# Patient Record
Sex: MALE | Race: WHITE | NOT HISPANIC OR LATINO | Employment: OTHER | URBAN - METROPOLITAN AREA
[De-identification: names, ages, dates, MRNs, and addresses within clinical notes are randomized per-mention and may not be internally consistent; named-entity substitution may affect disease eponyms.]

---

## 2018-05-13 ENCOUNTER — APPOINTMENT (OUTPATIENT)
Dept: GENERAL RADIOLOGY | Facility: CLINIC | Age: 58
End: 2018-05-13
Attending: EMERGENCY MEDICINE

## 2018-05-13 ENCOUNTER — HOSPITAL ENCOUNTER (EMERGENCY)
Facility: CLINIC | Age: 58
Discharge: HOME | End: 2018-05-13
Attending: EMERGENCY MEDICINE | Admitting: EMERGENCY MEDICINE

## 2018-05-13 VITALS
TEMPERATURE: 98.7 F | HEART RATE: 88 BPM | RESPIRATION RATE: 18 BRPM | OXYGEN SATURATION: 94 % | BODY MASS INDEX: 24.34 KG/M2 | SYSTOLIC BLOOD PRESSURE: 135 MMHG | DIASTOLIC BLOOD PRESSURE: 85 MMHG | WEIGHT: 205.25 LBS

## 2018-05-13 DIAGNOSIS — J20.9 ACUTE BRONCHITIS, UNSPECIFIED ORGANISM: ICD-10-CM

## 2018-05-13 LAB
ANION GAP SERPL CALCULATED.3IONS-SCNC: 14 MMOL/L (ref 3–14)
BASOPHILS # BLD AUTO: 0.1 10E9/L (ref 0–0.2)
BASOPHILS NFR BLD AUTO: 1.5 %
BUN SERPL-MCNC: 9 MG/DL (ref 7–30)
CALCIUM SERPL-MCNC: 9 MG/DL (ref 8.5–10.1)
CHLORIDE SERPL-SCNC: 105 MMOL/L (ref 94–109)
CO2 SERPL-SCNC: 25 MMOL/L (ref 20–32)
CREAT SERPL-MCNC: 0.7 MG/DL (ref 0.66–1.25)
DIFFERENTIAL METHOD BLD: ABNORMAL
EOSINOPHIL # BLD AUTO: 0.3 10E9/L (ref 0–0.7)
EOSINOPHIL NFR BLD AUTO: 5.6 %
ERYTHROCYTE [DISTWIDTH] IN BLOOD BY AUTOMATED COUNT: 12.5 % (ref 10–15)
GFR SERPL CREATININE-BSD FRML MDRD: >90 ML/MIN/1.7M2
GLUCOSE SERPL-MCNC: 114 MG/DL (ref 70–99)
HCT VFR BLD AUTO: 41.1 % (ref 40–53)
HGB BLD-MCNC: 14.2 G/DL (ref 13.3–17.7)
IMM GRANULOCYTES # BLD: 0 10E9/L (ref 0–0.4)
IMM GRANULOCYTES NFR BLD: 0.2 %
LYMPHOCYTES # BLD AUTO: 2 10E9/L (ref 0.8–5.3)
LYMPHOCYTES NFR BLD AUTO: 36.5 %
MCH RBC QN AUTO: 36 PG (ref 26.5–33)
MCHC RBC AUTO-ENTMCNC: 34.5 G/DL (ref 31.5–36.5)
MCV RBC AUTO: 104 FL (ref 78–100)
MONOCYTES # BLD AUTO: 0.4 10E9/L (ref 0–1.3)
MONOCYTES NFR BLD AUTO: 7.8 %
NEUTROPHILS # BLD AUTO: 2.6 10E9/L (ref 1.6–8.3)
NEUTROPHILS NFR BLD AUTO: 48.4 %
NRBC # BLD AUTO: 0 10*3/UL
NRBC BLD AUTO-RTO: 0 /100
PLATELET # BLD AUTO: 157 10E9/L (ref 150–450)
POTASSIUM SERPL-SCNC: 3.5 MMOL/L (ref 3.4–5.3)
RBC # BLD AUTO: 3.94 10E12/L (ref 4.4–5.9)
SODIUM SERPL-SCNC: 144 MMOL/L (ref 133–144)
TROPONIN I SERPL-MCNC: <0.015 UG/L (ref 0–0.04)
WBC # BLD AUTO: 5.4 10E9/L (ref 4–11)

## 2018-05-13 PROCEDURE — 93005 ELECTROCARDIOGRAM TRACING: CPT | Performed by: EMERGENCY MEDICINE

## 2018-05-13 PROCEDURE — 85025 COMPLETE CBC W/AUTO DIFF WBC: CPT | Performed by: EMERGENCY MEDICINE

## 2018-05-13 PROCEDURE — 99284 EMERGENCY DEPT VISIT MOD MDM: CPT | Mod: 25 | Performed by: EMERGENCY MEDICINE

## 2018-05-13 PROCEDURE — 84484 ASSAY OF TROPONIN QUANT: CPT | Performed by: EMERGENCY MEDICINE

## 2018-05-13 PROCEDURE — 25000125 ZZHC RX 250: Performed by: EMERGENCY MEDICINE

## 2018-05-13 PROCEDURE — 93010 ELECTROCARDIOGRAM REPORT: CPT | Mod: Z6 | Performed by: EMERGENCY MEDICINE

## 2018-05-13 PROCEDURE — 99285 EMERGENCY DEPT VISIT HI MDM: CPT | Mod: 25 | Performed by: EMERGENCY MEDICINE

## 2018-05-13 PROCEDURE — 71046 X-RAY EXAM CHEST 2 VIEWS: CPT

## 2018-05-13 PROCEDURE — 80048 BASIC METABOLIC PNL TOTAL CA: CPT | Performed by: EMERGENCY MEDICINE

## 2018-05-13 PROCEDURE — 94640 AIRWAY INHALATION TREATMENT: CPT | Performed by: EMERGENCY MEDICINE

## 2018-05-13 RX ORDER — AZITHROMYCIN 250 MG/1
TABLET, FILM COATED ORAL
Qty: 6 TABLET | Refills: 0 | Status: SHIPPED | OUTPATIENT
Start: 2018-05-13 | End: 2018-05-18

## 2018-05-13 RX ORDER — ALBUTEROL SULFATE 90 UG/1
2 AEROSOL, METERED RESPIRATORY (INHALATION) EVERY 6 HOURS PRN
Qty: 1 INHALER | Refills: 0 | Status: ON HOLD | OUTPATIENT
Start: 2018-05-13 | End: 2019-07-08

## 2018-05-13 RX ORDER — IPRATROPIUM BROMIDE AND ALBUTEROL SULFATE 2.5; .5 MG/3ML; MG/3ML
3 SOLUTION RESPIRATORY (INHALATION) ONCE
Status: COMPLETED | OUTPATIENT
Start: 2018-05-13 | End: 2018-05-13

## 2018-05-13 RX ADMIN — IPRATROPIUM BROMIDE AND ALBUTEROL SULFATE 3 ML: .5; 3 SOLUTION RESPIRATORY (INHALATION) at 12:05

## 2018-05-13 ASSESSMENT — ENCOUNTER SYMPTOMS
EYE REDNESS: 0
HEADACHES: 0
COUGH: 1
SINUS PAIN: 0
FEVER: 0
MYALGIAS: 1
SHORTNESS OF BREATH: 0
ABDOMINAL PAIN: 0
RHINORRHEA: 0
CONFUSION: 0
COLOR CHANGE: 0
NECK STIFFNESS: 0
ARTHRALGIAS: 0
SORE THROAT: 0
DIFFICULTY URINATING: 0

## 2018-05-13 NOTE — ED AVS SNAPSHOT
[facility], [place], Emergency Department    [address]    [place] [address]    Phone:  [phone]    Fax:  [phone]                                       Sohan Marcano   MRN: [id number]    Department:  [facility], Emergency Department   Date of Visit:  5/13/2018           After Visit Summary Signature Page     I have received my discharge instructions, and my questions have been answered. I have discussed any challenges I see with this plan with the nurse or doctor.    ..........................................................................................................................................  Patient/Patient Representative Signature      ..........................................................................................................................................  Patient Representative Print Name and Relationship to Patient    ..................................................               ................................................  Date                                            Time    ..........................................................................................................................................  Reviewed by Signature/Title    ...................................................              ..............................................  Date                                                            Time

## 2018-05-13 NOTE — ED AVS SNAPSHOT
[facility], Emergency Department    [address]    [place] [address]    Phone:  [phone]    Fax:  [phone]                                       Sohan Marcano   MRN: [id number]    Department:  [facility], Emergency Department   Date of Visit:  5/13/2018           Patient Information     Date Of Birth          1960        Your diagnoses for this visit were:     Acute bronchitis, unspecified organism        You were seen by Dung Delaney MD.        Discharge Instructions         Acute Bronchitis  Your healthcare provider has told you that you have acute bronchitis. Bronchitis is infection or inflammation of the bronchial tubes (airways in the lungs). Normally, air moves easily in and out of the airways. Bronchitis narrows the airways, making it harder for air to flow in and out of the lungs. This causes symptoms such as shortness of breath, coughing up yellow or green mucus, and wheezing. Bronchitis can be acute or chronic. Acute means the condition comes on quickly and goes away in a short time, usually within 3 to 10 days. Chronic means a condition lasts a long time and often comes back.    What causes acute bronchitis?  Acute bronchitis almost always starts as a viral respiratory infection, such as a cold or the flu. Certain factors make it more likely for a cold or flu to turn into bronchitis. These include being very young, being elderly, having a heart or lung problem, or having a weak immune system. Cigarette smoking also makes bronchitis more likely.  When bronchitis develops, the airways become swollen. The airways may also become infected with bacteria. This is known as a secondary infection.  Diagnosing acute bronchitis  Your healthcare provider will examine you and ask about your symptoms and health history. You may also have a sputum culture to test the fluid in your lungs. Chest X-rays may be done to look for infection in the lungs.  Treating acute bronchitis  Bronchitis  usually clears up as the cold or flu goes away. You can help feel better faster by doing the following:    Take medicine as directed. You may be told to take ibuprofen or other over-the-counter medicines. These help relieve inflammation in your bronchial tubes. Your healthcare provider may prescribe an inhaler to help open up the bronchial tubes. Most of the time, acute bronchitis is caused by a viral infection. Antibiotics are usually not prescribed for viral infections.    Drink plenty of fluids, such as water, juice, or warm soup. Fluids loosen mucus so that you can cough it up. This helps you breathe more easily. Fluids also prevent dehydration.    Make sure you get plenty of rest.    Do not smoke. Do not allow anyone else to smoke in your home.  Recovery and follow-up  Follow up with your doctor as you are told. You will likely feel better in a week or two. But a dry cough can linger beyond that time. Let your doctor know if you still have symptoms (other than a dry cough) after 2 weeks, or if you re prone to getting bronchial infections. Take steps to protect yourself from future infections. These steps include stopping smoking and avoiding tobacco smoke, washing your hands often, and getting a yearly flu shot.  When to call your healthcare provider  Call the healthcare provider if you have any of the following:    Fever of 100.4 F (38.0 C) or higher, or as advised    Symptoms that get worse, or new symptoms    Trouble breathing    Symptoms that don t start to improve within a week, or within 3 days of taking antibiotics   Date Last Reviewed: 12/1/2016 2000-2017 [employer]. [address] [place] [address], [place] [address]. All rights reserved. This information is not intended as a substitute for professional medical care. Always follow your healthcare professional's instructions.      Use albuterol inhaler as directed.  Take complete course of azithromycin.    Please make an appointment to follow up  with Your Primary Care Provider in 1 week.     24 Hour Appointment Hotline       To make an appointment at any [facility], call [phone] ([phone]). If you don't have a family doctor or clinic, we will help you find one. [facility] are conveniently located to serve the needs of you and your family.             Review of your medicines      START taking        Dose / Directions Last dose taken    albuterol 108 (90 Base) MCG/ACT Inhaler   Commonly known as:  PROAIR HFA/PROVENTIL HFA/VENTOLIN HFA   Dose:  2 puff   Quantity:  1 Inhaler        Inhale 2 puffs into the lungs every 6 hours as needed for shortness of breath / dyspnea or wheezing   Refills:  0        azithromycin 250 MG tablet   Commonly known as:  ZITHROMAX Z-SEN   Quantity:  6 tablet        Two tablets on the first day, then one tablet daily for the next 4 days   Refills:  0          Our records show that you are taking the medicines listed below. If these are incorrect, please call your family doctor or clinic.        Dose / Directions Last dose taken    NEW MED        VITAMIN B COMPLEX + VITAMIN C - Take 1 tablet by mouth once daily.   Refills:  0                Prescriptions were sent or printed at these locations (2 Prescriptions)                   Other Prescriptions                Printed at Department/Unit printer (2 of 2)         albuterol (PROAIR HFA/PROVENTIL HFA/VENTOLIN HFA) 108 (90 Base) MCG/ACT Inhaler               azithromycin (ZITHROMAX Z-SEN) 250 MG tablet                Procedures and tests performed during your visit     Basic metabolic panel    CBC with platelets differential    Chest XR,  PA & LAT    EKG 12 lead    Peripheral IV catheter    Troponin I      Orders Needing Specimen Collection     None      Pending Results     Date and Time Order Name Status Description    5/13/2018 1147 Chest XR,  PA & LAT Preliminary             Pending Culture Results     No orders found from 5/11/2018 to 5/14/2018.           "  Pending Results Instructions     If you had any lab results that were not finalized at the time of your Discharge, you can call the ED Lab Result RN at [phone]. You will be contacted by this team for any positive Lab results or changes in treatment. The nurses are available 7 days a week from 10A to 6:30P.  You can leave a message 24 hours per day and they will return your call.        Thank you for choosing [facility]       Thank you for choosing [facility] for your care. Our goal is always to provide you with excellent care. Hearing back from our patients is one way we can continue to improve our services. Please take a few minutes to complete the written survey that you may receive in the mail after you visit with us. Thank you!        [facility] Information     [facility] lets you send messages to your doctor, view your test results, renew your prescriptions, schedule appointments and more. To sign up, go to [email or web address] . Click on \"Log in\" on the left side of the screen, which will take you to the Welcome page. Then click on \"Sign up Now\" on the right side of the page.     You will be asked to enter the access code listed below, as well as some personal information. Please follow the directions to create your username and password.     Your access code is: [email or web address]  Expires: 2018  1:13 PM     Your access code will  in 90 days. If you need help or a new code, please call your [facility] clinic or [phone].        Care EveryWhere ID     This is your Care EveryWhere ID. This could be used by other organizations to access your [facility] medical records  [id number]        Equal Access to Services     [facility]: Hadii prachi Frank, wacarenda luqadaha, qamariano dinhalefren lawler. So [facility] [phone].    ATENCIÓN: Si habla español, tiene a archuleta disposición servicios gratuitos de asistencia lingüística. Llame al [phone].    We comply " with applicable federal civil rights laws and [place] laws. We do not discriminate on the basis of race, color, national origin, age, disability, sex, sexual orientation, or gender identity.            After Visit Summary       This is your record. Keep this with you and show to your community pharmacist(s) and doctor(s) at your next visit.

## 2018-05-13 NOTE — DISCHARGE INSTRUCTIONS

## 2018-05-13 NOTE — ED PROVIDER NOTES
History     Chief Complaint   Patient presents with     Cough     fough for the past 1 week, feels congested in chest,      HPI  Sohan Marcano is a 57 year old male who resents the emergency department for evaluation of cough, chest congestion, and chest pain.  Patient states that he has been ill for the past 3 weeks.  Patient states that his symptoms began with nasal congestion and rhinorrhea.  He subsequent developed a GI illness which included nausea and vomiting.  Those symptoms have subsequently resolved but for the past week, the patient has had a cough is productive of sputum that is mostly clear but occasionally green.  He denies any hemoptysis.  Patient also complains of his chest feeling congested and tight.  Though symptoms are worse with coughing.  Patient denies any fever although states he has had some sweats.  He denies any nausea or vomiting.  No abdominal pain.  No back pain.  Patient denies any leg pain or swelling.  Patient does smoke cigarettes.    I have reviewed the Medications, Allergies, Past Medical and Surgical History, and Social History in the Epic system.    Review of Systems   Constitutional: Negative for fever.   HENT: Negative for congestion, rhinorrhea, sinus pain and sore throat.    Eyes: Negative for redness.   Respiratory: Positive for cough. Negative for shortness of breath.    Cardiovascular: Positive for chest pain.   Gastrointestinal: Negative for abdominal pain.   Genitourinary: Negative for difficulty urinating.   Musculoskeletal: Positive for myalgias. Negative for arthralgias and neck stiffness.   Skin: Negative for color change.   Neurological: Negative for headaches.   Psychiatric/Behavioral: Negative for confusion.   All other systems reviewed and are negative.      Physical Exam   BP: 140/84  Pulse: 105  Temp: 98.7  F (37.1  C)  Resp: 18  Weight: 93.1 kg (205 lb 4 oz)  SpO2: 97 %      Physical Exam   Constitutional: He appears well-developed and well-nourished.  No distress.   HENT:   Head: Normocephalic and atraumatic.   Mouth/Throat: Oropharynx is clear and moist. No oropharyngeal exudate.   Eyes: Pupils are equal, round, and reactive to light. No scleral icterus.   Neck: Normal range of motion. Neck supple.   Cardiovascular: Normal rate, regular rhythm, normal heart sounds and intact distal pulses.    Pulmonary/Chest: Effort normal and breath sounds normal. No respiratory distress.   Abdominal: Soft. Bowel sounds are normal. There is no tenderness.   Musculoskeletal: Normal range of motion. He exhibits no edema or tenderness.   Neurological: He is alert. He has normal strength. Coordination normal.   Skin: Skin is warm and dry. No rash noted. He is not diaphoretic.   Psychiatric: He has a normal mood and affect. His behavior is normal.   Nursing note and vitals reviewed.      ED Course     ED Course     Procedures             EKG Interpretation:      Interpreted by BARNEY SHI MD  Time reviewed: 1205  Symptoms at time of EKG: chest pain   Rhythm: normal sinus   Rate: 84  Axis: Left Axis Deviation  Ectopy: none  Conduction: normal  ST Segments/ T Waves: No ST-T wave changes and No acute ischemic changes  Q Waves: none  Comparison to prior: Resolved atrial fibrillation    Clinical Impression: no acute changes    Results for orders placed or performed during the hospital encounter of 05/13/18   Chest XR,  PA & LAT    Narrative    XR CHEST 2 VW   5/13/2018 12:47 PM     HISTORY: cough;     COMPARISON: None.    FINDINGS: The heart is negative.  Lungs appear to be hyperinflated.  This pattern raises the possibility of emphysematous change. The  pulmonary vasculature is normal.  The bones and soft tissues are  unremarkable.      Impression    IMPRESSION:   1. No active infiltrates are identified.  2. Lungs appear hyperinflated. Question emphysematous change.       CBC with platelets differential   Result Value Ref Range    WBC 5.4 4.0 - 11.0 10e9/L    RBC Count 3.94 (L) 4.4 -  5.9 10e12/L    Hemoglobin 14.2 13.3 - 17.7 g/dL    Hematocrit 41.1 40.0 - 53.0 %     (H) 78 - 100 fl    MCH 36.0 (H) 26.5 - 33.0 pg    MCHC 34.5 31.5 - 36.5 g/dL    RDW 12.5 10.0 - 15.0 %    Platelet Count 157 150 - 450 10e9/L    Diff Method Automated Method     % Neutrophils 48.4 %    % Lymphocytes 36.5 %    % Monocytes 7.8 %    % Eosinophils 5.6 %    % Basophils 1.5 %    % Immature Granulocytes 0.2 %    Nucleated RBCs 0 0 /100    Absolute Neutrophil 2.6 1.6 - 8.3 10e9/L    Absolute Lymphocytes 2.0 0.8 - 5.3 10e9/L    Absolute Monocytes 0.4 0.0 - 1.3 10e9/L    Absolute Eosinophils 0.3 0.0 - 0.7 10e9/L    Absolute Basophils 0.1 0.0 - 0.2 10e9/L    Abs Immature Granulocytes 0.0 0 - 0.4 10e9/L    Absolute Nucleated RBC 0.0    Basic metabolic panel   Result Value Ref Range    Sodium 144 133 - 144 mmol/L    Potassium 3.5 3.4 - 5.3 mmol/L    Chloride 105 94 - 109 mmol/L    Carbon Dioxide 25 20 - 32 mmol/L    Anion Gap 14 3 - 14 mmol/L    Glucose 114 (H) 70 - 99 mg/dL    Urea Nitrogen 9 7 - 30 mg/dL    Creatinine 0.70 0.66 - 1.25 mg/dL    GFR Estimate >90 >60 mL/min/1.7m2    GFR Estimate If Black >90 >60 mL/min/1.7m2    Calcium 9.0 8.5 - 10.1 mg/dL   Troponin I   Result Value Ref Range    Troponin I ES <0.015 0.000 - 0.045 ug/L     Critical Care time:    Medications   ipratropium - albuterol 0.5 mg/2.5 mg/3 mL (DUONEB) neb solution 3 mL (3 mLs Nebulization Given 5/13/18 1205)         Assessments & Plan (with Medical Decision Making)   57 year old male tobacco smoker to the emergency department for evaluation of productive cough, chest congestion, and chest tightness.  His symptoms began 3-4 weeks ago with a URI followed by a gastrointestinal illness.  Those symptoms resolved with the patient has persistent productive cough, chest congestion, and chest tightness.  Emergency department is vital signs are normal.  He is not hypoxic.  Patient does not have leg pain, swelling, hemoptysis, pleuritic chest pain, or  hypoxia to suggest pulmonary embolus.  The patient's chest radiograph does not reveal any infiltrate.  His EKG does not reveal any ischemic changes troponin is negative so do not suspect cardiac etiology.  His labs are normal.  Suspect his symptoms are related to a bronchitis/COPD exacerbation.  The patient continues to smoke and does have emphysematous change on his chest radiograph.  He was advised to discontinue smoking.  He will be discharged home with a Zithromax Dosepak.  Albuterol MDI prescribed.  Primary care follow-up in 1 week recommended.    I have reviewed the nursing notes.    I have reviewed the findings, diagnosis, plan and need for follow up with the patient.    New Prescriptions    ALBUTEROL (PROAIR HFA/PROVENTIL HFA/VENTOLIN HFA) 108 (90 BASE) MCG/ACT INHALER    Inhale 2 puffs into the lungs every 6 hours as needed for shortness of breath / dyspnea or wheezing    AZITHROMYCIN (ZITHROMAX Z-SEN) 250 MG TABLET    Two tablets on the first day, then one tablet daily for the next 4 days       Final diagnoses:   Acute bronchitis, unspecified organism       5/13/2018   [facility], [place], EMERGENCY DEPARTMENT     Dung Delaney MD  05/13/18 4607

## 2018-05-14 LAB — INTERPRETATION ECG - MUSE: NORMAL

## 2018-09-23 ENCOUNTER — HOSPITAL ENCOUNTER (INPATIENT)
Facility: CLINIC | Age: 58
LOS: 1 days | Discharge: HOME | DRG: 391 | End: 2018-09-24
Attending: FAMILY MEDICINE | Admitting: INTERNAL MEDICINE

## 2018-09-23 ENCOUNTER — APPOINTMENT (OUTPATIENT)
Dept: ULTRASOUND IMAGING | Facility: CLINIC | Age: 58
DRG: 391 | End: 2018-09-23
Attending: FAMILY MEDICINE

## 2018-09-23 ENCOUNTER — APPOINTMENT (OUTPATIENT)
Dept: GENERAL RADIOLOGY | Facility: CLINIC | Age: 58
DRG: 391 | End: 2018-09-23
Attending: FAMILY MEDICINE

## 2018-09-23 DIAGNOSIS — K85.90 PANCREATITIS, UNSPECIFIED PANCREATITIS TYPE: ICD-10-CM

## 2018-09-23 DIAGNOSIS — R07.9 CHEST PAIN, UNSPECIFIED TYPE: ICD-10-CM

## 2018-09-23 DIAGNOSIS — F10.10 ALCOHOL ABUSE, EPISODIC DRINKING BEHAVIOR: ICD-10-CM

## 2018-09-23 PROBLEM — R10.9 ABDOMINAL PAIN: Status: ACTIVE | Noted: 2018-09-23

## 2018-09-23 LAB
ALBUMIN SERPL-MCNC: 3.5 G/DL (ref 3.4–5)
ALP SERPL-CCNC: 103 U/L (ref 40–150)
ALT SERPL W P-5'-P-CCNC: 74 U/L (ref 0–70)
ANION GAP SERPL CALCULATED.3IONS-SCNC: 8 MMOL/L (ref 3–14)
AST SERPL W P-5'-P-CCNC: 100 U/L (ref 0–45)
BASOPHILS # BLD AUTO: 0.1 10E9/L (ref 0–0.2)
BASOPHILS NFR BLD AUTO: 1.5 %
BILIRUB DIRECT SERPL-MCNC: 0.7 MG/DL (ref 0–0.2)
BILIRUB SERPL-MCNC: 1.2 MG/DL (ref 0.2–1.3)
BUN SERPL-MCNC: 9 MG/DL (ref 7–30)
CALCIUM SERPL-MCNC: 8.5 MG/DL (ref 8.5–10.1)
CHLORIDE SERPL-SCNC: 106 MMOL/L (ref 94–109)
CO2 SERPL-SCNC: 29 MMOL/L (ref 20–32)
CREAT SERPL-MCNC: 0.8 MG/DL (ref 0.66–1.25)
D DIMER PPP FEU-MCNC: 0.3 UG/ML FEU (ref 0–0.5)
DIFFERENTIAL METHOD BLD: ABNORMAL
EOSINOPHIL # BLD AUTO: 0.2 10E9/L (ref 0–0.7)
EOSINOPHIL NFR BLD AUTO: 2.7 %
ERYTHROCYTE [DISTWIDTH] IN BLOOD BY AUTOMATED COUNT: 12.6 % (ref 10–15)
GFR SERPL CREATININE-BSD FRML MDRD: >90 ML/MIN/1.7M2
GLUCOSE SERPL-MCNC: 141 MG/DL (ref 70–99)
HCT VFR BLD AUTO: 39.9 % (ref 40–53)
HGB BLD-MCNC: 13.8 G/DL (ref 13.3–17.7)
IMM GRANULOCYTES # BLD: 0 10E9/L (ref 0–0.4)
IMM GRANULOCYTES NFR BLD: 0.2 %
INTERPRETATION ECG - MUSE: NORMAL
LIPASE SERPL-CCNC: 760 U/L (ref 73–393)
LYMPHOCYTES # BLD AUTO: 1.9 10E9/L (ref 0.8–5.3)
LYMPHOCYTES NFR BLD AUTO: 23.1 %
MAGNESIUM SERPL-MCNC: 1.8 MG/DL (ref 1.6–2.3)
MCH RBC QN AUTO: 36.7 PG (ref 26.5–33)
MCHC RBC AUTO-ENTMCNC: 34.6 G/DL (ref 31.5–36.5)
MCV RBC AUTO: 106 FL (ref 78–100)
MONOCYTES # BLD AUTO: 1 10E9/L (ref 0–1.3)
MONOCYTES NFR BLD AUTO: 12.5 %
NEUTROPHILS # BLD AUTO: 4.8 10E9/L (ref 1.6–8.3)
NEUTROPHILS NFR BLD AUTO: 60 %
NRBC # BLD AUTO: 0 10*3/UL
NRBC BLD AUTO-RTO: 0 /100
PLATELET # BLD AUTO: 141 10E9/L (ref 150–450)
POTASSIUM SERPL-SCNC: 3.4 MMOL/L (ref 3.4–5.3)
PROT SERPL-MCNC: 8.2 G/DL (ref 6.8–8.8)
RBC # BLD AUTO: 3.76 10E12/L (ref 4.4–5.9)
SODIUM SERPL-SCNC: 143 MMOL/L (ref 133–144)
TROPONIN I BLD-MCNC: 0 UG/L (ref 0–0.1)
TROPONIN I SERPL-MCNC: <0.015 UG/L (ref 0–0.04)
TROPONIN I SERPL-MCNC: <0.015 UG/L (ref 0–0.04)
WBC # BLD AUTO: 8.1 10E9/L (ref 4–11)

## 2018-09-23 PROCEDURE — 25000125 ZZHC RX 250: Performed by: FAMILY MEDICINE

## 2018-09-23 PROCEDURE — 99285 EMERGENCY DEPT VISIT HI MDM: CPT | Mod: 25 | Performed by: FAMILY MEDICINE

## 2018-09-23 PROCEDURE — 25000132 ZZH RX MED GY IP 250 OP 250 PS 637: Performed by: FAMILY MEDICINE

## 2018-09-23 PROCEDURE — 93005 ELECTROCARDIOGRAM TRACING: CPT | Performed by: FAMILY MEDICINE

## 2018-09-23 PROCEDURE — 84484 ASSAY OF TROPONIN QUANT: CPT

## 2018-09-23 PROCEDURE — 83735 ASSAY OF MAGNESIUM: CPT | Performed by: FAMILY MEDICINE

## 2018-09-23 PROCEDURE — 85379 FIBRIN DEGRADATION QUANT: CPT | Performed by: FAMILY MEDICINE

## 2018-09-23 PROCEDURE — 12000001 ZZH R&B MED SURG/OB UMMC

## 2018-09-23 PROCEDURE — 80048 BASIC METABOLIC PNL TOTAL CA: CPT | Performed by: FAMILY MEDICINE

## 2018-09-23 PROCEDURE — 76705 ECHO EXAM OF ABDOMEN: CPT

## 2018-09-23 PROCEDURE — C9113 INJ PANTOPRAZOLE SODIUM, VIA: HCPCS | Performed by: FAMILY MEDICINE

## 2018-09-23 PROCEDURE — 99223 1ST HOSP IP/OBS HIGH 75: CPT | Mod: AI | Performed by: INTERNAL MEDICINE

## 2018-09-23 PROCEDURE — 84484 ASSAY OF TROPONIN QUANT: CPT | Performed by: FAMILY MEDICINE

## 2018-09-23 PROCEDURE — 85025 COMPLETE CBC W/AUTO DIFF WBC: CPT | Performed by: FAMILY MEDICINE

## 2018-09-23 PROCEDURE — 96375 TX/PRO/DX INJ NEW DRUG ADDON: CPT | Performed by: FAMILY MEDICINE

## 2018-09-23 PROCEDURE — 96374 THER/PROPH/DIAG INJ IV PUSH: CPT | Performed by: FAMILY MEDICINE

## 2018-09-23 PROCEDURE — 83605 ASSAY OF LACTIC ACID: CPT | Performed by: INTERNAL MEDICINE

## 2018-09-23 PROCEDURE — 80076 HEPATIC FUNCTION PANEL: CPT | Performed by: FAMILY MEDICINE

## 2018-09-23 PROCEDURE — 25000128 H RX IP 250 OP 636: Performed by: FAMILY MEDICINE

## 2018-09-23 PROCEDURE — 71045 X-RAY EXAM CHEST 1 VIEW: CPT

## 2018-09-23 PROCEDURE — 83690 ASSAY OF LIPASE: CPT | Performed by: FAMILY MEDICINE

## 2018-09-23 PROCEDURE — 36415 COLL VENOUS BLD VENIPUNCTURE: CPT | Performed by: INTERNAL MEDICINE

## 2018-09-23 PROCEDURE — 93010 ELECTROCARDIOGRAM REPORT: CPT | Mod: Z6 | Performed by: FAMILY MEDICINE

## 2018-09-23 RX ORDER — PROCHLORPERAZINE 25 MG
25 SUPPOSITORY, RECTAL RECTAL EVERY 12 HOURS PRN
Status: DISCONTINUED | OUTPATIENT
Start: 2018-09-23 | End: 2018-09-25 | Stop reason: HOSPADM

## 2018-09-23 RX ORDER — SODIUM CHLORIDE, SODIUM LACTATE, POTASSIUM CHLORIDE, CALCIUM CHLORIDE 600; 310; 30; 20 MG/100ML; MG/100ML; MG/100ML; MG/100ML
INJECTION, SOLUTION INTRAVENOUS CONTINUOUS
Status: DISCONTINUED | OUTPATIENT
Start: 2018-09-23 | End: 2018-09-25 | Stop reason: HOSPADM

## 2018-09-23 RX ORDER — AMOXICILLIN 250 MG
1 CAPSULE ORAL 2 TIMES DAILY PRN
Status: DISCONTINUED | OUTPATIENT
Start: 2018-09-23 | End: 2018-09-25 | Stop reason: HOSPADM

## 2018-09-23 RX ORDER — MULTIPLE VITAMINS W/ MINERALS TAB 9MG-400MCG
1 TAB ORAL DAILY
Status: DISCONTINUED | OUTPATIENT
Start: 2018-09-24 | End: 2018-09-25 | Stop reason: HOSPADM

## 2018-09-23 RX ORDER — ALBUTEROL SULFATE 90 UG/1
2 AEROSOL, METERED RESPIRATORY (INHALATION) EVERY 6 HOURS PRN
Status: DISCONTINUED | OUTPATIENT
Start: 2018-09-23 | End: 2018-09-25 | Stop reason: HOSPADM

## 2018-09-23 RX ORDER — ONDANSETRON 2 MG/ML
4 INJECTION INTRAMUSCULAR; INTRAVENOUS EVERY 6 HOURS PRN
Status: DISCONTINUED | OUTPATIENT
Start: 2018-09-23 | End: 2018-09-25 | Stop reason: HOSPADM

## 2018-09-23 RX ORDER — LORAZEPAM 1 MG/1
1-4 TABLET ORAL EVERY 30 MIN PRN
Status: DISCONTINUED | OUTPATIENT
Start: 2018-09-23 | End: 2018-09-25 | Stop reason: HOSPADM

## 2018-09-23 RX ORDER — HYDROMORPHONE HCL/0.9% NACL/PF 0.2MG/0.2
0.2 SYRINGE (ML) INTRAVENOUS ONCE
Status: COMPLETED | OUTPATIENT
Start: 2018-09-23 | End: 2018-09-23

## 2018-09-23 RX ORDER — AMOXICILLIN 250 MG
2 CAPSULE ORAL 2 TIMES DAILY PRN
Status: DISCONTINUED | OUTPATIENT
Start: 2018-09-23 | End: 2018-09-25 | Stop reason: HOSPADM

## 2018-09-23 RX ORDER — ONDANSETRON 4 MG/1
4 TABLET, ORALLY DISINTEGRATING ORAL EVERY 6 HOURS PRN
Status: DISCONTINUED | OUTPATIENT
Start: 2018-09-23 | End: 2018-09-25 | Stop reason: HOSPADM

## 2018-09-23 RX ORDER — ATENOLOL 50 MG/1
50 TABLET ORAL DAILY PRN
Status: DISCONTINUED | OUTPATIENT
Start: 2018-09-23 | End: 2018-09-25 | Stop reason: HOSPADM

## 2018-09-23 RX ORDER — FOLIC ACID 1 MG/1
1 TABLET ORAL DAILY
Status: DISCONTINUED | OUTPATIENT
Start: 2018-09-24 | End: 2018-09-25 | Stop reason: HOSPADM

## 2018-09-23 RX ORDER — LANOLIN ALCOHOL/MO/W.PET/CERES
100 CREAM (GRAM) TOPICAL DAILY
Status: DISCONTINUED | OUTPATIENT
Start: 2018-09-24 | End: 2018-09-25 | Stop reason: HOSPADM

## 2018-09-23 RX ORDER — NALOXONE HYDROCHLORIDE 0.4 MG/ML
.1-.4 INJECTION, SOLUTION INTRAMUSCULAR; INTRAVENOUS; SUBCUTANEOUS
Status: DISCONTINUED | OUTPATIENT
Start: 2018-09-23 | End: 2018-09-25 | Stop reason: HOSPADM

## 2018-09-23 RX ORDER — HYDROMORPHONE HYDROCHLORIDE 1 MG/ML
.3-.5 INJECTION, SOLUTION INTRAMUSCULAR; INTRAVENOUS; SUBCUTANEOUS
Status: DISCONTINUED | OUTPATIENT
Start: 2018-09-23 | End: 2018-09-25 | Stop reason: HOSPADM

## 2018-09-23 RX ORDER — LORAZEPAM 1 MG/1
1 TABLET ORAL ONCE
Status: COMPLETED | OUTPATIENT
Start: 2018-09-23 | End: 2018-09-23

## 2018-09-23 RX ORDER — ALUMINA, MAGNESIA, AND SIMETHICONE 2400; 2400; 240 MG/30ML; MG/30ML; MG/30ML
30 SUSPENSION ORAL EVERY 4 HOURS PRN
Status: DISCONTINUED | OUTPATIENT
Start: 2018-09-23 | End: 2018-09-25 | Stop reason: HOSPADM

## 2018-09-23 RX ORDER — PROCHLORPERAZINE MALEATE 10 MG
10 TABLET ORAL EVERY 6 HOURS PRN
Status: DISCONTINUED | OUTPATIENT
Start: 2018-09-23 | End: 2018-09-25 | Stop reason: HOSPADM

## 2018-09-23 RX ADMIN — Medication 0.2 MG: at 19:37

## 2018-09-23 RX ADMIN — LIDOCAINE HYDROCHLORIDE 30 ML: 20 SOLUTION ORAL; TOPICAL at 19:01

## 2018-09-23 RX ADMIN — LORAZEPAM 1 MG: 1 TABLET ORAL at 21:10

## 2018-09-23 RX ADMIN — PANTOPRAZOLE SODIUM 40 MG: 40 INJECTION, POWDER, FOR SOLUTION INTRAVENOUS at 21:11

## 2018-09-23 ASSESSMENT — ENCOUNTER SYMPTOMS: ABDOMINAL PAIN: 1

## 2018-09-23 NOTE — ED PROVIDER NOTES
"    [facility] [place] [facility] ([facility])    9/23/18     ED 8 6:47 PM   History     Chief Complaint   Patient presents with     Chest Pain     Onset 3 hours ago awoke with mid sternal chest pain radiating to throat, \"burning \" head, increased stress at home with sick relatives, denies nausea or shortness of breath.     The history is provided by the patient and medical records.     Sohan Marcano is a 58 year old male who presents with chest pain. He has a history of atrial fibrillation as well as alcohol abuse and bronchitis.  He was brought in by hs housemate. He states he has been under significant stress. Approximately 18 days ago his sister suffered catastrophic ruptured aortic aneurysm while visiting their brother who is at [facility] for a heart attack. 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 He slept for past 24 hours. He woke up at around 2:40 today and noticed sore throat followed by central chest pain. His head felt like it was burning though temperature was only 99.1  F. The chest pain is migrating upwards and feels like it is up at the top of his throat. He is a smoker, has smoked more over past 2 1/2 weeks given circumstances. He drinks 5-6 drinks a day, last drink was 1/2 an hour ago, states he's not a chronic alcoholic but has noticed that as he has gotten older \"it doesn't agree with the system as well.\" No history of diabetes, hyperlipidemia, hypertension.     I have reviewed the Medications, Allergies, Past Medical and Surgical History, and Social History in the Epic system.    Review of Systems   Cardiovascular: Positive for chest pain.   Gastrointestinal: Positive for abdominal pain (epigastric).   All other systems were reviewed and are negative      Physical Exam   BP: (!) 144/99  Pulse: 96  Heart Rate: 96  Temp: 99.6  F (37.6  C)  Resp: 18  Weight: 83.2 kg (183 lb 5 oz)  SpO2: 100 " %      Physical Exam   Constitutional: He is oriented to person, place, and time. He appears well-developed and well-nourished.   HENT:   Head: Normocephalic and atraumatic.   Mouth/Throat: Oropharynx is clear and moist.   Eyes: EOM are normal. Pupils are equal, round, and reactive to light.   Neck: Normal range of motion. Neck supple. No tracheal deviation present. No thyromegaly present.   Cardiovascular: Normal rate, regular rhythm, normal heart sounds and intact distal pulses.  Exam reveals no gallop and no friction rub.    No murmur heard.  Pulmonary/Chest: Effort normal and breath sounds normal. He exhibits no tenderness.   Abdominal: Soft. Bowel sounds are normal. He exhibits no distension and no mass. There is no tenderness.   Musculoskeletal: He exhibits no edema or tenderness.   Neurological: He is alert and oriented to person, place, and time. No cranial nerve deficit. Coordination normal.   Skin: Skin is warm and dry. No rash noted.   Psychiatric: He has a normal mood and affect. His behavior is normal.   Nursing note and vitals reviewed.      ED Course     ED Course     Procedures             EKG Interpretation:      Interpreted by Melchor Epstein  Time reviewed: 1852  Symptoms at time of EKG: chest pain   Rhythm: normal sinus   Rate: Normal  Axis: Normal  Ectopy: premature ventricular contractions (unifocal)  Conduction: normal  ST Segments/ T Waves: No ST-T wave changes and No acute ischemic changes  Q Waves: nonspecific  Comparison to prior: No old EKG available    Clinical Impression: Normal sinus with PVCs.  No acute ischemic changes.                Critical Care time:  none             Labs Ordered and Resulted from Time of ED Arrival Up to the Time of Departure from the ED   CBC WITH PLATELETS DIFFERENTIAL - Abnormal; Notable for the following:        Result Value    RBC Count 3.76 (*)     Hematocrit 39.9 (*)      (*)     MCH 36.7 (*)     Platelet Count 141 (*)     All other components  within normal limits   BASIC METABOLIC PANEL - Abnormal; Notable for the following:     Glucose 141 (*)     All other components within normal limits   HEPATIC PANEL - Abnormal; Notable for the following:     Bilirubin Direct 0.7 (*)     ALT 74 (*)      (*)     All other components within normal limits   LIPASE - Abnormal; Notable for the following:     Lipase 760 (*)     All other components within normal limits   TROPONIN I   D DIMER QUANTITATIVE   MAGNESIUM   PERIPHERAL IV CATHETER   CARDIAC CONTINUOUS MONITORING   PULSE OXIMETRY NURSING   ISTAT TROPONIN NURSING POCT   TROPONIN POCT            Assessments & Plan (with Medical Decision Making)   58-year-old man with 3 cardiac risk factors presenting with epigastric pain and chest pain of 2-3 hours duration.   Initial differential diagnosis included a life-threatening cause of chest pain such as a ACS, PE, dissection, pneumonia, pneumothorax, pericarditis, Boerhaave tear, pancreatitis, cholecystitis, and other life-threatening as well as nonlife threatening causes of acute chest pain.    On exam vital signs are remarkable for some elevation of the blood pressure but blood pressure symmetric in both arms.  His other vital signs are normal.  His cardiovascular exam is normal.  His lungs are clear to auscultation bilaterally.  Moderate epigastric tenderness to palpation without peritoneal signs. remainder the physical exam benign.  EKG does not show any acute ischemic changes and his initial i-STAT troponin is negative.  Troponin and d-dimer are both negative.  He has elevation of his liver transaminases and his lipase is near 3 times normal.  Right upper quadrant ultrasound shows cholelithiasis without any signs of cholecystitis or obstruction.  This patient appears to have symptoms due to early acute alcoholic pancreatitis.  Based on the history he may be at some risk for mild alcohol withdrawal as well.  Would like to admit him under observation status for  gentle hydration, gentle advancement of diet, serial abdominal exam, MSSA protocol, repeat lipase.  I will discuss with the hospitalist.    I have reviewed the nursing notes.    I have reviewed the findings, diagnosis, plan and need for follow up with the patient.    New Prescriptions    No medications on file       Final diagnoses:   Pancreatitis, unspecified pancreatitis type   Alcohol abuse, episodic drinking behavior       9/23/2018   [facility], [place], EMERGENCY DEPARTMENT     Melchor Epstein MD  09/23/18 6638

## 2018-09-23 NOTE — IP AVS SNAPSHOT
[facility] [address]    [address]    [place] [address]    Phone:  [phone]                                       After Visit Summary   9/23/2018    Sohan Marcano    MRN: [id number]           After Visit Summary Signature Page     I have received my discharge instructions, and my questions have been answered. I have discussed any challenges I see with this plan with the nurse or doctor.    ..........................................................................................................................................  Patient/Patient Representative Signature      ..........................................................................................................................................  Patient Representative Print Name and Relationship to Patient    ..................................................               ................................................  Date                                   Time    ..........................................................................................................................................  Reviewed by Signature/Title    ...................................................              ..............................................  Date                                               Time          22EPIC Rev 08/18

## 2018-09-23 NOTE — IP AVS SNAPSHOT
"                  MRN:[id number]                      After Visit Summary   9/23/2018    Sohan Marcano    MRN: [id number]           Thank you!     Thank you for choosing [facility] for your care. Our goal is always to provide you with excellent care. Hearing back from our patients is one way we can continue to improve our services. Please take a few minutes to complete the written survey that you may receive in the mail after you visit with us. Thank you!        Patient Information     Date Of Birth          1960        Designated Caregiver       Most Recent Value    Caregiver    Will someone help with your care after discharge? no      About your hospital stay     You were admitted on:  September 23, 2018 You last received care in the:  [facility]    You were discharged on:  September 24, 2018       Who to Call     For medical emergencies, please call 911.  For non-urgent questions about your medical care, please call your primary care provider or clinic, None          Attending Provider     Provider Specialty    Melchor Epstein MD Family Practice    Elier Lua MD Internal Medicine       Primary Care Provider Fax #    Physician No Ref-Primary [phone]      Pending Results     No orders found for last 3 day(s).            Admission Information     Date & Time Provider Department Dept. Phone    9/23/2018 Elier Lua MD [facility] [phone]      Your Vitals Were     Blood Pressure Pulse Temperature Respirations Weight Pulse Oximetry    135/76 (BP Location: Right arm) 86 98.1  F (36.7  C) (Oral) 16 83.2 kg (183 lb 5 oz) 96%    BMI (Body Mass Index)                   21.74 kg/m2           MyChart Information     [employer] lets you send messages to your doctor, view your test results, renew your prescriptions, schedule appointments and more. To sign up, go to [email or web address] . Click on \"Log in\" on the left side of the screen, which will take you to the Welcome page. Then click on \"Sign " "up Now\" on the right side of the page.     You will be asked to enter the access code listed below, as well as some personal information. Please follow the directions to create your username and password.     Your access code is: [email or web address]  Expires: 2018  8:20 PM     Your access code will  in 90 days. If you need help or a new code, please call your [facility] clinic or [phone].        Care EveryWhere ID     This is your Care EveryWhere ID. This could be used by other organizations to access your [facility] medical records  [id number]        Equal Access to Services     [facility]: Hadii prachi Frank, page steward, lazara washburn, efren enriquez . So [facility] [phone].    ATENCIÓN: Si habla español, tiene a archuleta disposición servicios gratuitos de asistencia lingüística. Llame al [phone].    We comply with applicable federal civil rights laws and [place] laws. We do not discriminate on the basis of race, color, national origin, age, disability, sex, sexual orientation, or gender identity.               Review of your medicines      UNREVIEWED medicines. Ask your doctor about these medicines        Dose / Directions    albuterol 108 (90 Base) MCG/ACT inhaler   Commonly known as:  PROAIR HFA/PROVENTIL HFA/VENTOLIN HFA        Dose:  2 puff   Inhale 2 puffs into the lungs every 6 hours as needed for shortness of breath / dyspnea or wheezing   Quantity:  1 Inhaler   Refills:  0       NEW MED        VITAMIN B COMPLEX + VITAMIN C - Take 1 tablet by mouth once daily.   Refills:  0                Protect others around you: Learn how to safely use, store and throw away your medicines at www.disposemymeds.org.             Medication List: This is a list of all your medications and when to take them. Check marks below indicate your daily home schedule. Keep this list as a reference.      Medications           Morning Afternoon Evening Bedtime As Needed "    albuterol 108 (90 Base) MCG/ACT inhaler   Commonly known as:  PROAIR HFA/PROVENTIL HFA/VENTOLIN HFA   Inhale 2 puffs into the lungs every 6 hours as needed for shortness of breath / dyspnea or wheezing                                NEW MED   VITAMIN B COMPLEX + VITAMIN C - Take 1 tablet by mouth once daily.

## 2018-09-24 VITALS
TEMPERATURE: 98.1 F | WEIGHT: 183.31 LBS | HEART RATE: 86 BPM | OXYGEN SATURATION: 96 % | RESPIRATION RATE: 16 BRPM | SYSTOLIC BLOOD PRESSURE: 135 MMHG | DIASTOLIC BLOOD PRESSURE: 76 MMHG | BODY MASS INDEX: 21.74 KG/M2

## 2018-09-24 LAB
ALBUMIN SERPL-MCNC: 3.1 G/DL (ref 3.4–5)
ALP SERPL-CCNC: 65 U/L (ref 40–150)
ALT SERPL W P-5'-P-CCNC: 60 U/L (ref 0–70)
AMPHETAMINES UR QL SCN: NEGATIVE
ANION GAP SERPL CALCULATED.3IONS-SCNC: 9 MMOL/L (ref 3–14)
AST SERPL W P-5'-P-CCNC: 71 U/L (ref 0–45)
BARBITURATES UR QL: NEGATIVE
BENZODIAZ UR QL: NEGATIVE
BILIRUB SERPL-MCNC: 2.4 MG/DL (ref 0.2–1.3)
BUN SERPL-MCNC: 10 MG/DL (ref 7–30)
CALCIUM SERPL-MCNC: 7.8 MG/DL (ref 8.5–10.1)
CANNABINOIDS UR QL SCN: NEGATIVE
CHLORIDE SERPL-SCNC: 106 MMOL/L (ref 94–109)
CO2 SERPL-SCNC: 25 MMOL/L (ref 20–32)
COCAINE UR QL: NEGATIVE
CREAT SERPL-MCNC: 0.63 MG/DL (ref 0.66–1.25)
ERYTHROCYTE [DISTWIDTH] IN BLOOD BY AUTOMATED COUNT: 12.4 % (ref 10–15)
ETHANOL UR QL SCN: NEGATIVE
GFR SERPL CREATININE-BSD FRML MDRD: >90 ML/MIN/1.7M2
GLUCOSE SERPL-MCNC: 137 MG/DL (ref 70–99)
HCT VFR BLD AUTO: 35.5 % (ref 40–53)
HGB BLD-MCNC: 12.4 G/DL (ref 13.3–17.7)
LACTATE BLD-SCNC: 1.6 MMOL/L (ref 0.7–2)
LIPASE SERPL-CCNC: 360 U/L (ref 73–393)
MCH RBC QN AUTO: 37 PG (ref 26.5–33)
MCHC RBC AUTO-ENTMCNC: 34.9 G/DL (ref 31.5–36.5)
MCV RBC AUTO: 106 FL (ref 78–100)
OPIATES UR QL SCN: POSITIVE
PCP UR QL SCN: NEGATIVE
PLATELET # BLD AUTO: 117 10E9/L (ref 150–450)
POTASSIUM SERPL-SCNC: 3.4 MMOL/L (ref 3.4–5.3)
PROT SERPL-MCNC: 7 G/DL (ref 6.8–8.8)
RBC # BLD AUTO: 3.35 10E12/L (ref 4.4–5.9)
SODIUM SERPL-SCNC: 140 MMOL/L (ref 133–144)
WBC # BLD AUTO: 9.1 10E9/L (ref 4–11)

## 2018-09-24 PROCEDURE — 85027 COMPLETE CBC AUTOMATED: CPT | Performed by: INTERNAL MEDICINE

## 2018-09-24 PROCEDURE — 80307 DRUG TEST PRSMV CHEM ANLYZR: CPT | Performed by: INTERNAL MEDICINE

## 2018-09-24 PROCEDURE — 25000128 H RX IP 250 OP 636: Performed by: HOSPITALIST

## 2018-09-24 PROCEDURE — 83690 ASSAY OF LIPASE: CPT | Performed by: INTERNAL MEDICINE

## 2018-09-24 PROCEDURE — 12000001 ZZH R&B MED SURG/OB UMMC

## 2018-09-24 PROCEDURE — 99238 HOSP IP/OBS DSCHRG MGMT 30/<: CPT | Performed by: HOSPITALIST

## 2018-09-24 PROCEDURE — 80320 DRUG SCREEN QUANTALCOHOLS: CPT | Performed by: INTERNAL MEDICINE

## 2018-09-24 PROCEDURE — 36415 COLL VENOUS BLD VENIPUNCTURE: CPT | Performed by: INTERNAL MEDICINE

## 2018-09-24 PROCEDURE — 80053 COMPREHEN METABOLIC PANEL: CPT | Performed by: INTERNAL MEDICINE

## 2018-09-24 PROCEDURE — C9113 INJ PANTOPRAZOLE SODIUM, VIA: HCPCS | Performed by: INTERNAL MEDICINE

## 2018-09-24 PROCEDURE — 99233 SBSQ HOSP IP/OBS HIGH 50: CPT | Performed by: HOSPITALIST

## 2018-09-24 PROCEDURE — 25000125 ZZHC RX 250: Performed by: INTERNAL MEDICINE

## 2018-09-24 PROCEDURE — 25000132 ZZH RX MED GY IP 250 OP 250 PS 637: Performed by: INTERNAL MEDICINE

## 2018-09-24 PROCEDURE — 25000128 H RX IP 250 OP 636: Performed by: INTERNAL MEDICINE

## 2018-09-24 RX ORDER — PANTOPRAZOLE SODIUM 40 MG/1
40 TABLET, DELAYED RELEASE ORAL
Status: DISCONTINUED | OUTPATIENT
Start: 2018-09-25 | End: 2018-09-25 | Stop reason: HOSPADM

## 2018-09-24 RX ADMIN — Medication 1 MG: at 00:38

## 2018-09-24 RX ADMIN — Medication 0.5 MG: at 00:33

## 2018-09-24 RX ADMIN — ALUMINUM HYDROXIDE, MAGNESIUM HYDROXIDE, AND DIMETHICONE 30 ML: 400; 400; 40 SUSPENSION ORAL at 00:38

## 2018-09-24 RX ADMIN — SODIUM CHLORIDE, POTASSIUM CHLORIDE, SODIUM LACTATE AND CALCIUM CHLORIDE: 600; 310; 30; 20 INJECTION, SOLUTION INTRAVENOUS at 14:31

## 2018-09-24 RX ADMIN — Medication 0.5 MG: at 04:51

## 2018-09-24 RX ADMIN — MULTIPLE VITAMINS W/ MINERALS TAB 1 TABLET: TAB at 07:39

## 2018-09-24 RX ADMIN — LORAZEPAM 2 MG: 1 TABLET ORAL at 00:42

## 2018-09-24 RX ADMIN — SODIUM CHLORIDE, POTASSIUM CHLORIDE, SODIUM LACTATE AND CALCIUM CHLORIDE: 600; 310; 30; 20 INJECTION, SOLUTION INTRAVENOUS at 09:14

## 2018-09-24 RX ADMIN — FOLIC ACID: 5 INJECTION, SOLUTION INTRAMUSCULAR; INTRAVENOUS; SUBCUTANEOUS at 00:06

## 2018-09-24 RX ADMIN — SODIUM CHLORIDE, POTASSIUM CHLORIDE, SODIUM LACTATE AND CALCIUM CHLORIDE: 600; 310; 30; 20 INJECTION, SOLUTION INTRAVENOUS at 04:14

## 2018-09-24 RX ADMIN — FOLIC ACID 1 MG: 1 TABLET ORAL at 07:39

## 2018-09-24 RX ADMIN — Medication 100 MG: at 07:39

## 2018-09-24 RX ADMIN — PANTOPRAZOLE SODIUM 40 MG: 40 INJECTION, POWDER, FOR SOLUTION INTRAVENOUS at 07:40

## 2018-09-24 ASSESSMENT — ACTIVITIES OF DAILY LIVING (ADL)
ADLS_ACUITY_SCORE: 11
TRANSFERRING: 0 - INDEPENDENT
BATHING: 0 - INDEPENDENT
ADLS_ACUITY_SCORE: 10
ADLS_ACUITY_SCORE: 13
COMMUNICATION: 0 - UNDERSTANDS/COMMUNICATES WITHOUT DIFFICULTY
ADLS_ACUITY_SCORE: 11
ADLS_ACUITY_SCORE: 11
EATING: 0 - INDEPENDENT
SWALLOWING: 0 - SWALLOWS FOODS/LIQUIDS WITHOUT DIFFICULTY
ADLS_ACUITY_SCORE: 10
DRESS: 0 - INDEPENDENT
AMBULATION: 0 - INDEPENDENT
TOILETING: 0 - INDEPENDENT

## 2018-09-24 NOTE — PLAN OF CARE
"Problem: Patient Care Overview  Goal: Plan of Care/Patient Progress Review  Outcome: No Change  Admission to 10A    Pt arrived on unit via WC, transferred independently into bed, oriented to call light and room, performed routine assessment, entered admission data into computer, MD saw pt, VSS, mild tachycardia, on RA, A&Ox3, calm, pain in chest/ upper gastric area intensified - IV dilaudid given, as well as mylanta with good results, MSSA score 10 - ativan given, telemonitoring initiated, PIV fluids started, pt has not voided yet, UA/UC needed, CMS- denies numbness or tingling, call light in reach length, will continue to monitor and assist.        0500: pt voided good amounts in BR but not able to obtain urine specimen. Slightly unsteady on feet and disoriented- bed alarm placed, abdominal pain \"somewhat better\" but still complaining of discomfort- IV dilaudid given, denies nausea, no dizziness, SOB or CP reported but pt has congested bronchospastic nonproductive infrequent cough, inhaler offered but pt refused.     0545: tele monitoring showed a couple of strips of frequent PVCs (bigeminy) and now only occasionally PVCs, MD notified- no response yet, pt denies CP, SOB or dizziness, BP and HR WNL, will continue to monitor and assist.      0700: pt is sleeping, PIV infusing.  "

## 2018-09-24 NOTE — ED NOTES
"[facility], [place]   ED Nurse to Floor Handoff     Sohan Marcano is a 58 year old male who speaks English and lives with others,  in a home  They arrived in the ED by car from home    ED Chief Complaint: Chest Pain (Onset 3 hours ago awoke with mid sternal chest pain radiating to throat, \"burning \" head, increased stress at home with sick relatives, denies nausea or shortness of breath.)    ED Dx;   Final diagnoses:   Pancreatitis, unspecified pancreatitis type   Alcohol abuse, episodic drinking behavior         Needed?: No    Allergies: No Known Allergies.  Past Medical Hx:   Past Medical History:   Diagnosis Date     Actinic keratosis      Basal cell carcinoma      Squamous cell carcinoma      Substance abuse     alcohol      Baseline Mental status: WDL  Current Mental Status changes: at basesline    Infection present or suspected this encounter: no  Sepsis suspected: No  Isolation type: No active isolations     Activity level - Baseline/Home:  Independent  Activity Level - Current:   Independent    Bariatric equipment needed?: No    In the ED these meds were given:   Medications   pantoprazole (PROTONIX) 40 mg IV push injection (not administered)   LORazepam (ATIVAN) tablet 1 mg (not administered)   lidocaine (viscous) (XYLOCAINE) 2 % 15 mL, alum & mag hydroxide-simethicone (MYLANTA ES/MAALOX  ES) 15 mL GI Cocktail (30 mLs Oral Given 9/23/18 1901)   HYDROmorphone (DILAUDID) injection 0.2 mg (0.2 mg Intravenous Given 9/23/18 1937)       Drips running?  No    Home pump  No    Current LDAs  Peripheral IV 09/23/18 Left Upper forearm (Active)   Site Assessment WDL 9/23/2018  6:50 PM   Line Status Saline locked 9/23/2018  6:50 PM   Phlebitis Scale 0-->no symptoms 9/23/2018  6:50 PM   Infiltration Site Treatment Method  None 9/23/2018  6:50 PM   Extravasation? No 9/23/2018  6:50 PM   Dressing Intervention New dressing  9/23/2018  6:50 PM   Number of days:0       Labs results: "   Labs Ordered and Resulted from Time of ED Arrival Up to the Time of Departure from the ED   CBC WITH PLATELETS DIFFERENTIAL - Abnormal; Notable for the following:        Result Value    RBC Count 3.76 (*)     Hematocrit 39.9 (*)      (*)     MCH 36.7 (*)     Platelet Count 141 (*)     All other components within normal limits   BASIC METABOLIC PANEL - Abnormal; Notable for the following:     Glucose 141 (*)     All other components within normal limits   HEPATIC PANEL - Abnormal; Notable for the following:     Bilirubin Direct 0.7 (*)     ALT 74 (*)      (*)     All other components within normal limits   LIPASE - Abnormal; Notable for the following:     Lipase 760 (*)     All other components within normal limits   TROPONIN I   D DIMER QUANTITATIVE   MAGNESIUM   PERIPHERAL IV CATHETER   CARDIAC CONTINUOUS MONITORING   PULSE OXIMETRY NURSING   ISTAT TROPONIN NURSING POCT   TROPONIN POCT       Imaging Studies:   Recent Results (from the past 24 hour(s))   XR Chest Port 1 View    Narrative    CHEST PORTABLE ONE VIEW   9/23/2018 7:32 PM     HISTORY: Extreme dyspnea.     COMPARISON: Chest x-rays dated 5/13/2018.    FINDINGS:  The lateral aspects of the bilateral lungs are not  completely included and cannot be evaluated. Visualized portions of  the lungs are grossly clear. There is mild cephalization of pulmonary  venous structures. Heart size and pulmonary vascularity are otherwise  within normal limits. No fracture or malalignment. No pneumothorax.  Effusions cannot be excluded due to the costophrenic angles not being  included.      Impression    IMPRESSION:  1. Mild pulmonary venous cephalization.  2. No other findings of acute cardiopulmonary disease are identified.  3. Limitations of this study as described above.   Abdomen US, limited (RUQ only)    Narrative    LIMITED ABDOMINAL ULTRASOUND  9/23/2018 8:11 PM     HISTORY:  Abnormal LFTs and lipase, evaluate for cholelithiasis  or  obstruction.    COMPARISON: None.    FINDINGS:    Gallbladder: Hyperechoic shadowing structures in the gallbladder are  consistent with calculi measuring up to 0.7 cm. The gallbladder is  otherwise normal in appearance without wall thickening,  pericholecystic fluid, or sonographic Hawk sign.    Bile ducts:  CHD is normal diameter.  No intrahepatic biliary  dilatation.    Liver: There is diffuse hyperechogenicity and decreased penetration of  liver most consistent with diffuse fatty infiltration. Hypoechoic  lesion in the lateral segment left lobe of liver measures 1.0 cm and  likely represents a cyst. There are some internal echoes which are  likely due to the depth of the finding. No other intrahepatic lesion  or biliary ductal dilatation is seen.    Pancreas:  Partially obscured but grossly unremarkable, where seen.     Right kidney:  Normal.       Impression    IMPRESSION:   1. Cholelithiasis without evidence for acute cholecystitis.  2. Diffuse fatty infiltration of the liver.  3. Hypoechoic lesion in the lateral segment left lobe of liver is most  consistent with a simple cyst. There are some internal echoes which  are likely due to the depth of the lesion and likely not due to  complexity.  4. No other abnormalities are identified.       Recent vital signs:   /88  Pulse 96  Temp 99.6  F (37.6  C) (Oral)  Resp 16  Wt 83.2 kg (183 lb 5 oz)  SpO2 94%  BMI 21.74 kg/m2    Cardiac Rhythm: Normal Sinus  Pt needs tele? No  Skin/wound Issues: None    Code Status: DNR / DNI    Pain control: fair    Nausea control: good    Abnormal labs/tests/findings requiring intervention: see labs    Family present during ED course? No   Family Comments/Social Situation comments: none    Tasks needing completion: None    Raúl David RN  [facility]--     [phone] [place] [facility]  [phone] [facility]

## 2018-09-24 NOTE — PROGRESS NOTES
[facility], [place]    Hospitalist Progress Note    Date of Service (when I saw the patient): 09/24/2018    Assessment & Plan     58-year-old male with history of alcohol abuse, smoking presents with acute chest pain.     # Chest  And Abominal Pain: Likely from pancreatitis, may have acute gastritis. More localized to lower sternum and epigastrium, reports it got better with gi cocktail.  Has family history of MI.  No prior history of similar chest pain or other significant cardiac history. EKG, troponin negative. Unlikely ACS. D-dimer not elevated, pulmonary embolism unlikely. Has lipase elevated.  Given history of alcohol use, possible early mild pancreatitis. Pain started after he drank Alchol and ate Pizza.     Ultrasound abdomen with fatty liver.  Cholelithiasis.  No evidence of acute cholecystitis or bile duct obstruction. Transaminases are elevated, more consistent with alcohol use.     Low fat diet,   IVF  PPI daily  ETOH cessation.      #Alcohol abuse, withdrawal:     Last drink was yesterday afternoon around 2- 3 pm  Drink half a pint of Gin most days  Wants to quit  [facility] protocol  Supportive care    #Tobacco abuse:   Active smoker, has cut back to 2 cigarettes per day  Encourage tobacco cessation  Nicotine gum as needed    DVT Prophylaxis: Enoxaparin (Lovenox) SQ  Code Status: Full Code    Disposition: 1-2 days based on Withdrawal symptoms.     Elder Mitchell MD    Interval History     Chest/Abdomne pain is better. No nausea or vomiting. No new issues noted. He is not withdrawing right now.     -Data reviewed today: I reviewed all new labs and imaging results over the last 24 hours. I personally reviewed no images or EKG's today.    Physical Exam   Temp: 98.1  F (36.7  C) Temp src: Oral BP: 135/76 Pulse: 86 Heart Rate: 70 Resp: 16 SpO2: 96 % O2 Device: None (Room air)    Vitals:    09/23/18 1850   Weight: 83.2 kg (183 lb 5 oz)     Vital Signs with Ranges  Temp:  [98.1  F  (36.7  C)-99.6  F (37.6  C)] 98.1  F (36.7  C)  Pulse:  [86-96] 86  Heart Rate:  [] 70  Resp:  [15-25] 16  BP: (104-159)/(65-99) 135/76  SpO2:  [90 %-100 %] 96 %  I/O last 3 completed shifts:  In: 716.67 [I.V.:716.67]  Out: 300 [Urine:300]    Constitutional:  Awake, alert, cooperative, no apparent distress  Respiratory: Clear to auscultation bilaterally, no crackles or wheezing  Cardiovascular: Regular rate and rhythm, normal S1 and S2, and no murmur noted  GI:  non-tender, BS present.  Skin/Integumen: No rashes, no cyanosis, no edema      Medications     lactated ringers 100 mL/hr at 09/24/18 1431       folic acid  1 mg Oral Daily     [START ON 9/25/2018] influenza vaccine adult (product based on age)  0.5 mL Intramuscular Prior to discharge     multivitamin, therapeutic with minerals  1 tablet Oral Daily     [START ON 9/25/2018] pantoprazole  40 mg Oral QAM AC     thiamine  100 mg Oral Daily       Data     Recent Labs  Lab 09/24/18  0609 09/23/18  2252 09/23/18  1848 09/23/18  1846   WBC 9.1  --   --  8.1   HGB 12.4*  --   --  13.8   *  --   --  106*   *  --   --  141*     --   --  143   POTASSIUM 3.4  --   --  3.4   CHLORIDE 106  --   --  106   CO2 25  --   --  29   BUN 10  --   --  9   CR 0.63*  --   --  0.80   ANIONGAP 9  --   --  8   LEIGHA 7.8*  --   --  8.5   *  --   --  141*   ALBUMIN 3.1*  --   --  3.5   PROTTOTAL 7.0  --   --  8.2   BILITOTAL 2.4*  --   --  1.2   ALKPHOS 65  --   --  103   ALT 60  --   --  74*   AST 71*  --   --  100*   LIPASE 360  --   --  760*   TROPI  --  <0.015  --  <0.015   TROPONIN  --   --  0.00  --        Recent Results (from the past 24 hour(s))   XR Chest Port 1 View    Narrative    CHEST PORTABLE ONE VIEW   9/23/2018 7:32 PM     HISTORY: Extreme dyspnea.     COMPARISON: Chest x-rays dated 5/13/2018.    FINDINGS:  The lateral aspects of the bilateral lungs are not  completely included and cannot be evaluated. Visualized portions of  the lungs are  grossly clear. There is mild cephalization of pulmonary  venous structures. Heart size and pulmonary vascularity are otherwise  within normal limits. No fracture or malalignment. No pneumothorax.  Effusions cannot be excluded due to the costophrenic angles not being  included.      Impression    IMPRESSION:  1. Mild pulmonary venous cephalization.  2. No other findings of acute cardiopulmonary disease are identified.  3. Limitations of this study as described above.    LIANNA JOHNSON MD   Abdomen US, limited (RUQ only)    Narrative    LIMITED ABDOMINAL ULTRASOUND  9/23/2018 8:11 PM     HISTORY:  Abnormal LFTs and lipase, evaluate for cholelithiasis or  obstruction.    COMPARISON: None.    FINDINGS:    Gallbladder: Hyperechoic shadowing structures in the gallbladder are  consistent with calculi measuring up to 0.7 cm. The gallbladder is  otherwise normal in appearance without wall thickening,  pericholecystic fluid, or sonographic Hawk sign.    Bile ducts:  CHD is normal diameter.  No intrahepatic biliary  dilatation.    Liver: There is diffuse hyperechogenicity and decreased penetration of  liver most consistent with diffuse fatty infiltration. Hypoechoic  lesion in the lateral segment left lobe of liver measures 1.0 cm and  likely represents a cyst. There are some internal echoes which are  likely due to the depth of the finding. No other intrahepatic lesion  or biliary ductal dilatation is seen.    Pancreas:  Partially obscured but grossly unremarkable, where seen.     Right kidney:  Normal.       Impression    IMPRESSION:   1. Cholelithiasis without evidence for acute cholecystitis.  2. Diffuse fatty infiltration of the liver.  3. Hypoechoic lesion in the lateral segment left lobe of liver is most  consistent with a simple cyst. There are some internal echoes which  are likely due to the depth of the lesion and likely not due to  complexity.  4. No other abnormalities are identified.    LIANNA JOHNSON MD

## 2018-09-24 NOTE — PLAN OF CARE
Problem: Patient Care Overview  Goal: Plan of Care/Patient Progress Review  Outcome: No Change  Pt denies abd pain. Voiding small amounts, urine concentrated. PIV infusing. MSSA 5; pt's last drink was last night prior to arrival. Slightly unsteady on feet - bed alarm on. ALso not using call light appropriately and needs frequent reminder. CMS intact. Diet just changed from clears to low fat. Pt sleeping most of the shift. Staff will continue to monitor.

## 2018-09-24 NOTE — H&P
History and Physical     Sohan Marcano MRN# [id number]   YOB: 1960 Age: 58 year old      Date of Admission:  9/23/2018      CC:  Chest pain x 3 pm 09.23.18    HPI: Obtained from the patient, chart review, ED provider.     Sohan Marcano is a 58 year old male who presents with chest pain, acute.   He has a history of alcohol abuse, smoking  and Chronic bronchitis.  He states he has been under significant stress recently. Approximately 18 days ago his sister suffered catastrophic ruptured aortic aneurysm while visiting their brother who is at [facility] for a heart attack. 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 He has been exhausted and slept for past 24 hours. He woke up at around ~3pm today and noticed sore throat followed by lower chest pain. His head felt like it was burning though temperature was only 99.1  F. The chest pain is migrating upwards along the sterum and feels like it is up at the top of his throat.   He is a smoker, has smoked more over past 2 1/2 weeks given circumstances. He drinks john 1 pt daily , last drink was 1/2 an hour prior to the presentation to ED.  Alcohol withdrawal -- unknown what to expect as he has not tried to quit drinking for long time. No hx of seizures or DTs.   Denies any other medical problem.   Only takes ibuprofen at times at home, last dose more than a week ago.     At current, patient reports chest pain better, now more localized on lower sternum. Reproducible on palpation.   Denies any Nausea, vomiting, fever. Chills+. No sob. No Lh or dizziness or palpitations.   No prior hx of Pancreatitis or GB or Liver problem.      I have reviewed this patient's Past Medical History, Past Surgical History, Allergies, Family History, Social History, Medications and updated it with pertinent information if needed.    Past Medical History:  Past Medical History:  "  Diagnosis Date     Actinic keratosis      Basal cell carcinoma      Squamous cell carcinoma      Substance abuse     alcohol       Past Surgical History:  Past Surgical History:   Procedure Laterality Date     HERNIA REPAIR         Allergies:   No Known Allergies    Medications:    No current facility-administered medications on file prior to encounter.   Current Outpatient Prescriptions on File Prior to Encounter:  albuterol (PROAIR HFA/PROVENTIL HFA/VENTOLIN HFA) 108 (90 Base) MCG/ACT Inhaler Inhale 2 puffs into the lungs every 6 hours as needed for shortness of breath / dyspnea or wheezing   NEW MED VITAMIN B COMPLEX + VITAMIN C - Take 1 tablet by mouth once daily.   - Ibuprofen prn.     Social History:  Social History     Social History     Marital status: Single     Spouse name: N/A     Number of children: N/A     Years of education: N/A     Occupational History     Not on file.     Social History Main Topics     Smoking status: Current Every Day Smoker     Packs/day: 0.50     Smokeless tobacco: Never Used      Comment: 2014 - 15-16 cigs daily     Alcohol use Yes      Comment: 1pt of john per day     Drug use: No     Sexual activity: Not on file     Other Topics Concern     Not on file     Social History Narrative       Family History:   Family History   Problem Relation Age of Onset     Other - See Comments Mother      Patient states his Mother ? had skin cancer.     Other - See Comments Father      Patient reports Father had \"all types of skin cancer\".     Other - See Comments Sister      Basal cell carcinoma         ROS:  The remainder of the complete ROS was negative unless noted in the HPI.      Exam:    /65  Pulse 96  Temp 99.6  F (37.6  C) (Oral)  Resp 25  Wt 83.2 kg (183 lb 5 oz)  SpO2 95%  BMI 21.74 kg/m2    Temp (24hrs), Av.6  F (37.6  C), Min:99.6  F (37.6  C), Max:99.6  F (37.6  C)      Wt Readings from Last 5 Encounters:   18 83.2 kg (183 lb 5 oz)   18 93.1 kg " (205 lb 4 oz)       General: Alert, interactive, NAD  HEENT: AT/NC, sclera anicteric, Moist MM  Neck: Supple, no JVD or lymphadenopathy  Resp/chest: clear to auscultation bilaterally, no crackles or wheezes  Cardiac/Heart: s1s2 regular rate and rhythm, no murmur  GI/Abdomen: Soft, mild TTP lower sternum, epigastrium, nondistended. +BS.  No rebound or guarding.  MSK/Extremities: No LE edema, distally warm and well perfused.   Skin: Warm and dry, no jaundice or rash on exposed areas.   Neuro: Alert & oriented x 3, grossly intact  Psychiatry: stable mood.       Labs:    BMP  Recent Labs  Lab 18  184      POTASSIUM 3.4   CHLORIDE 106   LEIGHA 8.5   CO2 29   BUN 9   CR 0.80   *     CBC  Recent Labs  Lab 18   WBC 8.1   RBC 3.76*   HGB 13.8   HCT 39.9*   *   MCH 36.7*   MCHC 34.6   RDW 12.6   *     INRNo lab results found in last 7 days.  LFTs  Recent Labs  Lab 18  184   ALKPHOS 103   *   ALT 74*   BILITOTAL 1.2   PROTTOTAL 8.2   ALBUMIN 3.5      PANC  Recent Labs  Lab 18   LIPASE 760*     Lactic acid: 1.6.  D Dimer: 0.3  Ma.8      Imaging:   Recent Results (from the past 24 hour(s))   XR Chest Port 1 View    Narrative    CHEST PORTABLE ONE VIEW   2018 7:32 PM     HISTORY: Extreme dyspnea.     IMPRESSION:  1. Mild pulmonary venous cephalization.  2. No other findings of acute cardiopulmonary disease are identified.  3. Limitations of this study as described above.       Abdomen US, limited (RUQ only)    Narrative    LIMITED ABDOMINAL ULTRASOUND  2018 8:11 PM     HISTORY:  Abnormal LFTs and lipase, evaluate for cholelithiasis or  obstruction..       IMPRESSION:   1. Cholelithiasis without evidence for acute cholecystitis.  2. Diffuse fatty infiltration of the liver.  3. Hypoechoic lesion in the lateral segment left lobe of liver is most  consistent with a simple cyst. There are some internal echoes which  are likely due to the depth of the  lesion and likely not due to  complexity.  4. No other abnormalities are identified.           EKG: Reviewed by me.  Normal sinus rhythm.  No acute ST-T changes.    Lab Results   Component Value Date    TROPI <0.015 09/23/2018    TROPI <0.015 09/23/2018    TROPI <0.015 05/13/2018    TROPI <0.012 03/07/2014    TROPONIN 0.00 09/23/2018       Assessment/ Plan:    58-year-old male with history of alcohol abuse, smoking presents with acute chest pain.    # Chest pain, acute, mid to lower sternum.  No radiation.  More localized to lower sternum and epigastrium after initial treatment in the emergency department.  Family history of MI.  No prior history of similar chest pain or other significant cardiac history.  EKG, troponin negative.  Unlikely ACS.  D-dimer not elevated, pulmonary embolism unlikely.  Patient lipase elevated.  Given history of alcohol use, possible early mild pancreatitis.  However no radiation of pain to the back, no vomiting nausea vomiting at present.   Ultrasound abdomen with fatty liver.  Cholelithiasis.  No evidence of acute cholecystitis or bile duct obstruction.  Transaminases are elevated, more consistent with alcohol use.  Abdominal exam: On impressive.  Possible acute alcohol, NSAID, smoking -Related gastropathy.     Treat supportively: IVF /hr, iv protonix 40 mg daily.  Antacid as needed, antiemetics as needed, iv dilaudid 0.3-0.5 Q3hr prn, advance diet as tolerated to clear  Follow-up liver function, lipase in the am.   Serial clinical exam  Telemetry monitor  Monitor vitals  Avoid nsaids, alcohol or smoking.    #Alcohol abuse, withdrawal:   No signs of withdrawal at present.  Patient withdrawal symptoms unknown.   - check Urine tox screen  - given iv banana bag x 1  -Monitor for alcohol withdrawal using MSSA with lorazepam.   - Mvi, thiamine, folic acid daily  - SW, CD consult in am  - fall precautions.     #Tobacco abuse::   Active smoker  Encourage tobacco cessation  Nicotine gum as  needed    # Pain Assessment:   - Sohan is experiencing pain due to chest, epigastric pain. Pain management was discussed and the plan was created in a collaborative fashion.  Sohan's response to the current recommendations: engaged  - Please see the plan for pain management as documented above      FEN: ivf. adat to clears  Prophylaxis: mechanical.   IV Access: piv  CODE: full     Disposition: Disposition Plan   Expected discharge in 2-3 days to prior living arrangement once medically ready and detoxed     Entered: Milton El 09/23/2018, 11:28 PM         D/w JENNIFER El MD  House Physician  Pager: [phone]    9/23/2018

## 2018-09-25 ENCOUNTER — PATIENT OUTREACH (OUTPATIENT)
Dept: CARE COORDINATION | Facility: CLINIC | Age: 58
End: 2018-09-25

## 2018-09-25 ASSESSMENT — ACTIVITIES OF DAILY LIVING (ADL): ADLS_ACUITY_SCORE: 10

## 2018-09-25 NOTE — PROVIDER NOTIFICATION
Fransisco    S- Pt put on light.  Stated he was ready to go home,  'Had many things to take care of'  Pt upset he had only been able to eat this afternoon.  States no abd pain.  Pt voiding freq w IV fluid infusing  Dr Mitchell informed pt wants to go home.  MD requested pt to sign AMA papers.  Reviewed AMA paperwork w pt, pt agreed and signed. 2020- pt signed the time as 4:20 pm.    PIV discontinue;  Pt steady when up walking in room and swift.   Pt s belonngings including cash and credit cards obtained from [employer].  Pt called friend for ride, left ambulatory  By 2100

## 2018-09-25 NOTE — PROGRESS NOTES
Pt. discharged at 2100 to home and left with personal belongings. Charge nurse discharged pt. Pt wanted to leave, moonlighter notified as there was no discharge orders. Paperwork given and gone over w/ charge nurse. Pt. had no further questions at the time of discharge and no unmet needs were identified.

## 2018-10-28 NOTE — DISCHARGE SUMMARY
[facility]  Discharge Summary    Sohan Marcano MRN# [id number]   YOB: 1960 Age: 58 year old     Date of Admission:  9/23/2018  Date of Discharge:  9/24/2018  8:45 PM  Admitting Physician:  Milton El MD  Discharge Physician:  Elder Mitchell MD  Discharging Service:  Internal Medicine     Primary Provider: No Ref-Primary, Physician              Discharge Diagnosis:     Primary Discharge Diagnosis:    # Chest  And Abominal Pain: Likely from Acute pancreatitis, may have acute gastritis.   # Ho Alcohol Abuse.   # Cholelithiasis on USG  # Tobacco abuse    Past Medical History:   Diagnosis Date     Actinic keratosis      Basal cell carcinoma      Squamous cell carcinoma      Substance abuse     alcohol                    Discharge Medications:     Discharge Medication List as of 9/24/2018  8:21 PM      CONTINUE these medications which have NOT CHANGED    Details   albuterol (PROAIR HFA/PROVENTIL HFA/VENTOLIN HFA) 108 (90 Base) MCG/ACT Inhaler Inhale 2 puffs into the lungs every 6 hours as needed for shortness of breath / dyspnea or wheezing, Disp-1 Inhaler, R-0, Local Print      NEW MED VITAMIN B COMPLEX + VITAMIN C - Take 1 tablet by mouth once daily., Historical                  Hospital Course:     58-year-old male with history of alcohol abuse, smoking presents with acute chest pain.      # Chest  And Abominal Pain: Likely from pancreatitis, may have acute gastritis. Ho Alcohol Abuse. Cholelithiasis on USG:  More localized to lower sternum and epigastrium, reports it got better with gi cocktail.  Has family history of MI.  No prior history of similar chest pain or other significant cardiac history. EKG, troponin negative. Unlikely ACS. D-dimer not elevated, pulmonary embolism unlikely. Has lipase elevated.  Given history of alcohol use, possible early mild pancreatitis. Pain started after he drank Alchol and ate Pizza.  Ultrasound abdomen with fatty liver and  Cholelithiasis.  No evidence of acute cholecystitis or bile duct obstruction. Transaminases are elevated, more consistent with alcohol use. He was started on IVF, Kept NPO and he was on MSSA protocol. Likely pancreatitis from ETOh drinking. He drinks half a pint of Gin most days. He wants to quit. He was being treated for withdrawal from ETOH and his pancreatitis, but he decided go AMA before his pancreatitis coould completely resolve and his withdrawal get better.      #Tobacco abuse:   He is Active smoker a swell. He has cut back to 2 cigarettes per day  Encourage tobacco cessation  Nicotine gum as needed                   Discharge Disposition:     Patient left Home AMA           Condition on Discharge:   Discharge condition: Stable   Code status on discharge: Full Code                 Final Day of Progress before Discharge:       Physical Exam:  Blood pressure 135/76, pulse 86, temperature 98.1  F (36.7  C), temperature source Oral, resp. rate 16, weight 83.2 kg (183 lb 5 oz), SpO2 96 %.  GENERAL: Alert and oriented x 3. NAD.   HEENT: Anicteric sclera. PERRL. Mucous membranes moist and without lesions.   CV: RRR. S1, S2. No murmurs appreciated.   RESPIRATORY: Effort normal. Lungs CTAB with no wheezing, rales, rhonchi.   GI: Abdomen soft and non distended with normoactive bowel sounds present in all quadrants. No tenderness, rebound, guarding.      Data:  All laboratory data reviewed             Significant Results:     Results for orders placed or performed during the hospital encounter of 09/23/18   XR Chest Port 1 View    Narrative    CHEST PORTABLE ONE VIEW   9/23/2018 7:32 PM     HISTORY: Extreme dyspnea.     COMPARISON: Chest x-rays dated 5/13/2018.    FINDINGS:  The lateral aspects of the bilateral lungs are not  completely included and cannot be evaluated. Visualized portions of  the lungs are grossly clear. There is mild cephalization of pulmonary  venous structures. Heart size and pulmonary vascularity  are otherwise  within normal limits. No fracture or malalignment. No pneumothorax.  Effusions cannot be excluded due to the costophrenic angles not being  included.      Impression    IMPRESSION:  1. Mild pulmonary venous cephalization.  2. No other findings of acute cardiopulmonary disease are identified.  3. Limitations of this study as described above.    LIANNA JOHNSON MD   Abdomen US, limited (RUQ only)    Narrative    LIMITED ABDOMINAL ULTRASOUND  9/23/2018 8:11 PM     HISTORY:  Abnormal LFTs and lipase, evaluate for cholelithiasis or  obstruction.    COMPARISON: None.    FINDINGS:    Gallbladder: Hyperechoic shadowing structures in the gallbladder are  consistent with calculi measuring up to 0.7 cm. The gallbladder is  otherwise normal in appearance without wall thickening,  pericholecystic fluid, or sonographic Hawk sign.    Bile ducts:  CHD is normal diameter.  No intrahepatic biliary  dilatation.    Liver: There is diffuse hyperechogenicity and decreased penetration of  liver most consistent with diffuse fatty infiltration. Hypoechoic  lesion in the lateral segment left lobe of liver measures 1.0 cm and  likely represents a cyst. There are some internal echoes which are  likely due to the depth of the finding. No other intrahepatic lesion  or biliary ductal dilatation is seen.    Pancreas:  Partially obscured but grossly unremarkable, where seen.     Right kidney:  Normal.       Impression    IMPRESSION:   1. Cholelithiasis without evidence for acute cholecystitis.  2. Diffuse fatty infiltration of the liver.  3. Hypoechoic lesion in the lateral segment left lobe of liver is most  consistent with a simple cyst. There are some internal echoes which  are likely due to the depth of the lesion and likely not due to  complexity.  4. No other abnormalities are identified.    LIANNA JOHNSON MD   CBC with platelets differential   Result Value Ref Range    WBC 8.1 4.0 - 11.0 10e9/L    RBC Count 3.76 (L) 4.4 - 5.9  10e12/L    Hemoglobin 13.8 13.3 - 17.7 g/dL    Hematocrit 39.9 (L) 40.0 - 53.0 %     (H) 78 - 100 fl    MCH 36.7 (H) 26.5 - 33.0 pg    MCHC 34.6 31.5 - 36.5 g/dL    RDW 12.6 10.0 - 15.0 %    Platelet Count 141 (L) 150 - 450 10e9/L    Diff Method Automated Method     % Neutrophils 60.0 %    % Lymphocytes 23.1 %    % Monocytes 12.5 %    % Eosinophils 2.7 %    % Basophils 1.5 %    % Immature Granulocytes 0.2 %    Nucleated RBCs 0 0 /100    Absolute Neutrophil 4.8 1.6 - 8.3 10e9/L    Absolute Lymphocytes 1.9 0.8 - 5.3 10e9/L    Absolute Monocytes 1.0 0.0 - 1.3 10e9/L    Absolute Eosinophils 0.2 0.0 - 0.7 10e9/L    Absolute Basophils 0.1 0.0 - 0.2 10e9/L    Abs Immature Granulocytes 0.0 0 - 0.4 10e9/L    Absolute Nucleated RBC 0.0    Basic metabolic panel   Result Value Ref Range    Sodium 143 133 - 144 mmol/L    Potassium 3.4 3.4 - 5.3 mmol/L    Chloride 106 94 - 109 mmol/L    Carbon Dioxide 29 20 - 32 mmol/L    Anion Gap 8 3 - 14 mmol/L    Glucose 141 (H) 70 - 99 mg/dL    Urea Nitrogen 9 7 - 30 mg/dL    Creatinine 0.80 0.66 - 1.25 mg/dL    GFR Estimate >90 >60 mL/min/1.7m2    GFR Estimate If Black >90 >60 mL/min/1.7m2    Calcium 8.5 8.5 - 10.1 mg/dL   Troponin I   Result Value Ref Range    Troponin I ES <0.015 0.000 - 0.045 ug/L   D dimer quantitative   Result Value Ref Range    D Dimer 0.3 0.0 - 0.50 ug/ml FEU   Hepatic panel   Result Value Ref Range    Bilirubin Direct 0.7 (H) 0.0 - 0.2 mg/dL    Bilirubin Total 1.2 0.2 - 1.3 mg/dL    Albumin 3.5 3.4 - 5.0 g/dL    Protein Total 8.2 6.8 - 8.8 g/dL    Alkaline Phosphatase 103 40 - 150 U/L    ALT 74 (H) 0 - 70 U/L     (H) 0 - 45 U/L   Lipase   Result Value Ref Range    Lipase 760 (H) 73 - 393 U/L   Magnesium   Result Value Ref Range    Magnesium 1.8 1.6 - 2.3 mg/dL   Troponin I   Result Value Ref Range    Troponin I ES <0.015 0.000 - 0.045 ug/L   Lactic acid level STAT for sepsis protocol   Result Value Ref Range    Lactate for Sepsis Protocol 1.6 0.7 - 2.0  mmol/L   Drug abuse screen 8 urine (UR)   Result Value Ref Range    Amphetamine Qual Urine Negative NEG^Negative    Barbiturates Qual Urine Negative NEG^Negative    Benzodiazepine Qual Urine Negative NEG^Negative    Cannabinoids Qual Urine Negative NEG^Negative    Cocaine Qual Urine Negative NEG^Negative    Ethanol Qual Urine Negative NEG^Negative    Opiates Qualitative Urine Positive (A) NEG^Negative    PCP Qual Urine Negative NEG^Negative   Comprehensive metabolic panel   Result Value Ref Range    Sodium 140 133 - 144 mmol/L    Potassium 3.4 3.4 - 5.3 mmol/L    Chloride 106 94 - 109 mmol/L    Carbon Dioxide 25 20 - 32 mmol/L    Anion Gap 9 3 - 14 mmol/L    Glucose 137 (H) 70 - 99 mg/dL    Urea Nitrogen 10 7 - 30 mg/dL    Creatinine 0.63 (L) 0.66 - 1.25 mg/dL    GFR Estimate >90 >60 mL/min/1.7m2    GFR Estimate If Black >90 >60 mL/min/1.7m2    Calcium 7.8 (L) 8.5 - 10.1 mg/dL    Bilirubin Total 2.4 (H) 0.2 - 1.3 mg/dL    Albumin 3.1 (L) 3.4 - 5.0 g/dL    Protein Total 7.0 6.8 - 8.8 g/dL    Alkaline Phosphatase 65 40 - 150 U/L    ALT 60 0 - 70 U/L    AST 71 (H) 0 - 45 U/L   CBC with platelets   Result Value Ref Range    WBC 9.1 4.0 - 11.0 10e9/L    RBC Count 3.35 (L) 4.4 - 5.9 10e12/L    Hemoglobin 12.4 (L) 13.3 - 17.7 g/dL    Hematocrit 35.5 (L) 40.0 - 53.0 %     (H) 78 - 100 fl    MCH 37.0 (H) 26.5 - 33.0 pg    MCHC 34.9 31.5 - 36.5 g/dL    RDW 12.4 10.0 - 15.0 %    Platelet Count 117 (L) 150 - 450 10e9/L   Lipase   Result Value Ref Range    Lipase 360 73 - 393 U/L   EKG 12 lead   Result Value Ref Range    Interpretation ECG Click View Image link to view waveform and result    Troponin POCT   Result Value Ref Range    Troponin I 0.00 0.00 - 0.10 ug/L      No results found for this or any previous visit (from the past 48 hour(s)).             Pending Results:   Unresulted Labs Ordered in the Past 30 Days of this Admission     No orders found from 7/25/2018 to 9/24/2018.                  Discharge  Instructions and Follow-Up:   No discharge procedures on file.         Elder Mitchell  Internal Medicine Staff Hospitalist  [phone]  October 28, 2018        Time spent on patient: 20 minutes total including face to face and coordinating care time reviewing current illness, any medication changes, and the care plan for today.

## 2019-06-27 ENCOUNTER — HOSPITAL ENCOUNTER (EMERGENCY)
Facility: CLINIC | Age: 59
Discharge: HOME | End: 2019-06-27
Attending: EMERGENCY MEDICINE | Admitting: EMERGENCY MEDICINE

## 2019-06-27 VITALS
WEIGHT: 205.7 LBS | RESPIRATION RATE: 18 BRPM | BODY MASS INDEX: 24.39 KG/M2 | DIASTOLIC BLOOD PRESSURE: 79 MMHG | TEMPERATURE: 98.1 F | SYSTOLIC BLOOD PRESSURE: 130 MMHG | OXYGEN SATURATION: 96 % | HEART RATE: 68 BPM

## 2019-06-27 DIAGNOSIS — F10.920 ALCOHOLIC INTOXICATION WITHOUT COMPLICATION (H): ICD-10-CM

## 2019-06-27 DIAGNOSIS — F10.10 CHRONIC ALCOHOL ABUSE: ICD-10-CM

## 2019-06-27 DIAGNOSIS — F10.220 ALCOHOL DEPENDENCE WITH UNCOMPLICATED INTOXICATION (H): ICD-10-CM

## 2019-06-27 LAB — ALCOHOL BREATH TEST: 0.18 (ref 0–0.01)

## 2019-06-27 PROCEDURE — 99285 EMERGENCY DEPT VISIT HI MDM: CPT | Performed by: EMERGENCY MEDICINE

## 2019-06-27 PROCEDURE — 99283 EMERGENCY DEPT VISIT LOW MDM: CPT | Mod: Z6 | Performed by: EMERGENCY MEDICINE

## 2019-06-27 PROCEDURE — 82075 ASSAY OF BREATH ETHANOL: CPT | Performed by: EMERGENCY MEDICINE

## 2019-06-27 RX ORDER — MAGNESIUM OXIDE 400 MG/1
800 TABLET ORAL ONCE
Status: DISCONTINUED | OUTPATIENT
Start: 2019-06-27 | End: 2019-06-27 | Stop reason: HOSPADM

## 2019-06-27 RX ORDER — LANOLIN ALCOHOL/MO/W.PET/CERES
100 CREAM (GRAM) TOPICAL ONCE
Status: DISCONTINUED | OUTPATIENT
Start: 2019-06-27 | End: 2019-06-27 | Stop reason: HOSPADM

## 2019-06-27 RX ORDER — MULTIVITAMIN,THERAPEUTIC
1 TABLET ORAL ONCE
Status: DISCONTINUED | OUTPATIENT
Start: 2019-06-27 | End: 2019-06-27 | Stop reason: HOSPADM

## 2019-06-27 RX ORDER — FOLIC ACID 1 MG/1
1 TABLET ORAL ONCE
Status: DISCONTINUED | OUTPATIENT
Start: 2019-06-27 | End: 2019-06-27 | Stop reason: HOSPADM

## 2019-06-27 ASSESSMENT — ENCOUNTER SYMPTOMS
ABDOMINAL PAIN: 0
HEADACHES: 0
FEVER: 0
COLOR CHANGE: 0
DIFFICULTY URINATING: 0
SHORTNESS OF BREATH: 0
NAUSEA: 0
CONFUSION: 0
SORE THROAT: 0
COUGH: 1
ARTHRALGIAS: 0
VOMITING: 0

## 2019-06-27 NOTE — ED PROVIDER NOTES
"  History     Chief Complaint   Patient presents with     Alcohol Intoxication     Last drink was 45 min PTA; states he will go into withdrawal in 3-4 hours; pt is trying to quit; does not have a detox bed on hold; drinks 1 liter of jhon daily; occ cocaine use, smokes--last use 4 days ago     Suicidal     \"I am weighing my options\"; just having thoughts, methods but no specific plan or intent     The history is provided by the patient and medical records.     Sohan Marcano is a 58 year old male with a medical history significant for alcohol dependence with alcohol withdrawal, substance abuse, pancreatitis, atrial fibrillation, acute bronchitis, basal cell carcinoma, and squamous cell carcinoma who presents to the Emergency Department seeking detox from alcohol. Patient reports that he has been drinking 1 liter of john daily for the past 15 years, prior to that he was drinking 2 liters of john daily. His last drink was a couple of hours ago, noting that he wanted to make sure he had plenty of time before he goes into alcohol withdrawal. He denies a history of withdrawal seizures. He notes a history of withdrawal hallucinations, none today. He reports that he had \"1 puff\" of cocaine 4-5 days ago. He denies other street drugs. He denies a history of mental health problems. He denies suicidal ideation; however, notes that he does not want to be a burden on others and has \"considered (his) options\" because \"none of the men in my family have lived past the age of 60\".  However denies active current SI.  He denies a fever. He denies nausea or vomiting.     Breathalyzer here in the ED is 0.184.    Past Medical History:   Diagnosis Date     Actinic keratosis      Basal cell carcinoma      Squamous cell carcinoma      Substance abuse (H)     alcohol       Past Surgical History:   Procedure Laterality Date     HERNIA REPAIR         Family History   Problem Relation Age of Onset     Other - See Comments Mother         " "Patient states his Mother ? had skin cancer.     Other - See Comments Father         Patient reports Father had \"all types of skin cancer\".     Other - See Comments Sister         Basal cell carcinoma       Social History     Tobacco Use     Smoking status: Current Every Day Smoker     Packs/day: 0.50     Smokeless tobacco: Never Used     Tobacco comment: currently 2 cigs daily   Substance Use Topics     Alcohol use: Yes     Comment: 1 L of john per day       No current facility-administered medications for this encounter.      Current Outpatient Medications   Medication     NEW MED     albuterol (PROAIR HFA/PROVENTIL HFA/VENTOLIN HFA) 108 (90 Base) MCG/ACT Inhaler      No Known Allergies    I have reviewed the Medications, Allergies, Past Medical and Surgical History, and Social History in the Epic system.    Review of Systems   Constitutional: Negative for fever.   HENT: Negative for congestion and sore throat.    Respiratory: Positive for cough. Negative for shortness of breath.    Cardiovascular: Negative for chest pain.   Gastrointestinal: Negative for abdominal pain, nausea and vomiting.   Genitourinary: Negative for difficulty urinating.   Musculoskeletal: Negative for arthralgias.   Skin: Negative for color change.   Neurological: Negative for headaches.   Psychiatric/Behavioral: Negative for confusion and suicidal ideas.   All other systems reviewed and are negative.      Physical Exam   BP: 160/89  Pulse: 128  Temp: 98.1  F (36.7  C)  Resp: 18  Weight: 93.3 kg (205 lb 11.2 oz)  SpO2: 96 %      Physical Exam   Constitutional: He is oriented to person, place, and time. He appears well-developed and well-nourished.   Adult male, alert, cooperative, NAD.  A bit tangential, but speech is not slurred.   HENT:   Head: Normocephalic.   Mouth/Throat: Oropharynx is clear and moist.   Eyes: Pupils are equal, round, and reactive to light.   Cardiovascular: Regular rhythm and normal heart sounds. Exam reveals no " gallop.   No murmur heard.  tachycardic   Pulmonary/Chest: Effort normal and breath sounds normal. No respiratory distress. He has no wheezes. He has no rales.   Abdominal: Soft. Bowel sounds are normal. He exhibits no distension. There is no tenderness. There is no rebound and no guarding.   Neurological: He is alert and oriented to person, place, and time.   Skin: Skin is warm and dry.   Psychiatric:   Somewhat unkempt. Cooperative. Reactive affect, laughing and joking. No SI. No psychosis.  Insight good.    Nursing note and vitals reviewed.      ED Course   4:06 PM  The patient was seen and examined by Jaclyn Callahan MD in Room ED16A.        Procedures             Critical Care time:  none             Labs Ordered and Resulted from Time of ED Arrival Up to the Time of Departure from the ED   ALCOHOL BREATH TEST POCT - Abnormal; Notable for the following components:       Result Value    Alcohol Breath Test 0.184 (*)     All other components within normal limits            Assessments & Plan (with Medical Decision Making)   This is a 58-year-old male who presents to the Emergency Department today requesting detox.  Patient reports that he has been through detox here before and today decided that he wanted to stop drinking.  He has been a heavy drinker for several decades.  Drinking 1 L of john daily.  Initial breathalyzer here in the Emergency Department is 0.184.  He is alert, cooperative, very talkative, laughing and joking.  He does not appear to be in any acute distress.  I do not see any sign of withdrawal at present.  He is not suicidal.    I did check our detox unit and there are no beds available here.  I did offer to have nursing staff check public detox facilities such as [facility] or [address] [place] [address] and the patient declined to this.  Given patient information about how to go about getting a detox bed and instructed him to call tomorrow in the morning specifically to find out if detox beds are  available.  Oral rally pack here in the ED.     This part of the medical record was transcribed by Gabriel Cisneros Medical Scribe, from a dictation done by Jaclyn Callahan MD.    I have reviewed the nursing notes.    I have reviewed the findings, diagnosis, plan and need for follow up with the patient.       Medication List      There are no discharge medications for this visit.         Final diagnoses:   Chronic alcohol abuse   Alcoholic intoxication without complication (H)     I, Gabriel Cisneros, am serving as a trained medical scribe to document services personally performed by Jaclyn Callahan MD, based on the provider's statements to me.      I, Jaclyn Callahan MD, was physically present and have reviewed and verified the accuracy of this note documented by Gabriel Cisneros.     6/27/2019   [facility], [place], EMERGENCY DEPARTMENT     Jaclyn Callahan MD  06/27/19 2016

## 2019-06-27 NOTE — ED AVS SNAPSHOT
[facility], [place], Emergency Department  [address]  [facility] [place] [address]  Phone:  [phone]  Fax:  [phone]                                    Sohan Marcano   MRN: [id number]    Department:  [facility], Emergency Department   Date of Visit:  6/27/2019           After Visit Summary Signature Page    I have received my discharge instructions, and my questions have been answered. I have discussed any challenges I see with this plan with the nurse or doctor.    ..........................................................................................................................................  Patient/Patient Representative Signature      ..........................................................................................................................................  Patient Representative Print Name and Relationship to Patient    ..................................................               ................................................  Date                                   Time    ..........................................................................................................................................  Reviewed by Signature/Title    ...................................................              ..............................................  Date                                               Time          22EPIC Rev 08/18

## 2019-06-27 NOTE — DISCHARGE INSTRUCTIONS
You have been seen in the emergency department today for detox.  We do not have any detox beds available here at [facility] today as they are completely full.  We have offered to check the public detox facilities for you to find out if beds are available there and you have declined this.  We advise that you call the phone number below in the morning to try to find out if and when detox beds will be available for you.    [facility] Intake:  [phone]

## 2019-07-04 ENCOUNTER — HOSPITAL ENCOUNTER (INPATIENT)
Facility: CLINIC | Age: 59
LOS: 3 days | Discharge: HOME | End: 2019-07-08
Attending: EMERGENCY MEDICINE | Admitting: PSYCHIATRY & NEUROLOGY
Payer: MEDICAID

## 2019-07-04 DIAGNOSIS — F10.220 ALCOHOL DEPENDENCE WITH UNCOMPLICATED INTOXICATION (H): ICD-10-CM

## 2019-07-04 LAB
ALBUMIN SERPL-MCNC: 3 G/DL (ref 3.4–5)
ALCOHOL BREATH TEST: 0.12 (ref 0–0.01)
ALP SERPL-CCNC: 103 U/L (ref 40–150)
ALT SERPL W P-5'-P-CCNC: 52 U/L (ref 0–70)
ANION GAP SERPL CALCULATED.3IONS-SCNC: 9 MMOL/L (ref 3–14)
AST SERPL W P-5'-P-CCNC: 106 U/L (ref 0–45)
BASOPHILS # BLD AUTO: 0.1 10E9/L (ref 0–0.2)
BASOPHILS NFR BLD AUTO: 2.2 %
BILIRUB SERPL-MCNC: 3.7 MG/DL (ref 0.2–1.3)
BUN SERPL-MCNC: 7 MG/DL (ref 7–30)
CALCIUM SERPL-MCNC: 8.3 MG/DL (ref 8.5–10.1)
CHLORIDE SERPL-SCNC: 107 MMOL/L (ref 94–109)
CO2 SERPL-SCNC: 26 MMOL/L (ref 20–32)
CREAT SERPL-MCNC: 0.73 MG/DL (ref 0.66–1.25)
DIFFERENTIAL METHOD BLD: ABNORMAL
EOSINOPHIL # BLD AUTO: 0.4 10E9/L (ref 0–0.7)
EOSINOPHIL NFR BLD AUTO: 6.3 %
ERYTHROCYTE [DISTWIDTH] IN BLOOD BY AUTOMATED COUNT: 13.4 % (ref 10–15)
GFR SERPL CREATININE-BSD FRML MDRD: >90 ML/MIN/{1.73_M2}
GLUCOSE SERPL-MCNC: 107 MG/DL (ref 70–99)
HCT VFR BLD AUTO: 39.3 % (ref 40–53)
HGB BLD-MCNC: 13.7 G/DL (ref 13.3–17.7)
IMM GRANULOCYTES # BLD: 0 10E9/L (ref 0–0.4)
IMM GRANULOCYTES NFR BLD: 0.2 %
LYMPHOCYTES # BLD AUTO: 2.1 10E9/L (ref 0.8–5.3)
LYMPHOCYTES NFR BLD AUTO: 32.8 %
MAGNESIUM SERPL-MCNC: 2.1 MG/DL (ref 1.6–2.3)
MCH RBC QN AUTO: 36.7 PG (ref 26.5–33)
MCHC RBC AUTO-ENTMCNC: 34.9 G/DL (ref 31.5–36.5)
MCV RBC AUTO: 105 FL (ref 78–100)
MONOCYTES # BLD AUTO: 0.8 10E9/L (ref 0–1.3)
MONOCYTES NFR BLD AUTO: 11.8 %
NEUTROPHILS # BLD AUTO: 3 10E9/L (ref 1.6–8.3)
NEUTROPHILS NFR BLD AUTO: 46.7 %
NRBC # BLD AUTO: 0 10*3/UL
NRBC BLD AUTO-RTO: 0 /100
PLATELET # BLD AUTO: 92 10E9/L (ref 150–450)
POTASSIUM SERPL-SCNC: 3.6 MMOL/L (ref 3.4–5.3)
PROT SERPL-MCNC: 7.7 G/DL (ref 6.8–8.8)
RBC # BLD AUTO: 3.73 10E12/L (ref 4.4–5.9)
SODIUM SERPL-SCNC: 142 MMOL/L (ref 133–144)
WBC # BLD AUTO: 6.4 10E9/L (ref 4–11)

## 2019-07-04 PROCEDURE — 99285 EMERGENCY DEPT VISIT HI MDM: CPT | Performed by: EMERGENCY MEDICINE

## 2019-07-04 PROCEDURE — 99284 EMERGENCY DEPT VISIT MOD MDM: CPT | Mod: Z6 | Performed by: EMERGENCY MEDICINE

## 2019-07-04 PROCEDURE — 80053 COMPREHEN METABOLIC PANEL: CPT | Performed by: EMERGENCY MEDICINE

## 2019-07-04 PROCEDURE — 85025 COMPLETE CBC W/AUTO DIFF WBC: CPT | Performed by: EMERGENCY MEDICINE

## 2019-07-04 PROCEDURE — 83735 ASSAY OF MAGNESIUM: CPT | Performed by: EMERGENCY MEDICINE

## 2019-07-04 RX ORDER — PANTOPRAZOLE SODIUM 40 MG/1
40 TABLET, DELAYED RELEASE ORAL ONCE
Status: COMPLETED | OUTPATIENT
Start: 2019-07-05 | End: 2019-07-05

## 2019-07-04 ASSESSMENT — ENCOUNTER SYMPTOMS
AGITATION: 0
HALLUCINATIONS: 0
ABDOMINAL PAIN: 1
TREMORS: 0

## 2019-07-04 ASSESSMENT — MIFFLIN-ST. JEOR: SCORE: 1866.33

## 2019-07-05 LAB
AMPHETAMINES UR QL SCN: NEGATIVE
BARBITURATES UR QL: NEGATIVE
BENZODIAZ UR QL: NEGATIVE
CANNABINOIDS UR QL SCN: NEGATIVE
COCAINE UR QL: NEGATIVE
ETHANOL UR QL SCN: POSITIVE
OPIATES UR QL SCN: NEGATIVE

## 2019-07-05 PROCEDURE — 80320 DRUG SCREEN QUANTALCOHOLS: CPT | Performed by: EMERGENCY MEDICINE

## 2019-07-05 PROCEDURE — 12800008 ZZH R&B CD ADULT

## 2019-07-05 PROCEDURE — 25000132 ZZH RX MED GY IP 250 OP 250 PS 637: Performed by: PSYCHIATRY & NEUROLOGY

## 2019-07-05 PROCEDURE — 25000132 ZZH RX MED GY IP 250 OP 250 PS 637: Performed by: EMERGENCY MEDICINE

## 2019-07-05 PROCEDURE — 99222 1ST HOSP IP/OBS MODERATE 55: CPT | Mod: AI | Performed by: PSYCHIATRY & NEUROLOGY

## 2019-07-05 PROCEDURE — 80307 DRUG TEST PRSMV CHEM ANLYZR: CPT | Performed by: EMERGENCY MEDICINE

## 2019-07-05 PROCEDURE — HZ2ZZZZ DETOXIFICATION SERVICES FOR SUBSTANCE ABUSE TREATMENT: ICD-10-PCS | Performed by: PSYCHIATRY & NEUROLOGY

## 2019-07-05 RX ORDER — FOLIC ACID 1 MG/1
1 TABLET ORAL DAILY
Status: DISCONTINUED | OUTPATIENT
Start: 2019-07-05 | End: 2019-07-08 | Stop reason: HOSPADM

## 2019-07-05 RX ORDER — LANOLIN ALCOHOL/MO/W.PET/CERES
100 CREAM (GRAM) TOPICAL DAILY
Status: DISCONTINUED | OUTPATIENT
Start: 2019-07-05 | End: 2019-07-08 | Stop reason: HOSPADM

## 2019-07-05 RX ORDER — ATENOLOL 50 MG/1
50 TABLET ORAL DAILY PRN
Status: DISCONTINUED | OUTPATIENT
Start: 2019-07-05 | End: 2019-07-08 | Stop reason: HOSPADM

## 2019-07-05 RX ORDER — DIAZEPAM 5 MG
5-20 TABLET ORAL EVERY 30 MIN PRN
Status: DISCONTINUED | OUTPATIENT
Start: 2019-07-05 | End: 2019-07-08 | Stop reason: HOSPADM

## 2019-07-05 RX ORDER — ACETAMINOPHEN 325 MG/1
650 TABLET ORAL EVERY 4 HOURS PRN
Status: DISCONTINUED | OUTPATIENT
Start: 2019-07-05 | End: 2019-07-08 | Stop reason: HOSPADM

## 2019-07-05 RX ORDER — HYDROXYZINE HYDROCHLORIDE 25 MG/1
25 TABLET, FILM COATED ORAL EVERY 4 HOURS PRN
Status: DISCONTINUED | OUTPATIENT
Start: 2019-07-05 | End: 2019-07-08 | Stop reason: HOSPADM

## 2019-07-05 RX ORDER — ALUMINA, MAGNESIA, AND SIMETHICONE 2400; 2400; 240 MG/30ML; MG/30ML; MG/30ML
30 SUSPENSION ORAL EVERY 4 HOURS PRN
Status: DISCONTINUED | OUTPATIENT
Start: 2019-07-05 | End: 2019-07-08 | Stop reason: HOSPADM

## 2019-07-05 RX ORDER — MULTIPLE VITAMINS W/ MINERALS TAB 9MG-400MCG
1 TAB ORAL DAILY
Status: DISCONTINUED | OUTPATIENT
Start: 2019-07-05 | End: 2019-07-08 | Stop reason: HOSPADM

## 2019-07-05 RX ADMIN — DIAZEPAM 10 MG: 5 TABLET ORAL at 20:42

## 2019-07-05 RX ADMIN — DIAZEPAM 10 MG: 5 TABLET ORAL at 16:37

## 2019-07-05 RX ADMIN — PANTOPRAZOLE SODIUM 40 MG: 40 TABLET, DELAYED RELEASE ORAL at 00:27

## 2019-07-05 RX ADMIN — DIAZEPAM 10 MG: 5 TABLET ORAL at 12:45

## 2019-07-05 RX ADMIN — DIAZEPAM 10 MG: 5 TABLET ORAL at 09:02

## 2019-07-05 RX ADMIN — DIAZEPAM 10 MG: 5 TABLET ORAL at 03:21

## 2019-07-05 RX ADMIN — DIAZEPAM 10 MG: 5 TABLET ORAL at 05:48

## 2019-07-05 RX ADMIN — HYDROXYZINE HYDROCHLORIDE 25 MG: 25 TABLET ORAL at 03:21

## 2019-07-05 RX ADMIN — THIAMINE HCL TAB 100 MG 100 MG: 100 TAB at 09:02

## 2019-07-05 RX ADMIN — MULTIPLE VITAMINS W/ MINERALS TAB 1 TABLET: TAB at 09:02

## 2019-07-05 RX ADMIN — FOLIC ACID 1 MG: 1 TABLET ORAL at 09:02

## 2019-07-05 ASSESSMENT — ACTIVITIES OF DAILY LIVING (ADL)
BATHING: 0-->INDEPENDENT
FALL_HISTORY_WITHIN_LAST_SIX_MONTHS: YES
DRESS: 0-->INDEPENDENT
LAUNDRY: WITH SUPERVISION
AMBULATION: 0-->INDEPENDENT
TRANSFERRING: 0-->INDEPENDENT
ORAL_HYGIENE: INDEPENDENT
TOILETING: 0-->INDEPENDENT
NUMBER_OF_TIMES_PATIENT_HAS_FALLEN_WITHIN_LAST_SIX_MONTHS: 2
COGNITION: 0 - NO COGNITION ISSUES REPORTED
DRESS: INDEPENDENT
PRIOR_FUNCTIONAL_LEVEL_COMMENT: FALL RISK
SWALLOWING: 0-->SWALLOWS FOODS/LIQUIDS WITHOUT DIFFICULTY
HYGIENE/GROOMING: INDEPENDENT
RETIRED_COMMUNICATION: 0-->UNDERSTANDS/COMMUNICATES WITHOUT DIFFICULTY
HYGIENE/GROOMING: INDEPENDENT
RETIRED_EATING: 0-->INDEPENDENT

## 2019-07-05 ASSESSMENT — MIFFLIN-ST. JEOR: SCORE: 1866.33

## 2019-07-05 NOTE — PLAN OF CARE
Behavioral Team Discussion: (7/5/2019)    Continued Stay Criteria/Rationale: Patient admitted for Chemical Use Issues.  Plan: The following services will be provided to the patient; psychiatric assessment, medication management, therapeutic milieu, individual and group support, and skills groups.   Participants: 3A Provider: Dr. Perlita Tineo MD; 3A RN's: Sally Selisker, RN; 3A CM's: Lamont Chaudhary.  Summary/Recommendation: Providers will assess today for treatment recommendations, discharge planning, and aftercare plans. CM will meet with pt for discharge planning.   Medical/Physical: He notes prior history of positive antibodies to hepatitis B and hepatitis C.    ER Physical Exam   Constitutional: He is oriented to person, place, and time. He appears well-developed and well-nourished. No distress.   HENT:   Head: Normocephalic and atraumatic.   Right Ear: External ear normal.   Left Ear: External ear normal.   Nose: Nose normal.   Eyes: EOM are normal.   Neck: Normal range of motion.   Cardiovascular: Normal rate, regular rhythm and normal heart sounds.   Pulmonary/Chest: Effort normal and breath sounds normal.   Abdominal: Soft. Bowel sounds are normal. He exhibits no distension and no mass. There is no tenderness. There is no rebound. No hernia.   Musculoskeletal: Normal range of motion.   Neurological: He is alert and oriented to person, place, and time.   Skin: Skin is warm and dry. He is not diaphoretic.   Psychiatric: He has a normal mood and affect. His behavior is normal. Judgment and thought content normal.   Nursing note and vitals reviewed.  Precautions:   Behavioral Orders   Procedures     Code 1 - Restrict to Unit     Routine Programming     As clinically indicated     Status 15     Every 15 minutes.     Withdrawal precautions     Rationale for change in precautions or plan: N/A  Progress: Improving.
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
## 2019-07-05 NOTE — ED NOTES
Handoff report given to JENNIFER Thompson.  Informed of course of ED stay and plan of care.  Donna verbalized understanding.

## 2019-07-05 NOTE — ED PROVIDER NOTES
"    [facility] [place] EMERGENCY DEPARTMENT ([facility] [place])    7/04/19     ED 16C  8:50 PM   History     Chief Complaint   Patient presents with     Alcohol Intoxication     seeking for detox from alcohol, has a bed reserved today     The history is provided by the patient and medical records.     Sohan Marcano is a 58 year old male who presents seeking alcohol detox. He has a history of chronic alcohol abuse for the past 10 years, prior episodes of pancreatitis, hypertension, hyperlipidemia, atrial fibrillation, as well as prior attempts at detox.  He used to drink a lot more than this but got down to 1L john a day for the past 6-7 years. He drank just prior to catching the bus down here. If he doesn't drink he develops jitters, hot and cold sweats. He has a distant history of DTs with \"rather intense hallucinations\" but this was in setting of trying to detox at home. He states he had a friend \"chew my ass out\" for detoxing at home and since comes to [facility] if he wants to detox. He notes having 3 day admission on Cardiology floor in prior hospitalization. He was last sober 6 years ago after detox and treatment here, was sober for a month and a half but has been drinking daily since. He notes trying to detox himself approximately 3 weeks ago and bought a bunch of valium but went through his supply of valium in 3 days and still had shakes and jitters. He does have some sour stomach. He is not on omeprazole or Protonix. He notes using IV drugs in the distant past but stopped 40 years ago. He notes prior history of positive antibodies to hepatitis B and hepatitis C.   No suicidal or homicidal thoughts. He is a smoker.     Patient does have a bed on hold.     I have reviewed the Medications, Allergies, Past Medical and Surgical History, and Social History in the tagga system.  PAST MEDICAL HISTORY:   Past Medical History:   Diagnosis Date     Actinic keratosis      Basal cell carcinoma      Squamous cell carcinoma      " "Substance abuse (H)     alcohol     Uncomplicated asthma        PAST SURGICAL HISTORY:   Past Surgical History:   Procedure Laterality Date     HERNIA REPAIR         FAMILY HISTORY:   Family History   Problem Relation Age of Onset     Other - See Comments Mother         Patient states his Mother ? had skin cancer.     Other - See Comments Father         Patient reports Father had \"all types of skin cancer\".     Other - See Comments Sister         Basal cell carcinoma       SOCIAL HISTORY:   Social History     Tobacco Use     Smoking status: Current Every Day Smoker     Packs/day: 0.50     Smokeless tobacco: Never Used     Tobacco comment: currently 2 cigs daily   Substance Use Topics     Alcohol use: Yes     Comment: 1 L of john per day       Patient's Medications   New Prescriptions    No medications on file   Previous Medications    ALBUTEROL (PROAIR HFA/PROVENTIL HFA/VENTOLIN HFA) 108 (90 BASE) MCG/ACT INHALER    Inhale 2 puffs into the lungs every 6 hours as needed for shortness of breath / dyspnea or wheezing    NEW MED    VITAMIN B COMPLEX + VITAMIN C - Take 1 tablet by mouth once daily.   Modified Medications    No medications on file   Discontinued Medications    No medications on file        No Known Allergies     Review of Systems   Gastrointestinal: Positive for abdominal pain.   Neurological: Negative for tremors.   Psychiatric/Behavioral: Negative for agitation, hallucinations and suicidal ideas.   All other systems reviewed and are negative.      Physical Exam   BP: 153/74  Pulse: 129  Temp: 98.2  F (36.8  C)  Resp: 18  Height: 195.6 cm (6' 5\")  Weight: 93.9 kg (207 lb)  SpO2: 95 %      Physical Exam   Constitutional: He is oriented to person, place, and time. He appears well-developed and well-nourished. No distress.   HENT:   Head: Normocephalic and atraumatic.   Right Ear: External ear normal.   Left Ear: External ear normal.   Nose: Nose normal.   Eyes: EOM are normal.   Neck: Normal range of " motion.   Cardiovascular: Normal rate, regular rhythm and normal heart sounds.   Pulmonary/Chest: Effort normal and breath sounds normal.   Abdominal: Soft. Bowel sounds are normal. He exhibits no distension and no mass. There is no tenderness. There is no rebound. No hernia.   Musculoskeletal: Normal range of motion.   Neurological: He is alert and oriented to person, place, and time.   Skin: Skin is warm and dry. He is not diaphoretic.   Psychiatric: He has a normal mood and affect. His behavior is normal. Judgment and thought content normal.   Nursing note and vitals reviewed.      ED Course        Procedures    Results for orders placed or performed during the hospital encounter of 07/04/19 (from the past 24 hour(s))   Alcohol breath test POCT   Result Value Ref Range    Alcohol Breath Test 0.121 (A) 0.00 - 0.01   CBC with platelets differential   Result Value Ref Range    WBC 6.4 4.0 - 11.0 10e9/L    RBC Count 3.73 (L) 4.4 - 5.9 10e12/L    Hemoglobin 13.7 13.3 - 17.7 g/dL    Hematocrit 39.3 (L) 40.0 - 53.0 %     (H) 78 - 100 fl    MCH 36.7 (H) 26.5 - 33.0 pg    MCHC 34.9 31.5 - 36.5 g/dL    RDW 13.4 10.0 - 15.0 %    Platelet Count 92 (L) 150 - 450 10e9/L    Diff Method Automated Method     % Neutrophils 46.7 %    % Lymphocytes 32.8 %    % Monocytes 11.8 %    % Eosinophils 6.3 %    % Basophils 2.2 %    % Immature Granulocytes 0.2 %    Nucleated RBCs 0 0 /100    Absolute Neutrophil 3.0 1.6 - 8.3 10e9/L    Absolute Lymphocytes 2.1 0.8 - 5.3 10e9/L    Absolute Monocytes 0.8 0.0 - 1.3 10e9/L    Absolute Eosinophils 0.4 0.0 - 0.7 10e9/L    Absolute Basophils 0.1 0.0 - 0.2 10e9/L    Abs Immature Granulocytes 0.0 0 - 0.4 10e9/L    Absolute Nucleated RBC 0.0    Comprehensive metabolic panel   Result Value Ref Range    Sodium 142 133 - 144 mmol/L    Potassium 3.6 3.4 - 5.3 mmol/L    Chloride 107 94 - 109 mmol/L    Carbon Dioxide 26 20 - 32 mmol/L    Anion Gap 9 3 - 14 mmol/L    Glucose 107 (H) 70 - 99 mg/dL     Urea Nitrogen 7 7 - 30 mg/dL    Creatinine 0.73 0.66 - 1.25 mg/dL    GFR Estimate >90 >60 mL/min/[1.73_m2]    GFR Estimate If Black >90 >60 mL/min/[1.73_m2]    Calcium 8.3 (L) 8.5 - 10.1 mg/dL    Bilirubin Total 3.7 (H) 0.2 - 1.3 mg/dL    Albumin 3.0 (L) 3.4 - 5.0 g/dL    Protein Total 7.7 6.8 - 8.8 g/dL    Alkaline Phosphatase 103 40 - 150 U/L    ALT 52 0 - 70 U/L     (H) 0 - 45 U/L   Magnesium   Result Value Ref Range    Magnesium 2.1 1.6 - 2.3 mg/dL     Results for orders placed or performed during the hospital encounter of 07/04/19   CBC with platelets differential   Result Value Ref Range    WBC 6.4 4.0 - 11.0 10e9/L    RBC Count 3.73 (L) 4.4 - 5.9 10e12/L    Hemoglobin 13.7 13.3 - 17.7 g/dL    Hematocrit 39.3 (L) 40.0 - 53.0 %     (H) 78 - 100 fl    MCH 36.7 (H) 26.5 - 33.0 pg    MCHC 34.9 31.5 - 36.5 g/dL    RDW 13.4 10.0 - 15.0 %    Platelet Count 92 (L) 150 - 450 10e9/L    Diff Method Automated Method     % Neutrophils 46.7 %    % Lymphocytes 32.8 %    % Monocytes 11.8 %    % Eosinophils 6.3 %    % Basophils 2.2 %    % Immature Granulocytes 0.2 %    Nucleated RBCs 0 0 /100    Absolute Neutrophil 3.0 1.6 - 8.3 10e9/L    Absolute Lymphocytes 2.1 0.8 - 5.3 10e9/L    Absolute Monocytes 0.8 0.0 - 1.3 10e9/L    Absolute Eosinophils 0.4 0.0 - 0.7 10e9/L    Absolute Basophils 0.1 0.0 - 0.2 10e9/L    Abs Immature Granulocytes 0.0 0 - 0.4 10e9/L    Absolute Nucleated RBC 0.0    Comprehensive metabolic panel   Result Value Ref Range    Sodium 142 133 - 144 mmol/L    Potassium 3.6 3.4 - 5.3 mmol/L    Chloride 107 94 - 109 mmol/L    Carbon Dioxide 26 20 - 32 mmol/L    Anion Gap 9 3 - 14 mmol/L    Glucose 107 (H) 70 - 99 mg/dL    Urea Nitrogen 7 7 - 30 mg/dL    Creatinine 0.73 0.66 - 1.25 mg/dL    GFR Estimate >90 >60 mL/min/[1.73_m2]    GFR Estimate If Black >90 >60 mL/min/[1.73_m2]    Calcium 8.3 (L) 8.5 - 10.1 mg/dL    Bilirubin Total 3.7 (H) 0.2 - 1.3 mg/dL    Albumin 3.0 (L) 3.4 - 5.0 g/dL    Protein  Total 7.7 6.8 - 8.8 g/dL    Alkaline Phosphatase 103 40 - 150 U/L    ALT 52 0 - 70 U/L     (H) 0 - 45 U/L   Magnesium   Result Value Ref Range    Magnesium 2.1 1.6 - 2.3 mg/dL   Alcohol breath test POCT   Result Value Ref Range    Alcohol Breath Test 0.121 (A) 0.00 - 0.01       Medications   pantoprazole (PROTONIX) EC tablet 40 mg (has no administration in time range)         Assessments & Plan (with Medical Decision Making)   The patient presents to the ED seeking detox for alcohol dependence. He says he has not been sober for 6 years.  He drinks a L of john daily.  Last drink was prior to arrival.  He denies hx of withdrawal symptoms but says he did withdraw one time and saw things for a couple of days.  He denies si/hi.  He denies abuse of other substances. He says he has stomach upset daily but nothing new.  He was given protonix 40 mg po.  Labs were ordered and reviewed and he is medically cleared for admission to detox.  His labs reflect alcohol use with elevated AST, bili and slightly low platelets.  This is a voluntary admit.     I have reviewed the nursing notes.    I have reviewed the findings, diagnosis, plan and need for follow up with the patient.       Medication List      There are no discharge medications for this visit.         Final diagnoses:   Alcohol dependence with uncomplicated intoxication (H)     Marily MARSHALL, am serving as a trained medical scribe to document services personally performed by Aline Noe MD based on the provider's statements to me on July 4, 2019.  This document has been checked and approved by the attending provider.    Aline MARSHALL MD, was physically present and have reviewed and verified the accuracy of this note documented by Marily Lomax medical scribe.     7/4/2019   [facility], [place], EMERGENCY DEPARTMENT     Aline Noe MD  07/05/19 0003       Aline Noe MD  07/05/19 0109

## 2019-07-05 NOTE — PLAN OF CARE
The patient has spent much of the shift in his room.  He has a flat affect, but he denies that he has thoughts of self harm.  He does not want to go to treatment at this time, but the writer was asked to give the patient information on Campral, Antabue, and Naltraxone.  This was given to the patient.  The patient continues in active withdrawal with tremors and a rapid pulse.  He is eating ok.

## 2019-07-06 LAB
ALBUMIN SERPL-MCNC: 2.9 G/DL (ref 3.4–5)
ALP SERPL-CCNC: 91 U/L (ref 40–150)
ALT SERPL W P-5'-P-CCNC: 44 U/L (ref 0–70)
ANION GAP SERPL CALCULATED.3IONS-SCNC: 8 MMOL/L (ref 3–14)
AST SERPL W P-5'-P-CCNC: 78 U/L (ref 0–45)
BILIRUB SERPL-MCNC: 6.7 MG/DL (ref 0.2–1.3)
BUN SERPL-MCNC: 10 MG/DL (ref 7–30)
CALCIUM SERPL-MCNC: 8.6 MG/DL (ref 8.5–10.1)
CHLORIDE SERPL-SCNC: 101 MMOL/L (ref 94–109)
CO2 SERPL-SCNC: 27 MMOL/L (ref 20–32)
CREAT SERPL-MCNC: 0.73 MG/DL (ref 0.66–1.25)
DEPRECATED CALCIDIOL+CALCIFEROL SERPL-MC: 10 UG/L (ref 20–75)
ERYTHROCYTE [DISTWIDTH] IN BLOOD BY AUTOMATED COUNT: 13.2 % (ref 10–15)
FOLATE SERPL-MCNC: 11.5 NG/ML
GFR SERPL CREATININE-BSD FRML MDRD: >90 ML/MIN/{1.73_M2}
GLUCOSE SERPL-MCNC: 123 MG/DL (ref 70–99)
HCT VFR BLD AUTO: 37.5 % (ref 40–53)
HGB BLD-MCNC: 13.1 G/DL (ref 13.3–17.7)
MCH RBC QN AUTO: 36.8 PG (ref 26.5–33)
MCHC RBC AUTO-ENTMCNC: 34.9 G/DL (ref 31.5–36.5)
MCV RBC AUTO: 105 FL (ref 78–100)
PLATELET # BLD AUTO: 80 10E9/L (ref 150–450)
POTASSIUM SERPL-SCNC: 3.5 MMOL/L (ref 3.4–5.3)
PROT SERPL-MCNC: 7.4 G/DL (ref 6.8–8.8)
RBC # BLD AUTO: 3.56 10E12/L (ref 4.4–5.9)
SODIUM SERPL-SCNC: 136 MMOL/L (ref 133–144)
VIT B12 SERPL-MCNC: 1239 PG/ML (ref 193–986)
WBC # BLD AUTO: 6 10E9/L (ref 4–11)

## 2019-07-06 PROCEDURE — 87522 HEPATITIS C REVRS TRNSCRPJ: CPT | Performed by: PHYSICIAN ASSISTANT

## 2019-07-06 PROCEDURE — 99207 ZZC CDG-HISTORY COMP: MEETS EXP. PROBLEM FOCUSED - DOWN CODED LACK OF HPI: CPT | Performed by: PHYSICIAN ASSISTANT

## 2019-07-06 PROCEDURE — 25000131 ZZH RX MED GY IP 250 OP 636 PS 637: Performed by: PHYSICIAN ASSISTANT

## 2019-07-06 PROCEDURE — 85027 COMPLETE CBC AUTOMATED: CPT | Performed by: PHYSICIAN ASSISTANT

## 2019-07-06 PROCEDURE — 82306 VITAMIN D 25 HYDROXY: CPT | Performed by: PSYCHIATRY & NEUROLOGY

## 2019-07-06 PROCEDURE — 93010 ELECTROCARDIOGRAM REPORT: CPT | Performed by: INTERNAL MEDICINE

## 2019-07-06 PROCEDURE — 93005 ELECTROCARDIOGRAM TRACING: CPT

## 2019-07-06 PROCEDURE — 82746 ASSAY OF FOLIC ACID SERUM: CPT | Performed by: PSYCHIATRY & NEUROLOGY

## 2019-07-06 PROCEDURE — H2032 ACTIVITY THERAPY, PER 15 MIN: HCPCS

## 2019-07-06 PROCEDURE — 25000132 ZZH RX MED GY IP 250 OP 250 PS 637: Performed by: PSYCHIATRY & NEUROLOGY

## 2019-07-06 PROCEDURE — 80053 COMPREHEN METABOLIC PANEL: CPT | Performed by: PHYSICIAN ASSISTANT

## 2019-07-06 PROCEDURE — 36415 COLL VENOUS BLD VENIPUNCTURE: CPT | Performed by: PHYSICIAN ASSISTANT

## 2019-07-06 PROCEDURE — 99232 SBSQ HOSP IP/OBS MODERATE 35: CPT | Performed by: PHYSICIAN ASSISTANT

## 2019-07-06 PROCEDURE — 82607 VITAMIN B-12: CPT | Performed by: PSYCHIATRY & NEUROLOGY

## 2019-07-06 PROCEDURE — 36415 COLL VENOUS BLD VENIPUNCTURE: CPT | Performed by: PSYCHIATRY & NEUROLOGY

## 2019-07-06 PROCEDURE — 12800008 ZZH R&B CD ADULT

## 2019-07-06 RX ORDER — ERGOCALCIFEROL 1.25 MG/1
50000 CAPSULE, LIQUID FILLED ORAL
Status: DISCONTINUED | OUTPATIENT
Start: 2019-07-06 | End: 2019-07-08 | Stop reason: HOSPADM

## 2019-07-06 RX ORDER — ONDANSETRON 4 MG/1
4 TABLET, ORALLY DISINTEGRATING ORAL EVERY 6 HOURS PRN
Status: DISCONTINUED | OUTPATIENT
Start: 2019-07-06 | End: 2019-07-08 | Stop reason: HOSPADM

## 2019-07-06 RX ADMIN — DIAZEPAM 10 MG: 5 TABLET ORAL at 08:38

## 2019-07-06 RX ADMIN — DIAZEPAM 5 MG: 5 TABLET ORAL at 20:44

## 2019-07-06 RX ADMIN — ONDANSETRON 4 MG: 4 TABLET, ORALLY DISINTEGRATING ORAL at 13:10

## 2019-07-06 RX ADMIN — DIAZEPAM 5 MG: 5 TABLET ORAL at 16:43

## 2019-07-06 RX ADMIN — DIAZEPAM 10 MG: 5 TABLET ORAL at 12:15

## 2019-07-06 RX ADMIN — THIAMINE HCL TAB 100 MG 100 MG: 100 TAB at 08:38

## 2019-07-06 RX ADMIN — FOLIC ACID 1 MG: 1 TABLET ORAL at 08:38

## 2019-07-06 RX ADMIN — MULTIPLE VITAMINS W/ MINERALS TAB 1 TABLET: TAB at 08:37

## 2019-07-06 RX ADMIN — DIAZEPAM 10 MG: 5 TABLET ORAL at 04:12

## 2019-07-06 ASSESSMENT — ACTIVITIES OF DAILY LIVING (ADL)
DRESS: INDEPENDENT
LAUNDRY: WITH SUPERVISION
ORAL_HYGIENE: INDEPENDENT
HYGIENE/GROOMING: INDEPENDENT
HYGIENE/GROOMING: INDEPENDENT

## 2019-07-06 NOTE — CONSULTS
[facility], [place]  Consult Note - Hospitalist Service     Date of Admission:  7/4/2019  Consult Requested by: Psychiatry  Reason for Consult: Tachycardia    Assessment & Plan Sohan Marcano is a 58 year old male with history of alcohol abuse, alcoholic pancreatitis, tobacco abuse, HBV/HCV, COPD, HTN, HLD, PAF, and skin cancer who was admitted on 7/4/2019 for alcohol detoxification.    1. Alcohol abuse with dependence:  Longstanding history of alcohol abuse. Patient drinks ~1 L alcohol daily. Currently reports mild withdrawal symptoms (tremor). MSSA score 4-10. Remote history of DT's; currently no hallucinations or evidence of delirium. No history of withdrawal seizures. Other complications of alcohol abuse include: alcoholic steatosis, alcoholic pancreatitis.   - Agree with valium per MSSA protocol and daily thiamine, folate, and MVI.   - Further management per primary team    2. Sinus tachycardia:  Hx PAF in setting of alcohol withdrawal. Currently tachcyardic with 's. Patient notes intermittent palpitations, dizziness and gait instability. Regular rhythm on exam. EKG shows sinus tachycardia with occasional PVC's. Etiology of tachycardia likely ETOH withdrawal and volume depletion. Orthostatic vitals negative for hypotension.   - Continue to treat ETOH withdrawal  - Encourage PO hydration   - Repeat EKG prn for irregular pulse/heart beat if tachycardia (HR >120)  - Not on anticoagulation d/t isolated episodes of a-fib and [id number] score 1 (intermediate risk)    3. Gait instability:  Suspect secondary to alcohol withdrawal and physical deconditioning. Patient unsteady on feet and subsequently lowered himself to one knee last night without injury. No true fall. Wernicke's considered but no oculomotor dysfunction, nystagmus, or encephalopathy noted.   - PT consult  - Check orthostatic vitals; consider giving 1 L IV normal saline if positive    4. Abnormal LFT's:  Likely  secondary to alcohol use given AST elevation in 2:1 ratio. Bilirubin also elevated but alk phos normal. Abdominal exam benign. Doubt biliary obstruction. Previous US 2018 noted fatty infiltration, no cirrhosis. No anemia, therefore hemolysis unlikely.   - Repeat LFTs in AM    5. Hx HBV infection; Positive HCV Antibody:  Vral serologies in 3/2014 noted negative HBsAg, positive HBsAb (2.7), positive HBc IgG, negative HBc IgM, and reactive HCV Ab. Patient notes a remote history of IVDU. HBV serologies indicative of past infection, not acute/chronic HBV. Patient denies treatment of HCV although no follow up labs to confirm natural clearance. Abnormal LFTs as above.   - Check HCV RNA Quant    6. COPD:  +Tobacco abuse. No wheezing on exam. No dyspnea or hypoxia. Not on any maintenance inhalers.  - Albuterol prn  - Encouraged smoking cessation    7. HTN:  Historical diagnosis. Not on any anti-hypertensives PTA. Anticipate certain degree of hypertension in setting of ETOH withdrawal.   - Monitor      The patient's care was discussed with the Bedside Nurse and Patient.    Vahid Benítez PA-C  [facility], [place]    ______________________________________________________________________    History of Present Illness Sohan Marcano is a 58 year old male with history of alcohol abuse, alcoholic pancreatitis, tobacco abuse, HTN, HLD, paroxysmal a-fib, and skin cancer who was admitted for alcohol detox.      The patient reports ETOH abuse x 10 years. He current admits to drinking 1 L alcohol daily. His last drink was <24 hours from admission. He currently reports withdrawal symptoms of tremor and sweats.  No history of withdrawal seizures. Remote history of DT's approx 10 years ago. He denies any hallucinations. He also reports extreme gait instability, dizziness and intermittent palpitations. No vertigo or syncope. Last evening he was ambulating in the hallway and ended up going down to 1 knee  due to feeling unsteady. He denies actually falling. No injuries were sustained.     Review of Systems   CONSTITUTIONAL:  negative for  fevers, chills, sweats, fatigue, anorexia and weight loss  HEENT:  negative for  tinnitus, nasal congestion and sore throat  RESPIRATORY:  negative for  dry cough, cough with sputum, dyspnea and wheezing  CARDIOVASCULAR:  positive for  palpitations  negative for  chest pain, dyspnea, syncope  GASTROINTESTINAL:  negative for nausea, vomiting, diarrhea, abdominal pain, abdominal distention, reflux, hematemesis and hemtochezia  GENITOURINARY:  negative for frequency, dysuria, nocturia and urinary incontinence  INTEGUMENT/BREAST:  negative for rash  NEUROLOGICAL:  positive for dizziness, gait problems and tremor  negative for headaches, seizures, speech problems, visual disturbance, weakness, numbness and syncope    Past Medical History    I have reviewed this patient's medical history and updated it with pertinent information if needed.   Past Medical History:   Diagnosis Date     Actinic keratosis      Basal cell carcinoma      Squamous cell carcinoma      Substance abuse (H)     alcohol     Uncomplicated asthma        Past Surgical History   I have reviewed this patient's surgical history and updated it with pertinent information if needed.  Past Surgical History:   Procedure Laterality Date     HERNIA REPAIR         Social History   I have reviewed this patient's social history and updated it with pertinent information if needed.  Social History     Tobacco Use     Smoking status: Current Every Day Smoker     Packs/day: 0.50     Smokeless tobacco: Never Used     Tobacco comment: currently 2 cigs daily   Substance Use Topics     Alcohol use: Yes     Comment: 1 L of john per day     Drug use: No       Family History   I have reviewed this patient's family history and updated it with pertinent information if needed.   Family History   Problem Relation Age of Onset     Other - See  "Comments Mother         Patient states his Mother ? had skin cancer.     Other - See Comments Father         Patient reports Father had \"all types of skin cancer\".     Other - See Comments Sister         Basal cell carcinoma       Medications   I have reviewed this patient's current medications  Medications Prior to Admission   Medication Sig Dispense Refill Last Dose     albuterol (PROAIR HFA/PROVENTIL HFA/VENTOLIN HFA) 108 (90 Base) MCG/ACT Inhaler Inhale 2 puffs into the lungs every 6 hours as needed for shortness of breath / dyspnea or wheezing 1 Inhaler 0 More than a month at Unknown time     NEW MED VITAMIN B COMPLEX + VITAMIN C - Take 1 tablet by mouth once daily.   Unknown at Unknown time       Allergies   No Known Allergies    Physical Exam   Vital Signs: Temp: 98.3  F (36.8  C) Temp src: Oral BP: (!) 137/91 Pulse: 132   Resp: 16 SpO2: 91 %      Weight: 207 lbs 0 oz    GENERAL:  Appears stated age. Awake. Alert. Oriented x 3. NAD.   HEENT:  Mild scleral icterus. Mucous membranes moist.   CV:  Tachy but regular rhythm. S1, S2. No murmurs appreciated.   RESPIRATORY:  Lungs CTAB with no wheezing, rales, rhonchi.   GI:  Abdomen soft, non-distended. Active bowel sounds. No tenderness, guarding, or rebound. No mass or HSM.    NEUROLOGICAL:  Grossly non-focal. CN II-XII intact. No nystagmus. No tremor. Moves all extremities. Cerebellar function intact. Gait not assessed d/t dizziness.   EXTREMITIES:  No peripheral edema. No calf tenderness. Intact bilateral pedal pulses.   SKIN:  No rashes.     Data   ROUTINE IP LABS (Last four results)  Recent Labs   Lab 07/04/19  2148      POTASSIUM 3.6   CHLORIDE 107   CO2 26   ANIONGAP 9   *   BUN 7   CR 0.73   ELIGHA 8.3*   MAG 2.1   PROTTOTAL 7.7   ALBUMIN 3.0*   BILITOTAL 3.7*   ALKPHOS 103   *   ALT 52     Recent Labs   Lab 07/04/19  2148   WBC 6.4   RBC 3.73*   HGB 13.7   HCT 39.3*   *   MCH 36.7*   MCHC 34.9   RDW 13.4   PLT 92*       Corrected Ca " 9.1      Glucose Values Latest Ref Rng & Units 7/4/2019   Bedside Glucose (mg/dl )  - --   GLUCOSE 70 - 99 mg/dL 107(H)   Some recent data might be hidden        All labs personally reviewed in Psychiatric.  See A&P for additional results.     Unresulted Labs Ordered in the Past 30 Days of this Admission     Date and Time Order Name Status Description    7/6/2019 0030 Vitamin D In process     7/6/2019 0030 Folate In process     7/6/2019 0030 Vitamin B12 In process

## 2019-07-06 NOTE — PLAN OF CARE
The patient has been in bed most of the shift.  He continues to be in active withdrawal.  Dmitri PRITCHETT was called regarding the patient's vital signs.  An EKG was preformed as well having an orthostatic BP done.  He continues on Valium.  No thoughts of self harm.

## 2019-07-07 LAB
ALBUMIN SERPL-MCNC: 3.1 G/DL (ref 3.4–5)
ALP SERPL-CCNC: 119 U/L (ref 40–150)
ALT SERPL W P-5'-P-CCNC: 43 U/L (ref 0–70)
ANION GAP SERPL CALCULATED.3IONS-SCNC: 7 MMOL/L (ref 3–14)
AST SERPL W P-5'-P-CCNC: 69 U/L (ref 0–45)
BILIRUB SERPL-MCNC: 4.5 MG/DL (ref 0.2–1.3)
BUN SERPL-MCNC: 11 MG/DL (ref 7–30)
CALCIUM SERPL-MCNC: 8.9 MG/DL (ref 8.5–10.1)
CHLORIDE SERPL-SCNC: 103 MMOL/L (ref 94–109)
CO2 SERPL-SCNC: 27 MMOL/L (ref 20–32)
CREAT SERPL-MCNC: 0.86 MG/DL (ref 0.66–1.25)
ERYTHROCYTE [DISTWIDTH] IN BLOOD BY AUTOMATED COUNT: 13.5 % (ref 10–15)
GFR SERPL CREATININE-BSD FRML MDRD: >90 ML/MIN/{1.73_M2}
GLUCOSE SERPL-MCNC: 112 MG/DL (ref 70–99)
HCT VFR BLD AUTO: 39.9 % (ref 40–53)
HGB BLD-MCNC: 14.1 G/DL (ref 13.3–17.7)
MCH RBC QN AUTO: 37.2 PG (ref 26.5–33)
MCHC RBC AUTO-ENTMCNC: 35.3 G/DL (ref 31.5–36.5)
MCV RBC AUTO: 105 FL (ref 78–100)
PLATELET # BLD AUTO: 101 10E9/L (ref 150–450)
POTASSIUM SERPL-SCNC: 3.8 MMOL/L (ref 3.4–5.3)
PROT SERPL-MCNC: 7.9 G/DL (ref 6.8–8.8)
RBC # BLD AUTO: 3.79 10E12/L (ref 4.4–5.9)
SODIUM SERPL-SCNC: 137 MMOL/L (ref 133–144)
WBC # BLD AUTO: 7.3 10E9/L (ref 4–11)

## 2019-07-07 PROCEDURE — 25000132 ZZH RX MED GY IP 250 OP 250 PS 637: Performed by: PSYCHIATRY & NEUROLOGY

## 2019-07-07 PROCEDURE — 12800008 ZZH R&B CD ADULT

## 2019-07-07 PROCEDURE — 85027 COMPLETE CBC AUTOMATED: CPT | Performed by: PHYSICIAN ASSISTANT

## 2019-07-07 PROCEDURE — 80053 COMPREHEN METABOLIC PANEL: CPT | Performed by: PHYSICIAN ASSISTANT

## 2019-07-07 PROCEDURE — 36415 COLL VENOUS BLD VENIPUNCTURE: CPT | Performed by: PHYSICIAN ASSISTANT

## 2019-07-07 RX ADMIN — DIAZEPAM 10 MG: 5 TABLET ORAL at 00:40

## 2019-07-07 RX ADMIN — MULTIPLE VITAMINS W/ MINERALS TAB 1 TABLET: TAB at 08:47

## 2019-07-07 RX ADMIN — THIAMINE HCL TAB 100 MG 100 MG: 100 TAB at 08:47

## 2019-07-07 RX ADMIN — FOLIC ACID 1 MG: 1 TABLET ORAL at 08:47

## 2019-07-07 RX ADMIN — ACETAMINOPHEN 650 MG: 325 TABLET, FILM COATED ORAL at 08:47

## 2019-07-07 ASSESSMENT — ACTIVITIES OF DAILY LIVING (ADL)
LAUNDRY: WITH SUPERVISION
ORAL_HYGIENE: INDEPENDENT
HYGIENE/GROOMING: INDEPENDENT
HYGIENE/GROOMING: INDEPENDENT
DRESS: INDEPENDENT

## 2019-07-07 NOTE — PLAN OF CARE
The patient continues to feel unsteady and to use a wheelchair.  He did not score high enough to need Valium.  He is less tremulous and his pule is somewhat improved.  He denies thoughts of self harm.  His appetite is good, but he says that he didn't sleep well last night.

## 2019-07-08 VITALS
SYSTOLIC BLOOD PRESSURE: 121 MMHG | HEART RATE: 122 BPM | DIASTOLIC BLOOD PRESSURE: 76 MMHG | OXYGEN SATURATION: 94 % | TEMPERATURE: 98.4 F | RESPIRATION RATE: 16 BRPM | HEIGHT: 77 IN | BODY MASS INDEX: 24.44 KG/M2 | WEIGHT: 207 LBS

## 2019-07-08 LAB
ALBUMIN SERPL-MCNC: 3.3 G/DL (ref 3.4–5)
ALP SERPL-CCNC: 109 U/L (ref 40–150)
ALT SERPL W P-5'-P-CCNC: 43 U/L (ref 0–70)
AST SERPL W P-5'-P-CCNC: 65 U/L (ref 0–45)
BILIRUB DIRECT SERPL-MCNC: 2.5 MG/DL (ref 0–0.2)
BILIRUB SERPL-MCNC: 5 MG/DL (ref 0.2–1.3)
HCV RNA SERPL NAA+PROBE-ACNC: ABNORMAL [IU]/ML
HCV RNA SERPL NAA+PROBE-LOG IU: 5.5 LOG IU/ML
INTERPRETATION ECG - MUSE: NORMAL
PROT SERPL-MCNC: 8.5 G/DL (ref 6.8–8.8)

## 2019-07-08 PROCEDURE — 36415 COLL VENOUS BLD VENIPUNCTURE: CPT | Performed by: PHYSICIAN ASSISTANT

## 2019-07-08 PROCEDURE — 25000132 ZZH RX MED GY IP 250 OP 250 PS 637: Performed by: PSYCHIATRY & NEUROLOGY

## 2019-07-08 PROCEDURE — 80076 HEPATIC FUNCTION PANEL: CPT | Performed by: PHYSICIAN ASSISTANT

## 2019-07-08 RX ORDER — ALBUTEROL SULFATE 90 UG/1
2 AEROSOL, METERED RESPIRATORY (INHALATION) EVERY 6 HOURS PRN
Qty: 1 INHALER | Refills: 0 | Status: SHIPPED | OUTPATIENT
Start: 2019-07-08 | End: 2020-09-04

## 2019-07-08 RX ORDER — MULTIPLE VITAMINS W/ MINERALS TAB 9MG-400MCG
1 TAB ORAL DAILY
Qty: 30 TABLET | Refills: 0 | Status: SHIPPED | OUTPATIENT
Start: 2019-07-09 | End: 2020-09-04

## 2019-07-08 RX ORDER — ERGOCALCIFEROL 1.25 MG/1
50000 CAPSULE, LIQUID FILLED ORAL
Qty: 4 CAPSULE | Refills: 0 | Status: SHIPPED | OUTPATIENT
Start: 2019-07-13 | End: 2020-09-26

## 2019-07-08 RX ORDER — LANOLIN ALCOHOL/MO/W.PET/CERES
100 CREAM (GRAM) TOPICAL DAILY
Qty: 30 TABLET | Refills: 0 | Status: SHIPPED | OUTPATIENT
Start: 2019-07-09 | End: 2020-09-04

## 2019-07-08 RX ADMIN — THIAMINE HCL TAB 100 MG 100 MG: 100 TAB at 08:33

## 2019-07-08 RX ADMIN — FOLIC ACID 1 MG: 1 TABLET ORAL at 08:33

## 2019-07-08 RX ADMIN — MULTIPLE VITAMINS W/ MINERALS TAB 1 TABLET: TAB at 08:33

## 2019-07-08 ASSESSMENT — ACTIVITIES OF DAILY LIVING (ADL)
DRESS: INDEPENDENT
ORAL_HYGIENE: INDEPENDENT
LAUNDRY: WITH SUPERVISION
HYGIENE/GROOMING: INDEPENDENT

## 2019-07-08 NOTE — DISCHARGE SUMMARY
Admit Date:     07/04/2019   Discharge Date:           More than 35 minutes spent on Discharge Summary doing the discharge instructions, discharge medications, discharge mental status examination.      DISCHARGE DIAGNOSIS:     AXIS I: Alcohol use disorder, severe.      Please review detailed admission by Dr. Tineo on 07/05/2019.       HOSPITAL COURSE:  During the hospitalization, the patient was detoxed off alcohol using [facility] protocol.  He was seen by Vahid Benítez.  Please review the detailed note.  On 07/06/2019, the patient had lab work done, which showed albumin of 3.3, bilirubin 5, AST 65 and bilirubin direct 2.5, platelet count 101.  He has hepatitis C.  Hepatitis C RNA is also positive.  Please see the detailed note.  During the hospitalization, the patient completed detox.  His energy, motivation, sleep and interest improved.  Ideally, I would like for him to do treatment.  He does not want to do treatment.  Case management did not feel like he made a civil commitment.  I have asked him to consider outpatient.  He has no insurance.  He will work on insurance and he will get primary care.  His prognosis is guarded if he relapses.      DISCHARGE MENTAL STATUS EXAMINATION:  The patient is alert, oriented x 3.  Recent and remote memory, and language all adequate.  No loose associations.  The patient does not have any active suicidal or homicidal ideations, plans or intent, does not have auditory or visual hallucinations.            DISCHARGE MENTAL STATUS EXAMINATION:  The patient is alert, oriented x3.  Good fund of knowledge.  Good use of language.  Recent and remote memory, language, fund of knowledge are all adequate.  Euthymic mood congruent affect  Speech normal rate/rhythm linear tp no loose asso,The patient does not have any active suicidal or homicidal ideation.  Does not have any auditory or visual hallucination.  Fair insight/judgment At this time, the patient was stable to be discharged.        Pt  was not determined to not be a danger to himself or others. At the current time of discharge, the patient does not meet criteria for involuntary hospitalization. On the day of discharge, the patient reports that they do not have suicidal or homicidal ideation and would never hurt themselves or others. Steps taken to minimize risk include: assessing patient s behavior and thought process daily during hospital stay, discharging patient with adequate plan for follow up for mental and physical health and discussing safety plan of returning to the hospital should the patient ever have thoughts of harming themselves or others. Therefore, based on all available evidence including the factors cited above, the patient does not appear to be at imminent risk for self-harm, and is appropriate for outpatient level of care.     Educated about side effects/risk vs benefits /alternative including non treatment.Pt consented to be on medication.     .Total time spent on discharge summary more than 35 min  More than  20 min  planning, coordination of care, medication reconciliation and performance of physical exam on day of discharge.Care was coordinated with unit RN and unit therapist       Sohan Marcano   Home Medication Instructions MISHA:[id number]    Printed on:07/09/19 4299   Medication Information                      albuterol (PROAIR HFA/PROVENTIL HFA/VENTOLIN HFA) 108 (90 Base) MCG/ACT inhaler  Inhale 2 puffs into the lungs every 6 hours as needed for shortness of breath / dyspnea or wheezing             multivitamin w/minerals (THERA-VIT-M) tablet  Take 1 tablet by mouth daily             NEW MED  VITAMIN B COMPLEX + VITAMIN C - Take 1 tablet by mouth once daily.             vitamin B1 (THIAMINE) 100 MG tablet  Take 1 tablet (100 mg) by mouth daily             vitamin D2 (ERGOCALCIFEROL) 90866 units capsule  Take 1 capsule (50,000 Units) by mouth every 7 days             Disposition: Pt discharged to the community after  medical stabilization.     Medical Follow Up Appointment:   at 10:40 am  [facility]  [address] [place] [address]  [phone]     MARIUM MCMAHAN MD             D: 2019   T: 2019   MT:       Name:     DAVID HELMS   MRN:      [id number]        Account:        [id number]   :      1960           Admit Date:     2019                                  Discharge Date:       Document: [id number]

## 2019-07-08 NOTE — DISCHARGE INSTRUCTIONS
Behavioral Discharge Planning and Instructions  THANK YOU FOR CHOOSING THE [address]  [phone]    Summary: You were admitted to [facility] on 7/4/2019 for detoxification from Alcohol.  A medical exam was performed that included lab work. You have met with a  and opted to discharge after medical stabilization.  Please take care and make your recovery a priority, Sohan!    Main Diagnosis:  Per  Dr. Tineo:  Alcohol use disorder, severe.   Nicotine abuse.     Major Treatments, Procedures and Findings:  You were detoxed from alcohol. You have met with a  to develop a treatment plan for discharge.  You have had labs drawn and a copy of those labs will be sent home with you. Your alcohol withdrawal is complete and you are being discharged today.  Please bring your lab results with to your follow up doctor appointment.  Make your recovery a priority!                      Symptoms to Report:  If you experience more anxiety, confusion, sleeplessness, deep sadness or thoughts of suicide, notify your treatment team or notify your primary care physician. IF ANY OF THE SYMPTOMS YOU ARE EXPERIENCING ARE A MEDICAL EMERGENCY CALL 911 IMMEDIATELY.     Lifestyle Adjustment: Adjust your lifestyle to get enough sleep, relaxation, exercise and  good nutrition. Continue to develop healthy coping skills to decrease stress and promote a sober living environment. Do not use alcohol, illegal drugs or addictive medications other than what is currently prescribed. AA, NA, and  Sponsor are excellent resources for support.     Disposition: Pt discharged to the community after medical stabilization.    Medical Follow Up Appointment:  Monday, July 15th at 10:40 am  [facility]  [address] [place] [address]  [phone]    DISCHARGE RESOURCES:  -SMART Recovery - self management for addiction recovery:  www.smartrecovery.org    -[facility] ~ [facility]  Crisis Resource & Support Center: [phone].  -[facility] [phone]   -[facility] [facility] [phone] or [phone].  -Crisis Intervention: 121.469.1036 or [phone] (TTY: [phone]).  Call anytime.  -Suicide Awareness Voices of Education (SAVE) (www.save.org): 108-890-OZSK (1119)  -National Suicide Prevention Line (www.mentalhealthmn.org): 979-635-HUAP (0092)  -National [place] on Mental Illness (www.mn.drew.org): 329.408.3000 or 558-658-3846.  -Omav9gjhc: text the word LIFE to 08995 for immediate support and crisis intervention  -Mental Health Consumer/Survivor Network of [place] (www.mhcsn.net): 589.682.5953 or 494-195-2251  -Mental Health Association of [place] (www.mentalhealth.org): 140.365.4492 or 179-950-1437     -Substance Abuse and Mental Health Services (www.samhsa.gov)  -Harm Reduction Coalition (www. Harmreduction.org)  -www.prescribetoprevent.org or http://prescribetoprevent.org/video  -Poison control 4-555-028-4568     Sober Support Group Information:  AA/NA & Sponsor/Support  -Alcoholics Anonymous (www.alcoholics-anonymous.org): for local information 24 hours/day  -AA Intergroup service office in [place] (http://www.aastpaul.org/) [phone]  -AA Intergroup service office in [facility]: [phone]. (http://www.a[place].org/)  -Narcotics Anonymous (www.nam[place].org) (787) 847-5726   **Sober Fun Activities: www.sober-activities.[employer]/[facility]//[facility] Recovery Connection (MRC)  [facility] connects people seeking recovery to resources that help foster and sustain long-term recovery.  Whether you are seeking resources for treatment, transportation, housing, job training, education, health care or other pathways to recovery, [facility] is a great place to start.  [phone].  Www.[facility].org    General Medication Instructions:   See your medication sheet(s) for instructions.   Take all medicines as directed.  Make no  changes unless your doctor suggests them.   Go to all your doctor visits.  Be sure to have all your required lab tests. This way, your medicines can be refilled on time.  Do not use any drugs not prescribed by your provider.  AA/NA and Sponsors are excellent resources for support  Avoid alcohol.    Please Note:  If you have any questions at anytime after you are discharged please call the [facility] [place] detox unit 3AW unit at [phone].    [facility], Behavioral Intake [phone]    Please take this discharge folder with you to all your follow up appointments, it contains your lab results, diagnosis, medication list and discharge recommendations.      THANK YOU FOR CHOOSING THE [facility]

## 2019-07-15 ENCOUNTER — OFFICE VISIT (OUTPATIENT)
Dept: FAMILY MEDICINE | Facility: CLINIC | Age: 59
End: 2019-07-15
Payer: MEDICAID

## 2019-07-15 VITALS
HEART RATE: 102 BPM | DIASTOLIC BLOOD PRESSURE: 72 MMHG | OXYGEN SATURATION: 95 % | RESPIRATION RATE: 18 BRPM | TEMPERATURE: 99 F | SYSTOLIC BLOOD PRESSURE: 110 MMHG | BODY MASS INDEX: 24.3 KG/M2 | WEIGHT: 204.9 LBS

## 2019-07-15 DIAGNOSIS — B19.20 HEPATITIS C VIRUS INFECTION WITHOUT HEPATIC COMA, UNSPECIFIED CHRONICITY: Primary | ICD-10-CM

## 2019-07-15 DIAGNOSIS — F10.21 ALCOHOL DEPENDENCE IN REMISSION (H): ICD-10-CM

## 2019-07-15 DIAGNOSIS — F19.11 SUBSTANCE ABUSE IN REMISSION (H): ICD-10-CM

## 2019-07-15 PROCEDURE — 99204 OFFICE O/P NEW MOD 45 MIN: CPT | Performed by: NURSE PRACTITIONER

## 2019-07-15 NOTE — PATIENT INSTRUCTIONS
"  Patient Education     Recovering from Addiction: Continuing with Counseling    The road to recovery can be tough. But working with a counselor can help make your recovery smoother and keep you on track. A counselor can help you decide which lifestyle changes you want to make to stay sober. Also, consider talking with a counselor about other issues you may want to work on. He or she can help you find resources for anger management, problem-solving skills, or assertiveness training.  Be aware of your triggers  Triggers are things that make you want to use again. They can be people you used with or places, things, and events that make you want to use. Stress and feelings like loneliness, anxiety, or depression can also make you want to use again. When you know what your triggers are, you can plan ways to avoid them when possible. To find your triggers, get a piece of paper. List the people, places, events, or feelings that could make you want to use again. Keep this paper. Add to it as needed. Work with your counselor on how to cope with these triggers without using.  Getting help  Once you admit that you have a substance abuse problem, there are many ways to find help.    Contact your Employee Assistance Program (EAP) or Human Resources department.    Talk to your primary care doctor and ask for a referral to an addiction specialist for an evaluation.    Look in the white pages of your phone book for local chapters of these groups:  ? Alcoholics Anonymous  ? Cocaine Anonymous  ? Marijuana Anonymous  ? Narcotics Anonymous    Look in the yellow pages of your phone book under one of the following:  ? Alcoholism Information and Treatment Center  ? Drug Abuse and Addiction Information and Treatment Center    Look online for treatment centers near you:  ? Substance Abuse and Mental Health Services Administration's \"treatment finder\": http://findtreatment.samhsa,gov    Contact one of these national groups:  ? National " [place] on Alcoholism and Drug Dependence  589.689.2950  ? [facility] Alcohol Treatment Referral Service  740-799-HELP (729-678-4925)   Date Last Reviewed: 2/1/2017 2000-2018 The [employer]. [address] [place] [address], [place] [address]. All rights reserved. This information is not intended as a substitute for professional medical care. Always follow your healthcare professional's instructions.           Patient Education     Addiction: Your Treatment Options  No single treatment for addiction works for everyone. The treatment that's best for you can depend on many factors. For many people, treatment may be a combination of medicine, behavior change, therapy, lifestyle changes, and support.  Medicines  Medicines can help with withdrawal symptoms. They can also reduce cravings for the addictive substance. They can blunt its feel-good effects. For example:    Methadone, buprenorphine, and naltrexone are used for heroin and other opioid addiction.    Acamprosate, disulfiram, and naltrexone are used for treating alcohol addiction.    Bupropion, varenicline, or nicotine replacement therapy can help with nicotine addiction.  These medicines have proved to be quite helpful for people trying to overcome an addiction.    Behavior change treatment    Motivational Interviewing. This is a type of counseling that encourages you to change your behavior. The goal is to explore and resolve any mixed feelings you have about quitting drug or alcohol use. The therapist helps you figure out and focus on your personal reasons for wanting to change.      Cognitive behavioral therapy (CBT). In this therapy, you figure out your problem behaviors. And you learn ways to change those behaviors. For example, if anger or stress makes you want to drink, a therapist can help you learn healthy ways to manage those feelings.    Community reinforcement approach (CRA). This therapy uses vouchers help you follow a drug- or  alcohol-free lifestyle.  With each clean urine sample, you get a voucher to use for a reward.  This helps you stay drug- or alcohol-free while you learn new life skills.    Community reinforcement and family training (CRAFT). This therapy counsels and trains your family. The therapist teaches them how to motivate you to seek or continue treatment. This therapy also helps your family recognize family situations that may encourage you to drink or use drugs.     [place] support groups. These groups are run voluntarily by non-health care professional people. Their purpose is to support each other emotionally and socially by sharing their experiences with substance abuse and mentoring others through the recovery process. Many of these groups are based on the 12-step recovery model, and have sessions available for every type of addiction (for example, AA or Alcoholics Anonymous; NA or Narcotics Anonymous).    Individualized drug counseling (IDC). This commonly practiced form of therapy typically incorporate the disease model of addiction and the spiritual dimension of recovery while also focusing on behavioral change through participation in 12-step programs.  Date Last Reviewed: 2/1/2017 2000-2018 [employer]. [address] [place] [address]. All rights reserved. This information is not intended as a substitute for professional medical care. Always follow your healthcare professional's instructions.           Patient Education     Hepatitis C: Protecting Your Liver    Taking good care of yourself is the best way to prevent health problems related to the hepatitis C virus (HCV). Give your liver a fighting chance. This means avoiding things that can make liver damage worse. And help your body defend against HCV infection by staying healthy.  Watch medicines and supplements  Some medicines and herbal supplements can harm your liver. To protect yourself:    Check with your healthcare provider or  pharmacist before taking anything that you buy over-the-counter.    Learn the generic and brand names for over-the-counter products that may affect your liver. Be especially aware of the many combination medicines that contain acetaminophen.    Make sure you tell any healthcare provider who prescribes medicine for you that you have hepatitis C.    Because herbal medicines are not FDA regulated, you may not know which could damage your liver. So avoid them unless you speak with your healthcare provider.    If you have cirrhosis, get specific instructions from your healthcare provider.    If you are overweight, lose weight. Fatty liver can cause the same damage as alcohol.  Do not drink alcohol  Your liver works hard to process alcohol. If you have HCV infection, drinking alcohol may make you more likely to develop cirrhosis. You may also develop it faster. Your best defense against hepatitis C is to not drink any alcohol.  Get tested  It is important to get tested for hepatitis A and hepatitis B. If you are not immune to these, ask your healthcare provider about getting vaccinated against them.  Stay healthy  Your body s immune system fights against infections. It s more able to do this when your body is healthy. Eating healthy foods and getting plenty of sleep will help keep your body strong. And staying upbeat can help you keep hepatitis C in perspective.  Date Last Reviewed: 7/1/2016 2000-2018 [employer]. [address] [facility], [place] [address]. All rights reserved. This information is not intended as a substitute for professional medical care. Always follow your healthcare professional's instructions.

## 2019-07-15 NOTE — PROGRESS NOTES
"Subjective     Sohan Marcano is a 58 year old male who presents to clinic today for the following health issues:    HPI   Was in detox last week, they wanted him to come here because his labs came back abnormal  Cirrhosis of the liver now. Hep C is active. Was told his eyes became more white inpatient and mild constipation otherwise feels very well   Has known for years maybe 45 years was positive for Hep B and Hep C  Never had any symptoms. 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 Did see bolastan on a clients porch but no cravings, just passing thoughts that they were probably enjoying themselves  Friends have AA meeting, wants to find one with older adults who are intelligent so he doesn't have to sit through a bunch of stuff...     Still smoking nearly completely quit, \"phasing it all out\"    Patient Active Problem List   Diagnosis     Alcohol withdrawal (H)     Chemical dependency (H)     Basal cell carcinoma of anterior chest s/p excision 3-10-15     Squamous cell carcinoma mid upper chest s/p excision 3-10-15     Abdominal pain     Alcohol dependence in remission (H)     Hepatitis C virus infection without hepatic coma, unspecified chronicity     Past Surgical History:   Procedure Laterality Date     HERNIA REPAIR         Social History     Tobacco Use     Smoking status: Current Every Day Smoker     Packs/day: 0.50     Smokeless tobacco: Never Used     Tobacco comment: currently 2 cigs daily   Substance Use Topics     Alcohol use: " "Not Currently     Comment: 1 L of john per day in past. None anymore     Family History   Problem Relation Age of Onset     Other - See Comments Mother         Patient states his Mother ? had skin cancer.     Other - See Comments Father         Patient reports Father had \"all types of skin cancer\".     Other - See Comments Sister         Basal cell carcinoma         Current Outpatient Medications   Medication Sig Dispense Refill     albuterol (PROAIR HFA/PROVENTIL HFA/VENTOLIN HFA) 108 (90 Base) MCG/ACT inhaler Inhale 2 puffs into the lungs every 6 hours as needed for shortness of breath / dyspnea or wheezing 1 Inhaler 0     multivitamin w/minerals (THERA-VIT-M) tablet Take 1 tablet by mouth daily 30 tablet 0     vitamin B1 (THIAMINE) 100 MG tablet Take 1 tablet (100 mg) by mouth daily 30 tablet 0     vitamin D2 (ERGOCALCIFEROL) 39163 units capsule Take 1 capsule (50,000 Units) by mouth every 7 days 4 capsule 0     NEW MED VITAMIN B COMPLEX + VITAMIN C - Take 1 tablet by mouth once daily.       No Known Allergies  Recent Labs   Lab Test 07/08/19  0818 07/07/19  0644 07/06/19  1136  03/08/14  0538 03/07/14  1657   A1C  --   --   --   --  5.3  --    ALT 43 43 44   < > 221* 292*   CR  --  0.86 0.73   < > 0.80 0.90   GFRESTIMATED  --  >90 >90   < > >90 88   GFRESTBLACK  --  >90 >90   < > >90 >90   POTASSIUM  --  3.8 3.5   < > 3.6 3.7   TSH  --   --   --   --   --  1.72    < > = values in this interval not displayed.      BP Readings from Last 3 Encounters:   07/15/19 110/72   07/08/19 121/76   06/27/19 130/79    Wt Readings from Last 3 Encounters:   07/15/19 92.9 kg (204 lb 14.4 oz)   07/05/19 93.9 kg (207 lb)   06/27/19 93.3 kg (205 lb 11.2 oz)                      Reviewed and updated as needed this visit by Provider         Review of Systems   ROS COMP: Constitutional, HEENT, cardiovascular, pulmonary, GI, , musculoskeletal, neuro, skin, endocrine and psych systems are negative, except as otherwise noted.    "   Objective    /72   Pulse 102   Temp 99  F (37.2  C) (Temporal)   Resp 18   Wt 92.9 kg (204 lb 14.4 oz)   SpO2 95%   BMI 24.30 kg/m    Body mass index is 24.3 kg/m .  Physical Exam   GENERAL: healthy, alert and no distress, appears older than age   EYES: Eyes just slightly yellow tinged, PERRL and conjunctivae and sclerae normal  HENT: mouth missing many teeth in disrepair  RESP: lungs clear to auscultation - no rales, rhonchi or wheezes  CV: regular rate and rhythm, normal S1 S2, no S3 or S4, no murmur, click or rub, no peripheral edema and peripheral pulses strong  ABDOMEN: soft, nontender, liver slighlty palpable nontender,  no masses and bowel sounds normal  MS: no gross musculoskeletal defects noted, no edema  SKIN: no suspicious lesions or rashes  NEURO: Normal strength and tone, mentation intact and speech normal  PSYCH: mentation appears normal, affect normal/bright    Diagnostic Test Results:  Labs reviewed in Epic        Assessment & Plan       ICD-10-CM    1. Hepatitis C virus infection without hepatic coma, unspecified chronicity B19.20 GASTROENTEROLOGY ADULT REF CONSULT ONLY   2. Alcohol dependence in remission (H) F10.21 CARE COORDINATION REFERRAL   3. 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     Liver enzymes already normalized but might have acute Hep C and referred to specialist, instructed to watch closely for any returning/worsening symptoms and notify if any concerns. Pt today with mild jaundice noted in eyes mostly resolved, per pt was worse in hosptial with his balance that was affected this is all improving as well.     Offered Addiction medicine and meds to support sobriety and he declines at this time    Care  coordination to please reach out at some point and discuss treatment options and possible financial resources, referral placed.     Return for health maintenance exam with fasting blood work at his convenience     Tobacco Cessation:   reports that he has been smoking.  He has been smoking about 0.50 packs per day. He has never used smokeless tobacco.  Tobacco Cessation Action Plan: Self help information given to patient        Patient Instructions     Patient Education     Recovering from Addiction: Continuing with Counseling    The road to recovery can be tough. But working with a counselor can help make your recovery smoother and keep you on track. A counselor can help you decide which lifestyle changes you want to make to stay sober. Also, consider talking with a counselor about other issues you may want to work on. He or she can help you find resources for anger management, problem-solving skills, or assertiveness training.  Be aware of your triggers  Triggers are things that make you want to use again. They can be people you used with or places, things, and events that make you want to use. Stress and feelings like loneliness, anxiety, or depression can also make you want to use again. When you know what your triggers are, you can plan ways to avoid them when possible. To find your triggers, get a piece of paper. List the people, places, events, or feelings that could make you want to use again. Keep this paper. Add to it as needed. Work with your counselor on how to cope with these triggers without using.  Getting help  Once you admit that you have a substance abuse problem, there are many ways to find help.    Contact your Employee Assistance Program (EAP) or Human Resources department.    Talk to your primary care doctor and ask for a referral to an addiction specialist for an evaluation.    Look in the white pages of your phone book for local chapters of these groups:  ? Alcoholics Anonymous  ? Cocaine  "Anonymous  ? Marijuana Anonymous  ? Narcotics Anonymous    Look in the yellow pages of your phone book under one of the following:  ? Alcoholism Information and Treatment Center  ? Drug Abuse and Addiction Information and Treatment Center    Look online for treatment centers near you:  ? Substance Abuse and Mental Health Services Administration's \"treatment finder\": http://findtreatment.samhsa,gov    Contact one of these national groups:  ? National [place] on Alcoholism and Drug Dependence  561.747.9643  ? National Drug and Alcohol Treatment Referral Service  685-984-YGLF (921-521-8165)   Date Last Reviewed: 2/1/2017 2000-2018 [employer]). [address] [place] [address]. All rights reserved. This information is not intended as a substitute for professional medical care. Always follow your healthcare professional's instructions.           Patient Education     Addiction: Your Treatment Options  No single treatment for addiction works for everyone. The treatment that's best for you can depend on many factors. For many people, treatment may be a combination of medicine, behavior change, therapy, lifestyle changes, and support.  Medicines  Medicines can help with withdrawal symptoms. They can also reduce cravings for the addictive substance. They can blunt its feel-good effects. For example:    Methadone, buprenorphine, and naltrexone are used for heroin and other opioid addiction.    Acamprosate, disulfiram, and naltrexone are used for treating alcohol addiction.    Bupropion, varenicline, or nicotine replacement therapy can help with nicotine addiction.  These medicines have proved to be quite helpful for people trying to overcome an addiction.    Behavior change treatment    Motivational Interviewing. This is a type of counseling that encourages you to change your behavior. The goal is to explore and resolve any mixed feelings you have about quitting drug or alcohol use. The therapist " helps you figure out and focus on your personal reasons for wanting to change.      Cognitive behavioral therapy (CBT). In this therapy, you figure out your problem behaviors. And you learn ways to change those behaviors. For example, if anger or stress makes you want to drink, a therapist can help you learn healthy ways to manage those feelings.    Community reinforcement approach (CRA). This therapy uses vouchers help you follow a drug- or alcohol-free lifestyle.  With each clean urine sample, you get a voucher to use for a reward.  This helps you stay drug- or alcohol-free while you learn new life skills.    Community reinforcement and family training (CRAFT). This therapy counsels and trains your family. The therapist teaches them how to motivate you to seek or continue treatment. This therapy also helps your family recognize family situations that may encourage you to drink or use drugs.     [employer] support groups. These groups are run voluntarily by non-health care professional people. Their purpose is to support each other emotionally and socially by sharing their experiences with substance abuse and mentoring others through the recovery process. Many of these groups are based on the 12-step recovery model, and have sessions available for every type of addiction (for example, AA or Alcoholics Anonymous; NA or Narcotics Anonymous).    Individualized drug counseling (IDC). This commonly practiced form of therapy typically incorporate the disease model of addiction and the spiritual dimension of recovery while also focusing on behavioral change through participation in 12-step programs.  Date Last Reviewed: 2/1/2017 2000-2018 [employer]. [address] [place] [address], [place] [address]. All rights reserved. This information is not intended as a substitute for professional medical care. Always follow your healthcare professional's instructions.           Patient Education     Hepatitis C: Protecting  Your Liver    Taking good care of yourself is the best way to prevent health problems related to the hepatitis C virus (HCV). Give your liver a fighting chance. This means avoiding things that can make liver damage worse. And help your body defend against HCV infection by staying healthy.  Watch medicines and supplements  Some medicines and herbal supplements can harm your liver. To protect yourself:    Check with your healthcare provider or pharmacist before taking anything that you buy over-the-counter.    Learn the generic and brand names for over-the-counter products that may affect your liver. Be especially aware of the many combination medicines that contain acetaminophen.    Make sure you tell any healthcare provider who prescribes medicine for you that you have hepatitis C.    Because herbal medicines are not FDA regulated, you may not know which could damage your liver. So avoid them unless you speak with your healthcare provider.    If you have cirrhosis, get specific instructions from your healthcare provider.    If you are overweight, lose weight. Fatty liver can cause the same damage as alcohol.  Do not drink alcohol  Your liver works hard to process alcohol. If you have HCV infection, drinking alcohol may make you more likely to develop cirrhosis. You may also develop it faster. Your best defense against hepatitis C is to not drink any alcohol.  Get tested  It is important to get tested for hepatitis A and hepatitis B. If you are not immune to these, ask your healthcare provider about getting vaccinated against them.  Stay healthy  Your body s immune system fights against infections. It s more able to do this when your body is healthy. Eating healthy foods and getting plenty of sleep will help keep your body strong. And staying upbeat can help you keep hepatitis C in perspective.  Date Last Reviewed: 7/1/2016 2000-2018 [employer]. [address] [place] [address], [place] [address]. All rights  reserved. This information is not intended as a substitute for professional medical care. Always follow your healthcare professional's instructions.               Return in about 2 months (around 9/15/2019) for next annual exam or as needed.    ELIU Christian [facility]

## 2019-07-17 ENCOUNTER — PATIENT OUTREACH (OUTPATIENT)
Dept: CARE COORDINATION | Facility: CLINIC | Age: 59
End: 2019-07-17

## 2019-07-17 NOTE — LETTER
[place] CARE COORDINATION  [address]  [facility] [address]  July 29, 2019    Sohan Marcano  [address] [facility] [address]      Dear Sohan,    I am a clinic care coordinator who works with ELIU Christian CNP at [facility]. I recently tried to call and was unable to reach you. I wanted to introduce myself and provide you with my contact information so that you can call me with questions or concerns about your health care. Below is a description of clinic care coordination and how I can further assist you.     The clinic care coordinator is a registered nurse and/or  who understand the health care system. The goal of clinic care coordination is to help you manage your health and improve access to the [facility] system in the most efficient manner. The registered nurse can assist you in meeting your health care goals by providing education, coordinating services, and strengthening the communication among your providers. The  can assist you with financial, behavioral, psychosocial, chemical dependency, counseling, and/or psychiatric resources.    Please feel free to contact me at [phone], with any questions or concerns. We at [facility] are focused on providing you with the highest-quality healthcare experience possible and that all starts with you.     Sincerely,     Majo Thorpe

## 2019-07-17 NOTE — PROGRESS NOTES
Clinic Care Coordination Contact  [facility]/Voicemail    Referral Source: PCP  Clinical Data: Care Coordinator Outreach  Outreach attempted x 1.  Left message on voicemail with call back information and requested return call.  Plan: Care Coordinator will mail out care coordination introduction letter with care coordinator contact information and explanation of care coordination services on next outreach. Care Coordinator will try to reach patient again in 1-2 business days.    ANASTACIA Zelaya  Clinic Care Coordinator   [facility]   [phone]

## 2019-07-23 ENCOUNTER — TELEPHONE (OUTPATIENT)
Dept: FAMILY MEDICINE | Facility: CLINIC | Age: 59
End: 2019-07-23

## 2019-07-23 NOTE — TELEPHONE ENCOUNTER
Panel Management Review      Patient has the following on his problem list: None      Composite cancer screening  Chart review shows that this patient is due/due soon for the following Colonoscopy  Summary:    Patient is due/failing the following:   COLONOSCOPY    Action needed:   Patient needs colonoscopy.    Type of outreach:    Sent letter.    Questions for provider review:    None                                                                                                                                    Kahlil Augustin MA     Chart routed to none.

## 2019-07-23 NOTE — LETTER
July 23, 2019      Sohan Marcano  [address] [facility] [address]        Dear Sohan,    In order to ensure we are providing the best quality care, we have reviewed your chart and see that you are due for:    1. Colonoscopy     Please call the clinic at your earliest convenience to schedule an appointment.  If you have completed these please contact our office via phone or [employer] to update our records.  We would like to know the date (approximately month and year), the result, and ideally where the procedure was performed.    Thank you for trusting us with your health care.      Sincerely,    Care Team for Gracie Winters NP

## 2019-07-29 ENCOUNTER — PATIENT OUTREACH (OUTPATIENT)
Dept: CARE COORDINATION | Facility: CLINIC | Age: 59
End: 2019-07-29

## 2019-07-29 ASSESSMENT — ACTIVITIES OF DAILY LIVING (ADL): DEPENDENT_IADLS:: INDEPENDENT

## 2019-07-29 NOTE — PROGRESS NOTES
Clinic Care Coordination Contact    Follow Up Progress Note      Assessment: SW talked with Pt over the phone. Pt stated that a few weeks ago he went to detox and was able to stop drinking. Pt reported that he went to the doctor a while back and was told that his liver functioning was 30% gone. The pt stated he had been drinking since age 12 and he knew his time drinking was over. Pt stated he has been doing well since detox and sticks to drinking green tea with lemon and water with lemon.     Goals addressed this encounter:   Goals Addressed     None           Intervention/Education provided during outreach: SW talked with patient about any other resources he was in need of right now. Pt denied needing anything as he stated he just needs to make an appointment with his doctor to get a further assessment of his body functioning.             Plan:   Pt will contact SW with any concerns in the future.   Care Coordinator will do no further follow ups.     ANASTACIA Zelaya  Clinic Care Coordinator   [facility]   [phone]

## 2019-10-08 ENCOUNTER — PATIENT OUTREACH (OUTPATIENT)
Dept: CARE COORDINATION | Facility: CLINIC | Age: 59
End: 2019-10-08

## 2019-10-08 NOTE — PROGRESS NOTES
Clinic Care Coordination Contact  Care Team Conversations    Pt called SW and had questions about what to do for past bills. Pt stated that his MA was reinstated on 8/9/19. Pt was wondering if there was something he could do financially for the visit to the ER on 6/27. SW suggested contacting the [facility] Billing department. They would have a better understanding of the billing and if it needs to be re ran under his new insurance plan. Pt stated he would be able to do this. Pt stated he continues to have health problems, but is overall feeling good. He states that he continues to be sober, but the last few months have been tough for him. Pt stated he did not need further follow up calls from SW, but would call if he had any concerns.    ANASTACIA Zelaya  Clinic Care Coordinator   [facility]   [phone]

## 2019-10-11 ENCOUNTER — OFFICE VISIT (OUTPATIENT)
Dept: FAMILY MEDICINE | Facility: CLINIC | Age: 59
End: 2019-10-11
Payer: COMMERCIAL

## 2019-10-11 ENCOUNTER — TELEPHONE (OUTPATIENT)
Dept: DERMATOLOGY | Facility: CLINIC | Age: 59
End: 2019-10-11

## 2019-10-11 VITALS
OXYGEN SATURATION: 97 % | WEIGHT: 193.2 LBS | SYSTOLIC BLOOD PRESSURE: 116 MMHG | BODY MASS INDEX: 22.91 KG/M2 | TEMPERATURE: 97.5 F | HEART RATE: 89 BPM | DIASTOLIC BLOOD PRESSURE: 88 MMHG

## 2019-10-11 DIAGNOSIS — R22.1 MASS OF RIGHT SIDE OF NECK: ICD-10-CM

## 2019-10-11 DIAGNOSIS — F10.21 ALCOHOL DEPENDENCE IN REMISSION (H): ICD-10-CM

## 2019-10-11 DIAGNOSIS — R74.8 ELEVATED LIVER ENZYMES: ICD-10-CM

## 2019-10-11 DIAGNOSIS — Z13.220 LIPID SCREENING: ICD-10-CM

## 2019-10-11 DIAGNOSIS — B19.20 HEPATITIS C VIRUS INFECTION WITHOUT HEPATIC COMA, UNSPECIFIED CHRONICITY: ICD-10-CM

## 2019-10-11 DIAGNOSIS — F19.11 SUBSTANCE ABUSE IN REMISSION (H): ICD-10-CM

## 2019-10-11 DIAGNOSIS — L98.9 CHANGING SKIN LESION: ICD-10-CM

## 2019-10-11 DIAGNOSIS — Z00.00 ROUTINE GENERAL MEDICAL EXAMINATION AT A HEALTH CARE FACILITY: Primary | ICD-10-CM

## 2019-10-11 LAB
ALBUMIN SERPL-MCNC: 3.3 G/DL (ref 3.4–5)
ALP SERPL-CCNC: 86 U/L (ref 40–150)
ALT SERPL W P-5'-P-CCNC: 38 U/L (ref 0–70)
ANION GAP SERPL CALCULATED.3IONS-SCNC: 8 MMOL/L (ref 3–14)
AST SERPL W P-5'-P-CCNC: 43 U/L (ref 0–45)
BASOPHILS # BLD AUTO: 0.1 10E9/L (ref 0–0.2)
BASOPHILS NFR BLD AUTO: 1.9 %
BILIRUB SERPL-MCNC: 2.6 MG/DL (ref 0.2–1.3)
BUN SERPL-MCNC: 10 MG/DL (ref 7–30)
CALCIUM SERPL-MCNC: 8.9 MG/DL (ref 8.5–10.1)
CHLORIDE SERPL-SCNC: 105 MMOL/L (ref 94–109)
CHOLEST SERPL-MCNC: 129 MG/DL
CO2 SERPL-SCNC: 25 MMOL/L (ref 20–32)
CREAT SERPL-MCNC: 0.74 MG/DL (ref 0.66–1.25)
DIFFERENTIAL METHOD BLD: ABNORMAL
EOSINOPHIL # BLD AUTO: 0.4 10E9/L (ref 0–0.7)
EOSINOPHIL NFR BLD AUTO: 6.7 %
ERYTHROCYTE [DISTWIDTH] IN BLOOD BY AUTOMATED COUNT: 14.2 % (ref 10–15)
GFR SERPL CREATININE-BSD FRML MDRD: >90 ML/MIN/{1.73_M2}
GLUCOSE SERPL-MCNC: 107 MG/DL (ref 70–99)
HCT VFR BLD AUTO: 36.9 % (ref 40–53)
HDLC SERPL-MCNC: 48 MG/DL
HGB BLD-MCNC: 12.9 G/DL (ref 13.3–17.7)
LDLC SERPL CALC-MCNC: 67 MG/DL
LYMPHOCYTES # BLD AUTO: 1.9 10E9/L (ref 0.8–5.3)
LYMPHOCYTES NFR BLD AUTO: 34.5 %
MCH RBC QN AUTO: 35.4 PG (ref 26.5–33)
MCHC RBC AUTO-ENTMCNC: 35 G/DL (ref 31.5–36.5)
MCV RBC AUTO: 101 FL (ref 78–100)
MONOCYTES # BLD AUTO: 0.6 10E9/L (ref 0–1.3)
MONOCYTES NFR BLD AUTO: 11.5 %
NEUTROPHILS # BLD AUTO: 2.4 10E9/L (ref 1.6–8.3)
NEUTROPHILS NFR BLD AUTO: 45.4 %
NONHDLC SERPL-MCNC: 81 MG/DL
PLATELET # BLD AUTO: 147 10E9/L (ref 150–450)
POTASSIUM SERPL-SCNC: 3.7 MMOL/L (ref 3.4–5.3)
PROT SERPL-MCNC: 7.6 G/DL (ref 6.8–8.8)
RBC # BLD AUTO: 3.64 10E12/L (ref 4.4–5.9)
SODIUM SERPL-SCNC: 138 MMOL/L (ref 133–144)
TRIGL SERPL-MCNC: 72 MG/DL
WBC # BLD AUTO: 5.4 10E9/L (ref 4–11)

## 2019-10-11 PROCEDURE — 90682 RIV4 VACC RECOMBINANT DNA IM: CPT | Performed by: NURSE PRACTITIONER

## 2019-10-11 PROCEDURE — 85025 COMPLETE CBC W/AUTO DIFF WBC: CPT | Performed by: NURSE PRACTITIONER

## 2019-10-11 PROCEDURE — 80061 LIPID PANEL: CPT | Performed by: NURSE PRACTITIONER

## 2019-10-11 PROCEDURE — 36415 COLL VENOUS BLD VENIPUNCTURE: CPT | Performed by: NURSE PRACTITIONER

## 2019-10-11 PROCEDURE — 80053 COMPREHEN METABOLIC PANEL: CPT | Performed by: NURSE PRACTITIONER

## 2019-10-11 PROCEDURE — 99214 OFFICE O/P EST MOD 30 MIN: CPT | Mod: 25 | Performed by: NURSE PRACTITIONER

## 2019-10-11 PROCEDURE — 99396 PREV VISIT EST AGE 40-64: CPT | Mod: 25 | Performed by: NURSE PRACTITIONER

## 2019-10-11 PROCEDURE — 90471 IMMUNIZATION ADMIN: CPT | Performed by: NURSE PRACTITIONER

## 2019-10-11 NOTE — TELEPHONE ENCOUNTER
amand  RECORDS RECEIVED FROM: Internal - Hepatitis C virus without hepatic coma//Elevated liver function enzymes//med recs in epic//scheduled by patient//   DATE RECEIVED: 10.14.2019   NOTES STATUS DETAILS   OFFICE NOTE from referring provider Internal 07.15.2019 Consult   OFFICE NOTES from other specialists N/A    DISCHARGE SUMMARY from hospital N/A    MEDICATION LIST Internal    LIVER BIOSPY (IF APPLICABLE)      PATHOLOGY REPORTS  N/A    IMAGING     ENDOSCOPY (IF AVAILABLE) N/A    COLONOSCOPY (IF AVAILABLE) N/A    ULTRASOUND LIVER Internal 09.23.2019   CT OF ABDOMEN N/A    MRI OF LIVER N/A    FIBROSCAN, US ELASTOGRAPHY, FIBROSIS SCAN, MR ELASTOGRAPHY N/A    LABS     HEPATIC PANEL (LIVER PANEL) Internal 07.18.2019   BASIC METABOLIC PANEL Internal 05.132.2018   COMPLETE METABOLIC PANEL N/A    COMPLETE BLOOD COUNT (CBC) Internal 10.11.2019   INTERNATIONAL NORMALIZED RATIO (INR) N/A    HEPATITIS C ANTIBODY Internal 03.08.2014   HEPATITIS C VIRAL LOAD/PCR N/A    HEPATITIS C GENOTYPE N/A    HEPATITIS B SURFACE ANTIGEN Internal 03.08.2014   HEPATITIS B SURFACE ANTIBODY Internal 03.08.2014   HEPATITIS B DNA QUANT LEVEL N/A    HEPATITIS B CORE ANTIBODY Internal 03.08.2014

## 2019-10-11 NOTE — PROGRESS NOTES
SUBJECTIVE:   CC: Sohan Marcano is an 59 year old male who presents for preventive health visit.     Healthy Habits:    Do you get at least three servings of calcium containing foods daily (dairy, green leafy vegetables, etc.)? yes    Amount of exercise or daily activities, outside of work: nothing outside of work    Problems taking medications regularly No    Medication side effects: No    Have you had an eye exam in the past two years? no    Do you see a dentist twice per year? no    Do you have sleep apnea, excessive snoring or daytime drowsiness?not sleeping much lately      PROBLEMS TO ADD ON...  Sobriety going well, getting used to new life changes, still smoking thinking about quitting with gum    Still hasn't scheduled with liver specialist, has the number. Occasional swelling in lower legs, L>R can see his sock line often at the end of the day.     Nighttime urinating but attributes this to old age    Changing skin lesion on lower right leg, has had skin cancer in the past lesions removed from neck,chest area    Recently got over a cold but congestion is clearing    Lives with his ex, stepson and grandkids in [place], doing groups, mood has been good     Fasting today    Today's PHQ-2 Score:   PHQ-2 ( 1999 [employer]) 10/11/2019 7/15/2019   Q1: Little interest or pleasure in doing things 0 0   Q2: Feeling down, depressed or hopeless 0 0   PHQ-2 Score 0 0       Abuse: Current or Past(Physical, Sexual or Emotional)- No  Do you feel safe in your environment? Yes    Social History     Tobacco Use     Smoking status: Current Every Day Smoker     Packs/day: 0.50     Smokeless tobacco: Never Used     Tobacco comment: currently 2 cigs daily   Substance Use Topics     Alcohol use: Not Currently     Comment: 1 L of john per day in past. None anymore     If you drink alcohol do you typically have >3 drinks per day or >7 drinks per week? No                      Last PSA: No results found for: PSA    Reviewed orders with  "patient. Reviewed health maintenance and updated orders accordingly - Yes  Lab work is in process  Labs reviewed in EPIC  BP Readings from Last 3 Encounters:   10/11/19 116/88   07/15/19 110/72   07/08/19 121/76    Wt Readings from Last 3 Encounters:   10/11/19 87.6 kg (193 lb 3.2 oz)   07/15/19 92.9 kg (204 lb 14.4 oz)   07/05/19 93.9 kg (207 lb)                  Patient Active Problem List   Diagnosis     Alcohol withdrawal (H)     Chemical dependency (H)     Basal cell carcinoma of anterior chest s/p excision 3-10-15     Squamous cell carcinoma mid upper chest s/p excision 3-10-15     Abdominal pain     Alcohol dependence in remission (H)     Hepatitis C virus infection without hepatic coma, unspecified chronicity     Past Surgical History:   Procedure Laterality Date     HERNIA REPAIR         Social History     Tobacco Use     Smoking status: Current Every Day Smoker     Packs/day: 0.50     Smokeless tobacco: Never Used     Tobacco comment: currently 2 cigs daily   Substance Use Topics     Alcohol use: Not Currently     Comment: 1 L of john per day in past. None anymore     Family History   Problem Relation Age of Onset     Other - See Comments Mother         Patient states his Mother ? had skin cancer.     Other - See Comments Father         Patient reports Father had \"all types of skin cancer\".     Other - See Comments Sister         Basal cell carcinoma         Current Outpatient Medications   Medication Sig Dispense Refill     Milk Thistle 1000 MG CAPS Take 1,000 mg by mouth 2 times daily       multivitamin w/minerals (THERA-VIT-M) tablet Take 1 tablet by mouth daily 30 tablet 0     NEW MED VITAMIN B COMPLEX + VITAMIN C - Take 1 tablet by mouth once daily.       vitamin B1 (THIAMINE) 100 MG tablet Take 1 tablet (100 mg) by mouth daily 30 tablet 0     albuterol (PROAIR HFA/PROVENTIL HFA/VENTOLIN HFA) 108 (90 Base) MCG/ACT inhaler Inhale 2 puffs into the lungs every 6 hours as needed for shortness of breath " / dyspnea or wheezing (Patient not taking: Reported on 10/11/2019) 1 Inhaler 0     vitamin D2 (ERGOCALCIFEROL) 95051 units capsule Take 1 capsule (50,000 Units) by mouth every 7 days (Patient not taking: Reported on 10/11/2019) 4 capsule 0     No Known Allergies  Recent Labs   Lab Test 07/08/19  0818 07/07/19  0644 07/06/19  1136  03/08/14  0538 03/07/14  1657   A1C  --   --   --   --  5.3  --    ALT 43 43 44   < > 221* 292*   CR  --  0.86 0.73   < > 0.80 0.90   GFRESTIMATED  --  >90 >90   < > >90 88   GFRESTBLACK  --  >90 >90   < > >90 >90   POTASSIUM  --  3.8 3.5   < > 3.6 3.7   TSH  --   --   --   --   --  1.72    < > = values in this interval not displayed.        Reviewed and updated as needed this visit by clinical staff  Tobacco  Allergies  Meds  Med Hx  Surg Hx  Fam Hx  Soc Hx        Reviewed and updated as needed this visit by Provider        Past Medical History:   Diagnosis Date     Actinic keratosis      Basal cell carcinoma      Squamous cell carcinoma      Substance abuse (H)     alcohol     Uncomplicated asthma       Past Surgical History:   Procedure Laterality Date     HERNIA REPAIR         ROS:  Constitutional, eye, ENT, skin, breast, respiratory, cardiac, GI, , MSK, neuro, psych, and allergy are normal except as otherwise noted.      OBJECTIVE:   /88   Pulse 89   Temp 97.5  F (36.4  C) (Oral)   Wt 87.6 kg (193 lb 3.2 oz)   SpO2 97%   BMI 22.91 kg/m    EXAM:  GENERAL: healthy, alert and no distress  EYES: Eyes grossly normal to inspection, PERRL and conjunctivae and sclerae normal  HENT: ear canals and TM's normal, nose and mouth without ulcers or lesions  NECK: right 2+ anterior cervical adenopathy and thyroid normal to palpation  RESP: lungs clear to auscultation - no rales, rhonchi or wheezes  CV: regular rate and rhythm, normal S1 S2, no S3 or S4, no murmur, click or rub, no peripheral edema and peripheral pulses strong  ABDOMEN: soft, slightly tender LLQ some gas palpated,  no hepatosplenomegaly, no masses and bowel sounds normal, no McBurney or rebound tenderness, negative Hawk's     (male): normal male genitalia without lesions or urethral discharge, no hernia  MS: no gross musculoskeletal defects noted, no edema  SKIN: dry lesion right lower leg actinic vs malignant appearing dry flaking raised, many scatterred nevi, back with couple slightly larger dark   NEURO: Normal strength and tone, mentation intact and speech normal  PSYCH: mentation appears normal, affect normal/bright, judgement and insight intact and appearance well groomed    Diagnostic Test Results:  Labs reviewed in Epic  No results found for this or any previous visit (from the past 24 hour(s)).    ASSESSMENT/PLAN:       ICD-10-CM    1. Routine general medical examination at a health care facility Z00.00    2. Alcohol dependence in remission (H) F10.21 Comprehensive metabolic panel     OFFICE/OUTPT VISIT,EST,LEVL IV   3. Substance abuse in remission (H) F19.11 Comprehensive metabolic panel     OFFICE/OUTPT VISIT,EST,LEVL IV   4. Elevated liver enzymes R74.8 Comprehensive metabolic panel     OFFICE/OUTPT VISIT,EST,LEVL IV   5. Changing skin lesion L98.9 DERMATOLOGY REFERRAL     OFFICE/OUTPT VISIT,EST,LEVL IV   6. Hepatitis C virus infection without hepatic coma, unspecified chronicity B19.20 Comprehensive metabolic panel     OFFICE/OUTPT VISIT,EST,LEVL IV   7. Lipid screening Z13.220 Lipid panel reflex to direct LDL Fasting   8. Mass of right side of neck R22.1 CBC with platelets differential     OFFICE/OUTPT VISIT,EST,LEVL IV   doing well with sobriety, admits just learning how to care for his health many things overdue. Continue program and working toward better health habits    History of skin cancer and concerning lesions, referred to derm    Neck with concerning adenopathy we discussed vs mass of right neck, start with derm for full skin check, montior and hot pack, NSAID for a week. Consider ENT next     Needs  "to see Hepatology, edema at times no other symptoms, labs done to recheck, follow up with specialist    COUNSELING:  Reviewed preventive health counseling, as reflected in patient instructions    Estimated body mass index is 22.91 kg/m  as calculated from the following:    Height as of 7/5/19: 1.956 m (6' 5\").    Weight as of this encounter: 87.6 kg (193 lb 3.2 oz).         reports that he has been smoking. He has been smoking about 0.50 packs per day. He has never used smokeless tobacco.  Tobacco Cessation Action Plan: Self help information given to patient discusse doptions pt declines at this tiem    Counseling Resources:  ATP IV Guidelines  Pooled Cohorts Equation Calculator  FRAX Risk Assessment  ICSI Preventive Guidelines  Dietary Guidelines for Americans, 2010  USDA's MyPlate  ASA Prophylaxis  Lung CA Screening    ELIU Christian CNP  [facility]  "

## 2019-10-11 NOTE — TELEPHONE ENCOUNTER
Appointment scheduled 10/17/19.  Left a voicemail with appointment details. Clinic phone number provided.    Celia Michelle, EMPERTARIZ

## 2019-10-11 NOTE — TELEPHONE ENCOUNTER
[facility] Call Center    Phone Message    May a detailed message be left on voicemail: yes    Reason for Call: Other: per protocols, priority diagnosis, if no appt available with in 2 weeks, an TE needs to be sent.No appt until November. Please review and call pt back. Pt Ok'd to have an appt scheduled and call and leave a message on date and time of appt if no answer. Thanks.      Action Taken: Message routed to:  [facility] ([facility]

## 2019-10-11 NOTE — PATIENT INSTRUCTIONS
Please schedule with the Liver Specialist-see other sheet.     Referral for Dermatology is here and call to schedule this soon as well as your dentist.       Preventive Health Recommendations  Male Ages 50 - 64    Yearly exam:             See your health care provider every year in order to  o   Review health changes.   o   Discuss preventive care.    o   Review your medicines if your doctor has prescribed any.     Have a cholesterol test every 5 years, or more frequently if you are at risk for high cholesterol/heart disease.     Have a diabetes test (fasting glucose) every three years. If you are at risk for diabetes, you should have this test more often.     Have a colonoscopy at age 50, or have a yearly FIT test (stool test). These exams will check for colon cancer.      Talk with your health care provider about whether or not a prostate cancer screening test (PSA) is right for you.    You should be tested each year for STDs (sexually transmitted diseases), if you re at risk.     Shots: Get a flu shot each year. Get a tetanus shot every 10 years.     Nutrition:    Eat at least 5 servings of fruits and vegetables daily.     Eat whole-grain bread, whole-wheat pasta and brown rice instead of white grains and rice.     Get adequate Calcium and Vitamin D.     Lifestyle    Exercise for at least 150 minutes a week (30 minutes a day, 5 days a week). This will help you control your weight and prevent disease.     Limit alcohol to one drink per day.     No smoking.     Wear sunscreen to prevent skin cancer.     See your dentist every six months for an exam and cleaning.     See your eye doctor every 1 to 2 years.

## 2019-10-14 ENCOUNTER — OFFICE VISIT (OUTPATIENT)
Dept: GASTROENTEROLOGY | Facility: CLINIC | Age: 59
End: 2019-10-14
Attending: NURSE PRACTITIONER
Payer: COMMERCIAL

## 2019-10-14 ENCOUNTER — DOCUMENTATION ONLY (OUTPATIENT)
Dept: CARE COORDINATION | Facility: CLINIC | Age: 59
End: 2019-10-14

## 2019-10-14 ENCOUNTER — PRE VISIT (OUTPATIENT)
Dept: GASTROENTEROLOGY | Facility: CLINIC | Age: 59
End: 2019-10-14

## 2019-10-14 VITALS
HEIGHT: 77 IN | RESPIRATION RATE: 18 BRPM | BODY MASS INDEX: 23.19 KG/M2 | DIASTOLIC BLOOD PRESSURE: 77 MMHG | HEART RATE: 80 BPM | SYSTOLIC BLOOD PRESSURE: 138 MMHG | TEMPERATURE: 98.3 F | WEIGHT: 196.4 LBS | OXYGEN SATURATION: 97 %

## 2019-10-14 DIAGNOSIS — B19.20 HEPATITIS C VIRUS INFECTION WITHOUT HEPATIC COMA, UNSPECIFIED CHRONICITY: Primary | ICD-10-CM

## 2019-10-14 DIAGNOSIS — K70.31 ALCOHOLIC CIRRHOSIS OF LIVER WITH ASCITES (H): ICD-10-CM

## 2019-10-14 DIAGNOSIS — Z11.59 NEED FOR HEPATITIS B SCREENING TEST: ICD-10-CM

## 2019-10-14 DIAGNOSIS — Z11.59 NEED FOR HEPATITIS B SCREENING TEST: Primary | ICD-10-CM

## 2019-10-14 DIAGNOSIS — E44.1 MILD PROTEIN-CALORIE MALNUTRITION (H): ICD-10-CM

## 2019-10-14 DIAGNOSIS — B19.20 HEPATITIS C VIRUS INFECTION WITHOUT HEPATIC COMA, UNSPECIFIED CHRONICITY: ICD-10-CM

## 2019-10-14 DIAGNOSIS — F10.21 ALCOHOL USE DISORDER, SEVERE, IN EARLY REMISSION (H): ICD-10-CM

## 2019-10-14 DIAGNOSIS — Z12.9 SCREENING FOR CANCER: ICD-10-CM

## 2019-10-14 DIAGNOSIS — Z12.11 COLON CANCER SCREENING: ICD-10-CM

## 2019-10-14 LAB
AFP SERPL-MCNC: 9.2 UG/L (ref 0–8)
INR PPP: 1.44 (ref 0.86–1.14)

## 2019-10-14 PROCEDURE — 86708 HEPATITIS A ANTIBODY: CPT | Performed by: INTERNAL MEDICINE

## 2019-10-14 PROCEDURE — 86704 HEP B CORE ANTIBODY TOTAL: CPT | Performed by: INTERNAL MEDICINE

## 2019-10-14 PROCEDURE — 91200 LIVER ELASTOGRAPHY: CPT | Mod: ZF

## 2019-10-14 PROCEDURE — 82105 ALPHA-FETOPROTEIN SERUM: CPT | Performed by: INTERNAL MEDICINE

## 2019-10-14 PROCEDURE — 87522 HEPATITIS C REVRS TRNSCRPJ: CPT | Performed by: INTERNAL MEDICINE

## 2019-10-14 PROCEDURE — 86803 HEPATITIS C AB TEST: CPT | Performed by: INTERNAL MEDICINE

## 2019-10-14 PROCEDURE — 87389 HIV-1 AG W/HIV-1&-2 AB AG IA: CPT | Performed by: INTERNAL MEDICINE

## 2019-10-14 PROCEDURE — 86705 HEP B CORE ANTIBODY IGM: CPT | Performed by: INTERNAL MEDICINE

## 2019-10-14 PROCEDURE — 87340 HEPATITIS B SURFACE AG IA: CPT | Performed by: INTERNAL MEDICINE

## 2019-10-14 PROCEDURE — 85610 PROTHROMBIN TIME: CPT | Performed by: INTERNAL MEDICINE

## 2019-10-14 PROCEDURE — 36415 COLL VENOUS BLD VENIPUNCTURE: CPT | Performed by: INTERNAL MEDICINE

## 2019-10-14 PROCEDURE — 87902 NFCT AGT GNTYP ALYS HEP C: CPT | Performed by: INTERNAL MEDICINE

## 2019-10-14 PROCEDURE — G0463 HOSPITAL OUTPT CLINIC VISIT: HCPCS | Mod: ZF

## 2019-10-14 PROCEDURE — 86706 HEP B SURFACE ANTIBODY: CPT | Performed by: INTERNAL MEDICINE

## 2019-10-14 ASSESSMENT — MIFFLIN-ST. JEOR: SCORE: 1813.24

## 2019-10-14 ASSESSMENT — PAIN SCALES - GENERAL: PAINLEVEL: NO PAIN (0)

## 2019-10-14 NOTE — LETTER
10/14/2019      RE: Sohan Marcano  1945 [facility] [address]       Date of Service: 10/14/2019     Referring Provider: ELIU Christian    Subjective:            Sohan Marcano is a 59 year old male presenting for evaluation of liver disease    History of Present Illness   Sohan Marcano is a 59 year old male with past medical history of IV and inhaled substance use, long-standing history of alcohol use disorder who underwent medical detox in July 2019 with resultant cirrhosis presenting for establishing care for his chronic liver disease and assessment and treatment of his chronic hepatitis C.    He reports that he was diagnosed with hepatitis C several decades ago.  He was not started on treatment at that time.  He believes that his risk factors for terry hepatitis C included a long-standing history of IV inhaled drug use in his teens and 20s.  He notes a long-standing history of alcohol  use and misuse.  He notes that prior to his medical detox in July 2019 his longest period of sobriety since age of 13 was 3-1/2 years during which he was in [facility].  He notes a history of significant withdrawal symptoms in the past.  At his peak he was drinking 1 L of john on a daily basis, and over the past several months prior to his sobriety he had weaned down to approximately 1 pint daily.  He underwent a relatively uncomplicated medical detox program to the [facility] in early July 2019.  During that hospitalization he underwent routine lab screening and was found to have chronic hepatitis C with an active viral load.  He noted that around the time of the presentation to detox he had significant abdominal swelling and lower extremity edema, and these have all but resolved since his discharge    He notes that he has remained sober since his detox.  His roommate is a nurse and she has given him good guidance in regards to improving his overall health and improving his diet and  "lifestyle    Past Medical History:  Past Medical History:   Diagnosis Date     Actinic keratosis      Basal cell carcinoma      Squamous cell carcinoma      Substance abuse (H)     alcohol     Uncomplicated asthma        Surgical History:  Past Surgical History:   Procedure Laterality Date     HERNIA REPAIR         Social History:  Social History     Tobacco Use     Smoking status: Current Every Day Smoker     Packs/day: 0.50     Smokeless tobacco: Never Used   Substance Use Topics     Alcohol use: Not Currently     Comment: Last drink 7/3/19 was drinking 1 L of john per day in past. None anymore     Drug use: No       Family History:  Family History   Problem Relation Age of Onset     Other - See Comments Mother         Patient states his Mother ? had skin cancer.     Other - See Comments Father         Patient reports Father had \"all types of skin cancer\".     Other - See Comments Sister         Basal cell carcinoma       Medications:  Current Outpatient Medications   Medication     Milk Thistle 1000 MG CAPS     multivitamin w/minerals (THERA-VIT-M) tablet     Probiotic Product (PROBIOTIC PO)     vitamin B1 (THIAMINE) 100 MG tablet     albuterol (PROAIR HFA/PROVENTIL HFA/VENTOLIN HFA) 108 (90 Base) MCG/ACT inhaler     NEW MED     vitamin D2 (ERGOCALCIFEROL) 65986 units capsule     No current facility-administered medications for this visit.        Review of Systems  A complete 10 point review of systems was asked and answered in the negative unless specifically commented upon in the HPI    Objective:         Vitals:    10/14/19 1332   BP: 138/77   BP Location: Right arm   Patient Position: Sitting   Cuff Size: Adult Regular   Pulse: 80   Resp: 18   Temp: 98.3  F (36.8  C)   TempSrc: Oral   SpO2: 97%   Weight: 89.1 kg (196 lb 6.4 oz)   Height: 1.956 m (6' 5\")     Body mass index is 23.29 kg/m .     Physical Exam  Constitutional: Well-developed, well-nourished, in no apparent distress.    HEENT: Normocephalic. no " scleral icterus. Moist oral mucosa. Dentition poor  Neck/Lymph: Normal ROM, supple. No thyromegaly.  No lymphadenopathy  Cardiac:  Regular rate and rhythm.  No overt murmurs  Respiratory: scattered wheezes  GI:  Abdomen soft, non-distended, non-tender. BS present. no shifting dullness.  Skin:  Skin is warm and dry. No rash noted.  no jaundice. + spider nevi noted.  + palmar erythema  Peripheral Vascular: trace lower extremity edema. 2+ pulses in all extremities  Musculoskeletal:  ROM intact, normal muscle bulk    Psychiatric: Normal mood and affect. Behavior is normal.  Neuro:  no asterixis, no tremor    Labs and Diagnostic tests:  Lab Results   Component Value Date     10/11/2019    POTASSIUM 3.7 10/11/2019    CHLORIDE 105 10/11/2019    CO2 25 10/11/2019    BUN 10 10/11/2019    CR 0.74 10/11/2019     Lab Results   Component Value Date    BILITOTAL 2.6 10/11/2019    ALT 38 10/11/2019    AST 43 10/11/2019    ALKPHOS 86 10/11/2019     Lab Results   Component Value Date    ALBUMIN 3.3 10/11/2019    PROTTOTAL 7.6 10/11/2019      Lab Results   Component Value Date    WBC 5.4 10/11/2019    HGB 12.9 10/11/2019     10/11/2019     10/11/2019     Lab Results   Component Value Date    INR 1.44 10/14/2019     MELD: ~14    Imaging:  None recent    Fibrosis Scan 10/14/2019  - EXAMINATION:  Fibrosis Scan  DATE OF EXAM:  10/14/2019  HISTORY:  Alcohol and HCV related liver disease  FINDINGS:  Median elasticity 60.1kPa  IMPRESSION:  Consistent with METAVIR stage F4 fibrosis    Procedures:    EGD: none    Colonoscopy: none    Assessment and Plan:    Cirrhosis:    -Based on available information at this time I do believe that the patient has a diagnosis of cirrhosis, likely secondary to combination of long-standing alcohol use disorder and chronic hepatitis C  -He has embraced sobriety and has been sober since early July 2019  -He does have chronic hepatitis C, and her plan is to eradicate this infection as per  below  -An estimated meld score of 14 based on labs from the past week.  Would expect this to improve as he remains sober for longer and we are able to cure his hepatitis C    Alcohol use disorder:  -He is in early remission as he underwent medical detox in the [facility] in early July 2019  -Discussed the need for him to remain engaged in a recovery program to maximize his chances of long-term sobriety    Chronic hepatitis C:  -Risk factors for infection include a remote prolonged history of IV inhaled substance abuse  -He has never been treated for his hepatitis C before  -We do not have information regarding genotype  -We have ordered a fibrosis scan as per above to assess for degree of hepatic fibrosis  -We will order labs today to assess for hepatitis B viral status as well as hepatitis C genotype and HIV    -Discussed with the patient that once genotype returns we will plan on ordering his treatment regimen and that he is to not start the medications until he is contacted by our team    Hepatocellular Cancer Screening:   -Based on the presence of cirrhosis he does have an increased risk for developing hepatocellular carcinoma  -We have ordered an abdominal ultrasound to be performed now  - Recommend screening for HCC every 6 months with either abdominal ultrasound or by alternating abdominal ultrasound with EITHER a triple/quad phase CT Liver with IV contrast OR a Quad phase MRI Liver with IV contrast.  AFP levels should be checked every 6 months at time of imaging screen.    Ascites:  -Was an issue at the time of his clinical decompensation when he presented for detox, however, with sobriety it has resolved  - Continue to follow a sodium restricted (<2g sodium diet)     Hepatic Encephalopathy:  -No acute issues    Esophageal Varices:   -As he has a new diagnosis of cirrhosis, he will benefit from screening of esophageal varices  -We will defer until he has completed his treatment for his chronic  hepatitis C and has been sober for several months.  We will also plan on a colonoscopy at the same time     If evidence of medium/large esophageal varices, would recommend esophageal varix band ligation or the initiation of a non-selective beta-blocker (carvedilol or propranolol).  If band ligation is performed, please continue to repeat until eraditation of varices.    Colon cancer screening:  -Patient will benefit from a colonoscopy at the same time as his upper GI endoscopy, to be performed after completion of his hepatitis C treatment    Nutrition:  As with most patients with chronic liver disease, there is a significant degree of protein malnutrition.  Dicussed need to change dietary habits to improve overall protein balance.  Discussed the importance of eating between 1.2-1.5g/kg/day lean protein like eggs, fish, chicken, nuts, and legumes, in addition to a diet rich in fresh fruits and vegetables.  Continue to follow a sodium restricted (<2g sodium diet) and discussed the need to minimize the intake of carbohydrates and sugars, to avoid obesity.   - Strongly encourage protein supplements 2-3 times daily (Boost, Ensure, [place] Instant Milk, etc.) to meet protein and caloric intake.  - Recommend a bedtime snack with protein and complex carbohydrate to minimize risk of muscle catabolism overnight    Routine Health Care in Patient with Chronic Liver Disease:  - We recommend screening for hepatitis A and B, please vaccinate if not immune  - All patients with liver disease, particularly those with cholestatic liver disease, are at an increased risk for osteoporosis.  We strongly recommend screening for Vitamin D deficiency at least twice yearly with aggressive supplementation/replacement as indicated.    - We also recommend a screening DEXA scan to evaluate for osteoporosis.  If present, should treat with calcium, Vitamin D supplementation, and recommend consideration of bisphosphonate therapy.  Also recommend  follow up DEXA scans to evaluate for improvement of bone density on therapy.  - All patients with liver disease should avoid the use of Non-steroidal Anti-Inflammatory (NSAID) medications as they can cause significant injury to the kidneys in this population    Follow Up:  7 months     Thank you very much for the opportunity to participate in the care of this patient.  If you have any further questions, please don't hesitate to contact our office.    Thomas M. Leventhal, M.D.   of Medicine  Advanced & Transplant Hepatology  [facility]    Thomas M. Leventhal, MD

## 2019-10-14 NOTE — LETTER
10/14/2019     RE: Sohan Marcano  [address] [facility] [address]     Dear Colleague,    Thank you for referring your patient, Sohan Marcano, to the [facility] HEPATOLOGY at [facility]. Please see a copy of my visit note below.    Date of Service: 10/14/2019     Referring Provider: ELIU Christian    Subjective:            Sohan Marcano is a 59 year old male presenting for evaluation of liver disease    History of Present Illness   Sohan Marcano is a 59 year old male with past medical history of IV and inhaled substance use, long-standing history of alcohol use disorder who underwent medical detox in July 2019 with resultant cirrhosis presenting for establishing care for his chronic liver disease and assessment and treatment of his chronic hepatitis C.    He reports that he was diagnosed with hepatitis C several decades ago.  He was not started on treatment at that time.  He believes that his risk factors for terry hepatitis C included a long-standing history of IV inhaled drug use in his teens and 20s.  He notes a long-standing history of alcohol  use and misuse.  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  He notes a history of significant withdrawal symptoms in the past.  At his peak he was drinking 1 L of john on a daily basis, and over the past several months prior to his sobriety he had weaned down to approximately 1 pint daily.  He underwent a relatively uncomplicated medical detox program to the [place] system in early July 2019.  During that hospitalization he underwent routine lab screening and was found to have chronic hepatitis C with an active viral load.  He noted that around the time of the presentation to detox he had significant abdominal swelling and lower extremity edema, and these have all but resolved since his  "discharge    He notes that he has remained sober since his detox.  His roommate is a nurse and she has given him good guidance in regards to improving his overall health and improving his diet and lifestyle    Past Medical History:  Past Medical History:   Diagnosis Date     Actinic keratosis      Basal cell carcinoma      Squamous cell carcinoma      Substance abuse (H)     alcohol     Uncomplicated asthma        Surgical History:  Past Surgical History:   Procedure Laterality Date     HERNIA REPAIR         Social History:  Social History     Tobacco Use     Smoking status: Current Every Day Smoker     Packs/day: 0.50     Smokeless tobacco: Never Used   Substance Use Topics     Alcohol use: Not Currently     Comment: Last drink 7/3/19 was drinking 1 L of john per day in past. None anymore     Drug use: No       Family History:  Family History   Problem Relation Age of Onset     Other - See Comments Mother         Patient states his Mother ? had skin cancer.     Other - See Comments Father         Patient reports Father had \"all types of skin cancer\".     Other - See Comments Sister         Basal cell carcinoma       Medications:  Current Outpatient Medications   Medication     Milk Thistle 1000 MG CAPS     multivitamin w/minerals (THERA-VIT-M) tablet     Probiotic Product (PROBIOTIC PO)     vitamin B1 (THIAMINE) 100 MG tablet     albuterol (PROAIR HFA/PROVENTIL HFA/VENTOLIN HFA) 108 (90 Base) MCG/ACT inhaler     NEW MED     vitamin D2 (ERGOCALCIFEROL) 45569 units capsule     No current facility-administered medications for this visit.        Review of Systems  A complete 10 point review of systems was asked and answered in the negative unless specifically commented upon in the HPI    Objective:         Vitals:    10/14/19 1332   BP: 138/77   BP Location: Right arm   Patient Position: Sitting   Cuff Size: Adult Regular   Pulse: 80   Resp: 18   Temp: 98.3  F (36.8  C)   TempSrc: Oral   SpO2: 97%   Weight: 89.1 kg " "(196 lb 6.4 oz)   Height: 1.956 m (6' 5\")     Body mass index is 23.29 kg/m .     Physical Exam  Constitutional: Well-developed, well-nourished, in no apparent distress.    HEENT: Normocephalic. no scleral icterus. Moist oral mucosa. Dentition poor  Neck/Lymph: Normal ROM, supple. No thyromegaly.  No lymphadenopathy  Cardiac:  Regular rate and rhythm.  No overt murmurs  Respiratory: scattered wheezes  GI:  Abdomen soft, non-distended, non-tender. BS present. no shifting dullness.  Skin:  Skin is warm and dry. No rash noted.  no jaundice. + spider nevi noted.  + palmar erythema  Peripheral Vascular: trace lower extremity edema. 2+ pulses in all extremities  Musculoskeletal:  ROM intact, normal muscle bulk    Psychiatric: Normal mood and affect. Behavior is normal.  Neuro:  no asterixis, no tremor    Labs and Diagnostic tests:  Lab Results   Component Value Date     10/11/2019    POTASSIUM 3.7 10/11/2019    CHLORIDE 105 10/11/2019    CO2 25 10/11/2019    BUN 10 10/11/2019    CR 0.74 10/11/2019     Lab Results   Component Value Date    BILITOTAL 2.6 10/11/2019    ALT 38 10/11/2019    AST 43 10/11/2019    ALKPHOS 86 10/11/2019     Lab Results   Component Value Date    ALBUMIN 3.3 10/11/2019    PROTTOTAL 7.6 10/11/2019      Lab Results   Component Value Date    WBC 5.4 10/11/2019    HGB 12.9 10/11/2019     10/11/2019     10/11/2019     Lab Results   Component Value Date    INR 1.44 10/14/2019     MELD: ~14    Imaging:  None recent    Fibrosis Scan 10/14/2019  - EXAMINATION:  Fibrosis Scan  DATE OF EXAM:  10/14/2019  HISTORY:  Alcohol and HCV related liver disease  FINDINGS:  Median elasticity 60.1kPa  IMPRESSION:  Consistent with METAVIR stage F4 fibrosis    Procedures:    EGD: none    Colonoscopy: none    Assessment and Plan:    Cirrhosis:    -Based on available information at this time I do believe that the patient has a diagnosis of cirrhosis, likely secondary to combination of long-standing " alcohol use disorder and chronic hepatitis C  -He has embraced sobriety and has been sober since early July 2019  -He does have chronic hepatitis C, and her plan is to eradicate this infection as per below  -An estimated meld score of 14 based on labs from the past week.  Would expect this to improve as he remains sober for longer and we are able to cure his hepatitis C    Alcohol use disorder:  -He is in early remission as he underwent medical detox in the [place] system in early July 2019  -Discussed the need for him to remain engaged in a recovery program to maximize his chances of long-term sobriety    Chronic hepatitis C:  -Risk factors for infection include a remote prolonged history of IV inhaled substance abuse  -He has never been treated for his hepatitis C before  -We do not have information regarding genotype  -We have ordered a fibrosis scan as per above to assess for degree of hepatic fibrosis  -We will order labs today to assess for hepatitis B viral status as well as hepatitis C genotype and HIV    -Discussed with the patient that once genotype returns we will plan on ordering his treatment regimen and that he is to not start the medications until he is contacted by our team    Hepatocellular Cancer Screening:   -Based on the presence of cirrhosis he does have an increased risk for developing hepatocellular carcinoma  -We have ordered an abdominal ultrasound to be performed now  - Recommend screening for HCC every 6 months with either abdominal ultrasound or by alternating abdominal ultrasound with EITHER a triple/quad phase CT Liver with IV contrast OR a Quad phase MRI Liver with IV contrast.  AFP levels should be checked every 6 months at time of imaging screen.    Ascites:  -Was an issue at the time of his clinical decompensation when he presented for detox, however, with sobriety it has resolved  - Continue to follow a sodium restricted (<2g sodium diet)     Hepatic Encephalopathy:  -No acute  issues    Esophageal Varices:   -As he has a new diagnosis of cirrhosis, he will benefit from screening of esophageal varices  -We will defer until he has completed his treatment for his chronic hepatitis C and has been sober for several months.  We will also plan on a colonoscopy at the same time     If evidence of medium/large esophageal varices, would recommend esophageal varix band ligation or the initiation of a non-selective beta-blocker (carvedilol or propranolol).  If band ligation is performed, please continue to repeat until eraditation of varices.    Colon cancer screening:  -Patient will benefit from a colonoscopy at the same time as his upper GI endoscopy, to be performed after completion of his hepatitis C treatment    Nutrition:  As with most patients with chronic liver disease, there is a significant degree of protein malnutrition.  Dicussed need to change dietary habits to improve overall protein balance.  Discussed the importance of eating between 1.2-1.5g/kg/day lean protein like eggs, fish, chicken, nuts, and legumes, in addition to a diet rich in fresh fruits and vegetables.  Continue to follow a sodium restricted (<2g sodium diet) and discussed the need to minimize the intake of carbohydrates and sugars, to avoid obesity.   - Strongly encourage protein supplements 2-3 times daily (Boost, Ensure, [place] Instant Milk, etc.) to meet protein and caloric intake.  - Recommend a bedtime snack with protein and complex carbohydrate to minimize risk of muscle catabolism overnight    Routine Health Care in Patient with Chronic Liver Disease:  - We recommend screening for hepatitis A and B, please vaccinate if not immune  - All patients with liver disease, particularly those with cholestatic liver disease, are at an increased risk for osteoporosis.  We strongly recommend screening for Vitamin D deficiency at least twice yearly with aggressive supplementation/replacement as indicated.    - We also  recommend a screening DEXA scan to evaluate for osteoporosis.  If present, should treat with calcium, Vitamin D supplementation, and recommend consideration of bisphosphonate therapy.  Also recommend follow up DEXA scans to evaluate for improvement of bone density on therapy.  - All patients with liver disease should avoid the use of Non-steroidal Anti-Inflammatory (NSAID) medications as they can cause significant injury to the kidneys in this population    Follow Up:  7 months     Thank you very much for the opportunity to participate in the care of this patient.  If you have any further questions, please don't hesitate to contact our office.    Thomas M. Leventhal, M.D.   of Medicine  Advanced & Transplant Hepatology  [facility]

## 2019-10-14 NOTE — NURSING NOTE
"Chief Complaint   Patient presents with     New Patient     Hep C       Vital signs:  Temp: 98.3  F (36.8  C) Temp src: Oral BP: 138/77 Pulse: 80   Resp: 18 SpO2: 97 %     Height: 195.6 cm (6' 5\") Weight: 89.1 kg (196 lb 6.4 oz)  Estimated body mass index is 23.29 kg/m  as calculated from the following:    Height as of this encounter: 1.956 m (6' 5\").    Weight as of this encounter: 89.1 kg (196 lb 6.4 oz).          Meli Fields, LTAC, located within [facility]  10/14/2019 1:35 PM      "

## 2019-10-14 NOTE — PATIENT INSTRUCTIONS
- Congrats on your sobriety and lifestyle modifications!!!  Let us know how we can help    - We will check labs today     - We will order treatment for your hepatitis C in approximately 1-2 weeks after labs return    - Please schedule an ultrasound of your liver to be performed here.    - We will have you return to our clinic in 7 month      Cirrhosis Education  Nutrition  - For patients with cirrhosis, it is very important to eat the right types and amounts of foods.  We recommend a diet low in carbohydrates/sugars and high in fresh fruits/vegetables, with the right amount of protein.  We typically recommend 1.5gm/kg/day of protein, or  grams of protein every day.  - You should eat at least three meals a day and three to four snacks between meals  - Bedtime snacks are especially important (preferrably something with some protein)    - Patients with malnutrition and/or loss of muscular mass can improve their nutrition and muscular mass by drinking two cans of dietary supplements daily, particularly at bedtime.  These would include: Ensure, Boost, [place] Instant Milk, Glucerna, (or similar supplements)  - Please avoid eating raw seafood, especially shellfish, because of risk of serious illness  - We recommend all patients with cirrhosis limit their daily sodium intake to less than 2,000mg      General Liver Health  - Continue to avoid the use of all non-steroid anti-inflammatory medicines (ibuprofen, Aleve, naproxen, aspirin, etc.) as this can cause serious injury to your kidneys in the setting of liver disease  - If you see a doctor and they prescribe you a new medication, please contact our clinic to let us know what changes are being made  - If you become acutely ill and present to an ER or are admitted to a hospital, please let us know as soon as you are able.  - We recommend that all patients with chronic liver disease be vaccinated against hepatitis A and B.  Please discuss with your primary provider  the need for these vaccines  - As patients with liver disease are at an increased risk of developing osteoporosis, we recommend that you have a DEXA scan with appropriate follow up and treatment.   - We recommend screening for Vitamin D deficiency (at least yearly) and aggressive replacement/supplementation as warranted.    Sleep Troubles:  - Sleep issues are very common in patients with chronic liver disease  - Recommend strict avoidance of medications such as narcotics, sedatives and sleep aids.    - Low dose benadryl or melatonin can be used for insomnia, if needed.        Preventive Care:    Colorectal Cancer Screening: During our visit today, we discussed that it is recommended you receive colorectal cancer screening. Please call or make an appointment with your primary care provider to discuss this. You may also call the [facility] scheduling line ([phone]) to set up a colonoscopy appointment.

## 2019-10-14 NOTE — PROGRESS NOTES
Date of Service: 10/14/2019     Referring Provider: ELIU Christian    Subjective:            Sohan Marcano is a 59 year old male presenting for evaluation of liver disease    History of Present Illness   Sohan Marcano is a 59 year old male with past medical history of IV and inhaled substance use, long-standing history of alcohol use disorder who underwent medical detox in July 2019 with resultant cirrhosis presenting for establishing care for his chronic liver disease and assessment and treatment of his chronic hepatitis C.    He reports that he was diagnosed with hepatitis C several decades ago.  He was not started on treatment at that time.  He believes that his risk factors for terry hepatitis C included a long-standing history of IV inhaled drug use in his teens and 20s.  He notes a long-standing history of alcohol  use and misuse.  He notes that prior to his medical detox in July 2019 his longest period of sobriety since age of 13 was 3-1/2 years during which he was in [facility].  He notes a history of significant withdrawal symptoms in the past.  At his peak he was drinking 1 L of john on a daily basis, and over the past several months prior to his sobriety he had weaned down to approximately 1 pint daily.  He underwent a relatively uncomplicated medical detox program to the [facility] in early July 2019.  During that hospitalization he underwent routine lab screening and was found to have chronic hepatitis C with an active viral load.  He noted that around the time of the presentation to detox he had significant abdominal swelling and lower extremity edema, and these have all but resolved since his discharge    He notes that he has remained sober since his detox.  His roommate is a nurse and she has given him good guidance in regards to improving his overall health and improving his diet and lifestyle    Past Medical History:  Past Medical History:   Diagnosis Date     Actinic  "keratosis      Basal cell carcinoma      Squamous cell carcinoma      Substance abuse (H)     alcohol     Uncomplicated asthma        Surgical History:  Past Surgical History:   Procedure Laterality Date     HERNIA REPAIR         Social History:  Social History     Tobacco Use     Smoking status: Current Every Day Smoker     Packs/day: 0.50     Smokeless tobacco: Never Used   Substance Use Topics     Alcohol use: Not Currently     Comment: Last drink 7/3/19 was drinking 1 L of john per day in past. None anymore     Drug use: No       Family History:  Family History   Problem Relation Age of Onset     Other - See Comments Mother         Patient states his Mother ? had skin cancer.     Other - See Comments Father         Patient reports Father had \"all types of skin cancer\".     Other - See Comments Sister         Basal cell carcinoma       Medications:  Current Outpatient Medications   Medication     Milk Thistle 1000 MG CAPS     multivitamin w/minerals (THERA-VIT-M) tablet     Probiotic Product (PROBIOTIC PO)     vitamin B1 (THIAMINE) 100 MG tablet     albuterol (PROAIR HFA/PROVENTIL HFA/VENTOLIN HFA) 108 (90 Base) MCG/ACT inhaler     NEW MED     vitamin D2 (ERGOCALCIFEROL) 73122 units capsule     No current facility-administered medications for this visit.        Review of Systems  A complete 10 point review of systems was asked and answered in the negative unless specifically commented upon in the HPI    Objective:         Vitals:    10/14/19 1332   BP: 138/77   BP Location: Right arm   Patient Position: Sitting   Cuff Size: Adult Regular   Pulse: 80   Resp: 18   Temp: 98.3  F (36.8  C)   TempSrc: Oral   SpO2: 97%   Weight: 89.1 kg (196 lb 6.4 oz)   Height: 1.956 m (6' 5\")     Body mass index is 23.29 kg/m .     Physical Exam  Constitutional: Well-developed, well-nourished, in no apparent distress.    HEENT: Normocephalic. no scleral icterus. Moist oral mucosa. Dentition poor  Neck/Lymph: Normal ROM, supple. No " thyromegaly.  No lymphadenopathy  Cardiac:  Regular rate and rhythm.  No overt murmurs  Respiratory: scattered wheezes  GI:  Abdomen soft, non-distended, non-tender. BS present. no shifting dullness.  Skin:  Skin is warm and dry. No rash noted.  no jaundice. + spider nevi noted.  + palmar erythema  Peripheral Vascular: trace lower extremity edema. 2+ pulses in all extremities  Musculoskeletal:  ROM intact, normal muscle bulk    Psychiatric: Normal mood and affect. Behavior is normal.  Neuro:  no asterixis, no tremor    Labs and Diagnostic tests:  Lab Results   Component Value Date     10/11/2019    POTASSIUM 3.7 10/11/2019    CHLORIDE 105 10/11/2019    CO2 25 10/11/2019    BUN 10 10/11/2019    CR 0.74 10/11/2019     Lab Results   Component Value Date    BILITOTAL 2.6 10/11/2019    ALT 38 10/11/2019    AST 43 10/11/2019    ALKPHOS 86 10/11/2019     Lab Results   Component Value Date    ALBUMIN 3.3 10/11/2019    PROTTOTAL 7.6 10/11/2019      Lab Results   Component Value Date    WBC 5.4 10/11/2019    HGB 12.9 10/11/2019     10/11/2019     10/11/2019     Lab Results   Component Value Date    INR 1.44 10/14/2019     MELD: ~14    Imaging:  None recent    Fibrosis Scan 10/14/2019  - EXAMINATION:  Fibrosis Scan  DATE OF EXAM:  10/14/2019  HISTORY:  Alcohol and HCV related liver disease  FINDINGS:  Median elasticity 60.1kPa  IMPRESSION:  Consistent with METAVIR stage F4 fibrosis    Procedures:    EGD: none    Colonoscopy: none    Assessment and Plan:    Cirrhosis:    -Based on available information at this time I do believe that the patient has a diagnosis of cirrhosis, likely secondary to combination of long-standing alcohol use disorder and chronic hepatitis C  -He has embraced sobriety and has been sober since early July 2019  -He does have chronic hepatitis C, and her plan is to eradicate this infection as per below  -An estimated meld score of 14 based on labs from the past week.  Would expect this  to improve as he remains sober for longer and we are able to cure his hepatitis C    Alcohol use disorder:  -He is in early remission as he underwent medical detox in the [facility] system in early July 2019  -Discussed the need for him to remain engaged in a recovery program to maximize his chances of long-term sobriety    Chronic hepatitis C:  -Risk factors for infection include a remote prolonged history of IV inhaled substance abuse  -He has never been treated for his hepatitis C before  -We do not have information regarding genotype  -We have ordered a fibrosis scan as per above to assess for degree of hepatic fibrosis  -We will order labs today to assess for hepatitis B viral status as well as hepatitis C genotype and HIV    -Discussed with the patient that once genotype returns we will plan on ordering his treatment regimen and that he is to not start the medications until he is contacted by our team    Hepatocellular Cancer Screening:   -Based on the presence of cirrhosis he does have an increased risk for developing hepatocellular carcinoma  -We have ordered an abdominal ultrasound to be performed now  - Recommend screening for HCC every 6 months with either abdominal ultrasound or by alternating abdominal ultrasound with EITHER a triple/quad phase CT Liver with IV contrast OR a Quad phase MRI Liver with IV contrast.  AFP levels should be checked every 6 months at time of imaging screen.    Ascites:  -Was an issue at the time of his clinical decompensation when he presented for detox, however, with sobriety it has resolved  - Continue to follow a sodium restricted (<2g sodium diet)     Hepatic Encephalopathy:  -No acute issues    Esophageal Varices:   -As he has a new diagnosis of cirrhosis, he will benefit from screening of esophageal varices  -We will defer until he has completed his treatment for his chronic hepatitis C and has been sober for several months.  We will also plan on a colonoscopy at the same  time     If evidence of medium/large esophageal varices, would recommend esophageal varix band ligation or the initiation of a non-selective beta-blocker (carvedilol or propranolol).  If band ligation is performed, please continue to repeat until eraditation of varices.    Colon cancer screening:  -Patient will benefit from a colonoscopy at the same time as his upper GI endoscopy, to be performed after completion of his hepatitis C treatment    Nutrition:  As with most patients with chronic liver disease, there is a significant degree of protein malnutrition.  Dicussed need to change dietary habits to improve overall protein balance.  Discussed the importance of eating between 1.2-1.5g/kg/day lean protein like eggs, fish, chicken, nuts, and legumes, in addition to a diet rich in fresh fruits and vegetables.  Continue to follow a sodium restricted (<2g sodium diet) and discussed the need to minimize the intake of carbohydrates and sugars, to avoid obesity.   - Strongly encourage protein supplements 2-3 times daily (Boost, Ensure, [place] Instant Milk, etc.) to meet protein and caloric intake.  - Recommend a bedtime snack with protein and complex carbohydrate to minimize risk of muscle catabolism overnight    Routine Health Care in Patient with Chronic Liver Disease:  - We recommend screening for hepatitis A and B, please vaccinate if not immune  - All patients with liver disease, particularly those with cholestatic liver disease, are at an increased risk for osteoporosis.  We strongly recommend screening for Vitamin D deficiency at least twice yearly with aggressive supplementation/replacement as indicated.    - We also recommend a screening DEXA scan to evaluate for osteoporosis.  If present, should treat with calcium, Vitamin D supplementation, and recommend consideration of bisphosphonate therapy.  Also recommend follow up DEXA scans to evaluate for improvement of bone density on therapy.  - All patients with  liver disease should avoid the use of Non-steroidal Anti-Inflammatory (NSAID) medications as they can cause significant injury to the kidneys in this population    Follow Up:  7 months     Thank you very much for the opportunity to participate in the care of this patient.  If you have any further questions, please don't hesitate to contact our office.    Thomas M. Leventhal, M.D.   of Medicine  Advanced & Transplant Hepatology  [facility]

## 2019-10-15 LAB
HBV CORE AB SERPL QL IA: REACTIVE
HBV CORE IGM SERPL QL IA: NONREACTIVE
HBV SURFACE AB SERPL IA-ACNC: 3.42 M[IU]/ML
HBV SURFACE AG SERPL QL IA: NONREACTIVE
HIV 1+2 AB+HIV1 P24 AG SERPL QL IA: NONREACTIVE

## 2019-10-16 ENCOUNTER — ANCILLARY PROCEDURE (OUTPATIENT)
Dept: ULTRASOUND IMAGING | Facility: CLINIC | Age: 59
End: 2019-10-16
Attending: INTERNAL MEDICINE
Payer: COMMERCIAL

## 2019-10-16 DIAGNOSIS — B19.20 HEPATITIS C VIRUS INFECTION WITHOUT HEPATIC COMA, UNSPECIFIED CHRONICITY: ICD-10-CM

## 2019-10-17 ENCOUNTER — TELEPHONE (OUTPATIENT)
Dept: FAMILY MEDICINE | Facility: CLINIC | Age: 59
End: 2019-10-17

## 2019-10-17 ENCOUNTER — OFFICE VISIT (OUTPATIENT)
Dept: DERMATOLOGY | Facility: CLINIC | Age: 59
End: 2019-10-17
Payer: COMMERCIAL

## 2019-10-17 DIAGNOSIS — D23.9 DERMAL NEVUS: ICD-10-CM

## 2019-10-17 DIAGNOSIS — L82.1 SEBORRHEIC KERATOSES: ICD-10-CM

## 2019-10-17 DIAGNOSIS — Z12.83 SKIN CANCER SCREENING: ICD-10-CM

## 2019-10-17 DIAGNOSIS — L57.0 ACTINIC KERATOSIS: ICD-10-CM

## 2019-10-17 DIAGNOSIS — Z85.828 HISTORY OF NONMELANOMA SKIN CANCER: Primary | ICD-10-CM

## 2019-10-17 DIAGNOSIS — D50.8 IRON DEFICIENCY ANEMIA SECONDARY TO INADEQUATE DIETARY IRON INTAKE: Primary | ICD-10-CM

## 2019-10-17 DIAGNOSIS — Z80.8 FAMILY HISTORY OF MELANOMA: ICD-10-CM

## 2019-10-17 LAB
HCV AB SERPL QL IA: REACTIVE
HCV RNA SERPL NAA+PROBE-ACNC: ABNORMAL [IU]/ML
HCV RNA SERPL NAA+PROBE-LOG IU: 5.6 LOG IU/ML

## 2019-10-17 RX ORDER — FERROUS SULFATE 325(65) MG
325 TABLET ORAL
Qty: 90 TABLET | Refills: 0 | Status: SHIPPED | OUTPATIENT
Start: 2019-10-17 | End: 2020-09-04

## 2019-10-17 ASSESSMENT — PAIN SCALES - GENERAL: PAINLEVEL: NO PAIN (0)

## 2019-10-17 NOTE — TELEPHONE ENCOUNTER
Singh - can we send iron supplement rx please?    Return call to patient discussed lab results - reviewed sources of nutritional iron - he states he is incorporating this into his diet - he states he was fasting for 8 hours during the lab only appointment - has been making dietary changes reviewed the following recommendations    Build your dinner around a generous helping of leafy greens and a moderate portion of whole grains, increase fruits, vegetables - Limit intake of sweets and processed foods - Finish your meals with fresh fruit instead of sweetened desserts. - Increase exercise - walking 30 minutes 3-5 days a week    Reviewed take iron with food - preferably vitamin C for absorption - may cause stomach upset - increase fruits/veggies/whole grains to prevent constipation - call if constipation is a problem to discuss stool softener - black stool is a normal side effect of supplement     Patient verbalized understanding

## 2019-10-17 NOTE — NURSING NOTE
Chief Complaint   Patient presents with     Skin Check     Sohan is here today for a spot on the right leg. He states that his primary is concerned about a spot on the back. Nothing is bleeding or unless he pickes at them.      Nanette Ryan, CMA

## 2019-10-17 NOTE — PROGRESS NOTES
[facility] Dermatology Note      Dermatology Problem List:  1. 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 Family history of Melanoma (Father)  3.  Actinic keratosis  -s/p cryo 12/13/2014, 10/17/2019  4. Seborrheic keratosis  5. Telangictasia    Encounter Date: Oct 17, 2019    CC:  Chief Complaint   Patient presents with     Skin Check     Sohan is here today for a spot on the right leg. He states that his primary is concerned about a spot on the back. Nothing is bleeding or unless he pickes at them.          History of Present Illness:  Mr. Sohan Marcano is a 59 year old male who is new to the clinic and presents for a skin check. The patient has a history of BCC and SCC. The patient presents as a referral by Gracie NOWAK CNP in [place] practice [place] [facility]. At today's visit, the patient notes a lesion of concern on his right leg. Additionally, he notes an area of concern on his back that he says his primary care provider was concerned about. He states he has not had a skin check since 2015. The patient notes a family history of melanoma (Father) and SCC (Brother). He notes that his brother passed away from his SCC. He denies any lesions or bumps that bleed. The patient denies painful, itching, tingling or bleeding lesions unless otherwise noted.    Past Medical History:   Patient Active Problem List   Diagnosis     Alcohol withdrawal (H)     Chemical dependency (H)     Basal cell carcinoma of anterior chest s/p excision 3-10-15     Squamous cell carcinoma mid upper chest s/p excision 3-10-15     Abdominal pain     Alcohol dependence in remission (H)     Hepatitis C virus infection without hepatic coma, unspecified chronicity     Past Medical History:   Diagnosis Date     Actinic keratosis      Basal cell carcinoma      Squamous cell carcinoma      Substance abuse (H)     alcohol      "Uncomplicated asthma      Past Surgical History:   Procedure Laterality Date     HERNIA REPAIR         Social History:   reports that he has been smoking. He has been smoking about 0.50 packs per day. He has never used smokeless tobacco. He reports previous alcohol use. He reports that he does not use drugs.    Family History:  Family History   Problem Relation Age of Onset     Other - See Comments Mother         Patient states his Mother ? had skin cancer.     Other - See Comments Father         Patient reports Father had \"all types of skin cancer\".     Other - See Comments Sister         Basal cell carcinoma       Medications:  Current Outpatient Medications   Medication Sig Dispense Refill     Milk Thistle 1000 MG CAPS Take 1,000 mg by mouth 2 times daily       multivitamin w/minerals (THERA-VIT-M) tablet Take 1 tablet by mouth daily 30 tablet 0     Probiotic Product (PROBIOTIC PO) Take 1 capsule by mouth 2 times daily       vitamin B1 (THIAMINE) 100 MG tablet Take 1 tablet (100 mg) by mouth daily 30 tablet 0     albuterol (PROAIR HFA/PROVENTIL HFA/VENTOLIN HFA) 108 (90 Base) MCG/ACT inhaler Inhale 2 puffs into the lungs every 6 hours as needed for shortness of breath / dyspnea or wheezing (Patient not taking: Reported on 10/11/2019) 1 Inhaler 0     NEW MED VITAMIN B COMPLEX + VITAMIN C - Take 1 tablet by mouth once daily.       vitamin D2 (ERGOCALCIFEROL) 34513 units capsule Take 1 capsule (50,000 Units) by mouth every 7 days (Patient not taking: Reported on 10/11/2019) 4 capsule 0       No Known Allergies    Review of Systems:  -Constitutional: The patient denies fatigue, fevers, chills, unintended weight loss, and night sweats.  -HEENT: Patient denies nonhealing oral sores.  -Skin: As above in HPI. No additional skin concerns.    Physical exam:  Vitals: There were no vitals taken for this visit.  GEN: This is a well developed, well-nourished male in no acute distress, in a pleasant mood.    SKIN: Full skin, " which includes the head/face, both arms, chest, back, abdomen,both legs, genitalia and/or groin buttocks, digits and/or nails, was examined.  -Pink dermal nevi on the lower back  -Linear scars on the chest  -Telangectasias on the chest and back  -There are erythematous macules with overyling adherent scale on the right temple x2, right lower jaw line x2, left temple x1.   -Slater's skin type I, less than 100 nevi  -There are waxy stuck on tan to brown papules on the back, right lower leg.  -No other lesions of concern on areas examined.       Impression/Plan:  1. History of NMSC - NERD on the chest x2    ABCDs of melanoma were discussed and self skin checks were advised.    Sun precaution was advised including the use of sun screens of SPF 30 or higher, sun protective clothing, and avoidance of tanning beds.    No evidence of recurrence    Provided sunscreen, melanoma and sun protective clothing handouts    2. Skin cancer screening/Fhx melanoma (father)    ABCDs of melanoma were discussed and self skin checks were advised.    Sun precaution was advised including the use of sun screens of SPF 30 or higher, sun protective clothing, and avoidance of tanning beds.    Patient to return for yearly skin checks    3. Seborrheic keratosis, non irritated    Reassured benign    No further intervention required. Patient to report changes.     4. Dermal nevi - back    Reassured benign    No further intervention required. Patient to report changes.     5. Actinic keratosis - right temple x2, right lower jaw line x2, left temple x1     Cryotherapy procedure note: After verbal consent and discussion of risks and benefits including but no limited to dyspigmentation/scar, blister, and pain, 5 were treated with 1-2mm freeze border for 2 cycles with liquid nitrogen. Post cryotherapy instructions were provided.    6.  Spider Telangectasia on the face, chest, and back - likely secondary to liver disease/hx of alcohol  abuse    Reassured benign    No further intervention required. Patient to report changes.     CC Dr. Haines on close of this encounter.  Follow-up in 1 year, earlier for new or changing lesions.       Staff Involved:  Staff/Scribe    Scribe Disclosure:  I, Cristopher Razo, am serving as a scribe to document services personally performed by April Russo PA-C, based on data collection and the provider's statements to me.     Provider Disclosure:   The documentation recorded by the scribe accurately reflects the services I personally performed and the decisions made by me.    All risks, benefits and alternatives were discussed with patient.  Patient is in agreement and understands the assessment and plan.  All questions were answered.    April Russo PA-C  [facility]: Phone: [phone], Fax: [phone]

## 2019-10-17 NOTE — LETTER
[address] [address] [phone]          October 17, 2019    Sohan Marcano                                                                                                                     [address] [facility] [facility] [address]            Dear Sohan,    Your results show mild iron deficiency anemia -Anemia is a condition in which you have too few red blood cells -  You can take iron daily or increase iron in your diet - to increase your healthy red blood cells.  I recommend we recheck this lab in 3-6 months.    Additionally, your fasting blood sugar was elevated.  Please work on diet and exercise habits and we can follow up with a lab to recheck your sugar levels in 3-6 months as well.      Health exercise habits include daily exercise such as 30 minutes of walking 3-5 times per week.  Dietary recommendations include decrease sweets, drinks, and processed foods high in sugar.  Eating more whole foods - such as leafy greens, vegetables, fruits, nuts and whole grains as well as drinking plenty of water (64 oz a day) are ways to improve these levels.  You are also welcome to meet with a nutritionist as well for further food choice education, resources, and recommendations if you'd like!    Cholesterol panel was perfectly normal.     Orders Only on 10/14/2019   Component Date Value Ref Range Status     HIV Antigen Antibody Combo 10/14/2019 Nonreactive  NR^Nonreactive     Final    HIV-1 p24 Ag & HIV-1/HIV-2 Ab Not Detected     Alpha Fetoprotein 10/14/2019 9.2* 0 - 8 ug/L Final    Assay Method:  Chemiluminescence using Siemens Centaur XP     INR 10/14/2019 1.44* 0.86 - 1.14 Final     Hepatitis A Antibody IgG 10/14/2019 Reactive* NR^Nonreactive Final    This assay cannot be used for the diagnosis of acute HAV infection.     Hepatitis B Surface Antibody 10/14/2019 3.42  <8.00 m[IU]/mL Final    Nonreactive, No antibody detected when the value  is less than 8.00 m[IU]/mL.     Hepatitis B Core [place] 10/14/2019 Reactive* NR^Nonreactive Final    Comment: A reactive result indicates acute, chronic or past/resolved hepatitis B   infection.       Hep B Surface Agn 10/14/2019 Nonreactive  NR^Nonreactive Final     Hepatitis B Core IgM 10/14/2019 Nonreactive  NR^Nonreactive Final    Comment: A nonreactive result suggests lack of recent exposure to the virus in the   preceding 6 months.         Sincerely,     Gracie Winters CNP

## 2019-10-17 NOTE — LETTER
10/17/2019       RE: Sohan Marcano  1945 [facility] [address]     Dear Colleague,    Thank you for referring your patient, Sohan Marcano, to the [facility] DERMATOLOGY at [facility]. Please see a copy of my visit note below.    [facility] Dermatology Note      Dermatology Problem List:  1. 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 Family history of Melanoma (Father)  3.  Actinic keratosis  -s/p cryo 12/13/2014, 10/17/2019  4. Seborrheic keratosis  5. Telangictasia    Encounter Date: Oct 17, 2019    CC:  Chief Complaint   Patient presents with     Skin Check     Sohan is here today for a spot on the right leg. He states that his primary is concerned about a spot on the back. Nothing is bleeding or unless he pickes at them.          History of Present Illness:  Mr. Sohan Marcano is a 59 year old male who is new to the clinic and presents for a skin check. The patient has a history of BCC and SCC. The patient presents as a referral by Gracie NOWAK CNP in [place] [facility] At today's visit, the patient notes a lesion of concern on his right leg. Additionally, he notes an area of concern on his back that he says his primary care provider was concerned about. He states he has not had a skin check since 2015. The patient notes a family history of melanoma (Father) and SCC (Brother). He notes that his brother passed away from his SCC. He denies any lesions or bumps that bleed. The patient denies painful, itching, tingling or bleeding lesions unless otherwise noted.    Past Medical History:   Patient Active Problem List   Diagnosis     Alcohol withdrawal (H)     Chemical dependency (H)     Basal cell carcinoma of anterior chest s/p excision 3-10-15     Squamous cell carcinoma mid upper chest s/p excision 3-10-15      "Abdominal pain     Alcohol dependence in remission (H)     Hepatitis C virus infection without hepatic coma, unspecified chronicity     Past Medical History:   Diagnosis Date     Actinic keratosis      Basal cell carcinoma      Squamous cell carcinoma      Substance abuse (H)     alcohol     Uncomplicated asthma      Past Surgical History:   Procedure Laterality Date     HERNIA REPAIR         Social History:   reports that he has been smoking. He has been smoking about 0.50 packs per day. He has never used smokeless tobacco. He reports previous alcohol use. He reports that he does not use drugs.    Family History:  Family History   Problem Relation Age of Onset     Other - See Comments Mother         Patient states his Mother ? had skin cancer.     Other - See Comments Father         Patient reports Father had \"all types of skin cancer\".     Other - See Comments Sister         Basal cell carcinoma       Medications:  Current Outpatient Medications   Medication Sig Dispense Refill     Milk Thistle 1000 MG CAPS Take 1,000 mg by mouth 2 times daily       multivitamin w/minerals (THERA-VIT-M) tablet Take 1 tablet by mouth daily 30 tablet 0     Probiotic Product (PROBIOTIC PO) Take 1 capsule by mouth 2 times daily       vitamin B1 (THIAMINE) 100 MG tablet Take 1 tablet (100 mg) by mouth daily 30 tablet 0     albuterol (PROAIR HFA/PROVENTIL HFA/VENTOLIN HFA) 108 (90 Base) MCG/ACT inhaler Inhale 2 puffs into the lungs every 6 hours as needed for shortness of breath / dyspnea or wheezing (Patient not taking: Reported on 10/11/2019) 1 Inhaler 0     NEW MED VITAMIN B COMPLEX + VITAMIN C - Take 1 tablet by mouth once daily.       vitamin D2 (ERGOCALCIFEROL) 99332 units capsule Take 1 capsule (50,000 Units) by mouth every 7 days (Patient not taking: Reported on 10/11/2019) 4 capsule 0       No Known Allergies    Review of Systems:  -Constitutional: The patient denies fatigue, fevers, chills, unintended weight loss, and night " sweats.  -HEENT: Patient denies nonhealing oral sores.  -Skin: As above in HPI. No additional skin concerns.    Physical exam:  Vitals: There were no vitals taken for this visit.  GEN: This is a well developed, well-nourished male in no acute distress, in a pleasant mood.    SKIN: Full skin, which includes the head/face, both arms, chest, back, abdomen,both legs, genitalia and/or groin buttocks, digits and/or nails, was examined.  -Pink dermal nevi on the lower back  -Linear scars on the chest  -Telangectasias on the chest and back  -There are erythematous macules with overyling adherent scale on the right temple x2, right lower jaw line x2, left temple x1.   -Slater's skin type I, less than 100 nevi  -There are waxy stuck on tan to brown papules on the back, right lower leg.  -No other lesions of concern on areas examined.       Impression/Plan:  1. History of NMSC - NERD on the chest x2    ABCDs of melanoma were discussed and self skin checks were advised.    Sun precaution was advised including the use of sun screens of SPF 30 or higher, sun protective clothing, and avoidance of tanning beds.    No evidence of recurrence    Provided sunscreen, melanoma and sun protective clothing handouts    2. Skin cancer screening/Fhx melanoma (father)    ABCDs of melanoma were discussed and self skin checks were advised.    Sun precaution was advised including the use of sun screens of SPF 30 or higher, sun protective clothing, and avoidance of tanning beds.    Patient to return for yearly skin checks    3. Seborrheic keratosis, non irritated    Reassured benign    No further intervention required. Patient to report changes.     4. Dermal nevi - back    Reassured benign    No further intervention required. Patient to report changes.     5. Actinic keratosis - right temple x2, right lower jaw line x2, left temple x1     Cryotherapy procedure note: After verbal consent and discussion of risks and benefits including but no  limited to dyspigmentation/scar, blister, and pain, 5 were treated with 1-2mm freeze border for 2 cycles with liquid nitrogen. Post cryotherapy instructions were provided.    6.  Spider Telangectasia on the face, chest, and back - likely secondary to liver disease/hx of alcohol abuse    Reassured benign    No further intervention required. Patient to report changes.     CC Dr. Haines on close of this encounter.  Follow-up in 1 year, earlier for new or changing lesions.       Staff Involved:  Staff/Scribe    Scribe Disclosure:  I, Cristopher Razo, am serving as a scribe to document services personally performed by April Russo PA-C, based on data collection and the provider's statements to me.     Provider Disclosure:   The documentation recorded by the scribe accurately reflects the services I personally performed and the decisions made by me.    All risks, benefits and alternatives were discussed with patient.  Patient is in agreement and understands the assessment and plan.  All questions were answered.    April Russo PA-C  [facility]: Phone: [phone], Fax: [phone]

## 2019-10-17 NOTE — PATIENT INSTRUCTIONS
Sun protective clothing and Resources     Lands End (www.Cava Grill.com)  Athleta (www.athleta.PrivacyProtector)  Poli Life (www.Konnekt[employer]anal[employer] Semicon[employer].PrivacyProtector)  Carve Designs (Resoomay) - affordable  Skinz (uvskinz.com)    Long sleeve - Cb Cool DRI UPF 50 or [place] PFG UPF 50  Hoodie - [place] PFG UPF 50  Swimshirt/Rash Guard - Michela UPF 50 (on Amazon)  Neck - Outdoor Research Ubertubes (www.outdoorresVibrant Energy.com)  The ABCDEs of Melanoma    Skin cancer can develop anywhere on the skin. Ask someone for help when checking your skin, especially in hard to see places. If you notice a mole different from others, or that changes, enlarges, itches, or bleeds (even if it is small), you should see a dermatologist.

## 2019-10-21 DIAGNOSIS — B19.20 HEPATITIS C VIRUS INFECTION WITHOUT HEPATIC COMA, UNSPECIFIED CHRONICITY: Primary | ICD-10-CM

## 2019-10-21 LAB
HCV GENTYP SERPL NAA+PROBE: NORMAL
HCV RNA SERPL NAA+PROBE-ACNC: ABNORMAL [IU]/ML
HCV RNA SERPL NAA+PROBE-LOG IU: 5.6 LOG IU/ML

## 2019-10-21 RX ORDER — VELPATASVIR AND SOFOSBUVIR 100; 400 MG/1; MG/1
1 TABLET, FILM COATED ORAL DAILY
Qty: 30 TABLET | Refills: 2 | Status: SHIPPED | OUTPATIENT
Start: 2019-10-21 | End: 2020-09-26

## 2019-10-21 NOTE — PROGRESS NOTES
Pt with Chronic hepatitis C  Genotype 2b  - Noted that HBcAb (+)    Will order sofosbuvir (400 mg)/velpatasvir (100 mg) for 12 weeks for a patient who is treatment naive, normal renal function, and compensated cirrhosis.    Thomas M. Leventhal, M.D.   of Medicine  Advanced & Transplant Hepatology  [facility]

## 2019-10-22 ENCOUNTER — TELEPHONE (OUTPATIENT)
Dept: GASTROENTEROLOGY | Facility: CLINIC | Age: 59
End: 2019-10-22

## 2019-10-22 NOTE — TELEPHONE ENCOUNTER
Faxed attestation form to clinic for completion    Please note plan preferred agent is Mavyret, unless we are treating as decompensated.  If you are willing to switch to Mavyret please send a new rx

## 2019-10-24 NOTE — TELEPHONE ENCOUNTER
PA Initiation    Medication: Epclusa  Insurance Company: [employer] - Phone [phone] Fax [phone]  Pharmacy Filling the Rx: [place], [place] - [address] [place] [address]  Filling Pharmacy Phone: [phone]  Filling Pharmacy Fax: [phone]  Start Date: 10/24/2019

## 2019-10-25 NOTE — TELEPHONE ENCOUNTER
Rosario from [facility] called with additional questions in regards to the PA request for Epclusa. She is requesting a follow up call ASAP; [phone].

## 2019-10-25 NOTE — TELEPHONE ENCOUNTER
I spoke with Rosario, explained the rationale for Epclusa.  The reviewer called back and wants to know if ribavirin can be added to therapy.  If ribavirin is not prescribed they will need some documentation detailing why ribavirin is contraindicated.    Let me know if there are any questions,    Thanks

## 2019-10-29 NOTE — TELEPHONE ENCOUNTER
Prior Authorization Approval    Authorization Effective Date: 10/28/2019  Authorization Expiration Date: 12/6/2019  Medication: Epclusa  Approved Dose/Quantity: 12 weeks     Insurance Company: [employer] - Phone [phone] Fax [phone]  Expected CoPay: unknown      Which Pharmacy is filling the prescription (Not needed for infusion/clinic administered): [address]  Pharmacy Notified: Yes  Patient Notified: Yes    Plan will require 4-week viral load lab to continue authorization

## 2019-11-05 ENCOUNTER — TELEPHONE (OUTPATIENT)
Dept: GASTROENTEROLOGY | Facility: CLINIC | Age: 59
End: 2019-11-05

## 2019-11-05 ENCOUNTER — CARE COORDINATION (OUTPATIENT)
Dept: GASTROENTEROLOGY | Facility: CLINIC | Age: 59
End: 2019-11-05

## 2019-11-05 DIAGNOSIS — B19.20 HEPATITIS C VIRUS INFECTION WITHOUT HEPATIC COMA, UNSPECIFIED CHRONICITY: Primary | ICD-10-CM

## 2019-11-05 NOTE — LETTER
November 5, 2019       TO: Sohan Marcano  1945 [facility] [address]       Dear ,    Hepatitis C Treatment    Treatment: Epclusa x 12 weeks    Please have labs drawn as close to the date indicated at the [facility] or [facility].    Start Date: 11/06/19    Week 4  HCV RNA Quant Lab Due: 12/4/2019    Week 12 - End of Treatment  HCV RNA Quant Lab due: 1/29/2020    3 Months Post Treatment  HCV RNA Quant, Hepatic Panel Lab due: 4/22/2020    Educational information to patient on Hep C treatment;     -Contact the [facility] Hepatology clinic and speak with clinical RN prior to starting any new prescribed or OTC medications.   -Take medications exactly as prescribed, do not change dose or stop taking without consulting your provider.   -Take Medication one time each day with or without food  -If you miss a dose of medication, then take it as soon as you remember on the same day. If not remembered on the same day, then skip the dose and take the next dose at the usual time. Do not take more than the recommended dose. Contact the clinic if you miss a dose.    Please contact the pharmacy 1-2 weeks prior to needing a medication refill.      Side Effects  The most common side effects of Hep C medication treatment can include:  -tiredness  -headache  -nausea  Notify the clinic of any side effects that bother you or that do not go away.   Possible side effects have been discussed.   Patient has been instructed to clinic for rash, itching or unmanageable nausea.    How to store Hep C Treatment Medications  -Store Medication at room temperature below 86 degrees F  -Keep Medication in it's original container  -Do not use Medication if the seal is broken or missing    General information  It is not known if treatment will prevent you from infecting another person or reinfecting yourself with the hepatitis C virus during treatment. It is best that as soon as you start treatment to buy a new toothbrush,  disposable razors (if you use a rotating shaver you do not need to buy a new one) and nail clippers. If you wear dentures, you should soak your dentures in 70-90% Isopropyl solution for 5 minutes one time within the first week of starting treatment. After dentures are done soaking, rinse your dentures off thoroughly with water. If you check your blood sugar at home, please dispose of the fingerstick needle after each use and DO NOT REUSE the insulin needles. These items should not be shared with anyone.        If you have any questions, please contact the main clinic at [phone] or your clinical RN at [phone]. We appreciate you choosing the [facility] clinic for your treatment. Patient agrees to Hep C treatment POC and verbalizes understanding. Patient will receive a copy of treatment plan in the mail, address verified with patient. Patient has no further questions or concerns. Hep C care team updated on patient status.      Gini Templeton RN Care Coordinator   [facility]  Hepatology Clinic/Specialty Program

## 2019-11-05 NOTE — LETTER
July 13, 2020       TO: Sohan NO Krys  1945 [facility] [address]       Dear ,    Per our records, you are due for labs and a hepatology follow up appointment. Please contact the clinic to schedule both of these appointments.     Lab scheduling line: [phone]  Hepatology scheduling line: [phone]    Thank you,    Gini Templeton RN, BSN  [facility] Medicine Specialty 3 [facility]  Care Coordinator Hepatology/ Specialty Program

## 2019-11-05 NOTE — LETTER
June 24, 2020       TO: Sohan Marcano  1945 [facility] [address]       Dear ,    We have been trying to reach you to discuss your Hepatitis C treatment plan. If your phone numbers have changed, please let us know so we can update your contact information. Per our records, you were due for labs around 4/22/2020. Please contact the clinic at [phone] or [phone] and ask to speak with a RN regarding your Hepatitis C treatment plan.       Thank you,    Gini Templeton RN, BSN  [facility]ty 3 [facility]  Care Coordinator Hepatology/ Specialty Program

## 2019-11-05 NOTE — PROGRESS NOTES
Connected with patient for f/u on Hep C treatment delivery/start status. Patient received their Epclusa medication and are ready to start treatment. Patient will be on Hep C treatment for 12 weeks. Patient reports no recent changes in health, hospitalizations or recent changes in medications. Patient did discuss with a pharmacist. Reviewed the following Hep C POC and education with patient.     Hepatitis C Treatment  Treatment: Epclusa x 12 weeks  Genotype: 2  Stage Fibrosis: F4  Previous Treatment Outcome: Naive    Please have labs drawn as close to the date indicated at the [facility] or [facility].    Start Date: 11/06/19    Week 4  HCV RNA Quant Lab Due: 12/4/2019  4 week lab result to be faxed to [facility] (fax: [phone]) and [facility] (fax: [phone])     Week 12 - End of Treatment  HCV RNA Quant Lab due: 1/29/2020    3 Months Post Treatment  HCV RNA Quant, Hepatic Panel Lab due: 4/22/2020    Educational information to patient on Hep C treatment;     -Contact the [facility] Hepatology clinic and speak with clinical RN prior to starting any new prescribed or OTC medications.   -Take medications exactly as prescribed, do not change dose or stop taking without consulting your provider.   -Take Medication one time each day with or without food  -If you miss a dose of medication, then take it as soon as you remember on the same day. If not remembered on the same day, then skip the dose and take the next dose at the usual time. Do not take more than the recommended dose. Contact the clinic if you miss a dose.    Please contact the pharmacy 1-2 weeks prior to needing a medication refill.      Side Effects  The most common side effects of Hep C medication treatment can include:  -tiredness  -headache  -nausea  Notify the clinic of any side effects that bother you or that do not go away.   Possible side effects have been discussed.   Patient has been instructed to clinic for rash, itching or unmanageable nausea.    How  to store Hep C Treatment Medications  -Store Medication at room temperature below 86 degrees F  -Keep Medication in it's original container  -Do not use Medication if the seal is broken or missing    General information  It is not known if treatment will prevent you from infecting another person or reinfecting yourself with the hepatitis C virus during treatment. It is best that as soon as you start treatment to buy a new toothbrush, disposable razors (if you use a rotating shaver you do not need to buy a new one) and nail clippers. If you wear dentures, you should soak your dentures in 70-90% Isopropyl solution for 5 minutes one time within the first week of starting treatment. After dentures are done soaking, rinse your dentures off thoroughly with water. If you check your blood sugar at home, please dispose of the fingerstick needle after each use and DO NOT REUSE the insulin needles. These items should not be shared with anyone.        If you have any questions, please contact the main clinic at [phone] or your clinical RN at [phone]. We appreciate you choosing the [facility] clinic for your treatment. Patient agrees to Hep C treatment POC and verbalizes understanding. Patient will receive a copy of treatment plan in the mail, address verified with patient. Patient has no further questions or concerns. Hep C care team updated on patient status.      Gini Templeton RN Care Coordinator   [facility]  Hepatology Clinic/Specialty Program

## 2019-11-05 NOTE — TELEPHONE ENCOUNTER
[facility] Call Center    Phone Message    May a detailed message be left on voicemail: yes    Reason for Call: Other: patient called stating he recieved medication (Sofoseuvir-Velpatasvir 400-100) in the mail and is wondering when he should start taking it. Please call to discuss thank you.      Action Taken: Message routed to:  [facility] ([facility]): hepatology

## 2019-11-21 NOTE — PROGRESS NOTES
Called pt to check in since starting Epclusa. Pt stated that he is having some fatigue and mild nausea but stated that these symptoms are tolerable. Reminded pt of lab appointment on 12/4. Will look for lab results and fax to pt's insurance.

## 2019-11-22 ENCOUNTER — TELEPHONE (OUTPATIENT)
Dept: FAMILY MEDICINE | Facility: CLINIC | Age: 59
End: 2019-11-22

## 2019-11-22 NOTE — LETTER
November 22, 2019      Sohan Marcano  1945 [facility] [address]        Dear Sohan,    In order to ensure we are providing the best quality care, we have reviewed your chart and see that you are due for:    1. Colonscopy    Please call the clinic at your earliest convenience to schedule an appointment.  If you have completed these please contact our office via phone or [facility] to update our records.  We would like to know the date (approximately month and year), the result, and ideally where the procedure was performed.    Thank you for trusting us with your health care.      Sincerely,    Care Team for Gracie Winters NP

## 2019-11-22 NOTE — TELEPHONE ENCOUNTER
Panel Management Review      Patient has the following on his problem list: None      Composite cancer screening  Chart review shows that this patient is due/due soon for the following Colonoscopy  Summary:    Patient is due/failing the following:   COLONOSCOPY    Action needed:   Patient needs colonoscopy.    Type of outreach:    Phone, left message for patient to call back.  and Sent letter.    Questions for provider review:    None                                                                                                                                    Kahlil Augustin MA     Chart routed to none.

## 2019-12-04 DIAGNOSIS — B19.20 HEPATITIS C VIRUS INFECTION WITHOUT HEPATIC COMA, UNSPECIFIED CHRONICITY: ICD-10-CM

## 2019-12-05 LAB
HCV RNA SERPL NAA+PROBE-ACNC: NORMAL [IU]/ML
HCV RNA SERPL NAA+PROBE-LOG IU: NORMAL LOG IU/ML

## 2019-12-05 NOTE — TELEPHONE ENCOUNTER
Pt received a letter from [facility], he aware that he is due for a colonoscopy and need to call in to schedule one. Pt states that he is ongoing for hepatitis C treatment now.  When that treatment is complete, he will call to schedule a colonoscopy.

## 2019-12-05 NOTE — PROGRESS NOTES
HCV RNA quantitative drawn at 4 weeks and virus not detected. Lab results faxed to [facility] (fax: [phone]) and [facility] (fax: [phone]) and will await authorization from pt's insurance for final shipment of Epclusa. Pt aware of lab results and asked whether he can take Ibuprofen for generalized muscle and joint aching. Educated pt to avoid taking Ibuprofen and other NSAIDs and try Tylenol for discomfort (not to exceed 2,000 mg).

## 2019-12-06 ENCOUNTER — TELEPHONE (OUTPATIENT)
Dept: GASTROENTEROLOGY | Facility: CLINIC | Age: 59
End: 2019-12-06

## 2019-12-06 NOTE — TELEPHONE ENCOUNTER
Health Call Center    Phone Message    May a detailed message be left on voicemail: yes    Reason for Call: Medication Question or concern regarding medication   Prescription Clarification  Name of Medication: sofosbuvir-velpatasvir (EPCLUSA) 400-100 MG per tablet  Prescribing Provider: LEVENTHAL   Pharmacy: [facility].  #: [phone], Ask for Tyesha   What on the order needs clarification?  For this medication, it looks like we wanted to get an extension for the PA. Tyesha says that the we might be confused about what goes where.     Sienna says the pharmacy will send clinicals but what insurance actually needs is PA form which will need to be done by the physician.     Looks like we haven't sent the actual forms that they need for the PA yet.     Please send forms to Pt's insurance, [facility] [employer]. Or Call    #: [phone], option 2, option 0    Call Sienna if need more details/clarification.       Action Taken: Message routed to:  [facility] ([facility]

## 2019-12-06 NOTE — TELEPHONE ENCOUNTER
Spoke with Tyesha, representative at [facility], who stated that viral level needs to be faxed to pt's insurance, [facility], at [phone]. Labs faxed and spoke with representative at pt's insurance. Per representative, labs will be reviewed and writer will receive a phone call regarding pt's decision.

## 2020-01-06 ENCOUNTER — TELEPHONE (OUTPATIENT)
Dept: GASTROENTEROLOGY | Facility: CLINIC | Age: 60
End: 2020-01-06

## 2020-01-06 DIAGNOSIS — K74.60 CIRRHOSIS OF LIVER WITHOUT ASCITES, UNSPECIFIED HEPATIC CIRRHOSIS TYPE (H): Primary | ICD-10-CM

## 2020-01-07 ENCOUNTER — TELEPHONE (OUTPATIENT)
Dept: GASTROENTEROLOGY | Facility: CLINIC | Age: 60
End: 2020-01-07

## 2020-01-30 ENCOUNTER — TELEPHONE (OUTPATIENT)
Dept: GASTROENTEROLOGY | Facility: CLINIC | Age: 60
End: 2020-01-30

## 2020-02-04 DIAGNOSIS — B19.20 HEPATITIS C VIRUS INFECTION WITHOUT HEPATIC COMA, UNSPECIFIED CHRONICITY: ICD-10-CM

## 2020-02-05 LAB
HCV RNA SERPL NAA+PROBE-ACNC: NORMAL [IU]/ML
HCV RNA SERPL NAA+PROBE-LOG IU: NORMAL LOG IU/ML

## 2020-05-04 ENCOUNTER — TELEPHONE (OUTPATIENT)
Dept: GASTROENTEROLOGY | Facility: CLINIC | Age: 60
End: 2020-05-04

## 2020-05-12 NOTE — PROGRESS NOTES
Attempted to reach patient for check in and review 3 month post treatment labs, no answer, message left requesting call back, number provided.

## 2020-06-24 NOTE — PROGRESS NOTES
Second attempt made to reach pt to check in and review 3 month post treatment labs, no answer, message left requesting call back, number provided. Letter also sent to pt.

## 2020-06-29 NOTE — PROGRESS NOTES
Connected with pt and discussed the need for labs and follow up appointment. Pt agreeable and requested assistance with getting these appointments arranged. Message sent to scheduling.

## 2020-07-07 ENCOUNTER — VIRTUAL VISIT (OUTPATIENT)
Dept: FAMILY MEDICINE | Facility: CLINIC | Age: 60
End: 2020-07-07
Payer: COMMERCIAL

## 2020-07-07 DIAGNOSIS — Z53.9 NO SHOW: Primary | ICD-10-CM

## 2020-07-07 PROCEDURE — 99207 ZZC NO SHOW FOR SCHEDULED APPT: CPT | Performed by: NURSE PRACTITIONER

## 2020-07-07 NOTE — PROGRESS NOTES
"Sohan Marcano is a 59 year old male who is being evaluated via a billable telephone visit.      The patient has been notified of following:     \"This telephone visit will be conducted via a call between you and your physician/provider. We have found that certain health care needs can be provided without the need for a physical exam.  This service lets us provide the care you need with a short phone conversation.  If a prescription is necessary we can send it directly to your pharmacy.  If lab work is needed we can place an order for that and you can then stop by our lab to have the test done at a later time.    Telephone visits are billed at different rates depending on your insurance coverage. During this emergency period, for some insurers they may be billed the same as an in-person visit.  Please reach out to your insurance provider with any questions.    If during the course of the call the physician/provider feels a telephone visit is not appropriate, you will not be charged for this service.\"    Patient has given verbal consent for Telephone visit?  {YES-NO  Default Yes:4444::\"Yes\"}    What phone number would you like to be contacted at? ***    How would you like to obtain your AVS? {AVS Preference:420982}    Subjective     Sohan Marcano is a 59 year old male who presents via phone visit today for the following health issues:    HPI  {SUPERLIST (Optional):819530}  {PEDS Chronic and Acute Problems (Optional):103147}     {additonal problems for provider to add (Optional):393521}    {HIST REVIEW/ LINKS 2 (Optional):050346}    Reviewed and updated as needed this visit by Provider         Review of Systems   {ROS COMP (Optional):040506}       Objective   Reported vitals:  There were no vitals taken for this visit.   {GENERAL APPEARANCE:50::\"healthy\",\"alert\",\"no distress\"}  PSYCH: Alert and oriented times 3; coherent speech, normal   rate and volume, able to articulate logical thoughts, able   to abstract " "reason, no tangential thoughts, no hallucinations   or delusions  His affect is { :8784113::\"normal\"}  RESP: No cough, no audible wheezing, able to talk in full sentences  Remainder of exam unable to be completed due to telephone visits    {Diagnostic Test Results (Optional):518157::\"Diagnostic Test Results:\",\"Labs reviewed in Epic\"}        Assessment/Plan:  {Diagnosis, Associated Orders and Comment:552068}    No follow-ups on file.      Phone call duration:  *** minutes    {signature options:161374}      "

## 2020-07-13 NOTE — PROGRESS NOTES
Per chart review, pt did not attend lab appointment on 7/7 and cancelled follow up appointment with Dr. Mullins on 7/13. Letter sent to pt to remind pt to reschedule labs and follow up appointment. Pt placed on lost to follow up list.

## 2020-08-14 LAB — HAV IGG SER QL IA: ABNORMAL

## 2020-09-04 ENCOUNTER — TELEPHONE (OUTPATIENT)
Dept: FAMILY MEDICINE | Facility: CLINIC | Age: 60
End: 2020-09-04

## 2020-09-04 ENCOUNTER — VIRTUAL VISIT (OUTPATIENT)
Dept: FAMILY MEDICINE | Facility: CLINIC | Age: 60
End: 2020-09-04
Payer: COMMERCIAL

## 2020-09-04 ENCOUNTER — TELEPHONE (OUTPATIENT)
Dept: ADDICTION MEDICINE | Facility: CLINIC | Age: 60
End: 2020-09-04

## 2020-09-04 DIAGNOSIS — F43.23 ADJUSTMENT DISORDER WITH MIXED ANXIETY AND DEPRESSED MOOD: ICD-10-CM

## 2020-09-04 DIAGNOSIS — R06.02 SOB (SHORTNESS OF BREATH): ICD-10-CM

## 2020-09-04 DIAGNOSIS — J06.9 VIRAL UPPER RESPIRATORY TRACT INFECTION: ICD-10-CM

## 2020-09-04 DIAGNOSIS — F10.939 ALCOHOL WITHDRAWAL SYNDROME WITH COMPLICATION (H): ICD-10-CM

## 2020-09-04 DIAGNOSIS — F10.220 ALCOHOL DEPENDENCE WITH UNCOMPLICATED INTOXICATION (H): ICD-10-CM

## 2020-09-04 DIAGNOSIS — F43.23 ADJUSTMENT DISORDER WITH MIXED ANXIETY AND DEPRESSED MOOD: Primary | ICD-10-CM

## 2020-09-04 DIAGNOSIS — F10.982 ALCOHOL-INDUCED INSOMNIA (H): ICD-10-CM

## 2020-09-04 DIAGNOSIS — F19.20 CHEMICAL DEPENDENCY (H): ICD-10-CM

## 2020-09-04 DIAGNOSIS — D50.8 IRON DEFICIENCY ANEMIA SECONDARY TO INADEQUATE DIETARY IRON INTAKE: ICD-10-CM

## 2020-09-04 DIAGNOSIS — F10.10 ALCOHOL ABUSE: Primary | ICD-10-CM

## 2020-09-04 PROCEDURE — 99443 ZZC PHYSICIAN TELEPHONE EVALUATION 21-30 MIN: CPT | Performed by: NURSE PRACTITIONER

## 2020-09-04 RX ORDER — FERROUS SULFATE 325(65) MG
325 TABLET ORAL
Qty: 90 TABLET | Refills: 0 | Status: SHIPPED | OUTPATIENT
Start: 2020-09-04 | End: 2022-06-03

## 2020-09-04 RX ORDER — LANOLIN ALCOHOL/MO/W.PET/CERES
100 CREAM (GRAM) TOPICAL
Qty: 90 TABLET | Refills: 0 | Status: ON HOLD | OUTPATIENT
Start: 2020-09-04 | End: 2020-09-30

## 2020-09-04 RX ORDER — MULTIVITAMIN WITH IRON
1 TABLET ORAL AT BEDTIME
Qty: 90 TABLET | Refills: 3 | Status: SHIPPED | OUTPATIENT
Start: 2020-09-04 | End: 2022-06-03

## 2020-09-04 RX ORDER — GABAPENTIN 300 MG/1
300 CAPSULE ORAL 3 TIMES DAILY PRN
Qty: 90 CAPSULE | Refills: 0 | Status: SHIPPED | OUTPATIENT
Start: 2020-09-04 | End: 2021-01-25

## 2020-09-04 RX ORDER — MULTIPLE VITAMINS W/ MINERALS TAB 9MG-400MCG
1 TAB ORAL
Qty: 30 TABLET | Refills: 0 | Status: ON HOLD | OUTPATIENT
Start: 2020-09-04 | End: 2020-09-30

## 2020-09-04 RX ORDER — CALCIUM CARBONATE 500 MG/1
1 TABLET, CHEWABLE ORAL AT BEDTIME
Qty: 90 TABLET | Refills: 3 | Status: SHIPPED | OUTPATIENT
Start: 2020-09-04 | End: 2022-06-03

## 2020-09-04 RX ORDER — ALBUTEROL SULFATE 90 UG/1
2 AEROSOL, METERED RESPIRATORY (INHALATION) EVERY 6 HOURS PRN
Qty: 1 INHALER | Refills: 0 | Status: SHIPPED | OUTPATIENT
Start: 2020-09-04 | End: 2021-03-31

## 2020-09-04 RX ORDER — CLONIDINE HYDROCHLORIDE 0.1 MG/1
0.1 TABLET ORAL 3 TIMES DAILY PRN
Qty: 30 TABLET | Refills: 0 | Status: ON HOLD | OUTPATIENT
Start: 2020-09-04 | End: 2020-09-30

## 2020-09-04 NOTE — PROGRESS NOTES
"Sohan Marcano is a 60 year old male who is being evaluated via a billable telephone visit.      The patient has been notified of following:     \"This telephone visit will be conducted via a call between you and your physician/provider. We have found that certain health care needs can be provided without the need for a physical exam.  This service lets us provide the care you need with a short phone conversation.  If a prescription is necessary we can send it directly to your pharmacy.  If lab work is needed we can place an order for that and you can then stop by our lab to have the test done at a later time.    Telephone visits are billed at different rates depending on your insurance coverage. During this emergency period, for some insurers they may be billed the same as an in-person visit.  Please reach out to your insurance provider with any questions.    If during the course of the call the physician/provider feels a telephone visit is not appropriate, you will not be charged for this service.\"    Patient has given verbal consent for Telephone visit?  Yes    What phone number would you like to be contacted at? [phone]    How would you like to obtain your AVS? Mail a copy    Subjective     Sohan Marcano is a 60 year old male who presents via phone visit today for the following health issues:    HPI    Patient went through a detox almost a year and half ago. Patient has been drinking for the last nine months. Patient decided to detox himself a few weeks ago. Patient had the shakes very bad, patient had muscle spasms from neck to ankle. Muscles would lock up on patient. Patient stated he started drinking and muscle spasms went away. Patient wants to detox from alcohol but does not want to be in a detox environment. When patient does not drink he does not sleep.    Patient also wants to discuss getting tested for covid. Patient has a history of respiratory illness. Patient currently has a cough. Patient " "also wants to be tested for MONO.     First relapsed 9 months or so ago, Abscess on teeth and used alcohol in his mouth to help clear it up, then led to drinking it  Had been dialing the drinking back again  4 days in bed last week went through withdrawals   Hands lock up, arms and chest , would go home and work on deep breathing   Couldn't deal, Pint a day keep the muscle spasms away  Week ago, \"now maintenance drinking\" to help with hyperactive brain    No sleep when stopping drinking, went 4 days without sleeping   Doesn't want the half-way cell detox approach   Functional alcoholic, drank for 40 years, this is his last relapse    Sober during the Hep C medication, felt great    Flu shot last year didn't get any pneumonia   Now has upper respiratory infection symptoms, no known COVID exposures    \"Kicked out of Psych offices many times  Has led a crazy life\"  Doesn't wnt depression and anxiety meds   Willing to do counseling, knows he has some anxiety     Would like meds to help with detox,   Not doing any supplements   Peeing okay  Diet \"sometimes good\"           Review of Systems   Constitutional, HEENT, cardiovascular, pulmonary, GI, , musculoskeletal, neuro, skin, endocrine and psych systems are negative, except as otherwise noted.       Objective          Vitals:  No vitals were obtained today due to virtual visit.    healthy, alert and no distress  PSYCH: Alert and oriented times 3; coherent speech, normal   rate and volume, able to articulate logical thoughts, able   to abstract reason, no tangential thoughts, no hallucinations   or delusions  His affect is normal  RESP: No cough, no audible wheezing, able to talk in full sentences  Remainder of exam unable to be completed due to telephone visits            Assessment/Plan:      ICD-10-CM    1. Alcohol abuse  F10.10 MENTAL HEALTH REFERRAL  - Adult; Addiction Medicine Provider; Addiction Medicine Evaluation & Treatment; Addiction Medicine Consultation, " Evaluation & Treatment [phone]; Alcohol; Medication Assisted Treatment: Alcohol, Benzo Taper; We will ...   2. Chemical dependency (H)  F19.20    3. Adjustment disorder with mixed anxiety and depressed mood  F43.23 MENTAL HEALTH REFERRAL  - Adult; Outpatient Treatment; Individual/Couples/Family/Group Therapy/Health Psychology[place] [facility] [place] [phone]; We will contact you to schedule the appointment or please call with any questions   4. Alcohol-induced insomnia (H)  F10.982 MENTAL HEALTH REFERRAL  - Adult; Outpatient Treatment; Individual/Couples/Family/Group Therapy/Health Psychology[place] [facility] [place] [facility] [phone]; We will contact you to schedule the appointment or please call with any questions   5. SOB (shortness of breath)  R06.02 Symptomatic COVID-19 Virus (Coronavirus) by PCR   6. Viral upper respiratory tract infection  J06.9 Symptomatic COVID-19 Virus (Coronavirus) by PCR   7. Alcohol dependence with uncomplicated intoxication (H)  F10.220 albuterol (PROAIR HFA/PROVENTIL HFA/VENTOLIN HFA) 108 (90 Base) MCG/ACT inhaler     thiamine (B-1) 100 MG tablet     multivitamin w/minerals (THERA-VIT-M) tablet   8. Iron deficiency anemia secondary to inadequate dietary iron intake  D50.8 ferrous sulfate (FEROSUL) 325 (65 Fe) MG tablet   9. Alcohol withdrawal syndrome with complication (H)  F10.239 magnesium 250 MG tablet     calcium carbonate (TUMS) 500 MG chewable tablet    first visit after a relapse pt admits to drinking high amounts of alcohol every day, wants to avoid detox yet hasn't been able to tolerate stopping drinking, addiction medicine not able to get him in today but did consult with suggested medications to try in his home--see telephone visit for follow up. Pt knows to go to ER if any trouble. Likely nutrition and lab work off, will need to check     Also wants COVID test discussed strict quarantine insturctions  Inhaler has been helpful in past, refilled,  push fluids and good self care, symptoms management discussed     FUTURE LABS:       - Schedule fasting labs then see provider for health maintenance exam   Restart supplements  Will see if Addiction med has appointment avail today      No follow-ups on file.    [facility]    Phone call duration:  30 minutes

## 2020-09-04 NOTE — TELEPHONE ENCOUNTER
Consulted with Caden Yi who said no addiction med openings today, however he suggested I could give pt Gabapentin 300-600mg TID PRN  Clonidine 0.1 TID PRN if no concerns with BP    Please verify pt is eating meals and drinking water/ staying hydrated and no low BPs    Then I'll send these meds to his pharmacy. Have someone else pick them up or deliver for him since he will be tested for COVID     Thanks,   Gracie NOWAK CNP

## 2020-09-04 NOTE — PATIENT INSTRUCTIONS
Patient Education     Alcohol Withdrawal  Alcohol withdrawal usually begins after prolonged heavy drinking, and then you suddenly stop drinking, or cut down on your alcohol use. It is not one thing, but is a complex combination of signs and symptoms that generally occur to together and define a particular problem or condition.    Alcohol withdrawal is potentially life-threatening, and is a medical emergency.    It can start as early as a couple of hours after your last drink, or may take 1 to 3 days to develop.    It can last from days to a week or more.    It can worsen very quickly.  Signs and symptoms  There are several stages of alcohol withdrawal, although they overlap, as do their signs and symptoms. In the earlier stages, it most commonly includes:    Anxiety    Shakiness    Nausea and vomiting    Sweating    Insomnia    Headaches    Fever    Mood swings, irritability, agitation, restlessness  Delirium tremens (DTs)  DTs are a severe and life-threatening complication. If it happens, it usually begins about 3 to 5 days after your last drink. It is potentially life threatening. DTs are characterized by:    Sudden and severe mental or nervous system changes    Uncontrollable tremors    Severe disorientation, confusion, hallucinations    Heart racing, or irregular heartbeat    High blood pressure    Seizures    Possible coma and death  Home care    You will need plenty of rest and fluids over the next several days. Eat regular meals and drink plenty of fluids to prevent dehydration. Do not drink any more alcohol. During this time, it is best that you stay with family or friends who can help and support you. You can also admit yourself to a residential detox program.    Do not drive until all symptoms are gone and you are feeling better. if you've had a seizure, don't drive until you have been examined by a doctor.    If you were given sedative medication to reduce your symptoms, do not take it more often than  prescribed and never take it with alcohol.  Follow-up care  Once you have gone through the withdrawal symptoms, you have fought half of the swanson. To avoid the risk of returning to your previous drinking pattern, it is essential that you get follow-up support and treatment.    Alcoholics Anonymous offers support through a self-help fellowship. There are no dues or fees. Search the internet or go to www.aa.org to find a local meeting place.    [employer] family groups offers support to families of alcohol users. Go to www.al-anon.org    National [place] On Alcoholism And Drug Dependence at 380-535-5211 or www.ncadd.org    Residential alcohol detox programs are available. Search the internet for drug abuse and treatment centers in your area.  Call 911  Call 911 if any of these occur:    Seizure    Trouble breathing or slow, irregular breathing    Chest pain    Sudden weakness on one side of the body or sudden trouble speaking    Heavy bleeding or vomiting blood    Very drowsy or trouble awakening    Fainting or loss of consciousness    Rapid heart rate  When to seek medical advice  Call your healthcare provider right away if any of these occur:    Severe shakiness    Hallucinations    Fever over 100.4  F (38.0  C)    Headache, confusion, extreme drowsiness, inability to awaken    Increasing upper abdominal pain    Repeated vomiting  Date Last Reviewed: 6/1/2018 2000-2019 [employer]. [address] [place] [address], [place] [address]. All rights reserved. This information is not intended as a substitute for professional medical care. Always follow your healthcare professional's instructions.

## 2020-09-04 NOTE — TELEPHONE ENCOUNTER
Pt is scheduled with Dr. Valenzuela on 09/22 @ 9:30am @ [facility] . Closing encounter as no further follow up is needed.     Mary Canela    [facility]

## 2020-09-04 NOTE — TELEPHONE ENCOUNTER
Please review referral. Please route back to writer.     Thank you,    Mary Canela    [facility]

## 2020-09-04 NOTE — TELEPHONE ENCOUNTER
Gracie    Pt eats may eat 1, maybe 2 xday the past few days  Eggs in am and then usually a sandwich later in the day  He is not drinking much, the past 2 to 3 days, before he would drink maybe 1/2 gallon of water  Pt has no way to check his bp at home    Bibi Valadez RN   [facility]

## 2020-09-04 NOTE — TELEPHONE ENCOUNTER
"Please schedule appointment for patient with   Addiction Medicine Provider   For alcohol use disorder    Patient can call our detox line at [phone] to speak with our intake staff and discuss possible admission.    We typically do not support home detox unless there is a support person to help manage medications for/with patient. Will discuss options with patient regardless.     Our staff will relay the following information: Please offer our patient to call [facility] assessment line - [phone]. They can ask for a \"chemical assessment\" or \"rule 25\". This will allow them to discuss possible psychosocial treatment options including individual therapy or any group options including outpatient, intensive outpatient, or residential (inpatient) treatment.     Note routed to PCP - Please feel free to reach out to myself with any specific patient care needs as well. I can help address concerns and/or relay the information to the provider who will be scheduled. No need to reply if consult is sufficient.     Pipe Iniguez MD    "

## 2020-09-09 DIAGNOSIS — J06.9 VIRAL UPPER RESPIRATORY TRACT INFECTION: ICD-10-CM

## 2020-09-09 DIAGNOSIS — R06.02 SOB (SHORTNESS OF BREATH): ICD-10-CM

## 2020-09-09 NOTE — TELEPHONE ENCOUNTER
Left message for patient to let him know Gabapentin and clonidine were sent to pharmacy    Francoise Walter RN   [facility]

## 2020-09-09 NOTE — LETTER
September 14, 2020      Sohan Marcano  [address] [address]        Dear ,    We are writing to inform you of your test results.    Your test results fall within the expected range(s) or remain unchanged from previous results.  Please continue with current treatment plan.    Resulted Orders   Symptomatic COVID-19 Virus (Coronavirus) by PCR   Result Value Ref Range    COVID-19 Virus PCR to [facility] - Source Nasopharyngeal     COVID-19 Virus PCR to [facility] - Result Not Detected       Comment:      Collection of multiple specimens from the same patient may be necessary to   detect the virus. The possibility of a false negative should be considered if   the patient's recent exposure or clinical presentation suggests 2019 nCOV   infection and diagnostic tests for other causes of illness are negative.   Repeat testing may be considered in this setting.  Patient sample was heat inactivated and amplified using the HDPCR SARS-CoV-2   assay ([employer] The HDPCRTM SARS-CoV-2 assay is a reverse   transcription real-time polymerase chain reaction (qRT-PCR) test intended for   the qualitative detection of nucleic acid  from SARS-CoV-2 in human nasopharyngeal swabs, oropharyngeal swabs, anterior   nasal swabs, mid-turbinate nasal swabs as well as nasal aspirate, nasal wash,   and bronchoalveolar lavage (BAL) specimens from individuals who are suspected   of COVID-19 by their healthcare provider.  A negative result does not rule out the presence of real-time PCR inhibitors   in the sp  ecimen or COVID-19 RNA in concentrations below the limit of detection   of the assay. The possibility of a false negative should be considered if the   patients recent exposure or clinical presentation suggests COVID-19.   Additional testing or repeat testing requires consultation with the   laboratory.  Nasopharyngeal specimen is the preferred choice for swab-based SARS CoV2   testing. When collection of  a nasopharyngeal swab is not possible the   following are acceptable alternatives:  an oropharyngeal (OP) specimen collected by a healthcare professional, or a   nasal mid-turbinate (NMT) swab collected by a healthcare professional or by   onsite self-collection (using a flocked tapered swab), or an anterior nares   specimen collected by a healthcare professional or by onsite self-collection   (using a round foam swab). (Centers for Disease Control)  Testing performed by [facility] ([facility]) [address] [facility] [address]  The test performance characteristics were determined by [facility]. It has not been   cleared or approved by the FDA.  The laboratory is regulated under the Clinical Laboratory Improvement   Amendments of 1988 (CLIA-88) as qualified to perform high-complexity testing.   This test is used for clinical purposes. It should not be regarded as   investigational or for research.         If you have any questions or concerns, please call the clinic at the number listed above.       Sincerely,      [facility]

## 2020-09-09 NOTE — TELEPHONE ENCOUNTER
Ok will order the meds, if gets too dizzy or feels low BP symptoms stop the clonidine. I see he scheduled with Bianca, forwarding to you ALONDRAI    Please have pt let us know until then if any issues  Thanks!

## 2020-09-10 LAB
SARS-COV-2 RNA SPEC QL NAA+PROBE: NOT DETECTED
SPECIMEN SOURCE: NORMAL

## 2020-09-11 ENCOUNTER — NURSE TRIAGE (OUTPATIENT)
Dept: NURSING | Facility: CLINIC | Age: 60
End: 2020-09-11

## 2020-09-11 NOTE — TELEPHONE ENCOUNTER

## 2020-09-14 ENCOUNTER — MEDICAL CORRESPONDENCE (OUTPATIENT)
Dept: HEALTH INFORMATION MANAGEMENT | Facility: CLINIC | Age: 60
End: 2020-09-14

## 2020-09-22 ENCOUNTER — OFFICE VISIT (OUTPATIENT)
Dept: ADDICTION MEDICINE | Facility: CLINIC | Age: 60
End: 2020-09-22
Payer: COMMERCIAL

## 2020-09-22 VITALS
OXYGEN SATURATION: 95 % | BODY MASS INDEX: 24.84 KG/M2 | DIASTOLIC BLOOD PRESSURE: 84 MMHG | TEMPERATURE: 100 F | SYSTOLIC BLOOD PRESSURE: 138 MMHG | WEIGHT: 210.38 LBS | RESPIRATION RATE: 18 BRPM | HEIGHT: 77 IN | HEART RATE: 110 BPM

## 2020-09-22 DIAGNOSIS — B19.20 HEPATITIS C VIRUS INFECTION WITHOUT HEPATIC COMA, UNSPECIFIED CHRONICITY: Primary | ICD-10-CM

## 2020-09-22 DIAGNOSIS — F10.930 ALCOHOL WITHDRAWAL SYNDROME WITHOUT COMPLICATION (H): ICD-10-CM

## 2020-09-22 DIAGNOSIS — F10.20 ALCOHOL USE DISORDER, SEVERE, DEPENDENCE (H): ICD-10-CM

## 2020-09-22 LAB
ALBUMIN SERPL-MCNC: 3.7 G/DL (ref 3.4–5)
ALP SERPL-CCNC: 86 U/L (ref 40–150)
ALT SERPL W P-5'-P-CCNC: 67 U/L (ref 0–70)
ANION GAP SERPL CALCULATED.3IONS-SCNC: 9 MMOL/L (ref 3–14)
AST SERPL W P-5'-P-CCNC: 131 U/L (ref 0–45)
BILIRUB SERPL-MCNC: 3.4 MG/DL (ref 0.2–1.3)
BUN SERPL-MCNC: 7 MG/DL (ref 7–30)
CALCIUM SERPL-MCNC: 8.7 MG/DL (ref 8.5–10.1)
CHLORIDE SERPL-SCNC: 105 MMOL/L (ref 94–109)
CO2 SERPL-SCNC: 26 MMOL/L (ref 20–32)
CREAT SERPL-MCNC: 0.84 MG/DL (ref 0.66–1.25)
ERYTHROCYTE [DISTWIDTH] IN BLOOD BY AUTOMATED COUNT: 13 % (ref 10–15)
GFR SERPL CREATININE-BSD FRML MDRD: >90 ML/MIN/{1.73_M2}
GGT SERPL-CCNC: 416 U/L (ref 0–75)
GLUCOSE SERPL-MCNC: 138 MG/DL (ref 70–99)
HCT VFR BLD AUTO: 40.3 % (ref 40–53)
HGB BLD-MCNC: 14.9 G/DL (ref 13.3–17.7)
MCH RBC QN AUTO: 38 PG (ref 26.5–33)
MCHC RBC AUTO-ENTMCNC: 37 G/DL (ref 31.5–36.5)
MCV RBC AUTO: 103 FL (ref 78–100)
PLATELET # BLD AUTO: 107 10E9/L (ref 150–450)
POTASSIUM SERPL-SCNC: 3 MMOL/L (ref 3.4–5.3)
PROT SERPL-MCNC: 8.1 G/DL (ref 6.8–8.8)
RBC # BLD AUTO: 3.92 10E12/L (ref 4.4–5.9)
SODIUM SERPL-SCNC: 140 MMOL/L (ref 133–144)
WBC # BLD AUTO: 5 10E9/L (ref 4–11)

## 2020-09-22 PROCEDURE — 99215 OFFICE O/P EST HI 40 MIN: CPT | Performed by: FAMILY MEDICINE

## 2020-09-22 PROCEDURE — 80053 COMPREHEN METABOLIC PANEL: CPT | Performed by: FAMILY MEDICINE

## 2020-09-22 PROCEDURE — 85027 COMPLETE CBC AUTOMATED: CPT | Performed by: FAMILY MEDICINE

## 2020-09-22 PROCEDURE — 82977 ASSAY OF GGT: CPT | Performed by: FAMILY MEDICINE

## 2020-09-22 PROCEDURE — 36415 COLL VENOUS BLD VENIPUNCTURE: CPT | Performed by: FAMILY MEDICINE

## 2020-09-22 RX ORDER — DIAZEPAM 10 MG
10 TABLET ORAL EVERY 4 HOURS PRN
Qty: 20 TABLET | Refills: 0 | Status: SHIPPED | OUTPATIENT
Start: 2020-09-22 | End: 2021-01-25

## 2020-09-22 ASSESSMENT — MIFFLIN-ST. JEOR: SCORE: 1871.75

## 2020-09-22 NOTE — PROGRESS NOTES
SUBJECTIVE:                                                    ADDICTION MEDICINE NOTE    Sohan Marcano is a 60 year old male who presents to clinic today for Addiction Medicine consultation    Date of last visit:  Initial Addiction Medicine visit 9/22/20    [place] [facility] Data Base Reviewed:    Yes ;     no issues; checked 9/22/20      Brief History:    Patient known to me from detox admission 5 years ago    Long history of alcohol dependence    Has resisted going to treatment over the years    Has resisted seriously trying to stop    Longest sober period was 6 months    Says he's physically addicted to alcohol and needs to drink every 6 hrs. to avoid withdrawal    Has history of withdrawal needing detox    Has history of tremors, hallucinations    Can't sleep at night without drinking    Last drink this morning    Doesn't want to go to detox; Discussed at length     Says he's now ready to stop drinking for good    Says he's not functioning lately; has reduced tolerance to alcohol and it makes him sleep all day    Living with former detox nurse    PMH: reviewed     Has history of Hepatitis C treated 2019          HPI:    9/22/2020    Discussed pros and cons, risks and benefits of out-patient detox    Has former detox nurse who can watch him    Advised call if any issues    Will order Valium 10 mg QID prn    Re-check in 2 days    Labs ordered          Social History     Social History Narrative     Not on file       Patient Active Problem List    Diagnosis Date Noted     Alcohol dependence in remission (H) 07/15/2019     Priority: Medium     Hepatitis C virus infection without hepatic coma, unspecified chronicity 07/15/2019     Priority: Medium     Abdominal pain 09/23/2018     Priority: Medium     Basal cell carcinoma of anterior chest s/p excision 3-10-15 03/10/2015     Priority: Medium     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  Additional history: as documented      albuterol (PROAIR HFA/PROVENTIL HFA/VENTOLIN HFA) 108 (90 Base) MCG/ACT inhaler, Inhale 2 puffs into the lungs every 6 hours as needed for shortness of breath / dyspnea or wheezing  calcium carbonate (TUMS) 500 MG chewable tablet, Take 1 tablet (500 mg) by mouth At Bedtime  cloNIDine (CATAPRES) 0.1 MG tablet, Take 1 tablet (0.1 mg) by mouth 3 times daily as needed (anxiety)  ferrous sulfate (FEROSUL) 325 (65 Fe) MG tablet, Take 1 tablet (325 mg) by mouth daily (with breakfast)  gabapentin (NEURONTIN) 300 MG capsule, Take 1 capsule (300 mg) by mouth 3 times daily as needed (anxiety)  magnesium 250 MG tablet, Take 1 tablet (250 mg) by mouth At Bedtime  Milk Thistle 1000 MG CAPS, Take 1,000 mg by mouth 2 times daily  multivitamin w/minerals (THERA-VIT-M) tablet, Take 1 tablet by mouth daily (with breakfast)  NEW MED, VITAMIN B COMPLEX + VITAMIN C - Take 1 tablet by mouth once daily.  Probiotic Product (PROBIOTIC PO), Take 1 capsule by mouth 2 times daily  sofosbuvir-velpatasvir (EPCLUSA) 400-100 MG per tablet, Take 1 tablet by mouth daily  thiamine (B-1) 100 MG tablet, Take 1 tablet (100 mg) by mouth daily (with breakfast)  vitamin D2 (ERGOCALCIFEROL) 81804 units capsule, Take 1 capsule (50,000 Units) by mouth every 7 days    No current facility-administered medications on file prior to visit.       No Known Allergies        REVIEW OF SYSTEMS:  General:  No acute withdrawal symptoms.  No recent infections or fever  Eyes:  No vision concerns.  No double vision.    Resp: No coughing, wheezing or shortness of breath  CV: No chest pains or palpitations  GI: No nausea, vomiting, abdominal pain, diarrhea.  No constipation  : No urinary frequency or dysuria    Musculoskeletal: No significant muscle or joint pains  "other than as above.  No edema  Neurologic: No numbness, tingling, weakness, problems with balance or coordination  Psychiatric: No acute concerns other than as above.   Skin: No rashes or areas of acute infection    OBJECTIVE:    PHYSICAL EXAM:  /84   Pulse 110   Temp 100  F (37.8  C) (Temporal)   Resp 18   Ht 1.956 m (6' 5.01\")   Wt 95.4 kg (210 lb 6 oz)   SpO2 95%   BMI 24.94 kg/m      GENERAL APPEARANCE:  alert, comfortable appearing  EYES:Eyes grossly normal to inspection  HEART:  regular rate and rhythm; rate 110  LUNGS:  clear  ABDOMEN: liver 5 FB below RCM; no ascites  NEURO:  Gait normal.  No tremor. Coordination intact.   MENTAL STATUS EXAM:  Appearance/Behavior: No appearant distress  Speech: Normal  Mood/Affect: normal affect  Insight: Adequate      Results for orders placed or performed in visit on 09/22/20   CBC with platelets     Status: Abnormal   Result Value Ref Range    WBC 5.0 4.0 - 11.0 10e9/L    RBC Count 3.92 (L) 4.4 - 5.9 10e12/L    Hemoglobin 14.9 13.3 - 17.7 g/dL    Hematocrit 40.3 40.0 - 53.0 %     (H) 78 - 100 fl    MCH 38.0 (H) 26.5 - 33.0 pg    MCHC 37.0 (H) 31.5 - 36.5 g/dL    RDW 13.0 10.0 - 15.0 %    Platelet Count 107 (L) 150 - 450 10e9/L           ASSESSMENT:    ALCOHOL DEPENDENCE  ALCOHOL WITHDRAWAL  ALCOHOLIC LIVER DISEASE    PLAN:    VALIUM  OUT-PATIENT  DETOX  RE-CHECK 2 DAYS  ER IF WORSENS        ENCOUNTER FOR LONG TERM USE OF HIGH RISK MEDICATION   High Risk Drug Monitoring?  YES   Drug being monitored: Valium   Reason for drug: Alcohol withdrawal    What is being monitored:  withdrawal .        Kehinde Valenzuela MD  [facility] Addiction Medicine  [phone]    "

## 2020-09-25 ENCOUNTER — TELEPHONE (OUTPATIENT)
Dept: ADDICTION MEDICINE | Facility: CLINIC | Age: 60
End: 2020-09-25

## 2020-09-25 NOTE — TELEPHONE ENCOUNTER
Spoke to patient  Stopped drinking  Taking Valium; it helps  Advised call to re-schedule follow up appointment   No further Valium until seen

## 2020-09-26 ENCOUNTER — APPOINTMENT (OUTPATIENT)
Dept: CT IMAGING | Facility: CLINIC | Age: 60
End: 2020-09-26
Attending: FAMILY MEDICINE
Payer: COMMERCIAL

## 2020-09-26 ENCOUNTER — HOSPITAL ENCOUNTER (INPATIENT)
Facility: CLINIC | Age: 60
LOS: 4 days | Discharge: HOME | End: 2020-09-30
Attending: FAMILY MEDICINE | Admitting: INTERNAL MEDICINE
Payer: COMMERCIAL

## 2020-09-26 DIAGNOSIS — F10.220 ALCOHOL DEPENDENCE WITH UNCOMPLICATED INTOXICATION (H): ICD-10-CM

## 2020-09-26 DIAGNOSIS — Z20.828 EXPOSURE TO SARS-ASSOCIATED CORONAVIRUS: ICD-10-CM

## 2020-09-26 DIAGNOSIS — F10.239 ALCOHOL DEPENDENCE WITH WITHDRAWAL WITH COMPLICATION (H): ICD-10-CM

## 2020-09-26 DIAGNOSIS — F19.20 CHEMICAL DEPENDENCY (H): Primary | ICD-10-CM

## 2020-09-26 DIAGNOSIS — K70.30 ALCOHOLIC CIRRHOSIS OF LIVER WITHOUT ASCITES (H): ICD-10-CM

## 2020-09-26 LAB
ALBUMIN SERPL-MCNC: 3.7 G/DL (ref 3.4–5)
ALBUMIN UR-MCNC: 10 MG/DL
ALP SERPL-CCNC: 98 U/L (ref 40–150)
ALT SERPL W P-5'-P-CCNC: 47 U/L (ref 0–70)
AMMONIA PLAS-SCNC: 43 UMOL/L (ref 10–50)
AMPHETAMINES UR QL SCN: NEGATIVE
ANION GAP SERPL CALCULATED.3IONS-SCNC: 9 MMOL/L (ref 3–14)
APPEARANCE UR: CLEAR
APTT PPP: 35 SEC (ref 22–37)
AST SERPL W P-5'-P-CCNC: 58 U/L (ref 0–45)
BARBITURATES UR QL: NEGATIVE
BASOPHILS # BLD AUTO: 0.1 10E9/L (ref 0–0.2)
BASOPHILS NFR BLD AUTO: 1.2 %
BENZODIAZ UR QL: POSITIVE
BILIRUB SERPL-MCNC: 4.6 MG/DL (ref 0.2–1.3)
BILIRUB UR QL STRIP: ABNORMAL
BUN SERPL-MCNC: 21 MG/DL (ref 7–30)
CALCIUM SERPL-MCNC: 8.9 MG/DL (ref 8.5–10.1)
CANNABINOIDS UR QL SCN: NEGATIVE
CHLORIDE SERPL-SCNC: 102 MMOL/L (ref 94–109)
CO2 SERPL-SCNC: 25 MMOL/L (ref 20–32)
COCAINE UR QL: NEGATIVE
COLOR UR AUTO: ABNORMAL
CREAT SERPL-MCNC: 0.93 MG/DL (ref 0.66–1.25)
DIFFERENTIAL METHOD BLD: ABNORMAL
EOSINOPHIL # BLD AUTO: 0.3 10E9/L (ref 0–0.7)
EOSINOPHIL NFR BLD AUTO: 4.3 %
ERYTHROCYTE [DISTWIDTH] IN BLOOD BY AUTOMATED COUNT: 13.2 % (ref 10–15)
ETHANOL SERPL-MCNC: 0.04 G/DL
ETHANOL UR QL SCN: NEGATIVE
GFR SERPL CREATININE-BSD FRML MDRD: 88 ML/MIN/{1.73_M2}
GLUCOSE SERPL-MCNC: 93 MG/DL (ref 70–99)
GLUCOSE UR STRIP-MCNC: NEGATIVE MG/DL
HCT VFR BLD AUTO: 44.3 % (ref 40–53)
HGB BLD-MCNC: 15.3 G/DL (ref 13.3–17.7)
HGB UR QL STRIP: NEGATIVE
HYALINE CASTS #/AREA URNS LPF: 7 /LPF (ref 0–2)
IMM GRANULOCYTES # BLD: 0 10E9/L (ref 0–0.4)
IMM GRANULOCYTES NFR BLD: 0.2 %
INR PPP: 1.56 (ref 0.86–1.14)
KETONES UR STRIP-MCNC: 10 MG/DL
LACTATE BLD-SCNC: 2.3 MMOL/L (ref 0.7–2)
LACTATE BLD-SCNC: 2.6 MMOL/L (ref 0.7–2)
LEUKOCYTE ESTERASE UR QL STRIP: NEGATIVE
LYMPHOCYTES # BLD AUTO: 1.3 10E9/L (ref 0.8–5.3)
LYMPHOCYTES NFR BLD AUTO: 22.5 %
MCH RBC QN AUTO: 37.4 PG (ref 26.5–33)
MCHC RBC AUTO-ENTMCNC: 34.5 G/DL (ref 31.5–36.5)
MCV RBC AUTO: 108 FL (ref 78–100)
MONOCYTES # BLD AUTO: 0.9 10E9/L (ref 0–1.3)
MONOCYTES NFR BLD AUTO: 15.9 %
MUCOUS THREADS #/AREA URNS LPF: PRESENT /LPF
NEUTROPHILS # BLD AUTO: 3.2 10E9/L (ref 1.6–8.3)
NEUTROPHILS NFR BLD AUTO: 55.9 %
NITRATE UR QL: NEGATIVE
NRBC # BLD AUTO: 0 10*3/UL
NRBC BLD AUTO-RTO: 0 /100
OPIATES UR QL SCN: NEGATIVE
PH UR STRIP: 6.5 PH (ref 5–7)
PLATELET # BLD AUTO: 118 10E9/L (ref 150–450)
POTASSIUM SERPL-SCNC: 3.4 MMOL/L (ref 3.4–5.3)
PROT SERPL-MCNC: 8.3 G/DL (ref 6.8–8.8)
RBC # BLD AUTO: 4.09 10E12/L (ref 4.4–5.9)
RBC #/AREA URNS AUTO: 1 /HPF (ref 0–2)
SODIUM SERPL-SCNC: 136 MMOL/L (ref 133–144)
SOURCE: ABNORMAL
SP GR UR STRIP: 1.02 (ref 1–1.03)
SQUAMOUS #/AREA URNS AUTO: <1 /HPF (ref 0–1)
TROPONIN I SERPL-MCNC: <0.015 UG/L (ref 0–0.04)
UROBILINOGEN UR STRIP-MCNC: >12 MG/DL (ref 0–2)
WBC # BLD AUTO: 5.8 10E9/L (ref 4–11)
WBC #/AREA URNS AUTO: 2 /HPF (ref 0–5)

## 2020-09-26 PROCEDURE — 12000001 ZZH R&B MED SURG/OB UMMC

## 2020-09-26 PROCEDURE — U0003 INFECTIOUS AGENT DETECTION BY NUCLEIC ACID (DNA OR RNA); SEVERE ACUTE RESPIRATORY SYNDROME CORONAVIRUS 2 (SARS-COV-2) (CORONAVIRUS DISEASE [COVID-19]), AMPLIFIED PROBE TECHNIQUE, MAKING USE OF HIGH THROUGHPUT TECHNOLOGIES AS DESCRIBED BY CMS-2020-01-R: HCPCS | Performed by: FAMILY MEDICINE

## 2020-09-26 PROCEDURE — 80320 DRUG SCREEN QUANTALCOHOLS: CPT | Performed by: FAMILY MEDICINE

## 2020-09-26 PROCEDURE — 25800025 ZZH RX 258: Performed by: FAMILY MEDICINE

## 2020-09-26 PROCEDURE — 70450 CT HEAD/BRAIN W/O DYE: CPT

## 2020-09-26 PROCEDURE — HZ2ZZZZ DETOXIFICATION SERVICES FOR SUBSTANCE ABUSE TREATMENT: ICD-10-PCS | Performed by: INTERNAL MEDICINE

## 2020-09-26 PROCEDURE — 87086 URINE CULTURE/COLONY COUNT: CPT | Performed by: INTERNAL MEDICINE

## 2020-09-26 PROCEDURE — 80307 DRUG TEST PRSMV CHEM ANLYZR: CPT | Performed by: FAMILY MEDICINE

## 2020-09-26 PROCEDURE — 96361 HYDRATE IV INFUSION ADD-ON: CPT

## 2020-09-26 PROCEDURE — 96365 THER/PROPH/DIAG IV INF INIT: CPT

## 2020-09-26 PROCEDURE — 99285 EMERGENCY DEPT VISIT HI MDM: CPT | Mod: Z6 | Performed by: FAMILY MEDICINE

## 2020-09-26 PROCEDURE — 82140 ASSAY OF AMMONIA: CPT | Performed by: FAMILY MEDICINE

## 2020-09-26 PROCEDURE — 25000128 H RX IP 250 OP 636: Performed by: FAMILY MEDICINE

## 2020-09-26 PROCEDURE — 85610 PROTHROMBIN TIME: CPT | Performed by: FAMILY MEDICINE

## 2020-09-26 PROCEDURE — 85730 THROMBOPLASTIN TIME PARTIAL: CPT | Performed by: FAMILY MEDICINE

## 2020-09-26 PROCEDURE — 99285 EMERGENCY DEPT VISIT HI MDM: CPT | Mod: 25

## 2020-09-26 PROCEDURE — 81001 URINALYSIS AUTO W/SCOPE: CPT | Performed by: FAMILY MEDICINE

## 2020-09-26 PROCEDURE — 83605 ASSAY OF LACTIC ACID: CPT | Performed by: FAMILY MEDICINE

## 2020-09-26 PROCEDURE — C9803 HOPD COVID-19 SPEC COLLECT: HCPCS

## 2020-09-26 PROCEDURE — 25000125 ZZHC RX 250: Performed by: FAMILY MEDICINE

## 2020-09-26 PROCEDURE — 25800030 ZZH RX IP 258 OP 636: Performed by: FAMILY MEDICINE

## 2020-09-26 PROCEDURE — 84484 ASSAY OF TROPONIN QUANT: CPT | Performed by: FAMILY MEDICINE

## 2020-09-26 PROCEDURE — 99223 1ST HOSP IP/OBS HIGH 75: CPT | Mod: AI | Performed by: INTERNAL MEDICINE

## 2020-09-26 PROCEDURE — 80053 COMPREHEN METABOLIC PANEL: CPT | Performed by: FAMILY MEDICINE

## 2020-09-26 PROCEDURE — 85025 COMPLETE CBC W/AUTO DIFF WBC: CPT | Performed by: FAMILY MEDICINE

## 2020-09-26 RX ORDER — ONDANSETRON 4 MG/1
4 TABLET, ORALLY DISINTEGRATING ORAL EVERY 6 HOURS PRN
Status: DISCONTINUED | OUTPATIENT
Start: 2020-09-26 | End: 2020-09-30 | Stop reason: HOSPADM

## 2020-09-26 RX ORDER — ATENOLOL 25 MG/1
50 TABLET ORAL DAILY PRN
Status: DISCONTINUED | OUTPATIENT
Start: 2020-09-26 | End: 2020-09-30 | Stop reason: HOSPADM

## 2020-09-26 RX ORDER — CALCIUM CARBONATE 500 MG/1
500 TABLET, CHEWABLE ORAL AT BEDTIME
Status: DISCONTINUED | OUTPATIENT
Start: 2020-09-26 | End: 2020-09-30 | Stop reason: HOSPADM

## 2020-09-26 RX ORDER — SODIUM CHLORIDE, SODIUM LACTATE, POTASSIUM CHLORIDE, CALCIUM CHLORIDE 600; 310; 30; 20 MG/100ML; MG/100ML; MG/100ML; MG/100ML
INJECTION, SOLUTION INTRAVENOUS CONTINUOUS
Status: DISCONTINUED | OUTPATIENT
Start: 2020-09-26 | End: 2020-09-27

## 2020-09-26 RX ORDER — MULTIPLE VITAMINS W/ MINERALS TAB 9MG-400MCG
1 TAB ORAL DAILY
Status: DISCONTINUED | OUTPATIENT
Start: 2020-09-27 | End: 2020-09-30 | Stop reason: HOSPADM

## 2020-09-26 RX ORDER — ALBUTEROL SULFATE 90 UG/1
2 AEROSOL, METERED RESPIRATORY (INHALATION) EVERY 6 HOURS PRN
Status: DISCONTINUED | OUTPATIENT
Start: 2020-09-26 | End: 2020-09-30 | Stop reason: HOSPADM

## 2020-09-26 RX ORDER — SODIUM CHLORIDE 9 MG/ML
INJECTION, SOLUTION INTRAVENOUS CONTINUOUS
Status: DISCONTINUED | OUTPATIENT
Start: 2020-09-26 | End: 2020-09-28

## 2020-09-26 RX ORDER — LIDOCAINE 40 MG/G
CREAM TOPICAL
Status: DISCONTINUED | OUTPATIENT
Start: 2020-09-26 | End: 2020-09-30 | Stop reason: HOSPADM

## 2020-09-26 RX ORDER — ONDANSETRON 2 MG/ML
4 INJECTION INTRAMUSCULAR; INTRAVENOUS EVERY 6 HOURS PRN
Status: DISCONTINUED | OUTPATIENT
Start: 2020-09-26 | End: 2020-09-30 | Stop reason: HOSPADM

## 2020-09-26 RX ORDER — DIAZEPAM 5 MG
5-20 TABLET ORAL EVERY 30 MIN PRN
Status: DISCONTINUED | OUTPATIENT
Start: 2020-09-26 | End: 2020-09-28

## 2020-09-26 RX ORDER — FOLIC ACID 1 MG/1
1 TABLET ORAL DAILY
Status: DISCONTINUED | OUTPATIENT
Start: 2020-09-27 | End: 2020-09-30 | Stop reason: HOSPADM

## 2020-09-26 RX ORDER — ALUMINA, MAGNESIA, AND SIMETHICONE 2400; 2400; 240 MG/30ML; MG/30ML; MG/30ML
30 SUSPENSION ORAL EVERY 4 HOURS PRN
Status: DISCONTINUED | OUTPATIENT
Start: 2020-09-26 | End: 2020-09-30 | Stop reason: HOSPADM

## 2020-09-26 RX ORDER — NALOXONE HYDROCHLORIDE 0.4 MG/ML
.1-.4 INJECTION, SOLUTION INTRAMUSCULAR; INTRAVENOUS; SUBCUTANEOUS
Status: DISCONTINUED | OUTPATIENT
Start: 2020-09-26 | End: 2020-09-30 | Stop reason: HOSPADM

## 2020-09-26 RX ORDER — BISACODYL 10 MG
10 SUPPOSITORY, RECTAL RECTAL DAILY PRN
Status: DISCONTINUED | OUTPATIENT
Start: 2020-09-26 | End: 2020-09-30 | Stop reason: HOSPADM

## 2020-09-26 RX ORDER — POLYETHYLENE GLYCOL 3350 17 G/17G
17 POWDER, FOR SOLUTION ORAL DAILY PRN
Status: DISCONTINUED | OUTPATIENT
Start: 2020-09-26 | End: 2020-09-30 | Stop reason: HOSPADM

## 2020-09-26 RX ORDER — LANOLIN ALCOHOL/MO/W.PET/CERES
100 CREAM (GRAM) TOPICAL DAILY
Status: DISCONTINUED | OUTPATIENT
Start: 2020-09-27 | End: 2020-09-30 | Stop reason: HOSPADM

## 2020-09-26 RX ORDER — SODIUM CHLORIDE 9 MG/ML
INJECTION, SOLUTION INTRAVENOUS
Status: DISCONTINUED
Start: 2020-09-26 | End: 2020-09-26 | Stop reason: HOSPADM

## 2020-09-26 RX ADMIN — SODIUM CHLORIDE, POTASSIUM CHLORIDE, SODIUM LACTATE AND CALCIUM CHLORIDE: 600; 310; 30; 20 INJECTION, SOLUTION INTRAVENOUS at 18:29

## 2020-09-26 RX ADMIN — FOLIC ACID: 5 INJECTION, SOLUTION INTRAMUSCULAR; INTRAVENOUS; SUBCUTANEOUS at 19:44

## 2020-09-26 RX ADMIN — SODIUM CHLORIDE 1000 ML: 9 INJECTION, SOLUTION INTRAVENOUS at 17:42

## 2020-09-26 RX ADMIN — SODIUM CHLORIDE: 9 INJECTION, SOLUTION INTRAVENOUS at 21:02

## 2020-09-26 NOTE — ED PROVIDER NOTES
ED Provider Note  [facility]      History     Chief Complaint   Patient presents with     Jaundice     coming from home, drinking-not feeling well. Sclera and skin yellow.     HPI  Sohan Marcano is a 60 year old male who has a longstanding history of intermittent alcohol abuse and alcoholic liver disease.  Patient states he relapsed drinking about 6 months ago and for a while was drinking every day.  He spoke with his physician and briefly was on a Valium taper at home as he declined going to detox or treatment.  He was taking 5 mg every 4 hours for several days, last dose around 6 days ago.  He was frustrated that he was unable to feel better and so he started drinking again and had some alcohol earlier today.  Patient states he has been very unsteady on his feet, has difficulty with balance, has been falling.  He feels intermittently confused.  He called a friend today who noticed that his sclera were yellow, and that he seemed disoriented, and encouraged him to come to the ED.  He denies headache, chest pain, abdominal pain.  He had one episode of shortness of breath while walking up the stairs a few days ago but is not short of breath currently.  Denies fever, chills, runny nose, nausea, vomiting or diarrhea.  No Known COVID-19 exposures.    Past Medical History  Past Medical History:   Diagnosis Date     Actinic keratosis      Basal cell carcinoma      Squamous cell carcinoma      Substance abuse (H)     alcohol     Uncomplicated asthma      Past Surgical History:   Procedure Laterality Date     HERNIA REPAIR       albuterol (PROAIR HFA/PROVENTIL HFA/VENTOLIN HFA) 108 (90 Base) MCG/ACT inhaler  calcium carbonate (TUMS) 500 MG chewable tablet  cloNIDine (CATAPRES) 0.1 MG tablet  diazepam (VALIUM) 10 MG tablet  ferrous sulfate (FEROSUL) 325 (65 Fe) MG tablet  gabapentin (NEURONTIN) 300 MG capsule  magnesium 250 MG tablet  multivitamin w/minerals (THERA-VIT-M) tablet  thiamine (B-1)  "100 MG tablet      No Known Allergies  Family History  Family History   Problem Relation Age of Onset     Other - See Comments Mother         Patient states his Mother ? had skin cancer.     Other - See Comments Father         Patient reports Father had \"all types of skin cancer\".     Other - See Comments Sister         Basal cell carcinoma     Social History   Social History     Tobacco Use     Smoking status: Current Every Day Smoker     Packs/day: 0.25     Smokeless tobacco: Never Used   Substance Use Topics     Alcohol use: Yes     Comment: sober six month, last drink today.     Drug use: No      Past medical history, past surgical history, medications, allergies, family history, and social history were reviewed with the patient.   Additional pertinent items:  Alcoholic liver disease  Cirrhosis  Hepatitis C  Alcohol dependence     Review of Systems  A complete review of systems was performed with pertinent positives and negatives noted in the HPI, and all other systems negative.    Physical Exam   BP: 114/89  Pulse: 103  Temp: 98.4  F (36.9  C)  Resp: 16  SpO2: 94 %  Physical Exam  Constitutional:       General: He is not in acute distress.     Appearance: He is not diaphoretic.   HENT:      Head: Atraumatic.   Eyes:      General: Scleral icterus present.      Extraocular Movements: Extraocular movements intact.      Pupils: Pupils are equal, round, and reactive to light.   Cardiovascular:      Rate and Rhythm: Normal rate.      Heart sounds: Normal heart sounds.   Pulmonary:      Effort: No respiratory distress.      Breath sounds: Normal breath sounds.   Abdominal:      General: Bowel sounds are normal.      Palpations: Abdomen is soft.      Tenderness: There is no abdominal tenderness.   Musculoskeletal:         General: No tenderness.   Skin:     General: Skin is warm.      Findings: No rash.   Neurological:      General: No focal deficit present.      Mental Status: He is alert and oriented to person, " place, and time.      Cranial Nerves: Cranial nerves are intact.      Sensory: Sensation is intact.      Motor: Tremor present.      Coordination: Finger-Nose-Finger Test abnormal. Impaired rapid alternating movements.         ED Course      Procedures                   The Lactic acid level is elevated due to Dehydration and alcoholic liver disease, at this time there is no sign of severe sepsis or septic shock.     Results for orders placed or performed during the hospital encounter of 09/26/20   CT Head w/o Contrast     Status: None    Narrative    CT SCAN OF THE HEAD WITHOUT CONTRAST   9/26/2020 6:13 PM     HISTORY: Falls. Balance difficulties.    TECHNIQUE:  Axial images of the head and coronal reformations without  IV contrast material. Radiation dose for this scan was reduced using  automated exposure control, adjustment of the mA and/or kV according  to patient size, or iterative reconstruction technique.    COMPARISON: None.    FINDINGS:     Intracranial contents: The ventricles are normal in size, shape and  configuration.  The brain parenchyma and subarachnoid spaces are  normal. There is no evidence of intracranial hemorrhage, mass, acute  infarct or anomaly.    Visualized orbits/sinuses/mastoids:  The visualized portions of the  sinuses and mastoids appear normal.    Osseous structures/soft tissues:  There is no evidence of trauma.      Impression    IMPRESSION: Negative head CT.      BARNEY JACOBSEN MD   CBC with platelets differential     Status: Abnormal   Result Value Ref Range    WBC 5.8 4.0 - 11.0 10e9/L    RBC Count 4.09 (L) 4.4 - 5.9 10e12/L    Hemoglobin 15.3 13.3 - 17.7 g/dL    Hematocrit 44.3 40.0 - 53.0 %     (H) 78 - 100 fl    MCH 37.4 (H) 26.5 - 33.0 pg    MCHC 34.5 31.5 - 36.5 g/dL    RDW 13.2 10.0 - 15.0 %    Platelet Count 118 (L) 150 - 450 10e9/L    Diff Method Automated Method     % Neutrophils 55.9 %    % Lymphocytes 22.5 %    % Monocytes 15.9 %    % Eosinophils 4.3 %    %  Basophils 1.2 %    % Immature Granulocytes 0.2 %    Nucleated RBCs 0 0 /100    Absolute Neutrophil 3.2 1.6 - 8.3 10e9/L    Absolute Lymphocytes 1.3 0.8 - 5.3 10e9/L    Absolute Monocytes 0.9 0.0 - 1.3 10e9/L    Absolute Eosinophils 0.3 0.0 - 0.7 10e9/L    Absolute Basophils 0.1 0.0 - 0.2 10e9/L    Abs Immature Granulocytes 0.0 0 - 0.4 10e9/L    Absolute Nucleated RBC 0.0    Comprehensive metabolic panel     Status: Abnormal   Result Value Ref Range    Sodium 136 133 - 144 mmol/L    Potassium 3.4 3.4 - 5.3 mmol/L    Chloride 102 94 - 109 mmol/L    Carbon Dioxide 25 20 - 32 mmol/L    Anion Gap 9 3 - 14 mmol/L    Glucose 93 70 - 99 mg/dL    Urea Nitrogen 21 7 - 30 mg/dL    Creatinine 0.93 0.66 - 1.25 mg/dL    GFR Estimate 88 >60 mL/min/[1.73_m2]    GFR Estimate If Black >90 >60 mL/min/[1.73_m2]    Calcium 8.9 8.5 - 10.1 mg/dL    Bilirubin Total 4.6 (H) 0.2 - 1.3 mg/dL    Albumin 3.7 3.4 - 5.0 g/dL    Protein Total 8.3 6.8 - 8.8 g/dL    Alkaline Phosphatase 98 40 - 150 U/L    ALT 47 0 - 70 U/L    AST 58 (H) 0 - 45 U/L   Lactic acid whole blood     Status: Abnormal   Result Value Ref Range    Lactic Acid 2.6 (H) 0.7 - 2.0 mmol/L   INR     Status: Abnormal   Result Value Ref Range    INR 1.56 (H) 0.86 - 1.14   Partial thromboplastin time     Status: None   Result Value Ref Range    PTT 35 22 - 37 sec   Troponin I     Status: None   Result Value Ref Range    Troponin I ES <0.015 0.000 - 0.045 ug/L   Alcohol ethyl     Status: Abnormal   Result Value Ref Range    Ethanol g/dL 0.04 (H) <0.01 g/dL   Ammonia     Status: None   Result Value Ref Range    Ammonia 43 10 - 50 umol/L   Lactic acid     Status: Abnormal   Result Value Ref Range    Lactic Acid 2.3 (H) 0.7 - 2.0 mmol/L     Medications   0.9% sodium chloride BOLUS (0 mLs Intravenous Stopped 9/26/20 1828)     Followed by   sodium chloride 0.9% infusion ( Intravenous New Bag 9/26/20 2102)   sodium chloride (PF) 0.9% PF flush 3 mL (has no administration in time range)    sodium chloride (PF) 0.9% PF flush 3 mL (3 mLs Intracatheter Not Given 9/26/20 2049)   lactated ringers infusion ( Intravenous Stopped 9/26/20 1943)   dextrose 5% and 0.45% NaCl 1,000 mL with Infuvite Adult 10 mL, thiamine 100 mg, folic acid 1 mg infusion ( Intravenous Stopped 9/26/20 2055)        Assessments & Plan (with Medical Decision Making)   60-year-old male with a long history of alcohol abuse, cirrhosis, and alcohol dependence.  He presents after relapse drinking, mixing benzodiazepines with alcohol, falling, intermittently confused.  Differential diagnosis includes alcohol withdrawal, hepatic encephalopathy, intracranial catastrophe, electrolyte derangement such as hyponatremia, sepsis, Wernicke's encephalopathy.  On exam his vital signs are stable.  He is alert, oriented x3 during my interview but can intermittently become confused and make inappropriate and disoriented statements.  He has obvious scleral icterus and dry mucous membranes.  Abdomen soft and nontender with no signs of ascites.  He has some tremor but no focal neurologic deficit.  His labs are significant for evidence of acute liver inflammation, alcohol level 0.04, normal CBC, normal electrolytes.  CT imaging of the head negative.  Lactate slightly elevated but no other signs of sepsis.  Lactate improved with IV fluids.  This patient most likely has acute alcohol withdrawal, in conjunction with recent dosing with benzodiazepines, and underlying liver disease.  He is inconsistent with his history regarding how much alcohol he has been using, much Valium he took, and when he took it, and this may be related to his difficulty with mental status.  I would like to admit him to medicine for monitoring and treatment of alcohol withdrawal, further evaluation of worsening liver disease, hydration, and evaluation for recurrent falls.  Discussed with the hospitalist.  Patient is in agreement.    I have reviewed the nursing notes. I have reviewed the  findings, diagnosis, plan and need for follow up with the patient.    New Prescriptions    No medications on file       Final diagnoses:   Alcohol dependence with withdrawal with complication (H)   Alcoholic cirrhosis of liver without ascites (H)       --  Melchor Epstein MD  [facility], [place], EMERGENCY DEPARTMENT  9/26/2020     Melchor Epstein MD  09/26/20 2596

## 2020-09-26 NOTE — ED NOTES
"Pt states he is feeling \"unwell\". Pt has stated he quick drinking 2 weeks ago.  Pt has slightly yellow jaundice coloration of skin and sclera.  Pt states he had gone through withdrawls (shakes fatigue, etc).  Pt believes withdrawls should be over.  Pt unable to fully track thoughts or plan.  Pt can't really describe why he is here.   "

## 2020-09-26 NOTE — ED NOTES
Bed: ED04  Expected date:   Expected time:   Means of arrival:   Comments:  Alexys 414  60y M  jaundice

## 2020-09-27 ENCOUNTER — APPOINTMENT (OUTPATIENT)
Dept: PHYSICAL THERAPY | Facility: CLINIC | Age: 60
End: 2020-09-27
Attending: INTERNAL MEDICINE
Payer: COMMERCIAL

## 2020-09-27 LAB
ALBUMIN SERPL-MCNC: 2.9 G/DL (ref 3.4–5)
ALP SERPL-CCNC: 84 U/L (ref 40–150)
ALT SERPL W P-5'-P-CCNC: 36 U/L (ref 0–70)
ANION GAP SERPL CALCULATED.3IONS-SCNC: 6 MMOL/L (ref 3–14)
AST SERPL W P-5'-P-CCNC: 48 U/L (ref 0–45)
BASOPHILS # BLD AUTO: 0.1 10E9/L (ref 0–0.2)
BASOPHILS NFR BLD AUTO: 1.3 %
BILIRUB SERPL-MCNC: 3.3 MG/DL (ref 0.2–1.3)
BUN SERPL-MCNC: 16 MG/DL (ref 7–30)
CALCIUM SERPL-MCNC: 7.8 MG/DL (ref 8.5–10.1)
CHLORIDE SERPL-SCNC: 108 MMOL/L (ref 94–109)
CO2 SERPL-SCNC: 25 MMOL/L (ref 20–32)
CREAT SERPL-MCNC: 0.82 MG/DL (ref 0.66–1.25)
DIFFERENTIAL METHOD BLD: ABNORMAL
EOSINOPHIL # BLD AUTO: 0.4 10E9/L (ref 0–0.7)
EOSINOPHIL NFR BLD AUTO: 6.8 %
ERYTHROCYTE [DISTWIDTH] IN BLOOD BY AUTOMATED COUNT: 13.2 % (ref 10–15)
GFR SERPL CREATININE-BSD FRML MDRD: >90 ML/MIN/{1.73_M2}
GLUCOSE SERPL-MCNC: 103 MG/DL (ref 70–99)
HCT VFR BLD AUTO: 38.7 % (ref 40–53)
HGB BLD-MCNC: 13.2 G/DL (ref 13.3–17.7)
IMM GRANULOCYTES # BLD: 0 10E9/L (ref 0–0.4)
IMM GRANULOCYTES NFR BLD: 0.2 %
INR PPP: 1.64 (ref 0.86–1.14)
LABORATORY COMMENT REPORT: NORMAL
LYMPHOCYTES # BLD AUTO: 1.8 10E9/L (ref 0.8–5.3)
LYMPHOCYTES NFR BLD AUTO: 32.5 %
MCH RBC QN AUTO: 36.8 PG (ref 26.5–33)
MCHC RBC AUTO-ENTMCNC: 34.1 G/DL (ref 31.5–36.5)
MCV RBC AUTO: 108 FL (ref 78–100)
MONOCYTES # BLD AUTO: 0.6 10E9/L (ref 0–1.3)
MONOCYTES NFR BLD AUTO: 11.5 %
NEUTROPHILS # BLD AUTO: 2.7 10E9/L (ref 1.6–8.3)
NEUTROPHILS NFR BLD AUTO: 47.7 %
NRBC # BLD AUTO: 0 10*3/UL
NRBC BLD AUTO-RTO: 0 /100
PLATELET # BLD AUTO: 106 10E9/L (ref 150–450)
POTASSIUM SERPL-SCNC: 3.3 MMOL/L (ref 3.4–5.3)
PROT SERPL-MCNC: 6.8 G/DL (ref 6.8–8.8)
RBC # BLD AUTO: 3.59 10E12/L (ref 4.4–5.9)
SARS-COV-2 RNA SPEC QL NAA+PROBE: NEGATIVE
SARS-COV-2 RNA SPEC QL NAA+PROBE: NORMAL
SODIUM SERPL-SCNC: 139 MMOL/L (ref 133–144)
SPECIMEN SOURCE: NORMAL
SPECIMEN SOURCE: NORMAL
WBC # BLD AUTO: 5.6 10E9/L (ref 4–11)

## 2020-09-27 PROCEDURE — 85025 COMPLETE CBC W/AUTO DIFF WBC: CPT | Performed by: INTERNAL MEDICINE

## 2020-09-27 PROCEDURE — 25800030 ZZH RX IP 258 OP 636: Performed by: FAMILY MEDICINE

## 2020-09-27 PROCEDURE — 97530 THERAPEUTIC ACTIVITIES: CPT | Mod: GP | Performed by: PHYSICAL THERAPIST

## 2020-09-27 PROCEDURE — 25000132 ZZH RX MED GY IP 250 OP 250 PS 637: Performed by: INTERNAL MEDICINE

## 2020-09-27 PROCEDURE — 12000001 ZZH R&B MED SURG/OB UMMC

## 2020-09-27 PROCEDURE — 36415 COLL VENOUS BLD VENIPUNCTURE: CPT | Performed by: INTERNAL MEDICINE

## 2020-09-27 PROCEDURE — 80053 COMPREHEN METABOLIC PANEL: CPT | Performed by: INTERNAL MEDICINE

## 2020-09-27 PROCEDURE — 97116 GAIT TRAINING THERAPY: CPT | Mod: GP | Performed by: PHYSICAL THERAPIST

## 2020-09-27 PROCEDURE — 99232 SBSQ HOSP IP/OBS MODERATE 35: CPT | Performed by: INTERNAL MEDICINE

## 2020-09-27 PROCEDURE — 97161 PT EVAL LOW COMPLEX 20 MIN: CPT | Mod: GP | Performed by: PHYSICAL THERAPIST

## 2020-09-27 PROCEDURE — 25800030 ZZH RX IP 258 OP 636: Performed by: INTERNAL MEDICINE

## 2020-09-27 PROCEDURE — 25800030 ZZH RX IP 258 OP 636

## 2020-09-27 PROCEDURE — 97110 THERAPEUTIC EXERCISES: CPT | Mod: GP | Performed by: PHYSICAL THERAPIST

## 2020-09-27 PROCEDURE — 85610 PROTHROMBIN TIME: CPT | Performed by: INTERNAL MEDICINE

## 2020-09-27 RX ORDER — POTASSIUM CHLORIDE 750 MG/1
40 TABLET, EXTENDED RELEASE ORAL ONCE
Status: COMPLETED | OUTPATIENT
Start: 2020-09-27 | End: 2020-09-27

## 2020-09-27 RX ORDER — SODIUM CHLORIDE 9 MG/ML
INJECTION, SOLUTION INTRAVENOUS
Status: COMPLETED
Start: 2020-09-27 | End: 2020-09-27

## 2020-09-27 RX ADMIN — SODIUM CHLORIDE: 9 INJECTION, SOLUTION INTRAVENOUS at 01:57

## 2020-09-27 RX ADMIN — CALCIUM CARBONATE (ANTACID) CHEW TAB 500 MG 500 MG: 500 CHEW TAB at 22:25

## 2020-09-27 RX ADMIN — FOLIC ACID 1 MG: 1 TABLET ORAL at 08:02

## 2020-09-27 RX ADMIN — MULTIPLE VITAMINS W/ MINERALS TAB 1 TABLET: TAB at 08:02

## 2020-09-27 RX ADMIN — THIAMINE HCL TAB 100 MG 100 MG: 100 TAB at 08:02

## 2020-09-27 RX ADMIN — POTASSIUM CHLORIDE 40 MEQ: 750 TABLET, EXTENDED RELEASE ORAL at 11:16

## 2020-09-27 RX ADMIN — SODIUM CHLORIDE: 9 INJECTION, SOLUTION INTRAVENOUS at 16:00

## 2020-09-27 RX ADMIN — SODIUM CHLORIDE 1000 ML: 9 INJECTION, SOLUTION INTRAVENOUS at 06:57

## 2020-09-27 RX ADMIN — SODIUM CHLORIDE: 9 INJECTION, SOLUTION INTRAVENOUS at 06:58

## 2020-09-27 NOTE — PROGRESS NOTES
09/27/20 1155   Quick Adds   Type of Visit Initial PT Evaluation       Present no   Language English   Living Environment   Lives With other (see comments)  (roommates)   Living Arrangements house   Home Accessibility stairs to enter home   Number of Stairs, Main Entrance other (see comments)  (6x6 )   Stair Railings, Main Entrance railing on left side (ascending);railing on right side (ascending)   Transportation Anticipated public transportation   Living Environment Comment lives in top floor duplex with 12 total stairs   Self-Care   Usual Activity Tolerance good   Current Activity Tolerance fair   Regular Exercise No   Equipment Currently Used at Home none   Functional Level Prior   Ambulation 0-->independent   Transferring 0-->independent   Toileting 1-->assistive equipment   Bathing 1-->assistive equipment   Communication 2-->difficulty understanding (not related to language barrier)   Swallowing 2-->difficulty swallowing liquids   Cognition 0 - no cognition issues reported   Fall history within last six months yes   Number of times patient has fallen within last six months   (1)   Which of the above functional risks had a recent onset or change? ambulation;transferring   General Information   Onset of Illness/Injury or Date of Surgery - Date 09/26/20   Referring Physician Elier Lua MD   Patient/Family Goals Statement did not endorse   Pertinent History of Current Problem (include personal factors and/or comorbidities that impact the POC) Sohan Marcano is a 60 year old male admitted for ETOH abuse and detox   Precautions/Limitations fall precautions   Weight-Bearing Status - LUE full weight-bearing   Weight-Bearing Status - RUE full weight-bearing   Weight-Bearing Status - LLE weight-bearing as tolerated   Weight-Bearing Status - RLE weight-bearing as tolerated   Heart Disease Risk Factors High blood pressure   General Observations PCD, fluid IV   General Info Comments Pt  supine on arrival, agreed to PT   Cognitive Status Examination   Orientation orientation to person, place and time   Level of Consciousness alert   Follows Commands and Answers Questions 100% of the time   Personal Safety and Judgment intact   Memory intact   Pain Assessment   Patient Currently in Pain Yes, see Vital Sign flowsheet   Integumentary/Edema   Integumentary/Edema no deficits were identifed   Posture    Posture Forward head position;Protracted shoulders;Kyphosis   Range of Motion (ROM)   ROM Comment WNL for functional mobility   Strength   Strength Comments WNL for functional mobility   Bed Mobility   Bed Mobility Comments Supine <> sit with HOB elevated and use of bed rails. able to boost and reposition with use of bedrails, supervision    Transfer Skills   Transfer Comments STS x 7 from elevated bed height, Nydia x1, and use of UEs to FWW. Unsteady in standing   Gait   Gait Comments ambulates from bed to door in room with small shuffled steps. Needs verbal cues for walker proximity. Unsteady gait with one LOB, able to correct with door frame and ModA x 1   Balance   Balance Comments Good sitting balance, unsteady in standing and with ambulation   Sensory Examination   Sensory Perception Comments Denies numbness or tingling   General Therapy Interventions   Intervention Comments Increase strenght, activity tolerance, balance. Increase gait endurance and attempt stairs when appropriate   Clinical Impression   Criteria for Skilled Therapeutic Intervention yes, treatment indicated   PT Diagnosis weakness, decreased activity tolerance, decreased balance, and impaired functional mobility   Influenced by the following impairments weakness, decreased activity tolerance, decreased balance, ETOH abuse/withdrawal   Functional limitations due to impairments transfers, gait, stairs   Clinical Presentation Stable/Uncomplicated   Clinical Presentation Rationale No significant comorbidities affecting POC   Clinical  "Decision Making (Complexity) Low complexity   Therapy Frequency Daily   Predicted Duration of Therapy Intervention (days/wks) 4   Anticipated Discharge Disposition Transitional Care Facility   Risk & Benefits of therapy have been explained Yes   Patient, Family & other staff in agreement with plan of care Yes   [facility] AM-PAC  \"6 Clicks\" V.2 Basic Mobility Inpatient Short Form   1. Turning from your back to your side while in a flat bed without using bedrails? 4 - None   2. Moving from lying on your back to sitting on the side of a flat bed without using bedrails? 4 - None   3. Moving to and from a bed to a chair (including a wheelchair)? 3 - A Little   4. Standing up from a chair using your arms (e.g., wheelchair, or bedside chair)? 3 - A Little   5. To walk in hospital room? 3 - A Little   6. Climbing 3-5 steps with a railing? 2 - A Lot   Basic Mobility Raw Score (Score out of 24.Lower scores equate to lower levels of function) 19   Total Evaluation Time   Total Evaluation Time (Minutes) 11     "

## 2020-09-27 NOTE — ED NOTES
[facility], [place]   ED Nurse to Floor Handoff     Sohan Marcano is a 60 year old male who speaks English and lives with others,  in a home  They arrived in the ED by car from home    ED Chief Complaint: Jaundice (coming from home, drinking-not feeling well. Sclera and skin yellow.)    ED Dx;   Final diagnoses:   Alcohol dependence with withdrawal with complication (H)   Alcoholic cirrhosis of liver without ascites (H)         Needed?: No    Allergies: No Known Allergies.  Past Medical Hx:   Past Medical History:   Diagnosis Date     Actinic keratosis      Basal cell carcinoma      Squamous cell carcinoma      Substance abuse (H)     alcohol     Uncomplicated asthma       Baseline Mental status: WDL  Current Mental Status changes: at basesline    Infection present or suspected this encounter: no  Sepsis suspected: No  Isolation type: No active isolations     Activity level - Baseline/Home:  Independent  Activity Level - Current:   Stand with Assist    Bariatric equipment needed?: No    In the ED these meds were given:   Medications   0.9% sodium chloride BOLUS (0 mLs Intravenous Stopped 9/26/20 1828)     Followed by   sodium chloride 0.9% infusion (has no administration in time range)   sodium chloride (PF) 0.9% PF flush 3 mL (has no administration in time range)   sodium chloride (PF) 0.9% PF flush 3 mL (has no administration in time range)   lactated ringers infusion ( Intravenous Stopped 9/26/20 1943)   dextrose 5% and 0.45% NaCl 1,000 mL with Infuvite Adult 10 mL, thiamine 100 mg, folic acid 1 mg infusion ( Intravenous New Bag 9/26/20 1944)       Drips running?  Yes    Home pump  No    Current LDAs  Peripheral IV 09/26/20 Right (Active)   Site Assessment WDL 09/26/20 1738   Line Status Saline locked 09/26/20 1738   Phlebitis Scale 0-->no symptoms 09/26/20 1738   Infiltration Scale 0 09/26/20 1738   Infiltration Site Treatment Method  None 09/26/20 1738    Extravasation? No 09/26/20 1738   Dressing Intervention New dressing  09/26/20 1738   Number of days: 0       Labs results:   Labs Ordered and Resulted from Time of ED Arrival Up to the Time of Departure from the ED   CBC WITH PLATELETS DIFFERENTIAL - Abnormal; Notable for the following components:       Result Value    RBC Count 4.09 (*)      (*)     MCH 37.4 (*)     Platelet Count 118 (*)     All other components within normal limits   COMPREHENSIVE METABOLIC PANEL - Abnormal; Notable for the following components:    Bilirubin Total 4.6 (*)     AST 58 (*)     All other components within normal limits   LACTIC ACID WHOLE BLOOD - Abnormal; Notable for the following components:    Lactic Acid 2.6 (*)     All other components within normal limits   INR - Abnormal; Notable for the following components:    INR 1.56 (*)     All other components within normal limits   ALCOHOL ETHYL - Abnormal; Notable for the following components:    Ethanol g/dL 0.04 (*)     All other components within normal limits   PARTIAL THROMBOPLASTIN TIME   TROPONIN I   AMMONIA   LACTIC ACID WHOLE BLOOD   DRUG ABUSE SCREEN 6 CHEM DEP URINE ([facility])   UA MACROSCOPIC WITH REFLEX TO MICRO AND CULTURE   COVID-19 VIRUS (CORONAVIRUS) BY PCR   PULSE OXIMETRY NURSING   CARDIAC CONTINUOUS MONITORING   PERIPHERAL IV CATHETER   STRICT INTAKE AND OUTPUT       Imaging Studies:   Recent Results (from the past 24 hour(s))   CT Head w/o Contrast    Narrative    CT SCAN OF THE HEAD WITHOUT CONTRAST   9/26/2020 6:13 PM     HISTORY: Falls. Balance difficulties.    TECHNIQUE:  Axial images of the head and coronal reformations without  IV contrast material. Radiation dose for this scan was reduced using  automated exposure control, adjustment of the mA and/or kV according  to patient size, or iterative reconstruction technique.    COMPARISON: None.    FINDINGS:     Intracranial contents: The ventricles are normal in size, shape and  configuration.  The brain  parenchyma and subarachnoid spaces are  normal. There is no evidence of intracranial hemorrhage, mass, acute  infarct or anomaly.    Visualized orbits/sinuses/mastoids:  The visualized portions of the  sinuses and mastoids appear normal.    Osseous structures/soft tissues:  There is no evidence of trauma.      Impression    IMPRESSION: Negative head CT.      BARNEY JACOBSEN MD       Recent vital signs:   /83   Pulse 83   Temp 98.4  F (36.9  C) (Oral)   Resp 13   SpO2 93%             Cardiac Rhythm: Normal Sinus  Pt needs tele? Yes  Skin/wound Issues: None    Code Status: Full Code    Pain control: good    Nausea control: good    Abnormal labs/tests/findings requiring intervention:     Family present during ED course? No   Family Comments/Social Situation comments:     Tasks needing completion: None    Zarina Gallego RN  [facility] -- [phone] [facility]  [phone] [facility]

## 2020-09-27 NOTE — PLAN OF CARE
Discharge Planner PT   Patient plan for discharge: Home   Current status: Eval complet and treatment initiated. Pt supine on arrival, agreed to PT. Completed supine and seated exercise with good tolerance and control throughout. SBA for bed mobility, IND with boosting and repositioning. Completes sit to stand transfer with use of FWW and Nydia, needed verbal cues for foot placement. Ambulates from bed to hallway door with FWW and CGA, one LOB able to correct with door frame and ModA x 1. Pt supine in bed to eat, with all needs in reach    Barriers to return to prior living situation: deconditioning, decreased activity tolerance, functional independence and strength  Recommendations for discharge: likely TCU  Rationale for recommendations: Pt would benefit from continued therapy to improve above stated barriers       Entered by: Juan Alberto Bashir 09/27/2020 12:37 PM

## 2020-09-27 NOTE — PLAN OF CARE
/88   Pulse 87   Temp 97.7  F (36.5  C) (Oral)   Resp 15   Wt 95.3 kg (210 lb)   SpO2 92%   BMI 24.90 kg/m    VSS. On room air. A&Ox3- disoriented to time this morning, oriented x4 this evening. Slow to respond but follows commands well. LS clear. Pt denies SOB and chest pain. Pt ambulating with assist of 1-2 with walker. Ambulated to room door and back with PT today. Pt is weak/unsteady when up and shuffles feet. PCDS on while in bed. Bed alarm on.  BS active and audible in all quadrants. Pt passing gas. Voiding without difficulty in urinal. Skin intact. Regular diet, tolerating well. One time dose of K today. MSSA 6 and 3. Pt denies numbness/tingling. PIV infusing NS at 125 mL/hr. Pt denies pain. Continue to monitor. Pt able to make needs known, call light within reach.

## 2020-09-27 NOTE — PLAN OF CARE
VS:   BP (!) 128/90   Pulse 95   Temp 98.6  F (37  C) (Oral)   Resp 14   Wt 95.3 kg (210 lb)   SpO2 90%   BMI 24.90 kg/m    Denies CP or SOB. On RA   Output:   Voiding without difficulty using the urinal. Urine is dark phillip in color. BS active   Activity:   Independently positioning in bed, assist x1 or 2  When out of bed due to weakness and unsteady gait, walker and gait belt    Skin: Intact. Grey / jaundice in color    Pain: Pt denies   Neuro/CMS: A&O x4, denies n/t, cms intact   Dressing(s): None   Diet: Regular, tolerated well, ate 100% of dinner   LDA: PIV infusing R   Equipment: IV pole   Plan:   Continue plan of care, pt able to make needs known, call light within reach    Additional Info:

## 2020-09-27 NOTE — PHARMACY-ADMISSION MEDICATION HISTORY
Admission Medication History Completed by Pharmacy    See [facility] Admission Navigator for allergy information, preferred outpatient pharmacy, prior to admission medications and immunization status.     Medication History Sources:     Patient, Care Everywhere     Changes made to PTA medication list (reason):    Added: None    Deleted:   o Milk Thistle 1000 mg caps: take 1000 mg po bid   o Vitamin B complex + vitamin C: take 1 tablet po every day   o Probiotic Product: 1 capsule po bid   o Sofosbuvir-velpatasvir (EPCLUSA) 400-100 mg per tablet: Take 1 tablet po daily.   o Vitamin D2 (ERGOCALCIFEROL) 86596 units capsule: take 1 capsule po q7d     Changed: None    Additional Information:    Patient's memory was foggy when I spoke with him    Patient refilled all of his medications [place]     Poor adherence due to current status     Prior to Admission medications    Medication Sig Last Dose Taking? Auth Provider   albuterol (PROAIR HFA/PROVENTIL HFA/VENTOLIN HFA) 108 (90 Base) MCG/ACT inhaler Inhale 2 puffs into the lungs every 6 hours as needed for shortness of breath / dyspnea or wheezing Past Week at Unknown time Yes Gracie Winters APRN CNP   calcium carbonate (TUMS) 500 MG chewable tablet Take 1 tablet (500 mg) by mouth At Bedtime Past Week at Unknown time Yes Gracie Winters APRN CNP   cloNIDine (CATAPRES) 0.1 MG tablet Take 1 tablet (0.1 mg) by mouth 3 times daily as needed (anxiety) Past Week at Unknown time Yes Gracie Winters APRN CNP   diazepam (VALIUM) 10 MG tablet Take 1 tablet (10 mg) by mouth every 4 hours as needed for anxiety Past Week at Unknown time Yes Kehinde Valenzuela MD   ferrous sulfate (FEROSUL) 325 (65 Fe) MG tablet Take 1 tablet (325 mg) by mouth daily (with breakfast) Past Week at Unknown time Yes Gracie Winters APRN CNP   gabapentin (NEURONTIN) 300 MG capsule Take 1 capsule (300 mg) by mouth 3 times daily as needed (anxiety) Past Week at Unknown time Yes Gracie Winters  ELIU Son CNP   magnesium 250 MG tablet Take 1 tablet (250 mg) by mouth At Bedtime Past Week at Unknown time Yes Gracie Winters APRN CNP   multivitamin w/minerals (THERA-VIT-M) tablet Take 1 tablet by mouth daily (with breakfast) Past Week at Unknown time Yes Gracie Winters APRN CNP   thiamine (B-1) 100 MG tablet Take 1 tablet (100 mg) by mouth daily (with breakfast) Past Week at Unknown time Yes Gracie Winters APRN CNP     Date completed: 09/26/20    Medication history completed by: Caro Verma

## 2020-09-27 NOTE — H&P
[facility]  HISTORY AND PHYSICAL   Chief Complaint       History of Present Illness       Sohan Marcano is 60 year old year old male with hx of alcoholic use disorder,cirrhosis, pancreatitis, tobacco abuse, HBV/HCV, COPD, HTN, HLD, PAF, and skin cancer presented to ED for alcoholism, and detoxification    Patient reports he was trying to detox himself at home with Valium. He reports he had 20 pills (unable to tell the dose) prescribed by his doctor. He was taking it every 4 hours for last few days. It initially helped to decrease shaking of hand but overall did not feel better. He reports he then started to drink again. Last drink was around 12 PM. He had john at that time.  He also reports whole body got weak, and anila balanced. He reports bouncing around the walls. He did not fall or hit his head. He reports poor food intake as he found difficult to prepare food for himself. He called a friend today who noticed that his sclera were yellow, and that he seemed disoriented, and encouraged him to come to the ED.  He denies any nausea, vomiting, abdominal pain, burning urination, diarrhea, cough, chest pain, or shortness of breath. No Known COVID-19 exposures  He feels confused at times, but reports no confusion at this time.            Past Medical History     Past Medical History:   Diagnosis Date     Actinic keratosis      Basal cell carcinoma      Squamous cell carcinoma      Substance abuse (H)     alcohol     Uncomplicated asthma          Past Surgical History     Past Surgical History:   Procedure Laterality Date     HERNIA REPAIR          Social History     Social History     Socioeconomic History     Marital status: Single     Spouse name: Not on file     Number of children: Not on file     Years of education: Not on file     Highest education level: Not on file   Occupational History     Not on file   Social Needs     Financial resource strain: Not on file     Food insecurity  "    Worry: Not on file     Inability: Not on file     Transportation needs     Medical: Not on file     Non-medical: Not on file   Tobacco Use     Smoking status: Current Every Day Smoker     Packs/day: 0.25     Smokeless tobacco: Never Used   Substance and Sexual Activity     Alcohol use: Yes     Comment: sober six month, last drink today.     Drug use: No     Sexual activity: Not on file   Lifestyle     Physical activity     Days per week: Not on file     Minutes per session: Not on file     Stress: Not on file   Relationships     Social connections     Talks on phone: Not on file     Gets together: Not on file     Attends Yazidi service: Not on file     Active member of club or organization: Not on file     Attends meetings of clubs or organizations: Not on file     Relationship status: Not on file     Intimate partner violence     Fear of current or ex partner: Not on file     Emotionally abused: Not on file     Physically abused: Not on file     Forced sexual activity: Not on file   Other Topics Concern     Not on file   Social History Narrative     Not on file          Smoking history:  Alcohol history:          Family History     Family History   Problem Relation Age of Onset     Other - See Comments Mother         Patient states his Mother ? had skin cancer.     Other - See Comments Father         Patient reports Father had \"all types of skin cancer\".     Other - See Comments Sister         Basal cell carcinoma        Medications     Reviewed and medication reconciliation done.  Current Facility-Administered Medications   Medication     dextrose 5% and 0.45% NaCl 1,000 mL with Infuvite Adult 10 mL, thiamine 100 mg, folic acid 1 mg infusion     lactated ringers infusion     sodium chloride (PF) 0.9% PF flush 3 mL     sodium chloride (PF) 0.9% PF flush 3 mL     sodium chloride 0.9% infusion     Current Outpatient Medications   Medication     albuterol (PROAIR HFA/PROVENTIL HFA/VENTOLIN HFA) 108 (90 Base) " "MCG/ACT inhaler     calcium carbonate (TUMS) 500 MG chewable tablet     cloNIDine (CATAPRES) 0.1 MG tablet     diazepam (VALIUM) 10 MG tablet     ferrous sulfate (FEROSUL) 325 (65 Fe) MG tablet     gabapentin (NEURONTIN) 300 MG capsule     magnesium 250 MG tablet     Milk Thistle 1000 MG CAPS     multivitamin w/minerals (THERA-VIT-M) tablet     NEW MED     Probiotic Product (PROBIOTIC PO)     sofosbuvir-velpatasvir (EPCLUSA) 400-100 MG per tablet     thiamine (B-1) 100 MG tablet     vitamin D2 (ERGOCALCIFEROL) 24441 units capsule          Review of Systems     10 point  review of systems negative, except for what is mentioned above      Physical Exam     General:   Vital signs:    Blood pressure 116/83, pulse 83, temperature 98.4  F (36.9  C), temperature source Oral, resp. rate 13, SpO2 93 %.  Estimated body mass index is 24.94 kg/m  as calculated from the following:    Height as of 9/22/20: 1.956 m (6' 5.01\").    Weight as of 9/22/20: 95.4 kg (210 lb 6 oz).    No intake or output data in the 24 hours ending 09/26/20 1932 Constitutional:     Eye: scleral icterus, no pallor  Mouth/ENT: Dry mucosa  Cardiovascular: S1, S2 normal. No pretibial edema  Respiratory: B/L CTA  GI: Soft, NT, BS+  : No gonzales's catheter  Neurology: Alert, awake, and oriented. No tremors Strength 5/5 in both extremities.   Psych: Mood stable, and cooperative  MSK:   Integumentary:   Heme/Lymph/Imm:               Laboratory/Imaging Studies     I have reviewed  laboratory and imaging studies in the Epic. Pertinent findings are as below    CMP  Recent Labs   Lab 09/26/20 1738 09/22/20  1020    140   POTASSIUM 3.4 3.0*   CHLORIDE 102 105   CO2 25 26   ANIONGAP 9 9   GLC 93 138*   BUN 21 7   CR 0.93 0.84   GFRESTIMATED 88 >90   GFRESTBLACK >90 >90   LEIGHA 8.9 8.7   PROTTOTAL 8.3 8.1   ALBUMIN 3.7 3.7   BILITOTAL 4.6* 3.4*   ALKPHOS 98 86   AST 58* 131*   ALT 47 67     CBC  Recent Labs   Lab 09/26/20 1738 09/22/20  1020   WBC 5.8 5.0   RBC " 4.09* 3.92*   HGB 15.3 14.9   HCT 44.3 40.3   * 103*   MCH 37.4* 38.0*   MCHC 34.5 37.0*   RDW 13.2 13.0   * 107*     INR  Recent Labs   Lab 09/26/20  1738   INR 1.56*         Impression/Plan       60 year old year old male with hx of alcoholic use disorder,cirrhosis, pancreatitis, tobacco abuse, HBV/HCV, COPD, HTN, HLD, PAF, and skin cancer presented to ED for seeking detoxification.       # Alcohol withdrawal syndrome  # Hx of Alcohol use disorder  Last Alcohol use on 09/26 12 pm. He took Jennifer. He was taking Valium at home for detoxification.   He reports some confusion, but no confusion at this time. He is alert, awake, and oriented.   Ammonia normal.   Vitals signs are stable.   Normal electrolytes, and renal function.   - Diazepam per [facility] protocol  - MVI, Thiamine, Folic acid  - Social work consult       # Cirrhosis  # Abnormal liver biomarkers  # Hx of Hepatitis B/C  No signs of ascites on exam. Recent US was on 10/16/2019  Bilirubin 4.6 on 09/26. Range between 2.4 to 6.7 for last couple of years.  Denies any abdominal pain. Abdominal exam benign  He reports he has completed Sofosbuvir-Velpatasvir treatment in summer when he was sober.   MELD-Na score: 18 at 9/26/2020  5:38 PM  MELD score: 17 at 9/26/2020  5:38 PM  - Repeat CMP in AM, if worsening may need US abdomen  - Avoid Alcohol intake.   - Outpatient GI follow up       # Elevated lactic acid: Mildly elevated at 2.6. Received IV fluids in ED. Likely d/t alcohol use, and decreased clearance from liver disease. No signs of infection at this time.   Repeat level 2.3  - Monitor in AM    # Thrombocytopenia: Likely d/t alcohol use  - Monitor    # Elevated INR: d/t liver disease  - INR in AM    # Unsteady gait: Likely due to alcohol use, liver disease, poor nutrition  - Fall precaution  - PT/OT consult  - RD consult    # Hx of HTN: Per chart review. Patient denies any hx.     # Hx of COPD: Per chart review. Patient not sure of COPD. Reports he  takes Albuterol PRN for shortness of breath. Hx of Tobacco use.     # FEN: Regular diet  # Lines &Tubes: PIV  # Prophylaxis: Ambulate. Mechanical. No heparin due to thrombocytopenia  # Code status: Full code  # COVID Status: Low risk.

## 2020-09-27 NOTE — PROGRESS NOTES
[facility], [facility] Progress Note - Hospitalist Service       Date of Admission:  9/26/2020  Assessment & Plan         60 year old year old male with hx of alcoholic use disorder,cirrhosis, pancreatitis, tobacco abuse, HBV/HCV, COPD, HTN, HLD, PAF, and skin cancer presented to ED for seeking detoxification.       # Alcohol withdrawal syndrome  # Hx of Alcohol use disorder  Last Alcohol use on 09/26 -12 pm. He took Jennifer. He was taking Diazepam at home for detoxification.   He reports some confusion, but now clear- alert, awake, and oriented.   Ammonia on adm: normal.   Vitals signs are stable.   Normal electrolytes, and renal function.   - Continue Diazepam per [facility] protocol  - MVI, Thiamine, Folic acid daily  - Social work consult   - CD consult.   - Fall precautions.         # Cirrhosis  # Abnormal liver biomarkers  # Hx of Hepatitis B/C  No signs of ascites on exam. Recent US was on 10/16/2019: no ascites. No HCC.   Bilirubin 4.6 on 09/26. Range between 2.4 to 6.7 for last couple of years.  Denies any abdominal pain. Abdominal exam benign. No NV @ current.   He reports he has completed Sofosbuvir-Velpatasvir treatment in summer when he was sober.     MELD-Na score: 16 at 9/27/2020  6:45 AM  MELD score: 16 at 9/27/2020  6:45 AM  Calculated from:  Serum Creatinine: 0.82 mg/dL (Rounded to 1 mg/dL) at 9/27/2020  6:45 AM  Serum Sodium: 139 mmol/L (Rounded to 137 mmol/L) at 9/27/2020  6:45 AM  Total Bilirubin: 3.3 mg/dL at 9/27/2020  6:45 AM  INR(ratio): 1.64 at 9/27/2020  6:45 AM  Age: 60 years 2 months    - Repeat CMP : Improving.   - Avoid Alcohol intake.   - Outpatient GI follow up         # Elevated lactic acid: Mildly elevated at 2.6. Received IV fluids in ED. Likely d/t alcohol use, and decreased clearance from liver disease. No signs of infection at this time.   Repeat level 2.3  - Hemodynamics stable.   -Monitor      # Thrombocytopenia: Likely d/t alcohol use  -  Monitor: stable.      # Elevated INR: d/t liver disease  - INR : 1.6     # Unsteady gait, physical deconditioning:    Likely due to alcohol use, liver disease, poor nutrition.  - Fall precautions. Bed alarm.   - PT/OT consultation.   - RD consult     # Hx of HTN: Per chart review. Patient denies any hx.   B/P: 132/88, T: 97.7, P: 87, R: 15     # Hx of COPD: Per chart review. Patient not sure of COPD. Reports he takes Albuterol PRN for shortness of breath. Hx of Tobacco use.     # Hypokalemia, mild: 3.3. Kdur 40 meq po x 1.    # COVID Status: Low risk: asymptomatic: negative.          Diet: Combination Diet Regular Diet Adult    DVT Prophylaxis: Pneumatic Compression Devices and no chemoppx given thrombocytopenia.   Marie Catheter: not present  Code Status: Full Code           Disposition Plan   Expected discharge: 2 - 3 days, recommended to tbd once detoxed. medical stabilization. .  Entered: Milton El MD 09/27/2020, 1:25 PM       The patient's care was discussed with the Bedside Nurse and Patient.    Milton El MD  Hospitalist Service  [facility], [place]    ______________________________________________________________________    Interval History     Interval events reviewed.   MSSA: 6  Gen weakness.   Denies fever or chills.   No cough or cp or sob.   No LH @ current.   No NV or pain abdomen.    No dysuria or bowel complaint.   No new sensory or motor complaint.   No new rash.    No other new or acute medical concern      Data reviewed today: I reviewed all medications, new labs and imaging results over the last 24 hours. I personally reviewed no images or EKG's today.    Physical Exam   Vital Signs: Temp: 97.7  F (36.5  C) Temp src: Oral BP: 132/88 Pulse: 87   Resp: 15 SpO2: 92 % O2 Device: None (Room air)    Weight: 210 lbs 0 oz    General: alert, interactive, NAD  HEENT: AT/NC,  Moist MM  Respi/Chest: Non labored, CTA BL.  CVS/Heart: S1S2 regular  GI/Abd: Soft, non  tender, non distended  MSK/Extremities: Distally warm, well perfused.     Neuro: AO x 4  Psychiatry: Stable mood.       Data   Recent Labs   Lab 09/27/20  0645 09/26/20  1738 09/22/20  1020   WBC 5.6 5.8 5.0   HGB 13.2* 15.3 14.9   * 108* 103*   * 118* 107*   INR 1.64* 1.56*  --     136 140   POTASSIUM 3.3* 3.4 3.0*   CHLORIDE 108 102 105   CO2 25 25 26   BUN 16 21 7   CR 0.82 0.93 0.84   ANIONGAP 6 9 9   LEIGHA 7.8* 8.9 8.7   * 93 138*   ALBUMIN 2.9* 3.7 3.7   PROTTOTAL 6.8 8.3 8.1   BILITOTAL 3.3* 4.6* 3.4*   ALKPHOS 84 98 86   ALT 36 47 67   AST 48* 58* 131*   TROPI  --  <0.015  --      Recent Results (from the past 24 hour(s))   CT Head w/o Contrast    Narrative    CT SCAN OF THE HEAD WITHOUT CONTRAST   9/26/2020 6:13 PM     HISTORY: Falls. Balance difficulties.    TECHNIQUE:  Axial images of the head and coronal reformations without  IV contrast material. Radiation dose for this scan was reduced using  automated exposure control, adjustment of the mA and/or kV according  to patient size, or iterative reconstruction technique.    COMPARISON: None.    FINDINGS:     Intracranial contents: The ventricles are normal in size, shape and  configuration.  The brain parenchyma and subarachnoid spaces are  normal. There is no evidence of intracranial hemorrhage, mass, acute  infarct or anomaly.    Visualized orbits/sinuses/mastoids:  The visualized portions of the  sinuses and mastoids appear normal.    Osseous structures/soft tissues:  There is no evidence of trauma.      Impression    IMPRESSION: Negative head CT.      BARNEY JACOBSEN MD     Medications     sodium chloride 125 mL/hr at 09/26/20 2102     sodium chloride 125 mL/hr at 09/27/20 1136       calcium carbonate  500 mg Oral At Bedtime     folic acid  1 mg Oral Daily     multivitamin w/minerals  1 tablet Oral Daily     sodium chloride (PF)  3 mL Intracatheter Q8H     sodium chloride (PF)  3 mL Intracatheter Q8H     thiamine  100 mg Oral Daily

## 2020-09-28 ENCOUNTER — APPOINTMENT (OUTPATIENT)
Dept: OCCUPATIONAL THERAPY | Facility: CLINIC | Age: 60
End: 2020-09-28
Attending: INTERNAL MEDICINE
Payer: COMMERCIAL

## 2020-09-28 ENCOUNTER — APPOINTMENT (OUTPATIENT)
Dept: PHYSICAL THERAPY | Facility: CLINIC | Age: 60
End: 2020-09-28
Payer: COMMERCIAL

## 2020-09-28 LAB
BACTERIA SPEC CULT: NORMAL
Lab: NORMAL
SPECIMEN SOURCE: NORMAL

## 2020-09-28 PROCEDURE — 97110 THERAPEUTIC EXERCISES: CPT | Mod: GP

## 2020-09-28 PROCEDURE — 99232 SBSQ HOSP IP/OBS MODERATE 35: CPT | Performed by: INTERNAL MEDICINE

## 2020-09-28 PROCEDURE — 25800030 ZZH RX IP 258 OP 636: Performed by: FAMILY MEDICINE

## 2020-09-28 PROCEDURE — 97535 SELF CARE MNGMENT TRAINING: CPT | Mod: GO | Performed by: OCCUPATIONAL THERAPIST

## 2020-09-28 PROCEDURE — 12000001 ZZH R&B MED SURG/OB UMMC

## 2020-09-28 PROCEDURE — 97165 OT EVAL LOW COMPLEX 30 MIN: CPT | Mod: GO | Performed by: OCCUPATIONAL THERAPIST

## 2020-09-28 PROCEDURE — 97530 THERAPEUTIC ACTIVITIES: CPT | Mod: GP

## 2020-09-28 PROCEDURE — 97112 NEUROMUSCULAR REEDUCATION: CPT | Mod: GP

## 2020-09-28 PROCEDURE — 25000132 ZZH RX MED GY IP 250 OP 250 PS 637: Performed by: INTERNAL MEDICINE

## 2020-09-28 PROCEDURE — 97110 THERAPEUTIC EXERCISES: CPT | Mod: GO | Performed by: OCCUPATIONAL THERAPIST

## 2020-09-28 RX ADMIN — MULTIPLE VITAMINS W/ MINERALS TAB 1 TABLET: TAB at 07:42

## 2020-09-28 RX ADMIN — FOLIC ACID 1 MG: 1 TABLET ORAL at 07:42

## 2020-09-28 RX ADMIN — POLYETHYLENE GLYCOL 3350 17 G: 17 POWDER, FOR SOLUTION ORAL at 16:13

## 2020-09-28 RX ADMIN — SODIUM CHLORIDE: 9 INJECTION, SOLUTION INTRAVENOUS at 09:04

## 2020-09-28 RX ADMIN — THIAMINE HCL TAB 100 MG 100 MG: 100 TAB at 07:42

## 2020-09-28 RX ADMIN — SODIUM CHLORIDE: 9 INJECTION, SOLUTION INTRAVENOUS at 00:53

## 2020-09-28 NOTE — PLAN OF CARE
Discharge Planner PT   Patient plan for discharge: Home  Current status: Patient SBA for bed mobilituy, CGA for sit<>Stand with FWW x6-8 reps  During session, unsteady during sit<>stand transition. Patient ambulated during session 2x140' with FWW and CGA. Patient unsteady and needing A, especially when turning corners. Patient should not get up OOB without assist and has been instructed so. Patient engaged in standing balance exercises and strengthening exercises; generally low activity tolerance, needing seated rest breaks throughout.   Barriers to return to prior living situation: Medical, deconditioned, impaired balance, safety concern with falls risk  Recommendations for discharge: TCU but PT will DEFER to team based on medical POC.  Rationale for recommendations: Patient unsafe to discharge to home at this time d/t falls risk and instability.        Entered by: Meli Adan 09/28/2020 12:02 PM

## 2020-09-28 NOTE — PLAN OF CARE
VS: VSS.    O2: >90% on RA.   Output: Voiding without difficulty to urinal.   Last BM: LBM 9/26 and passing gas. Give prn Miralax.   Activity: Up with assist X1, gait belt and walker.   Skin: Bruises, abrasion.   Pain: Denied pain this shift.   CMS: Intact.   Dressing: None.   Diet: Tolerating regular diet.   LDA: PIV Sl into right forearm.   Equipment: Walker, gait belt.   Plan: Discharge plan home when pt is medically stable.   Additional Info:

## 2020-09-28 NOTE — PROGRESS NOTES
"CLINICAL NUTRITION SERVICES - ASSESSMENT NOTE     Nutrition Prescription    RECOMMENDATIONS FOR MDs/PROVIDERS TO ORDER:  None today    Malnutrition Status:    Unable to determine due to incomplete nutrition status validation.      Recommendations already ordered by Registered Dietitian (RD):  -Ordered  Boost Plus with meals tid for a trial.      Future/Additional Recommendations:  -Monitor po intakes and weight trends.  -Follow up for supplement acceptance and adjust as indicated.     REASON FOR ASSESSMENT  Sohan Marcano is a/an 60 year old male assessed by the dietitian for Provider Order - Nutritional optimization     PMH:  alcoholic use disorder,cirrhosis, pancreatitis, tobacco abuse, HBV/HCV, COPD, HTN, HLD, PAF, and skin cancer presented to ED for seeking detoxification.     NUTRITION HISTORY  Per chart review, pt reports poor food intake as he found difficult to prepare food for himself.    Attempted to contact pt by phone today to obtain nutrition hx, but not answering.      CURRENT NUTRITION ORDERS  Diet: Regular  Intake/Tolerance: Per chart review tolerating Regular Diet well-ate 100% of dinner.    LABS  Labs reviewed    MEDICATIONS  Medications reviewed  MVI with minerals  Thiamine  Folic Acid    ANTHROPOMETRICS  Height: 0 cm (Data Unavailable)-6'5\" per previous record  Most Recent Weight (9/26): 95.3 kg (210 lb)    IBW: 208 lbs  BMI: Normal BMI  Weight History:   Wt Readings from Last 20 Encounters:   09/26/20 95.3 kg (210 lb)   09/22/20 95.4 kg (210 lb 6 oz)   10/14/19 89.1 kg (196 lb 6.4 oz)   10/11/19 87.6 kg (193 lb 3.2 oz)   07/15/19 92.9 kg (204 lb 14.4 oz)   06/27/19 93.3 kg (205 lb 11.2 oz)   09/23/18 83.2 kg (183 lb 5 oz)   05/13/18 93.1 kg (205 lb 4 oz)       Dosing Weight: 95 kg (current weight)    ASSESSED NUTRITION NEEDS  Estimated Energy Needs: 5723-0451 kcals/day (25 - 30 kcals/kg)  Justification: Maintenance  Estimated Protein Needs:  grams protein/day (1 - 1.2+ grams of " pro/kg)  Justification: Maintenance  Estimated Fluid Needs:  (1 mL/kcal)   Justification: Per provider pending fluid status    PHYSICAL FINDINGS  See malnutrition section below.    MALNUTRITION  % Intake: Unable to assess  % Weight Loss: Unable to assess  Subcutaneous Fat Loss: Unable to assess  Muscle Loss: Unable to assess  Fluid Accumulation/Edema: None noted  Malnutrition Diagnosis: Unable to determine due to incomplete nutrition status validation.    NUTRITION DIAGNOSIS  Inadequate oral intake related to presumed decreased appetite and difficulty preparing food with substance use as evidenced by provider consult and pt report.    INTERVENTIONS  Implementation  Nutrition Education: Unable today due to pt not available.    Ordered Boost Plus with meals tid for a trial.    Goals  Patient to consume % of nutritionally adequate meal trays TID, or the equivalent with supplements/snacks.     Monitoring/Evaluation  Progress toward goals will be monitored and evaluated per protocol.    Toshia Whitmore RD, LD

## 2020-09-28 NOTE — PLAN OF CARE
Discharge Planner OT   Patient plan for discharge: Home  Current status: Pt completing bed mobility IND. Pt demonstrating good balance while at EOB. Pt donning and doffing L sock while seated at EOB. Pt completing STS with FWW and SBA. Pt ambulating ~450ft with ~1 min standing rest break with FWW and SBA.  Barriers to return to prior living situation: Deconditioning  Recommendations for discharge: Home with assist as needed  Rationale for recommendations: Pt may benefit from assist at home with IADL.       Entered by: Isabella Arriola 09/28/2020 4:04 PM

## 2020-09-28 NOTE — PROGRESS NOTES
09/28/20 1500   Quick Adds   Type of Visit Initial Occupational Therapy Evaluation   Living Environment   Lives With other (see comments)  (roommates)   Living Arrangements house   Home Accessibility stairs to enter home   Number of Stairs, Main Entrance other (see comments)  (12)   Stair Railings, Main Entrance railing on left side (ascending);railing on right side (ascending)   Transportation Anticipated public transportation   Living Environment Comment Lives on top floor duplex with 12 total stairs, has tub for showers   Self-Care   Usual Activity Tolerance good   Current Activity Tolerance fair   Regular Exercise No   Equipment Currently Used at Home none   Functional Level   Ambulation 0-->independent   Transferring 0-->independent   Toileting 0-->independent   Bathing 0-->independent   Dressing 0-->independent   Eating 0-->independent   Communication 0-->understands/communicates without difficulty   Swallowing 0-->swallows foods/liquids without difficulty   Cognition 0 - no cognition issues reported   Fall history within last six months yes   Number of times patient has fallen within last six months 1   Which of the above functional risks had a recent onset or change? ambulation;transferring;toileting;bathing;dressing   General Information   Onset of Illness/Injury or Date of Surgery - Date 09/26/20   Referring Physician Elier Lua MD    Patient/Family Goals Statement To get home and back to work   Additional Occupational Profile Info/Pertinent History of Current Problem 60 year old year old male with hx of alcoholic use disorder,cirrhosis, pancreatitis, tobacco abuse, HBV/HCV, COPD, HTN, HLD, PAF, and skin cancer presented to ED for seeking detoxification.   Precautions/Limitations no known precautions/limitations   Weight-Bearing Status - LUE full weight-bearing   Weight-Bearing Status - RUE full weight-bearing   Weight-Bearing Status - LLE full weight-bearing   Weight-Bearing Status - RLE full  "weight-bearing   Cognitive Status Examination   Orientation orientation to person, place and time   Visual Perception   Visual Perception No deficits were identified   Lower Body Dressing   Level of [place]: Dress Lower Body independent   Physical Assist/Nonphysical Assist: Dress Lower Body supervision   Instrumental Activities of Daily Living (IADL)   Previous Responsibilities meal prep;housekeeping;work;laundry;shopping   Activities of Daily Living Analysis   Impairments Contributing to Impaired Activities of Daily Living balance impaired;coordination impaired;strength decreased   ADL Comments Pt IND in ADL at baseline   General Therapy Interventions   Planned Therapy Interventions ADL retraining;IADL retraining;balance training;strengthening;home program guidelines   Clinical Impression   Criteria for Skilled Therapeutic Interventions Met yes, treatment indicated   OT Diagnosis Decreased ADL/IADL indepenedence   Influenced by the following impairments Deconditioning   Assessment of Occupational Performance 1-3 Performance Deficits   Identified Performance Deficits Housekeeping, laundry, meal prep   Clinical Decision Making (Complexity) Low complexity   Therapy Frequency 5x/week   Predicted Duration of Therapy Intervention (days/wks) 3 days   Anticipated Discharge Disposition Home   Risks and Benefits of Treatment have been explained. Yes   Patient, Family & other staff in agreement with plan of care Yes   Clinical Impression Comments Pt presents with decontitioning and impaired balance. Pt will likely benefit from skilled OT services to increase functional strength and endurance for safe independence with ADL/IADL.   [facility] AM-PAC  \"6 Clicks\" Daily Activity Inpatient Short Form   1. Putting on and taking off regular lower body clothing? 4 - None   2. Bathing (including washing, rinsing, drying)? 3 - A Little   3. Toileting, which includes using toilet, bedpan or urinal? 4 - None   4. Putting on " and taking off regular upper body clothing? 4 - None   5. Taking care of personal grooming such as brushing teeth? 4 - None   6. Eating meals? 4 - None   Daily Activity Raw Score (Score out of 24.Lower scores equate to lower levels of function) 23   Total Evaluation Time   Total Evaluation Time (Minutes) 5

## 2020-09-28 NOTE — PLAN OF CARE
"  VS: VSS. MSSA of 3.   O2: Room air >90%.   Output: Voiding without difficulty.    Last BM: 9/26.   Activity: Up with Ax1-2 with walker. Pt not OOB overnight.    Skin: Intact. Pt dirty/needs to shower today.    Pain: Denies.   CMS: Intact.   Dressing: None.   Diet: Regular.    LDA: PIV infusing sodium chloride @ 125ml/hour.   Equipment: Personal belongings, urinal.   Plan: TBD.   Additional Info: Pt states \"I am not withdrawing, I am having an adverse reaction to the medication\".  Last drink was 9/26 before patient came to ED.       "

## 2020-09-28 NOTE — PROGRESS NOTES
[facility] Progress Note - Hospitalist Service       Date of Admission:  9/26/2020  Assessment & Plan         60 year old year old male with hx of alcoholic use disorder,cirrhosis, pancreatitis, tobacco abuse, HBV/HCV, COPD, HTN, HLD, PAF, and skin cancer presented to ED for seeking detoxification.       # Alcohol withdrawal syndrome  # Hx of Alcohol use disorder  Last Alcohol use on 09/26 -12 pm. He took Jennifer. He was taking Diazepam at home for detoxification.   He reports some confusion, but now clear- alert, awake, and oriented.   Ammonia on adm: normal.   Vitals signs are stable.   Normal electrolytes, and renal function.   - Continue Diazepam per MSSA protocol  9/28/2020: detoxed. discontinue mssa, diazepam.   - MVI, Thiamine, Folic acid daily  - Social work consult   - CD consult.   - Fall precautions.         # Cirrhosis  # Abnormal liver biomarkers  # Hx of Hepatitis B/C  No signs of ascites on exam. Recent US was on 10/16/2019: no ascites. No HCC.   Bilirubin 4.6 on 09/26. Range between 2.4 to 6.7 for last couple of years.  Denies any abdominal pain. Abdominal exam benign. No NV @ current.   He reports he has completed Sofosbuvir-Velpatasvir treatment in summer when he was sober.     MELD-Na score: 16 at 9/27/2020  6:45 AM  MELD score: 16 at 9/27/2020  6:45 AM  Calculated from:  Serum Creatinine: 0.82 mg/dL (Rounded to 1 mg/dL) at 9/27/2020  6:45 AM  Serum Sodium: 139 mmol/L (Rounded to 137 mmol/L) at 9/27/2020  6:45 AM  Total Bilirubin: 3.3 mg/dL at 9/27/2020  6:45 AM  INR(ratio): 1.64 at 9/27/2020  6:45 AM  Age: 60 years 2 months    - Repeat CMP : Improving.   - Avoid Alcohol intake.   - Outpatient GI follow up         # Elevated lactic acid: Mildly elevated at 2.6. Received IV fluids in ED. Likely d/t alcohol use, and decreased clearance from liver disease. No signs of infection at this time.   Repeat level 2.3  - Hemodynamics stable.   -Monitor      #  Thrombocytopenia: Likely d/t alcohol use  - Monitor: stable.      # Elevated INR: d/t liver disease  - INR : 1.6     # Unsteady gait, physical deconditioning:    Likely due to alcohol use, liver disease, poor nutrition.  - Fall precautions. Bed alarm.   - PT/OT consultation.   - RD consult     # Hx of HTN: Per chart review. Patient denies any hx.      # Hx of COPD: Per chart review. Patient not sure of COPD. Reports he takes Albuterol PRN for shortness of breath. Hx of Tobacco use.     # Hypokalemia, replace as needed.     # COVID Status: Low risk: asymptomatic: negative.          Diet: Combination Diet Regular Diet Adult  Snacks/Supplements Adult: Boost Plus; With Meals    DVT Prophylaxis: Pneumatic Compression Devices and no chemoppx given thrombocytopenia.   Marie Catheter: not present  Code Status: Full Code           Disposition Plan   Expected discharge: 2 - 3 days, recommended to tbd once detoxed. medical stabilization. .pedning therapy, sw input.   Entered: Milton El MD 09/28/2020, 2:29 PM       The patient's care was discussed with the Bedside Nurse, Care Coordinator/ and Patient.    Milton El MD  Hospitalist Service  [facility] [place]    ______________________________________________________________________    Interval History     Interval events reviewed.   MSSA: 3-4  Gen weakness.   Denies fever or chills.   No cough or cp or sob.   No LH @ current.   No NV or pain abdomen.    No dysuria or bowel complaint.   No new sensory or motor complaint.   No new rash.    No other new or acute medical concern      Data reviewed today: I reviewed all medications, new labs and imaging results over the last 24 hours. I personally reviewed no images or EKG's today.    Physical Exam   Vital Signs: Temp: 96.6  F (35.9  C) Temp src: Oral BP: 128/75 Pulse: 90   Resp: 15 SpO2: 98 % O2 Device: None (Room air)    Weight: 210 lbs 0 oz    General: alert, interactive,  NAD  HEENT: AT/NC,  Moist MM  Respi/Chest: Non labored  CVS/Heart: S1S2 regular  GI/Abd: Soft, non tender, non distended  MSK/Extremities: Distally warm, well perfused.     Neuro: AO x 4, grossly non focal.   Psychiatry: Stable mood.       Data   Recent Labs   Lab 09/27/20  0645 09/26/20  1738 09/22/20  1020   WBC 5.6 5.8 5.0   HGB 13.2* 15.3 14.9   * 108* 103*   * 118* 107*   INR 1.64* 1.56*  --     136 140   POTASSIUM 3.3* 3.4 3.0*   CHLORIDE 108 102 105   CO2 25 25 26   BUN 16 21 7   CR 0.82 0.93 0.84   ANIONGAP 6 9 9   LEIGHA 7.8* 8.9 8.7   * 93 138*   ALBUMIN 2.9* 3.7 3.7   PROTTOTAL 6.8 8.3 8.1   BILITOTAL 3.3* 4.6* 3.4*   ALKPHOS 84 98 86   ALT 36 47 67   AST 48* 58* 131*   TROPI  --  <0.015  --      No results found for this or any previous visit (from the past 24 hour(s)).  Medications       calcium carbonate  500 mg Oral At Bedtime     folic acid  1 mg Oral Daily     multivitamin w/minerals  1 tablet Oral Daily     sodium chloride (PF)  3 mL Intracatheter Q8H     sodium chloride (PF)  3 mL Intracatheter Q8H     thiamine  100 mg Oral Daily

## 2020-09-28 NOTE — PROGRESS NOTES
"Social Work: Assessment with Discharge Plan    Patient Name:  David Marcano  :  1960  Age:  60 year old  MRN:  [id number]  Risk/Complexity Score:   -  Completed assessment with:  Pt, chart review    Presenting Information   Reason for Referral:  Discharge plan  Date of Intake:  2020  Referral Source:  Chart Review  Decision Maker:  Pt is his own decision maker  Alternate Decision Maker:  YADIRA policy-brother  Health Care Directive:  Provided education  Living Situation:  Duplex  Previous Functional Status:  Independent  Patient and family understanding of hospitalization:  Pt hospitalized for alcohol withdrawal  Cultural/Language/Spiritual Considerations:  Pt is a 60 year old male  Adjustment to Illness:  Appropriate     Physical Health  Reason for Admission:    1. Alcohol dependence with withdrawal with complication (H)    2. Alcoholic cirrhosis of liver without ascites (H)    3. Exposure to SARS-associated coronavirus      Services Needed/Recommended:  TCU    Mental Health/Chemical Dependency  Diagnosis:  Alcohol use disorder  Support/Services in Place:  None  Services Needed/Recommended:  N/A    Support System  Significant relationship at present time:  Pt does not report a significant source of support  Family of origin is available for support:  Pt reports that his brother is available for emergencies but \"you can't count on him.\"   Other support available:  Two friends-names not shared  Gaps in support system:  Pt does not have a close friend or family member he can use for support  Patient is caregiver to:  None     Provider Information   Primary Care Physician:  Gracie Winters   [phone]   Clinic:  [address] [place] [id number]      :  None    Financial   Income Source:  Social security  Financial Concerns:  None shared  Insurance:    Payor/Plan Subscriber Name Rel Member # Group #   [facility] DAVID MARCANO [facility] [id number]      [address], [address]       Discharge Plan   Patient and family discharge goal:  Home  Provided education on discharge plan:  SW discussed TCU recommendations   Patient agreeable to discharge plan:  NO  A list of Medicare Certified Facilities was provided to the patient and/or family to encourage patient choice. Patient's choices for facility are:  N/A  Will NH provide Skilled rehabilitation or complex medical:  YES  General information regarding anticipated insurance coverage and possible out of pocket cost was discussed. Patient and patient's family are aware patient may incur the cost of transportation to the facility, pending insurance payment: YES  Barriers to discharge:  Medical stability    Discharge Recommendations   Anticipated Disposition:  Home with services  Transportation Needs:  Medical:  Wheelchair  Name of Transportation Company and Phone:  Medical ride or cab     Additional comments   Pt is a 60 year old male who was admitted for alcohol withdrawal. TCU recommendations were made due to his unsteady gait and assistance with transfers.     SW noted that pt's insurance does not cover TCU. SW met with pt to discuss. Pt stated that he was not agreeable to TCU placement. SW inquired about additional insurance-pt says his insurance only covers medical and dental.     Pt would like to discharge home. Pt reports he does have two friends he can call if he needs help-names not shared. Pt has 12 steps in his duplex.    ANITA Cope  [facility]  Phone: [phone] Pager: [phone]

## 2020-09-28 NOTE — CONSULTS
9/28/2020    CD consult acknowledged.   Pt has [facility], will need [facility] funding for treatment. Pt will be seen tomorrow 9/29 for CD consult.    NIKIA Kendall  [email or web address]  [phone]

## 2020-09-28 NOTE — PLAN OF CARE
VS: VSS. A+Ox4. Slow garbled speech.    O2: 100% on RA. Denies SOB and chest pain. Clear lung sounds bilaterally.    Output: Uses urinal and up to bathroom/commode.    Last BM: 9/26/20   Activity: Ax1/2 when out of bed. Poor balance and shuffling gait. Mobility has become significantly impaired from patient's baseline per patient report. Stated that he is normally very active and works as a . Patient is very concerned how long it will take to improve and/or if he will ever go back to normal.    Skin: Intact. Jaundice.    Pain: Denied pain on shift.    CMS: Reported tingling in feet. Denies numbness in all extremities.    Dressing: None   Diet: Regular   LDA: Right PIV infusing NS @ 125ml/hr   Equipment: Gait belt and walker    Plan: Continue to monitor detox sx. Likely discharge in 2-3 days.    Additional Info: Last drink was yesterday AM before patient came to ED.

## 2020-09-28 NOTE — PLAN OF CARE
Pt A/O X 4. Afebrile. VSS. Had scores of 3 and 4 on the  MSSA protocol. Lungs-Clear bilaterally with both anterior and posterior. Bowels-Hyperactive in all four quadrants. Voids spontaneously without difficulty in the bathroom. Pt also has urinary incontinence at times. Pt had a shower, and bed linen changed. Denies nausea and vomiting. CMS and Neuro's are intact. Denies numbness and tingling in all extremities. Denies pain. Is on a Regular diet and appetite was Good this shift. Pt up in room with Assist X 1, gait belt, and a FWW. PIV patent in the right arm and saline locked. PCD's on BLE's. Bilateral heels are elevated off the bed. Pt is able to make needs known, and call light is within reach. Continue to monitor.

## 2020-09-29 ENCOUNTER — APPOINTMENT (OUTPATIENT)
Dept: OCCUPATIONAL THERAPY | Facility: CLINIC | Age: 60
End: 2020-09-29
Payer: COMMERCIAL

## 2020-09-29 ENCOUNTER — APPOINTMENT (OUTPATIENT)
Dept: PHYSICAL THERAPY | Facility: CLINIC | Age: 60
End: 2020-09-29
Payer: COMMERCIAL

## 2020-09-29 PROCEDURE — 99232 SBSQ HOSP IP/OBS MODERATE 35: CPT | Performed by: INTERNAL MEDICINE

## 2020-09-29 PROCEDURE — 12000001 ZZH R&B MED SURG/OB UMMC

## 2020-09-29 PROCEDURE — 97530 THERAPEUTIC ACTIVITIES: CPT | Mod: GP | Performed by: PHYSICAL THERAPIST

## 2020-09-29 PROCEDURE — 40000007 ZZH STATISTIC ADULT CD FACE TO FACE-NO CHRG

## 2020-09-29 PROCEDURE — 25000132 ZZH RX MED GY IP 250 OP 250 PS 637: Performed by: INTERNAL MEDICINE

## 2020-09-29 PROCEDURE — 99207 ZZC CDG-MDM COMPONENT: MEETS LOW - DOWN CODED: CPT | Performed by: INTERNAL MEDICINE

## 2020-09-29 PROCEDURE — 97535 SELF CARE MNGMENT TRAINING: CPT | Mod: GO

## 2020-09-29 PROCEDURE — 97116 GAIT TRAINING THERAPY: CPT | Mod: GP | Performed by: PHYSICAL THERAPIST

## 2020-09-29 PROCEDURE — 97530 THERAPEUTIC ACTIVITIES: CPT | Mod: GO

## 2020-09-29 RX ADMIN — THIAMINE HCL TAB 100 MG 100 MG: 100 TAB at 08:35

## 2020-09-29 RX ADMIN — MULTIPLE VITAMINS W/ MINERALS TAB 1 TABLET: TAB at 08:35

## 2020-09-29 RX ADMIN — FOLIC ACID 1 MG: 1 TABLET ORAL at 08:35

## 2020-09-29 NOTE — PLAN OF CARE
Discharge Planner PT   Patient plan for discharge: Home   Current status: Pt seated EOB on arrival, agreed to PT. Completes STS from bed and mat without AD, SBA provided. Completes sit <> supine with HOB flat, without bedrails, SBA. Completed hallway ambulation and stairs with overall good balance throughout. Standing balance exercises continued with moderate to good balance overall. Pt demonstrates good sitting balance throughout, ended session seated EOB with needs in reach.  Barriers to return to prior living situation: balance, activity tolerance, safety, weakness  Recommendations for discharge: Home with assist  Rationale for recommendations: Pt making good progress with therapies, anticipate further improvement to near baseline with 1-2 more PT sessions       Entered by: Juan Alberto Bashir 09/29/2020 10:50 AM

## 2020-09-29 NOTE — PLAN OF CARE
Discharge Planner OT   Patient plan for discharge: Home  Current status:  pt ambulated x 300 ft with SBA and FWW, steady on feet no LOB noted. VSS throughout.  Pt able to don socks and pants with set-up A only seated EOB. Pt ambulated to bathroom with SBA and no AE. Fairly steady on feet.  g/h tasks in standing at sink with SBA. Good tolerance noted.   Barriers to return to prior living situation: Deconditioning  Recommendations for discharge: Home with assist as needed for higher level IADL's   Rationale for recommendations: anticipate pt is near baseline function. Will see for likely 1 more OT session to meet remaining OT goals.        Entered by: Lavern Paris 09/29/2020 7:51 AM

## 2020-09-29 NOTE — PROGRESS NOTES
[facility], [facility] Progress Note - Hospitalist Service       Date of Admission:  9/26/2020  Assessment & Plan         59 yo male with hx of alcoholic use disorder, cirrhosis, pancreatitis, tobacco abuse, HBV/HCV, COPD, HTN, HLD, PAF, and skin cancer presented to ED for seeking detoxification.       # Alcohol withdrawal syndrome  # Hx of Alcohol use disorder  Last Alcohol use on 09/26 -12 pm. He took Jennifer. He was taking Diazepam at home for detoxification.   Confusion resolved. He is alert and oriented.    Ammonia on adm: normal.   Vitals signs are stable.   Normal electrolytes, and renal function.   - Continue Diazepam per [facility] protocol  9/29/2020: Alcohol withdrawal appears to be resolved   - MVI, Thiamine, Folic acid daily  - Social work consult   - CD consult.   - Fall precautions.         # Cirrhosis  # Abnormal liver biomarkers  # Hx of Hepatitis B/C  No signs of ascites on exam. Recent US was on 10/16/2019: no ascites. No HCC.   Bilirubin 4.6 on 09/26. Range between 2.4 to 6.7 for last couple of years.  Denies any abdominal pain. Abdominal exam benign. No nausea or vomit.   He reports he has completed Sofosbuvir-Velpatasvir treatment in summer when he was sober.     MELD-Na score: 16 at 9/27/2020  6:45 AM  MELD score: 16 at 9/27/2020  6:45 AM  Calculated from:  Serum Creatinine: 0.82 mg/dL (Rounded to 1 mg/dL) at 9/27/2020  6:45 AM  Serum Sodium: 139 mmol/L (Rounded to 137 mmol/L) at 9/27/2020  6:45 AM  Total Bilirubin: 3.3 mg/dL at 9/27/2020  6:45 AM  INR(ratio): 1.64 at 9/27/2020  6:45 AM  Age: 60 years 2 months    - Repeat CMP : Improving.   - Avoid Alcohol intake.   - Outpatient GI follow up         # Elevated lactic acid: Mildly elevated at 2.6. Received IV fluids in ED. Likely d/t alcohol use, and decreased clearance from liver disease. No signs of infection at this time.   Repeat level 2.3  -Hemodynamics stable.   -Monitor      # Thrombocytopenia: Likely d/t  alcohol use  -Monitor: stable.      # Elevated INR: d/t liver disease  -INR : 1.6     # Unsteady gait, physical deconditioning:    Likely due to alcohol use, liver disease, poor nutrition.  -PT / OT following the patient.       # Hx of HTN  -BP stable, continue monitoring      # Hx of COPD: Per chart review. Patient not sure of COPD. Reports he takes Albuterol PRN for shortness of breath. Hx of Tobacco use.     # Hypokalemia, replace as needed.     # COVID Status: Low risk: asymptomatic: negative.          Diet: Combination Diet Regular Diet Adult  Snacks/Supplements Adult: Boost Plus; With Meals    DVT Prophylaxis: Pneumatic Compression Devices and no chemoppx given thrombocytopenia.   Marie Catheter: not present  Code Status: Full Code           Disposition Plan   Expected discharge: Tomorrow, recommended to tbd once detoxed. medical stabilization. .pedning therapy, sw input.   Entered: Cam Quintana MD 09/29/2020, 1:43 PM       The patient's care was discussed with the Bedside Nurse, Care Coordinator/ and Patient.    Cam Quintana MD  Hospitalist Service  [facility]  Pager: [phone]      ______________________________________________________________________    Interval History     Interval events reviewed.     No acute events overnight.   Generalized weakness is improving.   Denies fever, chills, nausea, vomit, chest pain, palpitations or shortness of breath.       Data reviewed today: I reviewed all medications, new labs and imaging results over the last 24 hours. I personally reviewed no images or EKG's today.    Physical Exam   Vital Signs: Temp: 96.7  F (35.9  C) Temp src: Oral BP: (!) 133/90(pt just got back from walk) Pulse: 106   Resp: 16 SpO2: 95 % O2 Device: None (Room air)    Weight: 210 lbs 0 oz    General: alert, older than stated, chronically ill, room air, NAD   HEENT: AT/NC. Moist MM  Respi/Chest: Non labored  CVS/Heart: S1S2  regular  GI/Abd: Soft, non tender, non distended  MSK/Extremities: Distally warm, well perfused.     Neuro: AO x 4, grossly non focal.   Psychiatry: Stable mood.       Data   Recent Labs   Lab 09/27/20  0645 09/26/20  1738   WBC 5.6 5.8   HGB 13.2* 15.3   * 108*   * 118*   INR 1.64* 1.56*    136   POTASSIUM 3.3* 3.4   CHLORIDE 108 102   CO2 25 25   BUN 16 21   CR 0.82 0.93   ANIONGAP 6 9   LEIGHA 7.8* 8.9   * 93   ALBUMIN 2.9* 3.7   PROTTOTAL 6.8 8.3   BILITOTAL 3.3* 4.6*   ALKPHOS 84 98   ALT 36 47   AST 48* 58*   TROPI  --  <0.015     No results found for this or any previous visit (from the past 24 hour(s)).  Medications       calcium carbonate  500 mg Oral At Bedtime     folic acid  1 mg Oral Daily     multivitamin w/minerals  1 tablet Oral Daily     sodium chloride (PF)  3 mL Intracatheter Q8H     sodium chloride (PF)  3 mL Intracatheter Q8H     thiamine  100 mg Oral Daily

## 2020-09-29 NOTE — PLAN OF CARE
VS: VSS ex .    O2: 95% on room air.    Output: Incontinent at times, wearing brief.    Last BM: LBM 9/26.    Activity: Up as tolerated. AO1.    Up for meals? Yes.   Skin: CDI.    Pain: Denies.    CMS: Intact.    Dressing: CDI - PIV dressing.    Diet: Regular, thin.    LDA: PIV R arm.    Equipment: Gait belt. Near baseline per therapy - see note.    Plan: Continue therapy.    Additional Info: JORY 5.

## 2020-09-29 NOTE — PROGRESS NOTES
VS: /72 (BP Location: Left arm)   Pulse 81   Temp 97.2  F (36.2  C) (Oral)   Resp 16   Wt 95.3 kg (210 lb)   SpO2 98%   BMI 24.90 kg/m   Pt denies pain, shortness of breath, nausea and vomiting and chest pain.    O2: Room air sat. > 90%.    Output: Voiding adequate amount in urinal. Could be incontinent.   Last BM: 09/26/20. Passing flatus.   Activity: AX 1 with walker and galt belt.   Skin: Intact.   Pain: Denies.   CMS: CMS and neuro intact.   Dressing: None   Diet: Tolerating regular diet.   LDA: PIV right for arm SL   Equipment: IV pole, walker, galt belt and personal belongings.   Plan: TBD. Continue with plan of care. Alert and oriented, able to make needs known. Call light within reach and bed alarm for safety.   Additional Info:

## 2020-09-29 NOTE — CONSULTS
9/29/2020    CD consult completed.   I called pt via his bedside phone. Pt reported he was not interested in any CD services at this time.   Patient has getbetter! and could seek eval at any of these locations if he changes his mind:    For reference:  The [facility] facilities offer Rule 25 assessments -      [facility]   [phone]    [facility]   [phone]  [address] [place] [address] [id number] [place] [address]   Phone: [phone]  Fax: [phone]  or for walk in assessment go to;   [address] [place] [address] Monday thru Friday, 7:00am to 2:15pm  [address], [place] [address] Saturday s, 7:45am to 10:45am  Arrive early for best chance to be seen early    [facility]   Phone [phone]   Fax [phone]    [facility]  Multiple Locations  Main phone: [phone]    [facility] RULE 25 INFORMATION -   [place] - [phone]    NIKIA Kendall  [email or web address]  [phone]

## 2020-09-30 ENCOUNTER — PATIENT OUTREACH (OUTPATIENT)
Dept: CARE COORDINATION | Facility: CLINIC | Age: 60
End: 2020-09-30

## 2020-09-30 ENCOUNTER — APPOINTMENT (OUTPATIENT)
Dept: PHYSICAL THERAPY | Facility: CLINIC | Age: 60
End: 2020-09-30
Payer: COMMERCIAL

## 2020-09-30 VITALS
TEMPERATURE: 96.2 F | WEIGHT: 210 LBS | DIASTOLIC BLOOD PRESSURE: 80 MMHG | HEART RATE: 101 BPM | SYSTOLIC BLOOD PRESSURE: 123 MMHG | OXYGEN SATURATION: 96 % | RESPIRATION RATE: 16 BRPM | BODY MASS INDEX: 24.9 KG/M2

## 2020-09-30 PROCEDURE — 99239 HOSP IP/OBS DSCHRG MGMT >30: CPT | Performed by: INTERNAL MEDICINE

## 2020-09-30 PROCEDURE — 97530 THERAPEUTIC ACTIVITIES: CPT | Mod: GP | Performed by: PHYSICAL THERAPIST

## 2020-09-30 PROCEDURE — 25000132 ZZH RX MED GY IP 250 OP 250 PS 637: Performed by: INTERNAL MEDICINE

## 2020-09-30 PROCEDURE — 97112 NEUROMUSCULAR REEDUCATION: CPT | Mod: GP | Performed by: PHYSICAL THERAPIST

## 2020-09-30 RX ORDER — CLONIDINE HYDROCHLORIDE 0.1 MG/1
0.1 TABLET ORAL 3 TIMES DAILY PRN
Qty: 30 TABLET | Refills: 0 | Status: SHIPPED | OUTPATIENT
Start: 2020-09-30 | End: 2021-01-25

## 2020-09-30 RX ORDER — FOLIC ACID 1 MG/1
1 TABLET ORAL DAILY
Qty: 30 TABLET | Refills: 0 | Status: SHIPPED | OUTPATIENT
Start: 2020-09-30 | End: 2022-06-03

## 2020-09-30 RX ORDER — LANOLIN ALCOHOL/MO/W.PET/CERES
100 CREAM (GRAM) TOPICAL
Qty: 90 TABLET | Refills: 0 | Status: SHIPPED | OUTPATIENT
Start: 2020-09-30 | End: 2022-06-03

## 2020-09-30 RX ORDER — MULTIPLE VITAMINS W/ MINERALS TAB 9MG-400MCG
1 TAB ORAL
Qty: 30 TABLET | Refills: 0 | Status: SHIPPED | OUTPATIENT
Start: 2020-09-30

## 2020-09-30 RX ADMIN — THIAMINE HCL TAB 100 MG 100 MG: 100 TAB at 07:45

## 2020-09-30 RX ADMIN — FOLIC ACID 1 MG: 1 TABLET ORAL at 07:45

## 2020-09-30 RX ADMIN — MULTIPLE VITAMINS W/ MINERALS TAB 1 TABLET: TAB at 07:45

## 2020-09-30 NOTE — PLAN OF CARE
VS: VSS  Temp: 96.7  F (35.9  C) Temp src: Oral BP: 136/84 Pulse: 89   Resp: 16 SpO2: 96 % O2 Device: None (Room air)     O2: >90% on RA, denies SOB or CP   Output: Voiding without difficulty in BR   Last BM: LBM 9/29 per pt report    Activity: Up ind in room, activity back to baseline. A bit unsteady on his feet and therapy recommending him discharge with a walker- pt refused at first but then came to  and was willing to take home a walker. PT still concerned with stair safety but pt adamantly requesting to discharge- MD notified and was OK with pt discharging at this point as we offered him home services and he refused all services.    Skin: Skin intact, warm, and dry.    Pain: Denies pain   CMS: CMS intact   Dressing: None   Diet: Regular diet, good appetite. Adequate intake of PO fluids   LDA: PIV removed for discharge    Equipment: Personal belongings at bedside   Plan: Discharge home, pt not interested in treatment at this time.   Additional Info: Medications given to pt prior to discharge. Pt will be taking bus home.     DISCHARGE SUMMARY    Pt discharging to: Home  Transportation: Public bus  AVS given and discussed: YES  Medications given: YES  Belongings returned: YES  Comments: Pt left unit 5ortho at 1145. Pt independent and will be taking bus home.

## 2020-09-30 NOTE — PLAN OF CARE
Occupational Therapy Discharge Summary    Reason for therapy discharge:    Discharged to home.    Progress towards therapy goal(s). See goals on Care Plan in [facility] electronic health record for goal details.  Goals not met.  Barriers to achieving goals:   discharge from facility.    Therapy recommendation(s):    Continued therapy is recommended.  Rationale/Recommendations:  Pt unsafe for discharge home, recommend ongoing therapies for maximizing safety and I with ADL.

## 2020-09-30 NOTE — PLAN OF CARE
VS: VSS    O2: RA   Output: Incontinent at times, wearing brief.    Last BM: LBM 9/26.    Activity: 1 assist with gait belt   Skin: CDI. Pt declined full skin assessment   Pain: Denies.    CMS: Intact.    Dressing: None   Diet: Regular   LDA: PIV R arm.    Plan: TBD Likely home 9/30

## 2020-09-30 NOTE — DISCHARGE SUMMARY
[facility], [place]  Hospitalist Discharge Summary      Date of Admission:  9/26/2020  Date of Discharge:  9/30/2020 11:43 AM  Discharging Provider: Cam Quintana MD      Discharge Diagnoses   Alcohol withdrawal     Follow-ups Needed After Discharge   Follow-up Appointments     Adult [facility] Follow-up and recommended labs and tests      Follow up with primary care provider, Gracie Winters, within 7 days   for hospital follow- up.  No follow up labs or test are needed.      Appointments on [place] and/or [facility] (with [facility] or [facility]   provider or service). Call [phone] if you haven't heard regarding   these appointments within 7 days of discharge.             Unresulted Labs Ordered in the Past 30 Days of this Admission     No orders found from 8/27/2020 to 9/27/2020.          Discharge Disposition   Discharged to home  Condition at discharge: Stable    Hospital Course   59 yo male with hx of alcoholic use disorder, cirrhosis, pancreatitis, tobacco abuse, HBV/HCV, COPD, HTN, HLD, PAF, and skin cancer presented to ED for seeking detoxification.   He was admitted to the medical floor, he received supportive treatment and benzodiazepines as needed. Alcohol withdrawal resolved. No seizures or hallucinations. He was hemodynamically stable. Patient was evaluated by PT / OT and recommended to discharge home with HH but he refused.          Consultations This Hospital Stay   MEDICATION HISTORY IP PHARMACY CONSULT  NUTRITION SERVICES ADULT IP CONSULT  PHYSICAL THERAPY ADULT IP CONSULT  OCCUPATIONAL THERAPY ADULT IP CONSULT  CHEMICAL DEPENDENCY IP CONSULT    Code Status   Full Code    Time Spent on this Encounter   I, Cam Quintana MD, personally saw the patient today and spent greater than 30 minutes discharging this patient.       Cam Quintana MD  [facility],  [place]  ______________________________________________________________________    Physical Exam   Vital Signs: Temp: 96.7  F (35.9  C) Temp src: Oral BP: 136/84 Pulse: 89   Resp: 16 SpO2: 96 % O2 Device: None (Room air)    Weight: 210 lbs 0 oz  General: alert, older than stated, chronically ill, room air, NAD   HEENT: AT/NC. Moist MM  Respi/Chest: Non labored  CVS/Heart: S1S2 regular  GI/Abd: Soft, non tender, non distended  MSK/Extremities: Distally warm, well perfused.     Neuro: AO x 4, grossly non focal.   Psychiatry: Stable mood.        Primary Care Physician   Gracie Winters    Discharge Orders      Reason for your hospital stay    Patient admitted due to concern for possible alcohol withdrawal.   Alcohol withdrawal resolved, he was hemodynamically stable on discharge     Adult [facility] Follow-up and recommended labs and tests    Follow up with primary care provider, Gracie Winters, within 7 days for hospital follow- up.  No follow up labs or test are needed.      Appointments on [place] and/or [facility] (with [facility] or [facility] provider or service). Call [phone] if you haven't heard regarding these appointments within 7 days of discharge.     Activity    Your activity upon discharge: activity as tolerated     Full Code     Diet    Follow this diet upon discharge: Orders Placed This Encounter      Snacks/Supplements Adult: Boost Plus; With Meals      Combination Diet Regular Diet Adult       Significant Results and Procedures   Most Recent 3 CBC's:  Recent Labs   Lab Test 09/27/20  0645 09/26/20  1738 09/22/20  1020   WBC 5.6 5.8 5.0   HGB 13.2* 15.3 14.9   * 108* 103*   * 118* 107*       Discharge Medications   Current Discharge Medication List      START taking these medications    Details   folic acid (FOLVITE) 1 MG tablet Take 1 tablet (1 mg) by mouth daily  Qty: 30 tablet, Refills: 0    Associated Diagnoses: Alcohol dependence with withdrawal with complication (H)          CONTINUE these medications which have NOT CHANGED    Details   albuterol (PROAIR HFA/PROVENTIL HFA/VENTOLIN HFA) 108 (90 Base) MCG/ACT inhaler Inhale 2 puffs into the lungs every 6 hours as needed for shortness of breath / dyspnea or wheezing  Qty: 1 Inhaler, Refills: 0    Comments: Pharmacy may dispense brand covered by insurance (Proair, or proventil or ventolin or generic albuterol inhaler)  Associated Diagnoses: Alcohol dependence with uncomplicated intoxication (H)      calcium carbonate (TUMS) 500 MG chewable tablet Take 1 tablet (500 mg) by mouth At Bedtime  Qty: 90 tablet, Refills: 3    Associated Diagnoses: Alcohol withdrawal syndrome with complication (H)      diazepam (VALIUM) 10 MG tablet Take 1 tablet (10 mg) by mouth every 4 hours as needed for anxiety  Qty: 20 tablet, Refills: 0    Associated Diagnoses: Hepatitis C virus infection without hepatic coma, unspecified chronicity      ferrous sulfate (FEROSUL) 325 (65 Fe) MG tablet Take 1 tablet (325 mg) by mouth daily (with breakfast)  Qty: 90 tablet, Refills: 0    Associated Diagnoses: Iron deficiency anemia secondary to inadequate dietary iron intake      gabapentin (NEURONTIN) 300 MG capsule Take 1 capsule (300 mg) by mouth 3 times daily as needed (anxiety)  Qty: 90 capsule, Refills: 0    Associated Diagnoses: Adjustment disorder with mixed anxiety and depressed mood; Alcohol dependence with uncomplicated intoxication (H)      magnesium 250 MG tablet Take 1 tablet (250 mg) by mouth At Bedtime  Qty: 90 tablet, Refills: 3    Associated Diagnoses: Alcohol withdrawal syndrome with complication (H)      multivitamin w/minerals (THERA-VIT-M) tablet Take 1 tablet by mouth daily (with breakfast)  Qty: 30 tablet, Refills: 0    Associated Diagnoses: Alcohol dependence with uncomplicated intoxication (H)      thiamine (B-1) 100 MG tablet Take 1 tablet (100 mg) by mouth daily (with breakfast)  Qty: 90 tablet, Refills: 0    Associated Diagnoses: Alcohol  dependence with uncomplicated intoxication (H)         STOP taking these medications       cloNIDine (CATAPRES) 0.1 MG tablet Comments:   Reason for Stopping:             Allergies   No Known Allergies

## 2020-09-30 NOTE — PLAN OF CARE
Discharge Planner PT   Patient plan for discharge: Home  Current status: Pt seated EOB on arrival, agreed to PT. Ambulates 150' without AD, shows signs of unsteadiness throughout with mild improvements with verbal cues, CGA to Nydia throughout. Pt completes STS x 12 without AD, demonstrates good control and tolerance, CGA to SBA provided. Pt shows significant LOB when transferring from mat to staris, ModA to correct, completes 3 stairs with L handrail, unsteady overall, CGA provided. Pt then ambulates 250' with FWW and CGA, demonstrates improved balance. Balance exercises complete in room with overall fair tolerance. Pt seated EOB at end of session, needs in reach.     Pt unsafe for discharge at this time, pt reporting he is going to check self out. Educated on importance of use of walker, initially refused but reported he will take with if/when he discharges, order placed.   Barriers to return to prior living situation: safety, fall risks, stairs  Recommendations for discharge: TCU  Rationale for recommendations: Pt not safe for discharge home due to fall risk, would benefit with continued therapies, strongly encouraged    Physical Therapy Discharge Summary    Reason for therapy discharge:    Patient/family request discontinuation of services.    Progress towards therapy goal(s). See goals on Care Plan in [facility] electronic health record for goal details.  Goals not met.  Barriers to achieving goals:   discharge from facility.    Therapy recommendation(s):    Continued therapy is recommended.  Rationale/Recommendations:  Pt would highly benefit from continued therapies to improve balance and overall functional mobility.           Entered by: Juan Alberto Bashir 09/30/2020 10:54 AM

## 2020-09-30 NOTE — PROVIDER NOTIFICATION
Discussed with Dr. Quintana about pt in 513. PT discussed with RN that pt was unsafe to discharge from their perspective due to him being unable to complete stairs safely (pt has 12 stairs at home). PT stated pt initially refused to take home a walker, but then stated he would take one home. PT recommended home services to pt and CC called pt, and he refused all services offered. Notified MD, and MD was still OK with pt discharging from hospital. Pt was given discharge instructions and discharge medications. Pt was provided a walker from the therapy department to take home for safety.

## 2020-09-30 NOTE — PROGRESS NOTES
Care Coordinator Progress Note    Admission Date/Time:  9/26/2020  Attending MD:  Kylie att. providers found    Data  Chart reviewed, discussed with interdisciplinary team.   Patient was admitted for:    Alcohol dependence with withdrawal with complication (H)  Alcoholic cirrhosis of liver without ascites (H)  Exposure to SARS-associated coronavirus  Alcohol dependence with uncomplicated intoxication (H)  Chemical dependency (H).    Concerns with insurance coverage for discharge needs: None.  Current Living Situation: Patient lives alone.  Support System: Involved  Services Involved: Home Care  Transportation at Discharge: TBD  Transportation to Medical Appointments:   - Not applicable  Barriers to Discharge: dependent with mobility/activities of daily living, lack of support system/caregiver and lives alone    Coordination of Care and Referrals: Provided patient/family with options for Home Care.        Assessment  Spoke with patient via phone to offer home care services, skilled nursing and physical therapy. Pateint declined any home services at this time. RNCC available as needed.     Plan  Anticipated Discharge Date:  9/30/2020  Anticipated Discharge Plan:  Home    Lilly Plasencia RN, BSN  Care Coordinator, 8A  Phone [phone]  Pager [phone]

## 2020-10-02 NOTE — PROGRESS NOTES
Patient was called three times and no answer so post 24 hr DC follow up calls will be closed out, message was left with contact number for department seen by or following up     Follow-up Appointments     Adult [facility] Follow-up and recommended labs and tests      Follow up with primary care provider, Gracie Winters, within 7 days   for hospital follow- up.  No follow up labs or test are needed.       Appointments on [place] and/or [facility] (with [facility] or [facility]   provider or service). Call [phone] if you haven't heard regarding   these appointments within 7 days of discharge.

## 2020-10-06 ENCOUNTER — OFFICE VISIT (OUTPATIENT)
Dept: FAMILY MEDICINE | Facility: CLINIC | Age: 60
End: 2020-10-06
Payer: COMMERCIAL

## 2020-10-06 VITALS
BODY MASS INDEX: 24.84 KG/M2 | HEART RATE: 90 BPM | WEIGHT: 209.5 LBS | SYSTOLIC BLOOD PRESSURE: 126 MMHG | OXYGEN SATURATION: 96 % | RESPIRATION RATE: 16 BRPM | DIASTOLIC BLOOD PRESSURE: 72 MMHG | TEMPERATURE: 99 F

## 2020-10-06 DIAGNOSIS — E87.6 HYPOKALEMIA: ICD-10-CM

## 2020-10-06 DIAGNOSIS — B19.20 HEPATITIS C VIRUS INFECTION WITHOUT HEPATIC COMA, UNSPECIFIED CHRONICITY: ICD-10-CM

## 2020-10-06 DIAGNOSIS — D50.8 IRON DEFICIENCY ANEMIA SECONDARY TO INADEQUATE DIETARY IRON INTAKE: ICD-10-CM

## 2020-10-06 DIAGNOSIS — K70.30 ALCOHOLIC CIRRHOSIS OF LIVER WITHOUT ASCITES (H): Primary | ICD-10-CM

## 2020-10-06 DIAGNOSIS — K59.01 SLOW TRANSIT CONSTIPATION: ICD-10-CM

## 2020-10-06 DIAGNOSIS — F19.20 CHEMICAL DEPENDENCY (H): ICD-10-CM

## 2020-10-06 PROCEDURE — 99214 OFFICE O/P EST MOD 30 MIN: CPT | Performed by: NURSE PRACTITIONER

## 2020-10-06 RX ORDER — POTASSIUM CHLORIDE 750 MG/1
10 TABLET, EXTENDED RELEASE ORAL DAILY
Qty: 90 TABLET | Refills: 0 | Status: SHIPPED | OUTPATIENT
Start: 2020-10-06 | End: 2022-06-03

## 2020-10-06 NOTE — PATIENT INSTRUCTIONS
Patient Education     Hypokalemia  Hypokalemia means a low level of potassium in the blood. This most often occurs in people who take water pills (diuretics). It can also occur because of severe vomiting or diarrhea. You may also have it if you take laxatives for long periods of time. It sometimes happens if you have low magnesium (hypomagnesemia). If you have this, your healthcare provider will treat the low magnesium first.  A mild case of hypokalemia usually causes no symptoms. It is only found with blood testing. More severe potassium loss causes overall weakness, muscle or abdominal cramps, rapid or irregular heartbeats (heart palpitations), low blood pressure, and muscle weakness.   Home care    Take any potassium supplements as prescribed.    Eat foods rich in potassium. The highest amount is found in avocado, baked potatoes, spinach, cantaloupe, cod, halibut, salmon, and scallops. White, red, or johnson beans are also very good sources. A modest amount of potassium is found in orange juice, bananas, carrots, and tomato juice.    If you take certain types of diuretics, you will also need to take potassium supplements. If you take a diuretic, discuss potassium supplements with your doctor.    Follow-up care  Follow up with your healthcare provider for a repeat blood test within the next week, or as advised by our staff.  When to seek medical advice  Call your healthcare provider right away if any of the following occur:    Increased weakness, fatigue, or muscle cramps    Dizziness  Call 911  Call 911 if any of the following occur:    Irregular heartbeat, extra beats, or very fast heart rate    Loss of consciousness  Date Last Reviewed: 7/1/2017 2000-2019 [employer]. [address] [place] [address]. All rights reserved. This information is not intended as a substitute for professional medical care. Always follow your healthcare professional's instructions.           Patient Education      Understanding the Disease of Addiction  What is addiction?    Addiction is a long-lasting (chronic) disease of the brain. It affects how your brain learns and works.Your genes and your environment can affect your risk for addiction. A family history of addiction also raises your risk. But anyone can have an addiction.Unfortunately, many people falsely think that addiction is a moral weakness. They think that people addicted to drugs or alcohol are just behaving badly or making poor choices.  How does addiction affect my brain?  Whether you start using drugs or alcohol is your choice. But once your brain is exposed to the addictive substance, your brain begins to change. This is especially true if you are more at risk for addiction. These brain changes overpower your self-control. This happens because the substance overexcites the brain s reward center. The substance mimics the brain's own natural feel-good chemicals. The brain is rewired into believing that the substance is a good thing and that you need it to survive. This rewiring is very strong. Over time, you no longer find pleasure in other things you once enjoyed. The addiction is more powerful.  If you keep using the substance, your brain makes less of its own feel-good chemicals. You then must keep using drugs or alcohol to try to make up for the low levels of the brain chemicals. Over time your brain needs more and more of the drug or alcohol to achieve this. You need the drug. You no longer think about the physical, emotional, and social harm it causes.  Can you become addicted to things other than drugs or alcohol?  Addiction can happen in response to other pleasurable things that stimulate the brain s reward center. These things include eating, having sex, gambling, using tobacco, and using the internet.  Can you get control over a brain disease?  The only way to get over an addiction is to stop using the substance. Not using it lets your brain  recover and go back to its normal functioning. You can relearn how to find pleasure in other things again. But your brain will always be at risk for addiction. Addiction is very powerful. So you usually will need medical help and social support for long-term success.  Addiction is a chronic condition. It s common for people who are recovering from addiction to start using the substance again (called a relapse). This doesn t mean that treatment doesn t work. Just like other chronic health conditions, addiction requires ongoing treatment that changes as the person s needs change.    Date Last Reviewed: 5/1/2017 2000-2019 [employer]. [address] [place] [address], [place] [address]. All rights reserved. This information is not intended as a substitute for professional medical care. Always follow your healthcare professional's instructions.           Patient Education     Recovering from Addiction    Recovery means making a new life for yourself. This includes finding new interests. It involves building new relationships. It means taking better care of yourself. These will all help you replace substance use with a new and healthier life. They will also help you avoid the things that could make you want to use again.  Make lifestyle changes  A big part of recovery is changing habits. These are habits that may have led to your substance abuse. It s also a time for personal growth. Below are some changes you may want to make.    Find new activities and goals. You may want to try new hobbies and interests. Or, you may want to join an activity group to meet new people.    Build relationships. You may choose to spend more time with loved ones you lost touch with while you were using. You may also want to make new friends. And there may be some friends or family members you will not want to see because they are still using.    Exercise and eat well. Get some physical activity on most days. This can help you feel  better. Eat healthy meals with lots of fruits, vegetables, and whole grains. This can also help your well-being. A nutritionist or fitness expert can help you.    Maintain ties with medical and addiction professionals. While you may not need intense professional support, it is important to have reliable safety nets so you can access professional assistance when needed.    Relax and get enough sleep. Good sleep can help you feel better. So can less stress. Ask your counselor about relaxation exercises. These may include meditation. Also ask about stress management classes.  Date Last Reviewed: 2/1/2017 2000-2019 [employer]. [address] [place] [address]. All rights reserved. This information is not intended as a substitute for professional medical care. Always follow your healthcare professional's instructions.

## 2020-10-06 NOTE — PROGRESS NOTES
Subjective     Sohan Marcano is a 60 year old male who presents to clinic today for the following health issues:    [facility] [place]           Hospital Follow-up Visit:    Hospital/Nursing Home/IP Rehab Facility: [facility]  Date of Admission: 09/26/2020  Date of Discharge: 09/30/2020  Reason(s) for Admission: chemical dependency.       Was your hospitalization related to COVID-19? No   Problems taking medications regularly:  None  Medication changes since discharge: None  Problems adhering to non-medication therapy:  None    Summary of hospitalization:  [facility] discharge summary reviewed  Diagnostic Tests/Treatments reviewed.  Follow up needed: none  Other Healthcare Providers Involved in Patient s Care:         Specialist appointment - addiction medicine   Went back to work, in [employer] and large project behind on  Up to 8 hours or more now  Back stiff after being in bed their program in the hospital wasn't for me  Going up stairs okay now  Was down to a pint a day,  Valium was the hardest med  Eating home cooked meals again  Upper body weak but lower body good not using walker any more  Stools hard and large, laxative makes it softer then has urgency of stool that is uncomfortable  COVID was negative     Update since discharge: improved. Post Discharge Medication Reconciliation: discharge medications reconciled and changed, per note/orders.  Plan of care communicated with patient              Review of Systems   Constitutional, HEENT, cardiovascular, pulmonary, GI, , musculoskeletal, neuro, skin, endocrine and psych systems are negative, except as otherwise noted.      Objective    /72   Pulse 90   Temp 99  F (37.2  C) (Temporal)   Resp 16   Wt 95 kg (209 lb 8 oz)   SpO2 96%   BMI 24.84 kg/m    Body mass index is 24.84 kg/m .  Physical Exam   GENERAL: healthy, alert and no distress  EYES: Eyes grossly normal to inspection, PERRL and conjunctivae and sclerae normal  HENT: ear  canals and TM's normal, nose and mouth without ulcers or lesions  NECK: no adenopathy, no asymmetry, masses, or scars and thyroid normal to palpation  RESP: lungs clear to auscultation - no rales, rhonchi or wheezes  CV: regular rate and rhythm, normal S1 S2, no S3 or S4, no murmur, click or rub, no peripheral edema and peripheral pulses strong  ABDOMEN: soft, nontender, liver slightly enlarged 1 cm below nontender, bowel sounds normal, no palpable or pulsatile masses, umbilicus normal, no bruits heard and no scars, striae, dilated veins, rashes, or lesions  MS: no gross musculoskeletal defects noted, no edema  SKIN: no suspicious lesions or rashes  NEURO: Normal strength and tone, tremor slight resting, sensory exam grossly normal, mentation intact, DTR's normal and symmetric knees and gait slow but appears steady  PSYCH: mentation appears normal, affect slightly frustrated at time dry sense of humor, and appropriate            Assessment & Plan       ICD-10-CM    1. Alcoholic cirrhosis of liver without ascites (H)  K70.30    2. Chemical dependency (H)  F19.20    3. Hypokalemia  E87.6 potassium chloride ER (KLOR-CON M) 10 MEQ CR tablet   4. Iron deficiency anemia secondary to inadequate dietary iron intake  D50.8    5. Hepatitis C virus infection without hepatic coma, unspecified chronicity  B19.20    recovering well after he left hosptial, considered checking lab work K just slightly low will start low dose daily and recheck in 1-2 months can do fasting lab work at that time  Pt declines mental health referral or AA recommended he consider retrying this, is busy at work and wishes to continue working on self care and sobriety on his own.   Does agree to follow up with Dr. Valenzuela on Monday, gave appointment reminder as he is having trouble with updated phone issues     Exercise, water, fiber, pt will let me know what med he tried and consdier adding other agents      Tobacco Cessation:   reports that he has been  smoking. He has been smoking about 0.25 packs per day. He has never used smokeless tobacco.  Tobacco Cessation Action Plan: Information offered: Patient not interested at this time         Patient Instructions     Patient Education     Hypokalemia  Hypokalemia means a low level of potassium in the blood. This most often occurs in people who take water pills (diuretics). It can also occur because of severe vomiting or diarrhea. You may also have it if you take laxatives for long periods of time. It sometimes happens if you have low magnesium (hypomagnesemia). If you have this, your healthcare provider will treat the low magnesium first.  A mild case of hypokalemia usually causes no symptoms. It is only found with blood testing. More severe potassium loss causes overall weakness, muscle or abdominal cramps, rapid or irregular heartbeats (heart palpitations), low blood pressure, and muscle weakness.   Home care    Take any potassium supplements as prescribed.    Eat foods rich in potassium. The highest amount is found in avocado, baked potatoes, spinach, cantaloupe, cod, halibut, salmon, and scallops. White, red, or johnson beans are also very good sources. A modest amount of potassium is found in orange juice, bananas, carrots, and tomato juice.    If you take certain types of diuretics, you will also need to take potassium supplements. If you take a diuretic, discuss potassium supplements with your doctor.    Follow-up care  Follow up with your healthcare provider for a repeat blood test within the next week, or as advised by our staff.  When to seek medical advice  Call your healthcare provider right away if any of the following occur:    Increased weakness, fatigue, or muscle cramps    Dizziness  Call 911  Call 911 if any of the following occur:    Irregular heartbeat, extra beats, or very fast heart rate    Loss of consciousness  Date Last Reviewed: 7/1/2017 2000-2019 [employer]. [address], [place] [address]. All rights reserved. This information is not intended as a substitute for professional medical care. Always follow your healthcare professional's instructions.           Patient Education     Understanding the Disease of Addiction  What is addiction?    Addiction is a long-lasting (chronic) disease of the brain. It affects how your brain learns and works.Your genes and your environment can affect your risk for addiction. A family history of addiction also raises your risk. But anyone can have an addiction.Unfortunately, many people falsely think that addiction is a moral weakness. They think that people addicted to drugs or alcohol are just behaving badly or making poor choices.  How does addiction affect my brain?  Whether you start using drugs or alcohol is your choice. But once your brain is exposed to the addictive substance, your brain begins to change. This is especially true if you are more at risk for addiction. These brain changes overpower your self-control. This happens because the substance overexcites the brain s reward center. The substance mimics the brain's own natural feel-good chemicals. The brain is rewired into believing that the substance is a good thing and that you need it to survive. This rewiring is very strong. Over time, you no longer find pleasure in other things you once enjoyed. The addiction is more powerful.  If you keep using the substance, your brain makes less of its own feel-good chemicals. You then must keep using drugs or alcohol to try to make up for the low levels of the brain chemicals. Over time your brain needs more and more of the drug or alcohol to achieve this. You need the drug. You no longer think about the physical, emotional, and social harm it causes.  Can you become addicted to things other than drugs or alcohol?  Addiction can happen in response to other pleasurable things that stimulate the brain s reward center. These things include  eating, having sex, gambling, using tobacco, and using the internet.  Can you get control over a brain disease?  The only way to get over an addiction is to stop using the substance. Not using it lets your brain recover and go back to its normal functioning. You can relearn how to find pleasure in other things again. But your brain will always be at risk for addiction. Addiction is very powerful. So you usually will need medical help and social support for long-term success.  Addiction is a chronic condition. It s common for people who are recovering from addiction to start using the substance again (called a relapse). This doesn t mean that treatment doesn t work. Just like other chronic health conditions, addiction requires ongoing treatment that changes as the person s needs change.    Date Last Reviewed: 5/1/2017 2000-2019 [employer]. [address] [place] [address]. All rights reserved. This information is not intended as a substitute for professional medical care. Always follow your healthcare professional's instructions.           Patient Education     Recovering from Addiction    Recovery means making a new life for yourself. This includes finding new interests. It involves building new relationships. It means taking better care of yourself. These will all help you replace substance use with a new and healthier life. They will also help you avoid the things that could make you want to use again.  Make lifestyle changes  A big part of recovery is changing habits. These are habits that may have led to your substance abuse. It s also a time for personal growth. Below are some changes you may want to make.    Find new activities and goals. You may want to try new hobbies and interests. Or, you may want to join an activity group to meet new people.    Build relationships. You may choose to spend more time with loved ones you lost touch with while you were using. You may also want to  make new friends. And there may be some friends or family members you will not want to see because they are still using.    Exercise and eat well. Get some physical activity on most days. This can help you feel better. Eat healthy meals with lots of fruits, vegetables, and whole grains. This can also help your well-being. A nutritionist or fitness expert can help you.    Maintain ties with medical and addiction professionals. While you may not need intense professional support, it is important to have reliable safety nets so you can access professional assistance when needed.    Relax and get enough sleep. Good sleep can help you feel better. So can less stress. Ask your counselor about relaxation exercises. These may include meditation. Also ask about stress management classes.  Date Last Reviewed: 2/1/2017 2000-2019 [employer]. [address] [place] [address], [place] [address]. All rights reserved. This information is not intended as a substitute for professional medical care. Always follow your healthcare professional's instructions.               Return in about 2 months (around 12/6/2020) for Lab Work, next annual exam or as needed.    [facility] PRIMARY CARE [place]

## 2020-10-12 ENCOUNTER — OFFICE VISIT (OUTPATIENT)
Dept: ADDICTION MEDICINE | Facility: CLINIC | Age: 60
End: 2020-10-12
Payer: COMMERCIAL

## 2020-10-12 VITALS
BODY MASS INDEX: 24.42 KG/M2 | SYSTOLIC BLOOD PRESSURE: 118 MMHG | HEART RATE: 74 BPM | OXYGEN SATURATION: 96 % | WEIGHT: 206 LBS | DIASTOLIC BLOOD PRESSURE: 60 MMHG

## 2020-10-12 DIAGNOSIS — F10.20 ALCOHOL USE DISORDER, SEVERE, DEPENDENCE (H): Primary | ICD-10-CM

## 2020-10-12 PROCEDURE — 99213 OFFICE O/P EST LOW 20 MIN: CPT | Performed by: FAMILY MEDICINE

## 2020-10-14 NOTE — PROGRESS NOTES
SUBJECTIVE:                                                    ADDICTION MEDICINE NOTE    Sohan Marcano is a 60 year old male who presents to clinic today for Addiction Medicine consultation    Date of last visit:  Initial Addiction Medicine visit 9/22/20    [place] Board of Pharmacy Data Base Reviewed:    Yes ;     no issues; checked 10/12/20      Brief History:    Patient known to me from detox admission 5 years ago    Long history of alcohol dependence    Has resisted going to treatment over the years    Has resisted seriously trying to stop    Longest sober period was 6 months    Says he's physically addicted to alcohol and needs to drink every 6 hrs. to avoid withdrawal    Has history of withdrawal needing detox    Has history of tremors, hallucinations    Can't sleep at night without drinking    Last drink this morning    Doesn't want to go to detox; Discussed at length     Says he's now ready to stop drinking for good    Says he's not functioning lately; has reduced tolerance to alcohol and it makes him sleep all day    Living with former detox nurse    PMH: reviewed     Has history of Hepatitis C treated 2019          HPI:    10/12/20    Here in follow up    Says he's sober    Still declines going to AA; Discussed at length      Not asking for help    Cannot use naltrexone as a MAT for him as his bilirubin is too high    Declines other mat medications     Discussed recovery at length    Advised he can return to see me whenever he would like my help            Social History     Social History Narrative     Not on file       Patient Active Problem List    Diagnosis Date Noted     Alcohol dependence in remission (H) 07/15/2019     Priority: Medium     Hepatitis C virus infection without hepatic coma, unspecified chronicity 07/15/2019     Priority: Medium     Abdominal pain 09/23/2018     Priority: Medium     Basal cell carcinoma of anterior chest s/p excision 3-10-15 03/10/2015     Priority: Medium      Squamous cell carcinoma mid upper chest s/p excision 3-10-15 03/10/2015     Priority: Medium     Chemical dependency (H) 03/09/2014     Priority: Medium     Alcohol withdrawal (H) 03/07/2014     Priority: Medium       Problem list and histories reviewed & adjusted, as indicated.  Additional history: as documented           albuterol (PROAIR HFA/PROVENTIL HFA/VENTOLIN HFA) 108 (90 Base) MCG/ACT inhaler, Inhale 2 puffs into the lungs every 6 hours as needed for shortness of breath / dyspnea or wheezing       calcium carbonate (TUMS) 500 MG chewable tablet, Take 1 tablet (500 mg) by mouth At Bedtime       cloNIDine (CATAPRES) 0.1 MG tablet, Take 1 tablet (0.1 mg) by mouth 3 times daily as needed (Anxiety)       diazepam (VALIUM) 10 MG tablet, Take 1 tablet (10 mg) by mouth every 4 hours as needed for anxiety       ferrous sulfate (FEROSUL) 325 (65 Fe) MG tablet, Take 1 tablet (325 mg) by mouth daily (with breakfast)       folic acid (FOLVITE) 1 MG tablet, Take 1 tablet (1 mg) by mouth daily       gabapentin (NEURONTIN) 300 MG capsule, Take 1 capsule (300 mg) by mouth 3 times daily as needed (anxiety)       magnesium 250 MG tablet, Take 1 tablet (250 mg) by mouth At Bedtime       multivitamin w/minerals (THERA-VIT-M) tablet, Take 1 tablet by mouth daily (with breakfast)       potassium chloride ER (KLOR-CON M) 10 MEQ CR tablet, Take 1 tablet (10 mEq) by mouth daily       thiamine (B-1) 100 MG tablet, Take 1 tablet (100 mg) by mouth daily (with breakfast)    No current facility-administered medications on file prior to visit.       No Known Allergies        REVIEW OF SYSTEMS:  General:  No acute withdrawal symptoms.  No recent infections or fever  Eyes:  No vision concerns.  No double vision.    Resp: No coughing, wheezing or shortness of breath  CV: No chest pains or palpitations  GI: No nausea, vomiting, abdominal pain, diarrhea.  No constipation  : No urinary frequency or dysuria    Musculoskeletal: No significant  muscle or joint pains other than as above.  No edema  Neurologic: No numbness, tingling, weakness, problems with balance or coordination  Psychiatric: No acute concerns other than as above.   Skin: No rashes or areas of acute infection    OBJECTIVE:    PHYSICAL EXAM:  /60   Pulse 74   Wt 93.4 kg (206 lb)   SpO2 96%   BMI 24.42 kg/m      GENERAL: Healthy, alert and no distress  EYES: Eyes grossly normal to inspection.  No discharge or erythema, or obvious scleral/conjunctival abnormalities.  RESP: No audible wheeze, cough, or visible cyanosis.  No visible retractions or increased work of breathing.    SKIN: Visible skin clear. No significant rash, abnormal pigmentation or lesions.  NEURO: Cranial nerves grossly intact.  Mentation and speech appropriate for age.  PSYCH: Mentation appears normal, affect normal/bright, judgement and insight intact, normal speech and appearance well-groomed.    No results found for any visits on 10/12/20.        ASSESSMENT:    ALCOHOL DEPENDENCE    ALCOHOLIC LIVER DISEASE    PLAN:    CONTINUE ABSTINENCE    RE-CHECK PRN            Kehinde Valenzuela MD  [facility] Addiction Medicine  [phone]

## 2021-01-25 ENCOUNTER — OFFICE VISIT (OUTPATIENT)
Dept: DERMATOLOGY | Facility: CLINIC | Age: 61
End: 2021-01-25
Payer: COMMERCIAL

## 2021-01-25 DIAGNOSIS — L57.0 ACTINIC KERATOSIS: ICD-10-CM

## 2021-01-25 DIAGNOSIS — D48.5 NEOPLASM OF UNCERTAIN BEHAVIOR OF SKIN: Primary | ICD-10-CM

## 2021-01-25 PROCEDURE — 11103 TANGNTL BX SKIN EA SEP/ADDL: CPT | Performed by: PHYSICIAN ASSISTANT

## 2021-01-25 PROCEDURE — 88305 TISSUE EXAM BY PATHOLOGIST: CPT | Performed by: PATHOLOGY

## 2021-01-25 PROCEDURE — 17000 DESTRUCT PREMALG LESION: CPT | Mod: 59 | Performed by: PHYSICIAN ASSISTANT

## 2021-01-25 PROCEDURE — 99214 OFFICE O/P EST MOD 30 MIN: CPT | Mod: 25 | Performed by: PHYSICIAN ASSISTANT

## 2021-01-25 PROCEDURE — 11102 TANGNTL BX SKIN SINGLE LES: CPT | Performed by: PHYSICIAN ASSISTANT

## 2021-01-25 PROCEDURE — 17003 DESTRUCT PREMALG LES 2-14: CPT | Performed by: PHYSICIAN ASSISTANT

## 2021-01-25 RX ORDER — LIDOCAINE HYDROCHLORIDE AND EPINEPHRINE 10; 10 MG/ML; UG/ML
3 INJECTION, SOLUTION INFILTRATION; PERINEURAL ONCE
Status: DISCONTINUED | OUTPATIENT
Start: 2021-01-25 | End: 2023-05-17

## 2021-01-25 ASSESSMENT — PAIN SCALES - GENERAL: PAINLEVEL: NO PAIN (0)

## 2021-01-25 NOTE — NURSING NOTE
Lidocaine-epinephrine 1-1:287284 % injection   2mL once for one use, starting 1/25/2021 ending 1/25/2021,  2mL disp, R-0, injection  Injected by Marie Brothers, CMA

## 2021-01-25 NOTE — PROGRESS NOTES
[facility] Dermatology Note  Encounter Date: Jan 25, 2021  Office Visit      Dermatology Problem List:  1. 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 Family history of Melanoma (Father)  3.  Actinic keratosis  -s/p cryo 12/13/2014, 10/17/2019, 1/25/21  4. Seborrheic keratosis  5. Telangiectasia  6. NUB x2 - R anterior shoulder, R temple - s/p bx 1/25/21    Social: Always worked outdoors.   ____________________________________________    Assessment & Plan:  # History of nonmelanoma skin cancer, no clincial evidence of recurrence. NERD on the chest x2.  -continue with annual skin exams, possible bi annual pending path (see below)    # Fhx melanoma/Skin cancer screening.   - ABCDEs: Counseled ABCDEs of melanoma: Asymmetry, Border (irregularity), Color (not uniform, changes in color), Diameter (greater than 6 mm which is about the size of a pencil eraser), and Evolving (any changes in preexisting moles).  - Sun protection: Counseled SPF30+ sunscreen, UPF clothing, sun avoidance, tanning bed avoidance.  - continue with annual skin exams     # Neoplasm of uncertain behavior on the R anterior shoulder. The differential diagnosis includes BCC vs other.   - Shave biopsy performed today (see procedure note(s) below).     # Neoplasm of uncertain behavior on the R temple. The differential diagnosis includes BCC vs ISK vs other.   - Shave biopsy performed today (see procedure note(s) below)     #AKs - forehead x3, chest x2, R forearm x1  - cryotherapy - see procedure note    # Seborrheic keratosis, non irritated.   -reassured benign    Procedures Performed:   - Shave biopsy procedure note, location(s): R anterior shoulder. After discussion of benefits and risks including but not limited to bleeding, infection, scar, incomplete removal, recurrence, and non-diagnostic biopsy, written consent and photographs were obtained. The  area was cleaned with isopropyl alcohol. 0.5mL of 1% lidocaine with epinephrine was injected to obtain adequate anesthesia of lesion(s). Shave biopsy at site(s) performed. Hemostasis was achieved with aluminium chloride. Petrolatum ointment and a sterile dressing were applied. The patient tolerated the procedure and no complications were noted. The patient was provided with verbal and written post care instructions.      - Shave biopsy procedure note, location(s): R temple. After discussion of benefits and risks including but not limited to bleeding, infection, scar, incomplete removal, recurrence, and non-diagnostic biopsy, written consent and photographs were obtained. The area was cleaned with isopropyl alcohol. 0.5mL of 1% lidocaine with epinephrine was injected to obtain adequate anesthesia of lesion(s). Shave biopsy at site(s) performed. Hemostasis was achieved with aluminium chloride. Petrolatum ointment and a sterile dressing were applied. The patient tolerated the procedure and no complications were noted. The patient was provided with verbal and written post care instructions.      - Cryotherapy procedure note, location(s): forehead, R forearm, chest. After verbal consent and discussion of risks and benefits including, but not limited to, dyspigmentation/scar, blister, and pain, 6 lesion(s) was(were) treated with 1-2 mm freeze border for 1-2 cycles with liquid nitrogen. Post cryotherapy instructions were provided.     Follow-up: 1 year(s) in-person, or earlier for new or changing lesions    Staff:     All risks, benefits and alternatives were discussed with patient.  Patient is in agreement and understands the assessment and plan.  All questions were answered.    April Russo PA-C, MPAS  [facility] [place]: Phone: [phone], Fax: [phone]  [facility]: Phone: [phone],  Fax:  [phone]  ____________________________________________    CC: Skin Check (Personal hx of SCC and BCC. Sohan has a few spots of concern today.)    HPI:  Mr. Sohan Marcano is a 60 year old male who presents today as a return patient for a skin check. Hx SCC and BCC in the past. Few spots of concern today, few on the forehead/temples and one of concern on the chest. One of the spots on the forehead has bled, but no others. Usually when he picks at it. No spots are painful or itchy. Lastly, has a spot of concern on the r forearm which is scaly. Fhx melanoma (father). Brother past away from SCC.    Patient is otherwise feeling well, without additional concerns.    Labs:  n/a    Physical Exam:  Vitals: There were no vitals taken for this visit.  SKIN: Full skin, which includes the head/face, both arms, chest, back, abdomen,both legs, genitalia and/or groin buttocks, digits and/or nails, was examined.   - Slater's skin type I, less than 100 nevi  - There are erythematous macules with overyling adherent scale on the forehead x3, chest x2, R forearm x1  - There are waxy stuck on tan to brown papules on the trunk and lower extremities.   - There is no erythema, telangectasias, nodularity, or pigmentation on the chest x2..   - R anterior shoulder - 6mm pink shiny papule  - R temple - 4mm pink papule with central erosion  - No other lesions of concern on areas examined.     Medications:  Current Outpatient Medications   Medication     albuterol (PROAIR HFA/PROVENTIL HFA/VENTOLIN HFA) 108 (90 Base) MCG/ACT inhaler     calcium carbonate (TUMS) 500 MG chewable tablet     ferrous sulfate (FEROSUL) 325 (65 Fe) MG tablet     folic acid (FOLVITE) 1 MG tablet     magnesium 250 MG tablet     multivitamin w/minerals (THERA-VIT-M) tablet     potassium chloride ER (KLOR-CON M) 10 MEQ CR tablet     thiamine (B-1) 100 MG tablet     No current facility-administered medications for this visit.       Past Medical/Surgical History:    Patient Active Problem List   Diagnosis     Alcohol withdrawal (H)     Chemical dependency (H)     Basal cell carcinoma of anterior chest s/p excision 3-10-15     Squamous cell carcinoma mid upper chest s/p excision 3-10-15     Abdominal pain     Alcohol dependence in remission (H)     Hepatitis C virus infection without hepatic coma, unspecified chronicity     Past Medical History:   Diagnosis Date     Actinic keratosis      Basal cell carcinoma      Squamous cell carcinoma      Substance abuse (H)     alcohol     Uncomplicated asthma      CC Dr. Rutledge on close of this encounter.

## 2021-01-25 NOTE — NURSING NOTE
Dermatology Rooming Note    Sohan Marcano's goals for this visit include:   Chief Complaint   Patient presents with     Skin Check     Personal hx of SCC and BCC. Sohan has a few spots of concern today.     Marie Brothers, CMA

## 2021-01-25 NOTE — PATIENT INSTRUCTIONS
Wound Care After a Biopsy    What is a skin biopsy?  A skin biopsy allows the doctor to examine a very small piece of tissue under the microscope to determine the diagnosis and the best treatment for the skin condition. A local anesthetic (numbing medicine)  is injected with a very small needle into the skin area to be tested. A small piece of skin is taken from the area. Sometimes a suture (stitch) is used.     What are the risks of a skin biopsy?  I will experience scar, bleeding, swelling, pain, crusting and redness. I may experience incomplete removal or recurrence. Risks of this procedure are excessive bleeding, bruising, infection, nerve damage, numbness, thick (hypertrophic or keloidal) scar and non-diagnostic biopsy.    How should I care for my wound for the first 24 hours?    Keep the wound dry and covered for 24 hours    If it bleeds, hold direct pressure on the area for 15 minutes. If bleeding does not stop then go to the emergency room    Avoid strenuous exercise the first 1-2 days or as your doctor instructs you    How should I care for the wound after 24 hours?    After 24 hours, remove the bandage    You may bathe or shower as normal    If you had a scalp biopsy, you can shampoo as usual and can use shower water to clean the biopsy site daily    Clean the wound twice a day with gentle soap and water    Do not scrub, be gentle    Apply white petroleum/Vaseline after cleaning the wound with a cotton swab or a clean finger, and keep the site covered with a Bandaid /bandage. Bandages are not necessary with a scalp biopsy    If you are unable to cover the site with a Bandaid /bandage, re-apply ointment 2-3 times a day to keep the site moist. Moisture will help with healing    Avoid strenuous activity for first 1-2 days    Avoid lakes, rivers, pools, and oceans until the stitches are removed or the site is healed    How do I clean my wound?    Wash hands thoroughly with soap or use hand  before all  wound care    Clean the wound with gentle soap and water    Apply white petroleum/Vaseline  to wound after it is clean    Replace the Bandaid /bandage to keep the wound covered for the first few days or as instructed by your doctor    If you had a scalp biopsy, warm shower water to the area on a daily basis should suffice    What should I use to clean my wound?     Cotton-tipped applicators (Qtips )    White petroleum jelly (Vaseline ). Use a clean new container and use Q-tips to apply.    Bandaids   as needed    Gentle soap     How should I care for my wound long term?    Do not get your wound dirty    Keep up with wound care for one week or until the area is healed.    A small scab will form and fall off by itself when the area is completely healed. The area will be red and will become pink in color as it heals. Sun protection is very important for how your scar will turn out. Sunscreen with an SPF 30 or greater is recommended once the area is healed.    If you have stitches, stitches need to be removed in 14 days. You may return to our clinic for this or you may have it done locally at your doctor s office.    You should have some soreness but it should be mild and slowly go away over several days. Talk to your doctor about using tylenol for pain,    When should I call my doctor?  If you have increased:     Pain or swelling    Pus or drainage (clear or slightly yellow drainage is ok)    Temperature over 100F    Spreading redness or warmth around wound    When will I hear about my results?  The biopsy results can take 2-3 weeks to come back. The clinic will call you with the results, send you a virocytt message, or have you schedule a follow-up clinic or phone time to discuss the results. Contact our clinics if you do not hear from us in 3 weeks.     Who should I call with questions?    [facility]: [phone]     [facility]: [phone]    For  urgent needs outside of business hours call the [facility] at [phone] and ask for the dermatology resident on call    The ABCDEs of Melanoma    Skin cancer can develop anywhere on the skin. Ask someone for help when checking your skin, especially in hard to see places. If you notice a mole different from others, or that changes, enlarges, itches, or bleeds (even if it is small), you should see a dermatologist.              Sun protective clothing and Resources     [employer] (www.Leonardo [employer])  Athleta (www.Siklu.Flitto)  yetu (wwwAgility Communications)  Carve Designs ([employer]) - affordable  Skinz (MycoTechnologyskinz.com)    Long sleeve - Cb Cool DRI UPF 50 or Isanti PFG UPF 50  Hoodie - Isanti PFG UPF 50  Swimshirt/Rash Guard - Michela UPF 50 (on Amazon)  Neck - Outdoor Research Ubertubes (www.outdoorresPrivatext.com)

## 2021-01-25 NOTE — LETTER
1/25/2021       RE: Sohan Marcano  [address] [facility] [address]     Dear Colleague,    Thank you for referring your patient, Sohan Marcano, to the [facility]ERMAT[facility] [place] at [facility]. Please see a copy of my visit note below.    [facility] Dermatology Note  Encounter Date: Jan 25, 2021  Office Visit      Dermatology Problem List:  1. 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 Family history of Melanoma (Father)  3.  Actinic keratosis  -s/p cryo 12/13/2014, 10/17/2019, 1/25/21  4. Seborrheic keratosis  5. Telangiectasia  6. NUB x2 - R anterior shoulder, R temple - s/p bx 1/25/21    Social: Always worked outdoors.   ____________________________________________    Assessment & Plan:  # History of nonmelanoma skin cancer, no clincial evidence of recurrence. NERD on the chest x2.  -continue with annual skin exams, possible bi annual pending path (see below)    # Fhx melanoma/Skin cancer screening.   - ABCDEs: Counseled ABCDEs of melanoma: Asymmetry, Border (irregularity), Color (not uniform, changes in color), Diameter (greater than 6 mm which is about the size of a pencil eraser), and Evolving (any changes in preexisting moles).  - Sun protection: Counseled SPF30+ sunscreen, UPF clothing, sun avoidance, tanning bed avoidance.  - continue with annual skin exams     # Neoplasm of uncertain behavior on the R anterior shoulder. The differential diagnosis includes BCC vs other.   - Shave biopsy performed today (see procedure note(s) below).     # Neoplasm of uncertain behavior on the R temple. The differential diagnosis includes BCC vs ISK vs other.   - Shave biopsy performed today (see procedure note(s) below)     #AKs - forehead x3, chest x2, R forearm x1  - cryotherapy - see procedure note    # Seborrheic keratosis, non irritated.    -reassured benign    Procedures Performed:   - Shave biopsy procedure note, location(s): R anterior shoulder. After discussion of benefits and risks including but not limited to bleeding, infection, scar, incomplete removal, recurrence, and non-diagnostic biopsy, written consent and photographs were obtained. The area was cleaned with isopropyl alcohol. 0.5mL of 1% lidocaine with epinephrine was injected to obtain adequate anesthesia of lesion(s). Shave biopsy at site(s) performed. Hemostasis was achieved with aluminium chloride. Petrolatum ointment and a sterile dressing were applied. The patient tolerated the procedure and no complications were noted. The patient was provided with verbal and written post care instructions.      - Shave biopsy procedure note, location(s): R temple. After discussion of benefits and risks including but not limited to bleeding, infection, scar, incomplete removal, recurrence, and non-diagnostic biopsy, written consent and photographs were obtained. The area was cleaned with isopropyl alcohol. 0.5mL of 1% lidocaine with epinephrine was injected to obtain adequate anesthesia of lesion(s). Shave biopsy at site(s) performed. Hemostasis was achieved with aluminium chloride. Petrolatum ointment and a sterile dressing were applied. The patient tolerated the procedure and no complications were noted. The patient was provided with verbal and written post care instructions.      - Cryotherapy procedure note, location(s): forehead, R forearm, chest. After verbal consent and discussion of risks and benefits including, but not limited to, dyspigmentation/scar, blister, and pain, 6 lesion(s) was(were) treated with 1-2 mm freeze border for 1-2 cycles with liquid nitrogen. Post cryotherapy instructions were provided.     Follow-up: 1 year(s) in-person, or earlier for new or changing lesions    Staff:     All risks, benefits and alternatives were discussed with patient.  Patient is in agreement and  understands the assessment and plan.  All questions were answered.    April Russo PA-C, MPAS  [facility] [place]: Phone: [phone], Fax: [phone]  [facility]: Phone: [phone],  Fax: [phone]  ____________________________________________    CC: Skin Check (Personal hx of SCC and BCC. Sohan has a few spots of concern today.)    HPI:  Mr. Sohan Marcano is a 60 year old male who presents today as a return patient for a skin check. Hx SCC and BCC in the past. Few spots of concern today, few on the forehead/temples and one of concern on the chest. One of the spots on the forehead has bled, but no others. Usually when he picks at it. No spots are painful or itchy. Lastly, has a spot of concern on the r forearm which is scaly. Fhx melanoma (father). Brother past away from SCC.    Patient is otherwise feeling well, without additional concerns.    Labs:  n/a    Physical Exam:  Vitals: There were no vitals taken for this visit.  SKIN: Full skin, which includes the head/face, both arms, chest, back, abdomen,both legs, genitalia and/or groin buttocks, digits and/or nails, was examined.   - Slater's skin type I, less than 100 nevi  - There are erythematous macules with overyling adherent scale on the forehead x3, chest x2, R forearm x1  - There are waxy stuck on tan to brown papules on the trunk and lower extremities.   - There is no erythema, telangectasias, nodularity, or pigmentation on the chest x2..   - R anterior shoulder - 6mm pink shiny papule  - R temple - 4mm pink papule with central erosion  - No other lesions of concern on areas examined.     Medications:  Current Outpatient Medications   Medication     albuterol (PROAIR HFA/PROVENTIL HFA/VENTOLIN HFA) 108 (90 Base) MCG/ACT inhaler     calcium carbonate (TUMS) 500 MG chewable tablet     ferrous sulfate (FEROSUL) 325 (65 Fe) MG tablet     folic acid (FOLVITE)  1 MG tablet     magnesium 250 MG tablet     multivitamin w/minerals (THERA-VIT-M) tablet     potassium chloride ER (KLOR-CON M) 10 MEQ CR tablet     thiamine (B-1) 100 MG tablet     No current facility-administered medications for this visit.       Past Medical/Surgical History:   Patient Active Problem List   Diagnosis     Alcohol withdrawal (H)     Chemical dependency (H)     Basal cell carcinoma of anterior chest s/p excision 3-10-15     Squamous cell carcinoma mid upper chest s/p excision 3-10-15     Abdominal pain     Alcohol dependence in remission (H)     Hepatitis C virus infection without hepatic coma, unspecified chronicity     Past Medical History:   Diagnosis Date     Actinic keratosis      Basal cell carcinoma      Squamous cell carcinoma      Substance abuse (H)     alcohol     Uncomplicated asthma      CC Dr. Rutledge on close of this encounter.

## 2021-01-26 LAB — COPATH REPORT: NORMAL

## 2021-02-03 ENCOUNTER — TELEPHONE (OUTPATIENT)
Dept: DERMATOLOGY | Facility: CLINIC | Age: 61
End: 2021-02-03

## 2021-02-03 DIAGNOSIS — C44.92 SCC (SQUAMOUS CELL CARCINOMA): ICD-10-CM

## 2021-02-03 DIAGNOSIS — C44.91 BCC (BASAL CELL CARCINOMA OF SKIN): Primary | ICD-10-CM

## 2021-02-03 NOTE — TELEPHONE ENCOUNTER
[facility] Call Center    Phone Message    May a detailed message be left on voicemail: yes     Reason for Call: Requesting Results   Name/type of test: Biopsy    Date of test:   1/25/21    Was test done at a location other than [facility] (Please fill in the location if not [facility])?: No      Action Taken: Message routed to:  [facility] Derm    Travel Screening: Not Applicable     Pt is returning call about his Biopsy results. Please call him back to discuss.    Thank you-

## 2021-02-03 NOTE — TELEPHONE ENCOUNTER
Patient notified of results. Verbal understanding and Mohs consult.  Celia Michelle, [facility] Rory Jarvis EMT

## 2021-02-03 NOTE — TELEPHONE ENCOUNTER
FUTURE VISIT INFORMATION      FUTURE VISIT INFORMATION:    Date: 2.9.21    Time: 1:30    Location: Telephone  REFERRAL INFORMATION:    Referring provider:  April Russo PA-C    Referring providers clinic:  [facility]    Reason for visit/diagnosis  New BCC right shoulder and SCC on temple Phone visit    RECORDS REQUESTED FROM:       Clinic name Comments Records Status Photos Status   [facility] Derm 1.25.21  Path #  [facility] Epic

## 2021-02-09 ENCOUNTER — OFFICE VISIT (OUTPATIENT)
Dept: DERMATOLOGY | Facility: CLINIC | Age: 61
End: 2021-02-09
Payer: COMMERCIAL

## 2021-02-09 ENCOUNTER — PRE VISIT (OUTPATIENT)
Dept: DERMATOLOGY | Facility: CLINIC | Age: 61
End: 2021-02-09

## 2021-02-09 DIAGNOSIS — C44.612 BASAL CELL CARCINOMA (BCC) OF RIGHT SHOULDER: ICD-10-CM

## 2021-02-09 DIAGNOSIS — C44.329 SQUAMOUS CELL CARCINOMA OF SKIN OF RIGHT TEMPLE: Primary | ICD-10-CM

## 2021-02-09 PROCEDURE — 99441 PR PHYSICIAN TELEPHONE EVALUATION 5-10 MIN: CPT | Mod: TEL | Performed by: DERMATOLOGY

## 2021-02-09 ASSESSMENT — PAIN SCALES - GENERAL: PAINLEVEL: NO PAIN (0)

## 2021-02-09 NOTE — PROGRESS NOTES
[facility] Dermatology Note [phone]  Encounter Date: Feb 9, 2021  Store-and-Forward and Telephone (Encounter). Location of teledermatologist: [facility] [place].  Start time: 1335. End time: 1340.    Dermatology Problem List:  1. BCC, R anterior shoulder. Schedule Excision.   - SCC, R temple. Schedule Mohs.   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 Family history of Melanoma (Father)  3.  Actinic keratosis  -s/p cryo 12/13/2014, 10/17/2019, 1/25/21  4. Seborrheic keratosis  5. Telangiectasia     Social: Always worked outdoors.  Last FBSE - 1/25/21  ___________________________________________    Assessment & Plan:   # SCC, right temple. Schedule Mohs.   The nature, risks, benefits, and alternatives to Mohs surgery were discussed. The patient would like to proceed with Mohs surgery.  Schedule Mohs.   Patient does not take anticoagulants.   No indications for pre-op antibiotics.     # BCC, right anterior shoulder  The nature, risks, benefits, and alternatives to WLE surgery were discussed. The patient would like to proceed with WLE.   Schedule WLE.  Patient does not take anticoagulants.   No indications for pre-op antibiotics.     Procedures Performed:    None    Staff:     Maurilio Rutledge DO    Department of Dermatology  [facility]: Phone: [phone], Fax:[phone]  [facility]: Phone: [phone], Fax: [phone]    ____________________________________________    CC: Derm Problem (Roge is having consult for BCC right shoulder and SCC right temple )    HPI:  Mr. Sohan Marcano is a(n) 60 year old male who presents today as a return patient for Mohs Surgery Consultation for SCC of the right temple. Additionally he has a BCC on the right anterior  shoulder. He's had several skin cancers treated with surgery in the past, but never Mohs.     Patient is otherwise feeling well, without additional concerns.    Labs:  Dermpath report 1/25/21 reviewed     Physical Exam:  Vitals: There were no vitals taken for this visit.  SKIN: Teledermatology photos were reviewed; image quality and interpretability: acceptable. Image date: 1/25/21  - R temple, 6mm pink papule with superficial erosion.   - R anterior shoulder, 6mm pink pearly papule  - No other lesions of concern on areas examined.     Medications:  Current Outpatient Medications   Medication     albuterol (PROAIR HFA/PROVENTIL HFA/VENTOLIN HFA) 108 (90 Base) MCG/ACT inhaler     calcium carbonate (TUMS) 500 MG chewable tablet     ferrous sulfate (FEROSUL) 325 (65 Fe) MG tablet     folic acid (FOLVITE) 1 MG tablet     magnesium 250 MG tablet     multivitamin w/minerals (THERA-VIT-M) tablet     potassium chloride ER (KLOR-CON M) 10 MEQ CR tablet     thiamine (B-1) 100 MG tablet     Current Facility-Administered Medications   Medication     lidocaine 1% with EPINEPHrine 1:100,000 injection 3 mL      Past Medical/Surgical History:   Patient Active Problem List   Diagnosis     Alcohol withdrawal (H)     Chemical dependency (H)     Basal cell carcinoma of anterior chest s/p excision 3-10-15     Squamous cell carcinoma mid upper chest s/p excision 3-10-15     Abdominal pain     Alcohol dependence in remission (H)     Hepatitis C virus infection without hepatic coma, unspecified chronicity     Past Medical History:   Diagnosis Date     Actinic keratosis      Basal cell carcinoma      Squamous cell carcinoma      Substance abuse (H)     alcohol     Uncomplicated asthma

## 2021-02-09 NOTE — NURSING NOTE
Sohan Marcano's goals for this visit include:   Chief Complaint   Patient presents with     Derm Problem     Roge is having consult for BCC right shoulder and SCC right temple        He requests these members of his care team be copied on today's visit information:     PCP: Gracie Winters    Referring Provider:  Referred Self, MD  No address on file    There were no vitals taken for this visit.    Do you need any medication refills at today's visit? No    Yasmine Linda LPN

## 2021-02-09 NOTE — PATIENT INSTRUCTIONS
[facility] Dermatology Visit    Thank you for allowing us to participate in your care. Your findings, instructions and follow-up plan are as follows:         When should I call my doctor?    If you are worsening or not improving, please, contact us or seek urgent care as noted below.     Who should I call with questions (adults)?    [facility] (adult and pediatric): [phone]     [facility] (adult): [phone]    For urgent needs outside of business hours call the [facility] at [phone] and ask for the dermatology resident on call    If this is a medical emergency and you are unable to reach an ER, Call 911      Who should I call with questions (pediatric)?  [facility]- Pediatric Dermatology  Dr. Meaghan Shah, Dr. Cheyenne Mora, Dr. Brinda Vicente, Marlena Sosa, PA  Dr. Shelbie Mathew, Dr. Sallie Haines & Dr. Melchor Campos  Non Urgent  Nurse Triage Line; [phone]- Ayde and Ofelai RN Care Coordinators   Silvana (/Complex ) [phone]    If you need a prescription refill, please contact your pharmacy. Refills are approved or denied by our Physicians during normal business hours, Monday through Fridays  Per office policy, refills will not be granted if you have not been seen within the past year (or sooner depending on your child's condition)    Scheduling Information:  Pediatric Appointment Scheduling and Call Center [phone]  Radiology Scheduling- [phone]  Sedation Unit Scheduling- [phone]  [place] Scheduling- General [phone]; Pediatric Dermatology [phone]  Main  Services: [phone]  Frisian: [phone]  Zambian: [phone]  Hmong/Icelandic/Wolof: [phone]  Preadmission Nursing Department Fax Number: [phone] (Fax all pre-operative paperwork to this number)    For urgent matters arising during evenings,  weekends, or holidays that cannot wait for normal business hours please call [phone] and ask for the Dermatology Resident On-Call to be paged.

## 2021-02-09 NOTE — LETTER
2/9/2021       RE: Sohan Marcano  1945 [facility] [address]     Dear Colleague,    Thank you for referring your patient, Sohan Marcano, to the [facility] DERMATOLOGIC SURGERY CLINIC [place] at [facility]. Please see a copy of my visit note below.    [facility] Dermatology Note [phone]  Encounter Date: Feb 9, 2021  Store-and-Forward and Telephone (Encounter). Location of teledermatologist: [facility] DERMATOLOGIC SURGERY CLINIC [place].  Start time: 1335. End time: 1340.    Dermatology Problem List:  1. BCC, R anterior shoulder. Schedule Excision.   - SCC, R temple. Schedule Mohs.   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 Family history of Melanoma (Father)  3.  Actinic keratosis  -s/p cryo 12/13/2014, 10/17/2019, 1/25/21  4. Seborrheic keratosis  5. Telangiectasia     Social: Always worked outdoors.  Last FBSE - 1/25/21  ___________________________________________    Assessment & Plan:   # SCC, right temple. Schedule Mohs.   The nature, risks, benefits, and alternatives to Mohs surgery were discussed. The patient would like to proceed with Mohs surgery.  Schedule Mohs.   Patient does not take anticoagulants.   No indications for pre-op antibiotics.     # BCC, right anterior shoulder  The nature, risks, benefits, and alternatives to WLE surgery were discussed. The patient would like to proceed with WLE.   Schedule WLE.  Patient does not take anticoagulants.   No indications for pre-op antibiotics.     Procedures Performed:    None    Staff:     Maurilio Rutledge DO    Department of Dermatology  [facility]: Phone: [phone], Fax:[phone]  [facility]: Phone: [phone], Fax:  [phone]    ____________________________________________    CC: Derm Problem (Roge is having consult for BCC right shoulder and SCC right temple )    HPI:  Mr. Sohan Marcano is a(n) 60 year old male who presents today as a return patient for Mohs Surgery Consultation for SCC of the right temple. Additionally he has a BCC on the right anterior shoulder. He's had several skin cancers treated with surgery in the past, but never Mohs.     Patient is otherwise feeling well, without additional concerns.    Labs:  Dermpath report 1/25/21 reviewed     Physical Exam:  Vitals: There were no vitals taken for this visit.  SKIN: Teledermatology photos were reviewed; image quality and interpretability: acceptable. Image date: 1/25/21  - R temple, 6mm pink papule with superficial erosion.   - R anterior shoulder, 6mm pink pearly papule  - No other lesions of concern on areas examined.     Medications:  Current Outpatient Medications   Medication     albuterol (PROAIR HFA/PROVENTIL HFA/VENTOLIN HFA) 108 (90 Base) MCG/ACT inhaler     calcium carbonate (TUMS) 500 MG chewable tablet     ferrous sulfate (FEROSUL) 325 (65 Fe) MG tablet     folic acid (FOLVITE) 1 MG tablet     magnesium 250 MG tablet     multivitamin w/minerals (THERA-VIT-M) tablet     potassium chloride ER (KLOR-CON M) 10 MEQ CR tablet     thiamine (B-1) 100 MG tablet     Current Facility-Administered Medications   Medication     lidocaine 1% with EPINEPHrine 1:100,000 injection 3 mL      Past Medical/Surgical History:   Patient Active Problem List   Diagnosis     Alcohol withdrawal (H)     Chemical dependency (H)     Basal cell carcinoma of anterior chest s/p excision 3-10-15     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

## 2021-02-23 ENCOUNTER — OFFICE VISIT (OUTPATIENT)
Dept: DERMATOLOGY | Facility: CLINIC | Age: 61
End: 2021-02-23
Payer: COMMERCIAL

## 2021-02-23 VITALS — DIASTOLIC BLOOD PRESSURE: 83 MMHG | SYSTOLIC BLOOD PRESSURE: 134 MMHG | HEART RATE: 50 BPM

## 2021-02-23 DIAGNOSIS — D48.5 NEOPLASM OF UNCERTAIN BEHAVIOR OF SKIN: Primary | ICD-10-CM

## 2021-02-23 DIAGNOSIS — C44.329 SQUAMOUS CELL CARCINOMA OF SKIN OF RIGHT TEMPLE: ICD-10-CM

## 2021-02-23 DIAGNOSIS — C44.519 BASAL CELL CARCINOMA OF TRUNCAL SKIN: ICD-10-CM

## 2021-02-23 PROCEDURE — 11602 EXC TR-EXT MAL+MARG 1.1-2 CM: CPT | Mod: XS | Performed by: DERMATOLOGY

## 2021-02-23 PROCEDURE — 11102 TANGNTL BX SKIN SINGLE LES: CPT | Mod: XS | Performed by: DERMATOLOGY

## 2021-02-23 PROCEDURE — 88305 TISSUE EXAM BY PATHOLOGIST: CPT | Performed by: DERMATOLOGY

## 2021-02-23 PROCEDURE — 17311 MOHS 1 STAGE H/N/HF/G: CPT | Mod: GC | Performed by: DERMATOLOGY

## 2021-02-23 PROCEDURE — 12032 INTMD RPR S/A/T/EXT 2.6-7.5: CPT | Mod: GC | Performed by: DERMATOLOGY

## 2021-02-23 PROCEDURE — 12052 INTMD RPR FACE/MM 2.6-5.0 CM: CPT | Mod: XS | Performed by: DERMATOLOGY

## 2021-02-23 ASSESSMENT — PAIN SCALES - GENERAL: PAINLEVEL: NO PAIN (0)

## 2021-02-23 NOTE — LETTER
2/23/2021       RE: Sohan Marcano  1945 [facility] [address]     Dear Colleague,    Thank you for referring your patient, Sohan Marcano, to the [facility] DERMATOLOGIC SURGERY CLINIC [place] at [facility]. Please see a copy of my visit note below.    [facility] Dermatologic Surgery Clinic [place] Procedure Note    Dermatology Problem List:  0. NUB - Right elbow, bx'd 02/23/21, R/O SCC vs HAK  1. 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 Family history of Melanoma (Father)  3.  Actinic keratosis  -s/p cryo 12/13/2014, 10/17/2019, 1/25/21  4. Seborrheic keratosis  5. Telangiectasia      Date of Service:  Feb 23, 2021  Surgery: Mohs micrographic surgery, WLE, skin biopsy    Case 1  Repair Type: intermediate linear  Repair Size: 3.4 cm  Suture Material: 5-0 Monocryl and 5-0 FAG  Tumor Type: SCC  Location: R temple  Derm-Path Accession #:   PreOp Size: 0.5 x 0.5 cm  PostOp Size: 1.4 x 1.0 cm  Mohs Accession #:   Level of Defect: fat      Procedure:  We discussed the principles of treatment and most likely complications including scarring, bleeding, infection, swelling, pain, crusting, nerve damage, large wound,  incomplete excision, wound dehiscence,  nerve damage, recurrence, and a second procedure may be recommended to obtain the best cosmetic or functional result.    Informed consent was obtained and the patient underwent the procedure as follows:  The patient was placed supine on the operating table.  The cancer was identified, outlined with a marker, and verified by the patient.  The entire surgical field was prepped with chlorhexidine.  The surgical site was anesthetized using  1% lidocaine with 1:100,000 epinephrine    The area of clinically  apparent tumor was not debulked. The layer of tissue was then surgically excised using a #15 blade and was then transferred onto a specimen sheet maintaining the orientation of the specimen. Hemostasis was obtained using electrocoagulation. The wound site was then covered with a dressing while the tissue samples were processed for examination.    The excised tissue was transported to the [facility] histology laboratory maintaining the tissue orientation.  The tissue specimen was relaxed so that the entire surgical margin was in a a single horizontal plane for sectioning and inked for precise mapping.  A precise reference map was drawn to reflect the sectioning of the specimen, colored inking of the margins, and orientation on the patient. The tissue was processed using horizontal sectioning of the base and continuous peripheral margins.  The histopathologic sections were reviewed in conjunction with the reference map.    Total blocks: 1   Total slides:  2    There were no cancer cells visualized on examination, therefore Mohs surgery was complete.    REPAIR: An intermediate layered linear closure was selected as the procedure which would maximally preserve both function and cosmesis.    After the excision of the tumor, the area was carefully undermined. Hemostasis was obtained with electrocoagulation.  Closure was oriented so that the wound was in the patient's natural skin tension lines.   The subcutaneous and dermal layers were then closed with 5-0 Monocryl sutures. The epidermis was then carefully approximated along the length of the wound using 5-0 Fast absorbing gut simple running sutures.     Estimated blood loss was less than 10 ml for all surgical sites. A sterile pressure dressing was applied and wound care instructions, with a written handout, were given. The patient was discharged from the Dermatologic Surgery Center alert and ambulatory.    Dr. Rutledge was immediately available for the entire surgery and was  physicially present for the key portions of the procedure.                    Photo placed into patients chart note for further reference.     Case 2 - Wide local excision    DERMATOLOGY EXCISION PROCEDURE NOTE    NAME OF PROCEDURE: Excision intermediate layered linear closure  Staff surgeon: DO Eliceo Osuna: Ankur Bear MD  Resident: Eduarda Marrero MD  Scrub Nurse: Dayan SHORE    PRE-OPERATIVE DIAGNOSIS:  Basal Cell Carcinoma  POST-OPERATIVE DIAGNOSIS: Same   LOCATION: Right shoulder  FINAL EXCISION SIZE(DEFECT SIZE): 1.8 x 1.9 cm  MARGIN: 0.4 cm  FINAL REPAIR LENGTH: 6.9 cm   ANESTHESIA: 1% lidocaine with 1:100,000 epinephrine         INDICATIONS: This patient presented with a 1.0 x 1.1 cm Basal Cell Carcinoma. Excision was indicated. We discussed the principles of treatment and most likely complications including scarring, bleeding, infection, incomplete excision, wound dehiscence, pain, nerve damage, and recurrence. Informed consent was obtained and the patient underwent the procedure as follows:    PROCEDURE: The patient was taken to the operative suite. Time-out was performed.  The treatment area was anesthetized with 1% lidocaine with epinephrine. The area was prepped with Chlorhexidine and rinsed with sterile saline and draped with sterile towels. The lesion was delineated and excised down to subcutaneous fat in a elliptical manner. Hemostasis was obtained by electrocoagulation.     REPAIR: An intermediate layered linear closure was selected as the procedure which would maximally preserve both function and cosmesis.    After the excision of the tumor, the area was carefully undermined. Hemostasis was obtained with electrocoagulation.  Closure was oriented so that the wound was in the patient's natural skin tension lines. The subcutaneous and dermal layers were then closed with 4-0 Monocryl sutures. The epidermis was then carefully approximated along the length of the wound using 4-0 Prolene  running sutures.     Estimated blood loss was less than 10 ml for all surgical sites. A sterile pressure dressing was applied and wound care instructions, with a written handout, were given. The patient was discharged from the Dermatologic Surgery Center alert and ambulatory.    Follow-up in 2 weeks for suture removal.     Dr. Rutledge was immediately available for the entire surgery and was physicially present for the key portions of the procedure.            Photo placed into patients chart note for further reference.     Anatomic Pathology Results: pending    Clinical Follow-Up: not yet scheduled    Staff Involved:  Resident/Staff       Case 3 - shave biopsy    S: patient expressed concern about a scaly lesion on the right arm. Denies any tenderness of the spot. Says that this spot has been frozen in the past.     O:  Physical exam:  On the right arm just proximal to the R elbow, there is a hyperkeratotic pink papule       A/P  1. NUB, right arm just proximal to R elbow  History and exam concerning for NMSC. Discussed with patient the option of doing a skin biopsy at today's visit and patient elected for this. DDx HAK vs SCC vs BCC  - SHAVE BIOPSY PROCEDURE NOTE: After written informed consent was obtained, a time out was taken to identify the patient and the correct site for biopsy. The lesion on the Right elbow was cleansed with a 70% isopropyl alcohol wipe, and then injected with 2cc of lidocaine 1% with epinephrine 1:100,000. Once anesthesia was ensured, the lesion was biopsied using a Dermablade in standard technique. Hemostasis was obtained with electrocoagulation. The specimen was placed in a labeled formalin container and sent to pathology for sectioning and analysis. The wound was dressed with white petrolatum and an adhesive bandage. The patient tolerated the procedure well. Post-procedure instructions and recommendations were provided both verbally and in writing.      Patient seen and discussed with  attending physician, Dr. Rutledge.     Eduarda Marrero MD  Dermatology resident     Staff Physician Comments:   I saw and evaluated the patient with the resident (Dr. Eduarda Marrero) and I agree with the assessment and plan and descriptions of the procedures as above. I was present for the key portions of the above procedures.       Maurilio Rutledge DO    Department of Dermatology  [facility]: Phone: [phone], Fax:[phone]  [facility]: Phone: [phone], Fax: [phone]    Care and Laboratory Testing Performed at:  [facility]   Clinics and Surgery Center - Dermatologic Surgery Clinic  [address] [place] [address]   Mail Code [id number], [place] [address]

## 2021-02-23 NOTE — PATIENT INSTRUCTIONS
Wound Care Instructions  I will experience scar, altered skin color, bleeding, swelling, pain, crusting and redness. I may experience altered sensation. Risks are excessive bleeding, infection, muscle weakness, thick (hypertrophic or keloidal) scar, and recurrence,. A second procedure may be recommended to obtain the best cosmetic or functional result.  Possible complications of any surgical procedure are bleeding, infection, scarring, alteration in skin color and sensation, muscle weakness in the area, wound dehiscence or seperation, or recurrence of the lesion or disease. On occasion, after healing, a secondary procedure or revision may be recommended in order to obtain the best cosmetic or functional result.   After your surgery, a pressure bandage will be placed over the area that has sutures. This will help prevent bleeding.   For the First 48 hours After Surgery:  1. Leave the pressure bandage on and keep it dry. If it should come loose, you may retape it, but do not take it off.  2. Relax and take it easy. Do not do any vigorous exercise, heavy lifting, or bending forward. This could cause the wound to bleed.  3. Post-operative pain is usually mild. You may take plain or extra strength Tylenol every 4 hours as needed (do not take more than 4,000mg in one day). Do not take any medicine that contains aspirin, ibuprofen or motrin unless you have been recommended these by a doctor.  Avoid alcohol and vitamin E as these may increase your tendency to bleed.  4. You may put an ice pack around the bandaged area for 20 minutes every 2-3 hours. This may help reduce swelling, bruising, and pain. Make sure the ice pack is waterproof so that the pressure bandage does not get wet.   5. You may see a small amount of drainage or blood on your pressure bandage. This is normal. However, if drainage or bleeding continues or saturates the bandage, you will need to apply firm pressure over the bandage with a washcloth for 15  minutes. If bleeding continues after applying pressure for 15 minutes then go to the nearest emergency room.  48 Hours After Surgery  Carefully remove the bandage and start daily wound care and dressing changes. You may also now shower and get the wound wet. Wash wound with a mild soap and water.  Use caution when washing the wound. Be gentle and do not let the forceful shower stream hit the wound directly.  PAT dry.  Daily Wound Care:  1. Wash wound with a mild soap and water.  Use caution when washing the wound, be gentle and do not let the forceful shower stream hit the wound directly.  2. PAT DRY.  3. Apply Vaseline (from a new container or tube) over the suture line with a Q-tip. It is very important to keep the wound continuously moist, as wounds heal best in a moist environment.  4.  Keep the site covered until sutures are removed, you can cover it with a Telfa (non-stick) dressing and tape or a band-aid.    5. If you are unable to keep wound covered, you must apply Vaseline every 2 - 3 hours (while awake) to ensure it is being kept moist for optimal healing. A dressing overnight is recommended to keep the area moist.   Call Us If:  1. You have pain that is not controlled with Tylenol.  2. You have signs or symptoms of an infection, such as: fever over 100 degrees F, redness, warmth, or foul-smelling or yellow/creamy drainage from the wound.  Who should I call with questions?    [facility]: [phone]     [facility]: [phone]    For urgent needs outside of business hours call the [facility] at [phone] and ask for the dermatology resident on call

## 2021-02-23 NOTE — NURSING NOTE
Chief Complaint   Patient presents with     Derm Problem     Patient is here today for mohs on right temple and right anterior shoulder.      Dayan WOOD CMA

## 2021-02-26 LAB — COPATH REPORT: NORMAL

## 2021-03-02 ENCOUNTER — DOCUMENTATION ONLY (OUTPATIENT)
Dept: CARE COORDINATION | Facility: CLINIC | Age: 61
End: 2021-03-02

## 2021-03-03 ENCOUNTER — TELEPHONE (OUTPATIENT)
Dept: DERMATOLOGY | Facility: CLINIC | Age: 61
End: 2021-03-03

## 2021-03-03 NOTE — TELEPHONE ENCOUNTER
Patient notified of results. Verbal understanding with no further questions.  Celia Michelle, [facility]

## 2021-03-03 NOTE — TELEPHONE ENCOUNTER
Maurilio Rutledge MD   3/2/2021 12:18 PM CST      Please call the patient with pathology results.     The biopsy on his right elbow was a thick pre-cancer, hypertrophic AK. We can treat it with liquid nitrogen when he is in for his follow up next week.      The margins for the excision were clear.   As long as the wound is healing well, no additional surgery is necessary.  Thank you.

## 2021-03-03 NOTE — TELEPHONE ENCOUNTER
[facility]    Phone Message    May a detailed message be left on voicemail: yes     Reason for Call: Requesting Results   Name/type of test:   Biopsy    Date of test: 2/23/21    Was test done at a location other than [facility] (Please fill in the location if not [facility])?: No      Action Taken: Message routed to:  [facility] ([facility]): Derm    Travel Screening: Not Applicable   Scheduling was not able to connect with staff using Priority line.    Pt calling to get results of his biopsy with Dr Rutledge. Please return call to Pt.   Thank you.

## 2021-03-09 ENCOUNTER — OFFICE VISIT (OUTPATIENT)
Dept: DERMATOLOGY | Facility: CLINIC | Age: 61
End: 2021-03-09
Payer: COMMERCIAL

## 2021-03-09 DIAGNOSIS — Z51.89 VISIT FOR WOUND CHECK: Primary | ICD-10-CM

## 2021-03-09 PROCEDURE — 99024 POSTOP FOLLOW-UP VISIT: CPT | Mod: GC | Performed by: DERMATOLOGY

## 2021-03-09 NOTE — NURSING NOTE
Chief Complaint   Patient presents with     Derm Problem     suture removal R shoulder      Elsa Perales, EMT

## 2021-03-09 NOTE — PROGRESS NOTES
Dermatologic Surgery Note    Dermatology Surgery Clinic  [facility]  [address] [place] [address]    Dermatology Problem List:  1. 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 Family history of Melanoma (Father)  3.  Actinic keratosis  -s/p cryo 12/13/2014, 10/17/2019, 1/25/21  -Right elbow, s/p biopsy 2/23/2021  4. Seborrheic keratosis  5. Telangiectasia    Subjective: Mr. Marcano is a pleasant 60 year old man with history of non-melanoma skin cancer including recent excision of BCC of the right anterior shoulder and Mohs surgery for SCC of the right temple on 2/23/2021 who presents today for wound check.     He feels the temple has healed very well. He has no complaints here.     He reports mild drainage and pain in the 1-2 days after the excision of the shoulder. This area is now itchy and pink, but he denies any pain or drainage.     Hypertrophic actinic keratosis of the     Overall, the patient is doing well.    Objective:   Gen: This is a well appearing man in no acute distress. Patient is alert and oriented x 3.  An exam of the Right temple, right shoulder and right elbow was performed today   - Over the right temple is a well healing linear incision line  - Over the right shoulder is a well healing, pink to red, linear incision line without fluctuance or tenderness  - Over the right elbow is a hemorrhagic papule    Assessment and Plan:     (1) BCC, R anterior shoulder, s/p excision 2/23/21   - Healing well, remains pink, but not tender or with any fluctuance   - Continue gentle wound care 1 additional week, OK to remove sutures today  - Discussed the signs and symptoms of infection with the patient. He will call with any concerns. Also discussed empiric antibiotics, patient and clinician feel  this is not needed.     (2) SCC, R temple s/p MMS 2/23/21  - Healing well, OK to stop wound care    (3) Hypertrophic actinic keratosis of the right elbow  - Allow this area to heal, biopsy likely curative.     Patient was discussed with and evaluated by attending physician Dr. Aamir Bear MD  PGY-6    Micrographic Surgery and Dermatologic Oncology Fellow  March 9, 2021    Staff Physician Comments:   I saw and evaluated the patient with the Mohs Surgery Fellow (Dr. Ankur Bear) and I agree with the assessment and plan. I was present for the entire exam.    Maurilio Rutledge DO    Department of Dermatology  [facility]: Phone: [phone], Fax:[phone]  [facility]: Phone: [phone], Fax: [phone]

## 2021-03-09 NOTE — LETTER
3/9/2021       RE: Sohan Marcano  1945 [facility] [id number]     Dear Colleague,    Thank you for referring your patient, Sohan Marcano, to the [facility] DERMATOLOG[facility] [place] at [facility]. Please see a copy of my visit note below.    Dermatologic Surgery Note    Dermatology Surgery Clinic  [facility]  Clinics and Surgery Center  [address] [place] [address]    Dermatology Problem List:  1. 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 Family history of Melanoma (Father)  3.  Actinic keratosis  -s/p cryo 12/13/2014, 10/17/2019, 1/25/21  -Right elbow, s/p biopsy 2/23/2021  4. Seborrheic keratosis  5. Telangiectasia    Subjective: Mr. Marcano is a pleasant 60 year old man with history of non-melanoma skin cancer including recent excision of BCC of the right anterior shoulder and Mohs surgery for SCC of the right temple on 2/23/2021 who presents today for wound check.     He feels the temple has healed very well. He has no complaints here.     He reports mild drainage and pain in the 1-2 days after the excision of the shoulder. This area is now itchy and pink, but he denies any pain or drainage.     Hypertrophic actinic keratosis of the     Overall, the patient is doing well.    Objective:   Gen: This is a well appearing man in no acute distress. Patient is alert and oriented x 3.  An exam of the Right temple, right shoulder and right elbow was performed today   - Over the right temple is a well healing linear incision line  - Over the right shoulder is a well healing, pink to red, linear incision line without fluctuance or tenderness  - Over the right elbow is a hemorrhagic papule    Assessment and Plan:     (1) BCC, R anterior  shoulder, s/p excision 2/23/21   - Healing well, remains pink, but not tender or with any fluctuance   - Continue gentle wound care 1 additional week, OK to remove sutures today  - Discussed the signs and symptoms of infection with the patient. He will call with any concerns. Also discussed empiric antibiotics, patient and clinician feel this is not needed.     (2) SCC, R temple s/p MMS 2/23/21  - Healing well, OK to stop wound care    (3) Hypertrophic actinic keratosis of the right elbow  - Allow this area to heal, biopsy likely curative.     Patient was discussed with and evaluated by attending physician Dr. Aamir Bear MD  PGY-[id number]    Micrographic Surgery and Dermatologic Oncology Fellow  March 9, 2021    Staff Physician Comments:   I saw and evaluated the patient with the Mohs Surgery Fellow (Dr. Ankur Bear) and I agree with the assessment and plan. I was present for the entire exam.    Maurilio Rutledge DO    Department of Dermatology  [facility]: Phone: [phone], Fax:[phone]  [facility]: Phone: [phone], Fax: [phone]

## 2021-03-31 ENCOUNTER — OFFICE VISIT (OUTPATIENT)
Dept: FAMILY MEDICINE | Facility: CLINIC | Age: 61
End: 2021-03-31
Payer: COMMERCIAL

## 2021-03-31 VITALS
TEMPERATURE: 99.2 F | SYSTOLIC BLOOD PRESSURE: 140 MMHG | OXYGEN SATURATION: 96 % | HEART RATE: 95 BPM | DIASTOLIC BLOOD PRESSURE: 86 MMHG

## 2021-03-31 DIAGNOSIS — J44.9 CHRONIC OBSTRUCTIVE PULMONARY DISEASE, UNSPECIFIED COPD TYPE (H): ICD-10-CM

## 2021-03-31 DIAGNOSIS — M22.2X1 PATELLOFEMORAL ARTHRALGIA OF BOTH KNEES: Primary | ICD-10-CM

## 2021-03-31 DIAGNOSIS — M22.2X2 PATELLOFEMORAL ARTHRALGIA OF BOTH KNEES: Primary | ICD-10-CM

## 2021-03-31 DIAGNOSIS — M19.90 ARTHRITIS: ICD-10-CM

## 2021-03-31 PROCEDURE — 99214 OFFICE O/P EST MOD 30 MIN: CPT | Performed by: NURSE PRACTITIONER

## 2021-03-31 RX ORDER — ALBUTEROL SULFATE 90 UG/1
2 AEROSOL, METERED RESPIRATORY (INHALATION) EVERY 6 HOURS PRN
Qty: 8 G | Refills: 3 | Status: SHIPPED | OUTPATIENT
Start: 2021-03-31 | End: 2022-04-06

## 2021-03-31 NOTE — PROGRESS NOTES
Assessment & Plan       ICD-10-CM    1. Patellofemoral arthralgia of both knees  M22.2X1 diclofenac (VOLTAREN) 1 % topical gel    M22.2X2 PHYSICAL THERAPY REFERRAL     XR Knee Right 3 Views     XR Knee Left 1/2 Views   2. Arthritis  M19.90 diclofenac (VOLTAREN) 1 % topical gel     PHYSICAL THERAPY REFERRAL     XR Hand Bilateral G/E 3 Views   3. Alcoholism /alcohol abuse (H)  F10.20 Lipid panel reflex to direct LDL Fasting     CBC with platelets differential     Vitamin D Deficiency     TSH with free T4 reflex     Comprehensive metabolic panel     CANCELED: Comprehensive metabolic panel     CANCELED: Vitamin D Deficiency     CANCELED: TSH with free T4 reflex     CANCELED: CBC with platelets differential   4. Chronic obstructive pulmonary disease, unspecified COPD type (H)  J44.9 albuterol (PROAIR HFA/PROVENTIL HFA/VENTOLIN HFA) 108 (90 Base) MCG/ACT inhaler    discussed could be arthritis as well, does wish to do imaging of knees and hands and discussed topicals are the best right now due to liver disease-avoid acetaminophen  Tolerating 1 Ibuprofen in am, would do lab work if he continues this   Ice, see patient instructions   PT for knees to start asap, would consider steroid injection if not improving with this  Working on improving diet    Did admit at end of visit he is drinking 2-3 drinks again, he is going to try to limit this and feels not out of control; however, with his history discussed my serious concerns for this  Will copy chart to Dr. Valenzuela as FYI in case you'd like to reach out to pt to schedule with you. Not doing groups, still smoking                    See Patient Instructions    No follow-ups on file.    [facility]    Harris Parry is a 60 year old who presents for the following health issues     HPI     Arthritis   Onset/Duration: years worsening for months  Description  Location: knee and hand - bilateral  Joint Swelling: no  Redness:  no  Pain: no  Warmth: no  Intensity:  moderate, severe  Progression of Symptoms:  constant  Accompanying signs and symptoms:   Fevers: no  Numbness/tingling/weakness: no  History  Trauma to the area: no  Recent illness:  no  Previous similar problem: YES  Previous evaluation:  no  Precipitating or alleviating factors:  Aggravating factors include: sitting and working  Therapies tried and outcome: Ibuprofen and creme    No ibuprofen or tylenol now  Hep c liver issues  Knees and hands uses voltaren  1 Ibu in the Am  Working on diet  Knee pads help  Anterior bilateral     Daily 2-3 drinks in evening  History of hospitalization from alcohol, mental health    Review of Systems   Constitutional, HEENT, cardiovascular, pulmonary, GI, , musculoskeletal, neuro, skin, endocrine and psych systems are negative, except as otherwise noted.      Objective    BP (!) 140/86   Pulse 95   Temp 99.2  F (37.3  C) (Temporal)   SpO2 96%   There is no height or weight on file to calculate BMI.  Physical Exam   GENERAL: healthy, alert and no distress  EYES: Eyes grossly normal to inspection, PERRL and conjunctivae and sclerae normal  RESP: lungs clear to auscultation - no rales, rhonchi or wheezes  CV: regular rate and rhythm, normal S1 S2, no S3 or S4, no murmur, click or rub, no peripheral edema and peripheral pulses strong and brisk cap refill  ABDOMEN: soft, nontender, no hepatosplenomegaly, no masses and bowel sounds normal  MS: no gross musculoskeletal defects noted, no edema painful and grinding anterior posterior patella and otherwise normal, fingers normal  Full rom   SKIN: no suspicious lesions or rashes  NEURO: Normal strength and tone, mentation intact and speech normal  PSYCH: mentation appears normal, affect normal/bright

## 2021-03-31 NOTE — PATIENT INSTRUCTIONS
Call to schedule the Xrays: [phone]    Check in with Dr. Valenzuela about alcohol within the next month    Patient Education     Understanding Patellofemoral Syndrome    Patellofemoral syndrome is a condition that causes pain on the front of the knee. The large bones of the upper and lower leg meet at the knee. This joint also includes a small triangle-shaped bone that rests on top of the leg bones. This is the kneecap (patella). Patellofemoral refers to the patella and the thighbone (femur). These bones are surrounded by connective tissue and muscles. Patellofemoral pain is believed to come from stress on the tissues of and around the knee. It's sometimes called runner's knee or jumper's knee because it's common in people who take part in sports.   What causes patellofemoral syndrome?  No single cause for patellofemoral pain has been found. But many things are likely to contribute to this type of knee pain. These include:     Actions that put repeated stress on the knee, such as running and squatting    Overtraining at a sport    Weak hip or thigh muscles    Normal variations in the way body parts fit together    Poor form during activities that stress the knee, such as running    A fall or blow to the knee  Symptoms of patellofemoral syndrome  Pain is a common symptom. It s often on the front of the knee, but can be around the kneecap. Pain can occur at certain times, such as when you are:     Running    Sitting for a long time with your knees bent, such as at a movie    Walking up or down stairs    Squatting  Other symptoms may include:    A feeling of the knee catching or locking    A grinding or crackling noise in your knee  Treatment for patellofemoral syndrome  Treatment focuses on reducing pain and avoiding further injury. Treatments may include:    Rest your leg. This gives your knee time to recover. You may need to avoid or change the activity that caused the problem, such as not running for a  while.    Prescription or over-the-counter medicines. These help reduce inflammation, swelling, and pain. NSAIDs (nonsteroidal anti-inflammatory drugs) are the most common medicines used. Medicines may be prescribed or bought over the counter. They may be given as pills. Or they may be put on the skin as a gel, cream, or patch.    Cold packs. These help reduce pain.    Stretches and other exercises. These can improve balance, flexibility, and strength.    A shoe insert (orthotic). This can make your knee more stable.    Elastic tape or a brace. These can make your knee more stable.    Physical therapy. This may include exercises or other treatments.    Surgery. In rare cases, if other treatments don t relieve symptoms, you may need surgery.  Possible complications of patellofemoral syndrome  If you don t give your knee time to heal, symptoms may return or get worse. Follow your healthcare provider s instructions on resting and treating your knee.   When to call your healthcare provider  Call your healthcare provider right away if you have any of these:    Fever of 100.4 F (38 C) or higher, or as directed by your provider    Chills    Pain that gets worse    Symptoms that don t get better, or get worse    New symptoms  [employer] last reviewed this educational content on 6/1/2019 2000-2020 [employer]. All rights reserved. This information is not intended as a substitute for professional medical care. Always follow your healthcare professional's instructions.           Patient Education     Arthritis: Exercise     Look for exercise classes for arthritis in your community.     Exercise is important to your overall health. It is especially important in people with arthritis. Regular exercise can:    Keep your heart and blood vessels healthy    Help with weight management, or weight loss    Improve your mood    Help prevent and manage health problems such as:  ? Diabetes  ? High blood pressure  ? High  cholesterol  ? Depression  In people with arthritis, it offers all of those benefits and it can:    Lessen pain and stiffness    Strengthen muscles that support your joints    Help you to be able to do the things you enjoy  Exercise and arthritis  Exercise is an important part of any arthritis treatment plan. A complete program consists of the following 3 types of exercises:    Aerobic exercises for cardiovascular health and overall fitness.     Strengthening exercises to build up muscles to help prevent injury and keep joints stable.    Range-of-motion exercises to keep muscles and joints flexible.  Getting started  Talk with your healthcare provider about what is safe for you. Make sure you:    Learn how to do exercises correctly and safely. Consider talking with a physical therapist or  used to working with people with arthritis.    Start slowly and build. If you haven't been exercising, start slowly. Don't exercise too hard or too long.    Create a routine. Set aside specific times for exercise every day.    Warm up carefully. Take 5 to 10 minutes at the beginning and end of exercising to warm up and cool down. Just do the same exercises at a slower pace for 5 to 10 minutes.    Work at a comfortable, smooth pace. Move your joints gently to prevent injury.    Pay attention to your body. Don't exercise a painful or swollen joint; switch to another activity. Follow the 2-hour pain rule: You did too much if your joint or muscle pain lasts 2 hours or more after exercising, or is worse the next day. This doesn't mean you should stop exercising. Just do less.  Aerobic exercise  Aerobic exercise improves overall health and helps control weight. Choose those that don't add extra stress to your joints. For example, walking, swimming, or bicycling.  Most people should exercise for at least 30 minutes, most days of the week. You don't have to exercise all at once. Try exercising for 10 minutes, 3 times a day, for  example.  Strengthening exercises  Strengthening your muscles helps to protect your joints and prevent injuries. Try to do strengthening exercises 2 to 3 times a week:    These exercises can be done with exercise or resistance bands (inexpensive exercise aids that add resistance), or with light weights. Some people use soup cans as weights.    Isometric exercises are done by tightening the muscles without moving the joint. This may be a good way to strengthen the muscles around a stiff joint.  A physical therapist or  can teach you how to do these exercises.  Range-of-motion (ROM) exercises  Range-of-motion (ROM) exercises allow you to move each of your joints in every way they are intended to move. You should do ROM exercises for each joint 2 to 3 times a day. This will help you maintain full use of all of your joints.  Sample ROM exercises  The following are just a sample of ROM exercises--one for your neck, shoulders, elbows, hips, knees, and ankles. To completely move each joint through its full range of motion, you will have to do a few exercises for each joint. A physical therapist or  can teach you how to do full ROM exercises for each joint.   Repeat each for these exercises 5 to 10 times. Make sure you move slowly:  1. Neck turns. Sit in a straight-backed chair. Look straight ahead. Slowly turn your head to the right, then return it to center. Repeat. Do the same thing, turning your head to the left. Repeat.  2. Shoulder raise. Lie on your back or sit in a chair. Raise one arm over your head, keeping your elbow straight. Keep the arm close to your ear. Return it slowly to your side. Repeat with your other arm.  3. Elbow stretch. Sit in a chair. If you are able, put both arms out to your sides to form a T. Slowly touch your shoulders with the tips of your fingers. Then return to the T-position. Repeat.  4. Hip stretch. Lie on your back with your legs straight and about 6 inches apart. With your  foot flexed, slide your leg out to the side, then slide it back to the starting position. Repeat with your other leg.  5. Knee bend. Sit in a chair with your legs bent at the knees in front of you. Straighten one leg as much as you can, then bring it back to the floor. Repeat this 5 to 10 times. Then do the same thing with the other leg.   6. Ankle stretch. Sit with your feet flat on the floor. Lift your toes off of the floor while your heels stay down. Repeat. Then lift your heels off the floor while your toes stay down. Repeat.  Other exercise  Many other exercise and activities benefit people with arthritis. It is most important to find exercise and activities that you enjoy. You might try:    Yoga, including chair yoga, helps to keep your joints strong and flexible    Priyank Chi, an ancient type of exercise with slow, gentle movements    Water exercise, including water walking  For more information on exercise for arthritis go to the Arth[employer] website: www.arthritis.org.  [employer] last reviewed this educational content on 6/1/2018 2000-2020 [employer]. All rights reserved. This information is not intended as a substitute for professional medical care. Always follow your healthcare professional's instructions.           Patient Education     Osteoarthritis  Osteoarthritis happens when the cartilage in a joint becomes damaged and worn. This may be from age, wear and tear, overuse of the joint, obesity, or other problems. Osteoarthritis can affect any joint. But it's most common in hands, knees, spine, hips, and feet. Symptoms include joint stiffness, and pain. It's also called degenerative joint disease.   Home care    When a joint is more sore than usual, rest it for a day or two.    Heat can help relieve stiffness. Take a hot bath or apply a heating pad for up to 30 minutes at a time. If symptoms are worse in the morning, using heat just after awakening can help relax the muscle and  soothe the joints.     Ice helps relieve pain. It's often used after activity. Use a cold pack wrapped in a thin cloth on the joint for 10 to 15 minutes at a time.     Alternating hot and cold can also help relieve pain. Try this for 20 minutes at a time, several times per day.    Exercise helps prevent the muscles and ligaments around the joint from becoming weak. It also helps maintain function in the joint. Be as active as you can. Talk to your healthcare provider about what activity program is best for you.    Excess weight puts a lot of extra strain on weight-bearing joints of the lower back, hips, knees, feet and ankles. If you are overweight, talk to your healthcare provider about a safe and effective weight loss program.    Use anti-inflammatory medicines as prescribed for pain. This includes acetaminophen or NSAIDs such as ibuprofen or naproxen. Don't take NSAIDs if your healthcare provider has told you that you can't take NSAIDS because of other health problems If needed, topical or injected medicines may be recommended. Talk with your healthcare provider if these options are not enough to manage your pain. Follow the directions on all over-the-counter medicines.    Talk with your healthcare provider about devices that might help improve your function and reduce pain.    Talk with you healthcare provider about physical therapy to help strengthen your joints and the surrounding muscles.    Follow-up care  Follow up with your healthcare provider, or as advised.   When to seek medical advice  Call your healthcare provider right away if any of these occur:    Redness or swelling of a painful joint    Discharge or pus from a painful joint    Fever of 100.4 F (38 C) or higher, or as directed by your healthcare provider    Worsening joint pain    Decreased ability to move the joint or bear weight on the joint  [employer] last reviewed this educational content on 8/1/2019 2000-2020 [employer]. All  rights reserved. This information is not intended as a substitute for professional medical care. Always follow your healthcare professional's instructions.           Patient Education     Addiction Recovery: Coping with Relapse  Addiction to drugs or alcohol is called a substance use disorder. It changes the brain in ways that continue long after the problem use ends. This is why people with a substance use disorder are at risk for relapse. This is true even after long periods of staying drug- or alcohol-free. Like other chronic diseases, the disorder needs to be managed daily. This can help prevent, or manage, a relapse.   You will need ongoing treatment and support. Your best chance for long-term recovery will likely be a mix of medicines and behavioral therapy. Other tips include:   Stick with your treatment plan.  It may seem like you've recovered. You may feel you don't need to keep taking steps to stay drug- or alcohol-free. But your chances of staying sober are much higher if you continue treatment after you recover. If you're thinking about stopping treatment, talk with a professional first.   Get help right away if you use the drug again.  If you start using again, talk with your healthcare provider or someone else right away.   Have loved ones be part of your recovery. One type of therapy is called community reinforcement and family training. This focuses on counseling and training for your family. The therapist teaches your family how to help motivate you to get treatment. This therapy also helps the family spot situations that may lead you to drink or use drugs. Other family- oriented recovery programs, such as Alcoholics Anonymous and Al-Anon, can be found nationwide.   Learn healthy ways to cope with stress, anger, boredom, or other triggers.  Behavioral therapy can help you learn ways for coping with drug or alcohol cravings. It can also teach you ways to stay away from drugs and prevent relapse. And  it can help you deal with relapse if it occurs.    [employer] last reviewed this educational content on 2/1/2020 2000-2020 [employer]. All rights reserved. This information is not intended as a substitute for professional medical care. Always follow your healthcare professional's instructions.           Patient Education     Medicines for Chronic Obstructive Pulmonary Disease  Some medicines for COPD help control or prevent symptoms. They are called maintenance medicines. Take these medicines every day. Or as instructed by your healthcare provider. Some are rescue medicines. Take these only when you have symptoms. These include more shortness of breath or chest tightness. Take this sheet with you to your next office visit. Ask your healthcare provider to help you fill it out.  Bronchodilators  What they do: Relax the muscles around airways. This lets you breathe more easily.  Short-acting beta-2 agonists. These start working shortly after you use them. They are rescue medicines.  My medicines: __________________________________________  When to take: __________________________________________  Long-acting beta-2 agonists. These work more slowly than the fast-acting type. But the effects last longer. They are maintenance medicines.  My medicines: __________________________________________  When to take: __________________________________________  Anticholinergics. Rescue: These may be used with a short-acting beta-2 agonist. This can help keep airways open.  My medicines: __________________________________________  When to take: __________________________________________  Anticholinergics. Maintenance: There are long acting.  My medicines: __________________________________________  When to take: __________________________________________  Methylxanthines. These are maintenance medicines. They have long-lasting effects. They may help if symptoms happen during sleep.  My  medicines: __________________________________________  When to take: __________________________________________  Corticosteroids  What they do: Reduce inflammation, swelling, and mucus. This lets you breathe more easily.  Inhaled corticosteroids. These medicines are taken with an inhaler or nebulizer. They are maintenance medicines.  My medicines: __________________________________________  When to take: __________________________________________  Oral corticosteroids. These medicines are taken by mouth. They may be used when symptoms get worse.  My medicines: __________________________________________  When to take: __________________________________________  PDE4 (phosphodiesterase type 4) inhibitors  What they do: Reduce the risk for flare-ups if you have severe COPD.  My medicines: __________________________________________  When to take: __________________________________________  Combination medicines  What they do: Combine the effects of different types of medicines. For example, a combination medicine may relax the muscles around the airways. And it may lessen airway swelling or inflammation.  My medicines: __________________________________________  When to take: __________________________________________  Other medicines  Other medicines for COPD.  My medicine: __________________________________________  What it does: __________________________________________  When to take: __________________________________________  Herbal products and supplements  Some products for COPD are available without a prescription. These include herbs, extracts, or supplements. Talk with your healthcare provider before taking any of these products. They can interact with medicines you re taking.  [employer] last reviewed this educational content on 9/1/2018 2000-2020 [employer]. All rights reserved. This information is not intended as a substitute for professional medical care. Always follow your healthcare  professional's instructions.

## 2021-04-08 ENCOUNTER — HOSPITAL ENCOUNTER (OUTPATIENT)
Dept: GENERAL RADIOLOGY | Facility: CLINIC | Age: 61
Discharge: HOME | End: 2021-04-08
Attending: NURSE PRACTITIONER | Admitting: NURSE PRACTITIONER
Payer: COMMERCIAL

## 2021-04-08 DIAGNOSIS — M19.90 ARTHRITIS: ICD-10-CM

## 2021-04-08 PROCEDURE — 73130 X-RAY EXAM OF HAND: CPT | Mod: 50

## 2021-04-08 PROCEDURE — 73130 X-RAY EXAM OF HAND: CPT | Mod: 26 | Performed by: RADIOLOGY

## 2021-04-15 ENCOUNTER — TELEPHONE (OUTPATIENT)
Dept: FAMILY MEDICINE | Facility: CLINIC | Age: 61
End: 2021-04-15

## 2021-04-15 DIAGNOSIS — M19.041 ARTHRITIS OF BOTH HANDS: Primary | ICD-10-CM

## 2021-04-15 DIAGNOSIS — M19.042 ARTHRITIS OF BOTH HANDS: Primary | ICD-10-CM

## 2021-04-15 NOTE — TELEPHONE ENCOUNTER
Gracie    Pt has PT appointment for next Thursday for the knees and is willing to be seen for the hands also    Referral cued    Bibi Valdaez RN   [facility]

## 2021-04-15 NOTE — TELEPHONE ENCOUNTER
Left message for patient to call [facility] back  When patient calls back please transfer to RN  Ryanne CLEARY, JENNIFER Winters, ELIU Ochoa CNP  P [facility] All Nurse Pool             Please call with results. Please let him know there is some arthritis in his hands, we cn refer to hand therapy to work on this if pt wants you could pend referral and I'd sign. Continue rest of plan    Thanks,   ELIU Christian CNP

## 2021-04-19 PROBLEM — M19.041 ARTHRITIS OF BOTH HANDS: Status: ACTIVE | Noted: 2021-04-19

## 2021-04-19 PROBLEM — M19.042 ARTHRITIS OF BOTH HANDS: Status: ACTIVE | Noted: 2021-04-19

## 2021-04-20 ENCOUNTER — THERAPY VISIT (OUTPATIENT)
Dept: OCCUPATIONAL THERAPY | Facility: CLINIC | Age: 61
End: 2021-04-20
Attending: NURSE PRACTITIONER
Payer: COMMERCIAL

## 2021-04-20 DIAGNOSIS — M19.041 ARTHRITIS OF BOTH HANDS: ICD-10-CM

## 2021-04-20 DIAGNOSIS — M19.042 ARTHRITIS OF BOTH HANDS: ICD-10-CM

## 2021-04-20 DIAGNOSIS — M79.641 PAIN IN BOTH HANDS: ICD-10-CM

## 2021-04-20 DIAGNOSIS — M79.642 PAIN IN BOTH HANDS: ICD-10-CM

## 2021-04-20 PROCEDURE — 97165 OT EVAL LOW COMPLEX 30 MIN: CPT | Mod: GO | Performed by: OCCUPATIONAL THERAPIST

## 2021-04-20 PROCEDURE — 97110 THERAPEUTIC EXERCISES: CPT | Mod: GO | Performed by: OCCUPATIONAL THERAPIST

## 2021-04-20 PROCEDURE — 97140 MANUAL THERAPY 1/> REGIONS: CPT | Mod: GO | Performed by: OCCUPATIONAL THERAPIST

## 2021-04-20 NOTE — PROGRESS NOTES
Hand Therapy Initial Evaluation    Current Date:  4/20/2021    Diagnosis: B hand pain  DOI: 04/19/21(MD order, but has been going on for years)  Procedure:  none    Precautions: NA    Subjective:  Sohan Marcano is a 60 year old male.    Patient reports symptoms of the bilateral hands which occurred due to not sure, overuse. Since onset symptoms are Gradually getting worse.  General health as reported by patient is fair.  Pertinent medical history includes:Cancer, Hepatitis, Smoking  Medical allergies:none.  Surgical history: cancer: skin.  Medication history: Anti-inflammatory.    Current occupation is   Job Tasks: Driving, Lifting, Carrying, Prolonged Standing    Occupational Profile Information:  Right hand dominant  Prior functional level:  no limitations  Patient reports symptoms of pain, stiffness/loss of motion, weakness/loss of strength and edema  Special tests:  x-ray.    Previous treatment: Diclofenac Sodium Topical Gel  Barriers include:none  Mobility: No difficulty  Transportation: drives  Currently working in normal job without restrictions  Leisure activities/hobbies: gardening, carpentry work    Functional Outcome Measure:   Upper Extremity Functional Index Score:  SCORE:   Column Totals: /80: 47   (A lower score indicates greater disability.)    Objective:  Pain Level (Scale 0-10)   4/20/2021   At Rest R: 5/10  L: 5/10   At Worst R: 9/10  L: 9/10     Pain Description  Date 4/20/2021   Location B PIP joints, ring fingers are worst   Pain Quality Aching, Dull and Sharp   Frequency constant     Pain is worst  daytime   Exacerbated by  use   Relieved by otc medications and rest   Progression Getting worse     Edema  Mild    Sensation   WNL throughout all nerve distributions; per patient report    ROM  Hand 4/20/2021   AROM(PROM) R   Index MP -5/69   PIP -13/97   DIP -2/62   MINA    Long MP -25/88   PIP -10/92   DIP 0/75   MINA    Ring MP -15/85   PIP -10/94   DIP 0/68   MINA    Small MP -10/82    PIP -7/75   DIP 0/55   MINA      ROM  Hand 4/20/2021   AROM(PROM) L   Index MP -10/68   PIP -10/97   DIP 0/65   MINA    Long MP -4/85   PIP -11/103   DIP -4/75   MINA    Ring MP 0/75   PIP -11/99   DIP 0/74   MINA    Small MP 0/90   PIP -14/90   DIP 0/70   MINA      Stage of Stenosing Tenosynovitis (SST)     4/20/2021   Ring Finger R: 1  L: 4   Stage 1:  Normal  Stage 2:  Uneven motion of tendon  Stage 3:  Triggering, clicking, catching  Stage 4:  Locking in extension or flexion; unlocked by active motion  Stage 5:  Locking in extension or flexion; unlocked by passive motion  Stage 6:  Finger locked in extension or flexion    Strength   (Measured in pounds)  Pain Report:  - none  + mild    ++ moderate    +++ severe    4/20/2021 4/20/2021   Trials R L   1  2  3 50  58  61 58++  62++  66++   Average 56 62     Lat Pinch 4/20/2021 4/20/2021   Trials R L   1  2  3 15  18  17 22  22  21   Average 17 22     3 Pt Pinch 4/20/2021 4/20/2021   Trials R L   1  2  3 14  16  19 18  17  19   Average 16 18     Palpation  Pain Report:  - none  + mild    ++ moderate    +++ severe   Ring Finger 4/20/2021   A1 Pulley R: -  L: +   A3 Pulley/PIP R: -  L: -   CMC R: -  L: -   FA Flexors R: -  L: -   FA Extensors R: -  L: -     Assessment:  Patient presents with symptoms consistent with diagnosis of bilateral hand pain,  with conservative intervention.     Patient's limitations or Problem List includes:  Pain, Decreased ROM/motion, Increased edema and Weakness of the bilateral hand which interferes with the patient's ability to perform Self Care Tasks (dressing, eating, bathing, hygiene/toileting), Work Tasks, Sleep Patterns, Recreational Activities, Household Chores and Driving  as compared to previous level of function.    Rehab Potential:  Good - Return to full activity, some limitations    Patient will benefit from skilled Occupational Therapy to increase ROM and overall strength and decrease pain and edema to return to previous  activity level and resume normal daily tasks and to reach their rehab potential.    Barriers to Learning:  No barrier    Communication Issues:  Patient appears to be able to clearly communicate and understand verbal and written communication and follow directions correctly.    Chart Review: Chart Review and Simple history review with patient    Identified Performance Deficits: bathing/showering, toileting, dressing, feeding, hygiene and grooming, driving and community mobility, health management and maintenance, work and leisure activities    Assessment of Occupational Performance:  3-5 Performance Deficits    Clinical Decision Making (Complexity): Low complexity    Treatment Explanation:  The following has been discussed with the patient:  RX ordered/plan of care  Anticipated outcomes  Possible risks and side effects    Plan:  Frequency:  2 X a month, once daily  Duration:  for 2 months    Treatment Plan:   Modalities:  US and Paraffin  Therapeutic Exercise:  AROM, AAROM, PROM, Tendon Gliding, Blocking, Isotonics, Isometrics and Stabilization  Manual Techniques:  Myofascial release  Orthotic Fabrication:  Static orthosis  Self Care:  Self Care Tasks    Discharge Plan:  Achieve all LTG.  Independent in home treatment program.  Reach maximal therapeutic benefit.    Home Exercise Program:  Ring orthosis for L ring finger to prevent trigger  DIP/PIP blocking exercises  Tendon gliding on R hand    Next Visit:  Orthosis for R?  A/AA/PROM  Friction massage  MFR

## 2021-04-22 ENCOUNTER — THERAPY VISIT (OUTPATIENT)
Dept: PHYSICAL THERAPY | Facility: CLINIC | Age: 61
End: 2021-04-22
Attending: NURSE PRACTITIONER
Payer: COMMERCIAL

## 2021-04-22 DIAGNOSIS — M22.2X1 PATELLOFEMORAL ARTHRALGIA OF BOTH KNEES: Primary | ICD-10-CM

## 2021-04-22 DIAGNOSIS — M22.2X2 PATELLOFEMORAL ARTHRALGIA OF BOTH KNEES: Primary | ICD-10-CM

## 2021-04-22 DIAGNOSIS — M19.90 ARTHRITIS: ICD-10-CM

## 2021-04-22 PROCEDURE — 97110 THERAPEUTIC EXERCISES: CPT | Mod: GP | Performed by: PHYSICAL THERAPIST

## 2021-04-22 PROCEDURE — 97530 THERAPEUTIC ACTIVITIES: CPT | Mod: GP | Performed by: PHYSICAL THERAPIST

## 2021-04-22 ASSESSMENT — ACTIVITIES OF DAILY LIVING (ADL)
WEAKNESS: THE SYMPTOM AFFECTS MY ACTIVITY MODERATELY
RISE FROM A CHAIR: ACTIVITY IS VERY DIFFICULT
KNEEL ON THE FRONT OF YOUR KNEE: ACTIVITY IS VERY DIFFICULT
WALK: ACTIVITY IS MINIMALLY DIFFICULT
SWELLING: I DO NOT HAVE THE SYMPTOM
KNEE_ACTIVITY_OF_DAILY_LIVING_SCORE: 61.43
LIMPING: I DO NOT HAVE THE SYMPTOM
GIVING WAY, BUCKLING OR SHIFTING OF KNEE: I DO NOT HAVE THE SYMPTOM
GO DOWN STAIRS: ACTIVITY IS SOMEWHAT DIFFICULT
HOW_WOULD_YOU_RATE_THE_OVERALL_FUNCTION_OF_YOUR_KNEE_DURING_YOUR_USUAL_DAILY_ACTIVITIES?: ABNORMAL
STAND: ACTIVITY IS NOT DIFFICULT
PAIN: THE SYMPTOM AFFECTS MY ACTIVITY MODERATELY
SQUAT: ACTIVITY IS FAIRLY DIFFICULT
SIT WITH YOUR KNEE BENT: ACTIVITY IS SOMEWHAT DIFFICULT
GO UP STAIRS: ACTIVITY IS SOMEWHAT DIFFICULT
KNEE_ACTIVITY_OF_DAILY_LIVING_SUM: 43
STIFFNESS: THE SYMPTOM AFFECTS MY ACTIVITY MODERATELY
HOW_WOULD_YOU_RATE_THE_CURRENT_FUNCTION_OF_YOUR_KNEE_DURING_YOUR_USUAL_DAILY_ACTIVITIES_ON_A_SCALE_FROM_0_TO_100_WITH_100_BEING_YOUR_LEVEL_OF_KNEE_FUNCTION_PRIOR_TO_YOUR_INJURY_AND_0_BEING_THE_INABILITY_TO_PERFORM_ANY_OF_YOUR_USUAL_DAILY_ACTIVITIES?: 30
RAW_SCORE: 43
AS_A_RESULT_OF_YOUR_KNEE_INJURY,_HOW_WOULD_YOU_RATE_YOUR_CURRENT_LEVEL_OF_DAILY_ACTIVITY?: NEARLY NORMAL

## 2021-04-22 NOTE — PROGRESS NOTES
Physical Therapy Initial Evaluation  Subjective:  The history is provided by the patient.   Therapist Generated HPI Evaluation  Problem details: Waylon knee pain, stiffness and clicking, especially when kneeling at work.    Hand therapy started yesterday with good relief of hand/joint pain.    NO swelling,   The past year he notes an increase in pain in both knees.  .         Type of problem:  Bilateral knees.    This is a chronic condition.  Condition occurred with:  Insidious onset.  Where condition occurred: for unknown reasons.  Patient reports pain:  Anterior.  Pain is described as aching and is constant.  Pain is worse during the day.  Since onset symptoms are gradually worsening.  Associated symptoms:  Loss of motion/stiffness and loss of strength. Symptoms are exacerbated by kneeling  and relieved by NSAID's.      Restrictions due to condition include:  Working in normal job without restrictions.                          Objective:  System                                                Knee Evaluation:  ROM:  AROM: normal            Strength:     Extension:  Left: 5/5   Pain:      Right: 5/5   Pain:  Flexion:  Left: 4+/5   Pain:      Right: 4+/5   Pain:    Quad Set Left: Fair    Pain:   Quad Set Right: Fair    Pain:  Ligament Testing:  Normal                  Palpation:      Left knee tenderness not present at:  Medial Joint Line; Lateral Joint Line and Patellar Tendon    Right knee tenderness not present at:  Medial Joint Line; Lateral Joint Line and Patellar Tendon  Edema:  Normal      Functional Testing:  not assessed                  General     ROS    Assessment/Plan:    Patient is a 60 year old male with both sides knee complaints.    Patient has the following significant findings with corresponding treatment plan.                Diagnosis 1:  waylon knee pain  Pain -  hot/cold therapy and manual therapy  Decreased strength - therapeutic exercise and therapeutic activities    Therapy Evaluation Codes:    1) History comprised of:   Personal factors that impact the plan of care:      None.    Comorbidity factors that impact the plan of care are:      None.     Medications impacting care: Anti-inflammatory.  2) Examination of Body Systems comprised of:   Body structures and functions that impact the plan of care:      Knee.   Activity limitations that impact the plan of care are:      Sitting and Squatting/kneeling.  3) Clinical presentation characteristics are:   Stable/Uncomplicated.  4) Decision-Making    Low complexity using standardized patient assessment instrument and/or measureable assessment of functional outcome.  Cumulative Therapy Evaluation is: Low complexity.    Previous and current functional limitations:  (See Goal Flow Sheet for this information)    Short term and Long term goals: (See Goal Flow Sheet for this information)     Communication ability:  Patient appears to be able to clearly communicate and understand verbal and written communication and follow directions correctly.  Treatment Explanation - The following has been discussed with the patient:   RX ordered/plan of care  Anticipated outcomes  Possible risks and side effects  This patient would benefit from PT intervention to resume normal activities.   Rehab potential is fair.    Frequency:  1 X week, once daily  Duration:  for 6 weeks  Discharge Plan:  Achieve all LTG.  Independent in home treatment program.  Reach maximal therapeutic benefit.    Please refer to the daily flowsheet for treatment today, total treatment time and time spent performing 1:1 timed codes.

## 2021-05-03 ENCOUNTER — IMMUNIZATION (OUTPATIENT)
Dept: NURSING | Facility: CLINIC | Age: 61
End: 2021-05-03
Payer: COMMERCIAL

## 2021-05-03 PROCEDURE — 0001A PR COVID VAC PFIZER DIL RECON 30 MCG/0.3 ML IM: CPT

## 2021-05-03 PROCEDURE — 91300 PR COVID VAC PFIZER DIL RECON 30 MCG/0.3 ML IM: CPT

## 2021-05-12 ENCOUNTER — OFFICE VISIT (OUTPATIENT)
Dept: DERMATOLOGY | Facility: CLINIC | Age: 61
End: 2021-05-12
Payer: COMMERCIAL

## 2021-05-12 DIAGNOSIS — Z51.89 VISIT FOR WOUND CHECK: Primary | ICD-10-CM

## 2021-05-12 PROCEDURE — 99024 POSTOP FOLLOW-UP VISIT: CPT | Performed by: DERMATOLOGY

## 2021-05-12 ASSESSMENT — PAIN SCALES - GENERAL: PAINLEVEL: NO PAIN (0)

## 2021-05-12 NOTE — LETTER
5/12/2021       RE: Sohan Marcano  1945 [facility] [address]     Dear Colleague,    Thank you for referring your patient, Sohan Marcano, to the [facility] DERMATOLOG[facility] SURGERY [facility]INIC [place] at [facility]. Please see a copy of my visit note below.    Patient presents for wound check.    Linear closure on shoulder looks great.    Reassured patient.  No wound care necessary.    Jorge Luis Miller MD

## 2021-05-12 NOTE — NURSING NOTE
Dermatology Rooming Note    Sohan Marcano's goals for this visit include:   Chief Complaint   Patient presents with     Derm Problem     Sohan is here today for a wound check and the site on his right shoulder is bothering him     Celia Michelle CMA

## 2021-05-24 ENCOUNTER — IMMUNIZATION (OUTPATIENT)
Dept: NURSING | Facility: CLINIC | Age: 61
End: 2021-05-24
Attending: INTERNAL MEDICINE
Payer: COMMERCIAL

## 2021-05-24 PROCEDURE — 0002A PR COVID VAC PFIZER DIL RECON 30 MCG/0.3 ML IM: CPT

## 2021-05-24 PROCEDURE — 91300 PR COVID VAC PFIZER DIL RECON 30 MCG/0.3 ML IM: CPT

## 2021-05-24 NOTE — PROGRESS NOTES
Patient presents for wound check.    Linear closure on shoulder looks great.    Reassured patient.  No wound care necessary.    Jorge Luis Miller MD

## 2021-06-28 PROBLEM — M79.641 PAIN IN BOTH HANDS: Status: RESOLVED | Noted: 2021-04-20 | Resolved: 2021-06-28

## 2021-06-28 PROBLEM — M79.642 PAIN IN BOTH HANDS: Status: RESOLVED | Noted: 2021-04-20 | Resolved: 2021-06-28

## 2021-06-29 ENCOUNTER — TELEPHONE (OUTPATIENT)
Dept: DERMATOLOGY | Facility: CLINIC | Age: 61
End: 2021-06-29

## 2021-07-01 NOTE — TELEPHONE ENCOUNTER
I called and patient would like to have them looked at in person but would like [facility].     Routing to nurses in [place] to schedule.    Dayan WOOD CMA

## 2021-07-01 NOTE — TELEPHONE ENCOUNTER
Patient scheduled for Monday July 5th at 1:00 pm. No further questions or concerns.     Yasmine Linda LPN

## 2021-07-05 ENCOUNTER — OFFICE VISIT (OUTPATIENT)
Dept: DERMATOLOGY | Facility: CLINIC | Age: 61
End: 2021-07-05
Payer: COMMERCIAL

## 2021-07-05 DIAGNOSIS — D48.5 NEOPLASM OF UNCERTAIN BEHAVIOR OF SKIN: Primary | ICD-10-CM

## 2021-07-05 DIAGNOSIS — L57.0 ACTINIC KERATOSIS: ICD-10-CM

## 2021-07-05 PROCEDURE — 11103 TANGNTL BX SKIN EA SEP/ADDL: CPT | Performed by: DERMATOLOGY

## 2021-07-05 PROCEDURE — 17000 DESTRUCT PREMALG LESION: CPT | Mod: XS | Performed by: DERMATOLOGY

## 2021-07-05 PROCEDURE — 11102 TANGNTL BX SKIN SINGLE LES: CPT | Performed by: DERMATOLOGY

## 2021-07-05 PROCEDURE — 88305 TISSUE EXAM BY PATHOLOGIST: CPT | Performed by: DERMATOLOGY

## 2021-07-05 NOTE — PATIENT INSTRUCTIONS
Wound Care After a Biopsy    What is a skin biopsy?  A skin biopsy allows the doctor to examine a very small piece of tissue under the microscope to determine the diagnosis and the best treatment for the skin condition. A local anesthetic (numbing medicine)  is injected with a very small needle into the skin area to be tested. A small piece of skin is taken from the area. Sometimes a suture (stitch) is used.     What are the risks of a skin biopsy?  I will experience scar, bleeding, swelling, pain, crusting and redness. I may experience incomplete removal or recurrence. Risks of this procedure are excessive bleeding, bruising, infection, nerve damage, numbness, thick (hypertrophic or keloidal) scar and non-diagnostic biopsy.    How should I care for my wound for the first 24 hours?    Keep the wound dry and covered for 24 hours    If it bleeds, hold direct pressure on the area for 15 minutes. If bleeding does not stop then go to the emergency room    Avoid strenuous exercise the first 1-2 days or as your doctor instructs you    How should I care for the wound after 24 hours?    After 24 hours, remove the bandage    You may bathe or shower as normal    If you had a scalp biopsy, you can shampoo as usual and can use shower water to clean the biopsy site daily    Clean the wound twice a day with gentle soap and water    Do not scrub, be gentle    Apply white petroleum/Vaseline after cleaning the wound with a cotton swab or a clean finger, and keep the site covered with a Bandaid /bandage. Bandages are not necessary with a scalp biopsy    If you are unable to cover the site with a Bandaid /bandage, re-apply ointment 2-3 times a day to keep the site moist. Moisture will help with healing    Avoid strenuous activity for first 1-2 days    Avoid lakes, rivers, pools, and oceans until the stitches are removed or the site is healed    How do I clean my wound?    Wash hands thoroughly with soap or use hand  before all  wound care    Clean the wound with gentle soap and water    Apply white petroleum/Vaseline  to wound after it is clean    Replace the Bandaid /bandage to keep the wound covered for the first few days or as instructed by your doctor    If you had a scalp biopsy, warm shower water to the area on a daily basis should suffice    What should I use to clean my wound?     Cotton-tipped applicators (Qtips )    White petroleum jelly (Vaseline ). Use a clean new container and use Q-tips to apply.    Bandaids   as needed    Gentle soap     How should I care for my wound long term?    Do not get your wound dirty    Keep up with wound care for one week or until the area is healed.    A small scab will form and fall off by itself when the area is completely healed. The area will be red and will become pink in color as it heals. Sun protection is very important for how your scar will turn out. Sunscreen with an SPF 30 or greater is recommended once the area is healed.    You should have some soreness but it should be mild and slowly go away over several days. Talk to your doctor about using tylenol for pain,    When should I call my doctor?  If you have increased:     Pain or swelling    Pus or drainage (clear or slightly yellow drainage is ok)    Temperature over 100F    Spreading redness or warmth around wound    When will I hear about my results?  The biopsy results can take 2-3 weeks to come back. The clinic will call you with the results, send you a Teachablet message, or have you schedule a follow-up clinic or phone time to discuss the results. Contact our clinics if you do not hear from us in 3 weeks.     Who should I call with questions?    [facility]: [phone]     [facility]: [phone]    For urgent needs outside of business hours call the [facility] at [phone] and ask for the dermatology resident on call    Cryotherapy    What is it?    Use  of a very cold liquid, such as liquid nitrogen, to freeze and destroy abnormal skin cells that need to be removed    What should I expect?    Tenderness and redness    A small blister that might grow and fill with dark purple blood. There may be crusting.    More than one treatment may be needed if the lesions do not go away.    How do I care for the treated area?    Gently wash the area with your hands when bathing.    Use a thin layer of Vaseline to help with healing. You may use a Band-Aid.     The area should heal within 7-10 days and may leave behind a pink or lighter color.     Do not use an antibiotic or Neosporin ointment.     You may take acetaminophen (Tylenol) for pain.     Call your Doctor if you have:    Severe pain    Signs of infection (warmth, redness, cloudy yellow drainage, and or a bad smell)    Questions or concerns    Who should I call with questions?       [facility]: [phone]       [facility]: [phone]       For urgent needs outside of business hours call the [facility] at [phone]        and ask for the dermatology resident on call

## 2021-07-05 NOTE — NURSING NOTE
Sohan Marcano's goals for this visit include:   Chief Complaint   Patient presents with     Derm Problem     R shoulder spot check/ started to come back 1 month post op     He requests these members of his care team be copied on today's visit information:     PCP: Gracie Winters    Referring Provider:  No referring provider defined for this encounter.    There were no vitals taken for this visit.    Do you need any medication refills at today's visit? No  Leilani Collado, EMPERATRIZ on 7/5/2021 at 12:50 PM

## 2021-07-05 NOTE — PROGRESS NOTES
[facility] Dermatology Note  Encounter Date: Jul 5, 2021  Office Visit     Dermatology Problem List:  0. NUB - right anterior shoulder, left sideburn - bx 7/5/2021  1. 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 Family history of Melanoma (Father)  3.  Actinic keratosis  -s/p cryo 12/13/2014, 10/17/2019, 1/25/21  -Right elbow, s/p biopsy 2/23/2021  4. Seborrheic keratosis  5. Telangiectasia    ____________________________________________    Assessment & Plan:    # Neoplasm of uncertain behavior on the right anterior shoulder. The differential diagnosis includes BCC vs SCC vs scar tissue vssuture granuloma.    - See procedure note.     # Neoplasm of uncertain behavior on the left sideburn. The differential diagnosis includes BCC vs sebaceous hyperplasia vs intradermal nevus.   - See procedure note.     # Hypertrophic actinic keratosis - right elbow, bx 2/23/2021.   - See cryo procedure note.         Procedures Performed:   - Shave biopsy procedure note, location(s): left sideburn and right anterior shoudler. After discussion of benefits and risks including but not limited to bleeding, infection, scar, incomplete removal, recurrence, and non-diagnostic biopsy, written consent and photographs were obtained. The area was cleaned with isopropyl alcohol. 0.5mL of 1% lidocaine with epinephrine was injected to obtain adequate anesthesia of lesion(s). Shave biopsy at site(s) performed. Hemostasis was achieved with aluminium chloride. Petrolatum ointment and a sterile dressing were applied. The patient tolerated the procedure and no complications were noted. The patient was provided with verbal and written post care instructions.     - Cryotherapy procedure note. After verbal consent and discussion of risks and benefits including, but not limited  "to, dyspigmentation/scar, blister, and pain, 1 AK lesion(s) was(were) treated with 1-2 mm freeze border for 1-2 cycles with liquid nitrogen. Post cryotherapy instructions were provided.      Follow-up: pending path results    Staff and Scribe:     Scribe Disclosure:   I, Ritchie Lawrence, am serving as a scribe to document services personally performed by this physician, Dr. Maurilio Rutledge, based on data collection and the provider's statements to me.     Provider Disclosure:   The documentation recorded by the scribe accurately reflects the services I personally performed and the decisions made by me.  I personally performed the procedures today.    Maurilio Rutledge DO    Department of Dermatology  [facility]: Phone: [phone], Fax:[phone]  [facility]: Phone: [phone], Fax: [phone]    ____________________________________________    CC: Derm Problem (R shoulder spot check/ started to come back 1 month post op)    HPI:  Mr. Sohan Marcano is a(n) 60 year old male who presents today as a return patient for a spot check.    Last seen 03/09/20201 for a wound check. At that time, the wound on the R anterior shoulder was healing well. Patient instructed to continue with gentle wound care for 1 week.    Today, he presents for a spot check of the right shoulder. He noticed this spot start to grow back 1 month after his surgery on 2/23/21. He has noticed some flaking in the area when he showers. He believes his skin cancer is growing back in this area. He also notes an \"electrical surge\" feeling in his right arm that originates in the right shoulder. He denies any weakness or numbness. Denies vision changes or sweating.    He has another rough spot on his left sideburn that he has noticed growing. He would like this examined today.    Patient is otherwise feeling well, without additional " skin concerns.    Labs Reviewed:  N/A    Physical Exam:  Vitals: There were no vitals taken for this visit.  SKIN: Focused examination of right arm and left sideburn was performed.  - Right anterior shoulder: poorly defined skin colored papule with superficial scale and crust  - Left Sideburn: pink pearly papule with central dell  - There is an erythematous macule with overyling adherent scale on the right elbow.   - No other lesions of concern on areas examined.   - LYMPH: There is no lymphadenopathy on palpation of cervical or post auricular nodes.    Medications:  Current Outpatient Medications   Medication     albuterol (PROAIR HFA/PROVENTIL HFA/VENTOLIN HFA) 108 (90 Base) MCG/ACT inhaler     calcium carbonate (TUMS) 500 MG chewable tablet     diclofenac (VOLTAREN) 1 % topical gel     ferrous sulfate (FEROSUL) 325 (65 Fe) MG tablet     folic acid (FOLVITE) 1 MG tablet     magnesium 250 MG tablet     multivitamin w/minerals (THERA-VIT-M) tablet     potassium chloride ER (KLOR-CON M) 10 MEQ CR tablet     thiamine (B-1) 100 MG tablet     Current Facility-Administered Medications   Medication     lidocaine 1% with EPINEPHrine 1:100,000 injection 3 mL      Past Medical History:   Patient Active Problem List   Diagnosis     Alcohol withdrawal (H)     Chemical dependency (H)     Basal cell carcinoma of anterior chest s/p excision 3-10-15     Squamous cell carcinoma mid upper chest s/p excision 3-10-15     Abdominal pain     Alcohol dependence in remission (H)     Hepatitis C virus infection without hepatic coma, unspecified chronicity     Arthritis of both hands     Past Medical History:   Diagnosis Date     Actinic keratosis      Basal cell carcinoma      Squamous cell carcinoma      Substance abuse (H)     alcohol     Uncomplicated asthma

## 2021-07-05 NOTE — NURSING NOTE
The following medication was given:     MEDICATION:  Lidocaine with epinephrine 1% [id number]  ROUTE: SQ  SITE: see procedure note  DOSE: 1ml  LOT #: -[id number]  : [employer]  EXPIRATION DATE: 2.2022  NDC#: 1847-0542-62  Was there drug waste? 1ml  Multi-dose vial: Yes    Leilani Collado, EMPERATRIZ on 7/5/2021 at 2:36 PM

## 2021-07-05 NOTE — LETTER
7/5/2021         RE: Sohan Marcano  1945 [facility] [address]        Dear Colleague,    Thank you for referring your patient, Sohan Marcano, to the [facility]. Please see a copy of my visit note below.    [facility] Dermatology Note  Encounter Date: Jul 5, 2021  Office Visit     Dermatology Problem List:  0. NUB - right anterior shoulder, left sideburn - bx 7/5/2021  1. 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 Family history of Melanoma (Father)  3.  Actinic keratosis  -s/p cryo 12/13/2014, 10/17/2019, 1/25/21  -Right elbow, s/p biopsy 2/23/2021  4. Seborrheic keratosis  5. Telangiectasia    ____________________________________________    Assessment & Plan:    # Neoplasm of uncertain behavior on the right anterior shoulder. The differential diagnosis includes BCC vs SCC vs scar tissue vssuture granuloma.    - See procedure note.     # Neoplasm of uncertain behavior on the left sideburn. The differential diagnosis includes BCC vs sebaceous hyperplasia vs intradermal nevus.   - See procedure note.     # Hypertrophic actinic keratosis - right elbow, bx 2/23/2021.   - See cryo procedure note.         Procedures Performed:   - Shave biopsy procedure note, location(s): left sideburn and right anterior shoudler. After discussion of benefits and risks including but not limited to bleeding, infection, scar, incomplete removal, recurrence, and non-diagnostic biopsy, written consent and photographs were obtained. The area was cleaned with isopropyl alcohol. 0.5mL of 1% lidocaine with epinephrine was injected to obtain adequate anesthesia of lesion(s). Shave biopsy at site(s) performed. Hemostasis was achieved with aluminium chloride. Petrolatum ointment and a sterile dressing were applied.  "The patient tolerated the procedure and no complications were noted. The patient was provided with verbal and written post care instructions.     - Cryotherapy procedure note. After verbal consent and discussion of risks and benefits including, but not limited to, dyspigmentation/scar, blister, and pain, 1 AK lesion(s) was(were) treated with 1-2 mm freeze border for 1-2 cycles with liquid nitrogen. Post cryotherapy instructions were provided.      Follow-up: pending path results    Staff and Scribe:     Scribe Disclosure:   I, Ritchie Lawrence, am serving as a scribe to document services personally performed by this physician, Dr. Maurilio Rutledge, based on data collection and the provider's statements to me.     Provider Disclosure:   The documentation recorded by the scribe accurately reflects the services I personally performed and the decisions made by me.  I personally performed the procedures today.    Maurilio Rutledge DO    Department of Dermatology  [facility]: Phone: [phone], Fax:[phone]  [facility]: Phone: [phone], Fax: [phone]    ____________________________________________    CC: Derm Problem (R shoulder spot check/ started to come back 1 month post op)    HPI:  Mr. Sohan Marcano is a(n) 60 year old male who presents today as a return patient for a spot check.    Last seen 03/09/20201 for a wound check. At that time, the wound on the R anterior shoulder was healing well. Patient instructed to continue with gentle wound care for 1 week.    Today, he presents for a spot check of the right shoulder. He noticed this spot start to grow back 1 month after his surgery on 2/23/21. He has noticed some flaking in the area when he showers. He believes his skin cancer is growing back in this area. He also notes an \"electrical surge\" feeling in his right arm that originates in " the right shoulder. He denies any weakness or numbness. Denies vision changes or sweating.    He has another rough spot on his left sideburn that he has noticed growing. He would like this examined today.    Patient is otherwise feeling well, without additional skin concerns.    Labs Reviewed:  N/A    Physical Exam:  Vitals: There were no vitals taken for this visit.  SKIN: Focused examination of right arm and left sideburn was performed.  - Right anterior shoulder: poorly defined skin colored papule with superficial scale and crust  - Left Sideburn: pink pearly papule with central dell  - There is an erythematous macule with overyling adherent scale on the right elbow.   - No other lesions of concern on areas examined.   - LYMPH: There is no lymphadenopathy on palpation of cervical or post auricular nodes.    Medications:  Current Outpatient Medications   Medication     albuterol (PROAIR HFA/PROVENTIL HFA/VENTOLIN HFA) 108 (90 Base) MCG/ACT inhaler     calcium carbonate (TUMS) 500 MG chewable tablet     diclofenac (VOLTAREN) 1 % topical gel     ferrous sulfate (FEROSUL) 325 (65 Fe) MG tablet     folic acid (FOLVITE) 1 MG tablet     magnesium 250 MG tablet     multivitamin w/minerals (THERA-VIT-M) tablet     potassium chloride ER (KLOR-CON M) 10 MEQ CR tablet     thiamine (B-1) 100 MG tablet     Current Facility-Administered Medications   Medication     lidocaine 1% with EPINEPHrine 1:100,000 injection 3 mL      Past Medical History:   Patient Active Problem List   Diagnosis     Alcohol withdrawal (H)     Chemical dependency (H)     Basal cell carcinoma of anterior chest s/p excision 3-10-15     Squamous cell carcinoma mid upper chest s/p excision 3-10-15     Abdominal pain     Alcohol dependence in remission (H)     Hepatitis C virus infection without hepatic coma, unspecified chronicity     Arthritis of both hands     Past Medical History:   Diagnosis Date     Actinic keratosis      Basal cell carcinoma       Squamous cell carcinoma      Substance abuse (H)     alcohol     Uncomplicated asthma             Again, thank you for allowing me to participate in the care of your patient.        Sincerely,        Maurilio Rutledge MD

## 2021-07-07 LAB — COPATH REPORT: NORMAL

## 2021-07-15 ENCOUNTER — TELEPHONE (OUTPATIENT)
Dept: DERMATOLOGY | Facility: CLINIC | Age: 61
End: 2021-07-15

## 2021-07-15 NOTE — TELEPHONE ENCOUNTER
[facility] [place] Pre-Surgery Screening Note     Pre-screening Information:  Hx of Skin Cancer: Yes  Hx of Mohs Surgery: Yes  Transplant: No  Immunocompromised: No  Current Anticoagulant(s): NSAIDS  Bleeding Disorder(s): No  Stent: No  Pacemaker: No  Defibrillator: No  Brain/Nerve Stimulator: No  Endocarditis/Rheumatic Fever Hx: No  Vascular graft: No  Prophylactic Antibiotic Needed: No  Congenital heart defect: No  Prosthetic Heart Valve: No  Lesion on Leg/Groin: No  Prosthetic Joint : No  Diabetic: No  HIV/AIDS: No  Hepatitis: Hep C (45 years ago )  Pregnant: No  Prior Problem with Local Anesthesia: No  Patient wears CPAP mask: No  Currently Hypertensive: No  Photoprotection: occasionally  Sunburns: 1-5 in lifetime  Tanning Bed Use: never  Current Tobacco Use: Yes  Current Alcohol Use: Yes  Occupation: [employer]       Medications/Allergies  Medications and allergies review with patient: Yes, No     Current Outpatient Medications   Medication Sig Dispense Refill     albuterol (PROAIR HFA/PROVENTIL HFA/VENTOLIN HFA) 108 (90 Base) MCG/ACT inhaler Inhale 2 puffs into the lungs every 6 hours as needed for shortness of breath / dyspnea or wheezing 8 g 3     calcium carbonate (TUMS) 500 MG chewable tablet Take 1 tablet (500 mg) by mouth At Bedtime 90 tablet 3     diclofenac (VOLTAREN) 1 % topical gel Apply 2 g topically 4 times daily 350 g 3     ferrous sulfate (FEROSUL) 325 (65 Fe) MG tablet Take 1 tablet (325 mg) by mouth daily (with breakfast) 90 tablet 0     folic acid (FOLVITE) 1 MG tablet Take 1 tablet (1 mg) by mouth daily 30 tablet 0     magnesium 250 MG tablet Take 1 tablet (250 mg) by mouth At Bedtime 90 tablet 3     multivitamin w/minerals (THERA-VIT-M) tablet Take 1 tablet by mouth daily (with breakfast) 30 tablet 0     potassium chloride ER (KLOR-CON M) 10 MEQ CR tablet Take 1 tablet (10 mEq) by mouth daily 90 tablet 0     thiamine (B-1) 100 MG tablet Take 1 tablet  (100 mg) by mouth daily (with breakfast) 90 tablet 0     No Known Allergies    Pertinent Labs:  Last Creatine:   Creatinine   Date Value Ref Range Status   09/27/2020 0.82 0.66 - 1.25 mg/dL Final     Last INR:   INR   Date Value Ref Range Status   09/27/2020 1.64 (H) 0.86 - 1.14 Final       Other Questions:    Reminded patient to take any order prophylactic antibiotics 1 hour prior to appointment:  No    If on a prescribed anticoagulant, advised patient to continue or check with prescribing provider: , No    If on an OTC anticoagulant/supplement, advised patient to hold these medications 10-14 days prior to surgery and resume 48 hrs after surgery: Yes     Please be aware that this can be an all day procedure. Please bring your daily medications and food/cash. Encourage patient to eat prior to procedure(s). After care instructions were reviewed with patient.    If any positives, send to RN to initiate antibiotic prophylaxis protocol and/or anticoagulation management protocol    Reviewed by:    Catrina Mas RN        Patient Name: DAVID HELMS   MR#: [id number]   Specimen #: [id number]   Collected: 7/5/2021   Received: 7/6/2021   Reported: 7/7/2021 12:13   Ordering Phy(s): RAMON SAEZ     For improved result formatting, select 'View Enhanced Report Format' under    Linked Documents section.     SPECIMEN(S):   A: Skin, right anterior shoulder, shave   B: Skin, left sideburn, shave     FINAL DIAGNOSIS:   A. Skin, right anterior shoulder, shave:   - Scar from the prior surgical procedure, with no evidence of residual   lesion - (see description)   - Incidental actinic keratosis - (see description)     B. Skin, left sideburn, shave:   - Basal cell carcinoma, nodular type, extending to the deep margin - (see   description)        Catrina Mas RN   7/15/2021  3:43 PM CDT Back to Top      Spoke with pt and results reviewed. Pt scheduled for Mohs. Previsit completed..Catrina Sutherland RN  JENNIFER SOOD   7/15/2021  3:19 PM CDT       Pt called, No answer.  does not identify pt. Left general message for pt to call the [facility] clinic back at [phone]. ..Maurilio Oropeza RN, MD   7/15/2021 12:31 PM CDT       Please call the patient with pathology results.     The biopsy on his shoulder did not show any recurrent BCC. It did show some actinic keratosis, pre cancer lesions. The pathologist thought these AK's were removed with the biopsy. We can re-check it at his next appointment.      The biopsy on the left sideburn was a BCC. My treatment recommendation is Mohs. Ok to schedule after telephone screening.      Thank you.

## 2021-07-27 ENCOUNTER — TELEPHONE (OUTPATIENT)
Dept: DERMATOLOGY | Facility: CLINIC | Age: 61
End: 2021-07-27

## 2021-07-27 NOTE — TELEPHONE ENCOUNTER
[facility] Call Center    Phone Message    May a detailed message be left on voicemail: yes     Reason for Call: Other: pt is calling to reschedule his procedure on 7/28- please call Sohan, thank you     Action Taken: Message routed to:  [facility] derm    Travel Screening: Not Applicable

## 2021-07-27 NOTE — TELEPHONE ENCOUNTER
Rescheduled to 8/23, soonest availability with Dr. Rutledge. Biopsy taken 7/5- ok to wait until then?    Please advise.    Leilani Collado, CMA on 7/27/2021 at 9:52 AM

## 2021-07-28 NOTE — TELEPHONE ENCOUNTER
responded back with message below. Pt called VM id's pt and vm left with 's message below and for pt to call back with any additional questions..Maurilio Oropeza RN, MD  [facility] [place] 16 hours ago (4:10 PM)   AM  Postponing the Mohs procedure a few weeks is unlikely to significantly change curability or the size of the scar. Thank you.     Message text

## 2021-08-23 ENCOUNTER — OFFICE VISIT (OUTPATIENT)
Dept: DERMATOLOGY | Facility: CLINIC | Age: 61
End: 2021-08-23
Payer: COMMERCIAL

## 2021-08-23 VITALS — SYSTOLIC BLOOD PRESSURE: 154 MMHG | HEART RATE: 83 BPM | DIASTOLIC BLOOD PRESSURE: 88 MMHG

## 2021-08-23 DIAGNOSIS — C44.319 BASAL CELL CARCINOMA (BCC) OF LEFT TEMPLE REGION: Primary | ICD-10-CM

## 2021-08-23 PROCEDURE — 12042 INTMD RPR N-HF/GENIT2.6-7.5: CPT | Mod: 51 | Performed by: DERMATOLOGY

## 2021-08-23 PROCEDURE — 17311 MOHS 1 STAGE H/N/HF/G: CPT | Performed by: DERMATOLOGY

## 2021-08-23 ASSESSMENT — PAIN SCALES - GENERAL: PAINLEVEL: NO PAIN (0)

## 2021-08-23 NOTE — PATIENT INSTRUCTIONS
Excision/Mohs Wound Care Instructions  I will experience scar, altered skin color, bleeding, swelling, pain, crusting and redness. I may experience altered sensation. Risks are excessive bleeding, infection, muscle weakness, thick (hypertrophic or keloidal) scar, and recurrence. A second procedure may be recommended to obtain the best cosmetic or functional result.  Possible complications of any surgical procedure are bleeding, infection, scarring, alteration in skin color and sensation, muscle weakness in the area, wound dehiscence or seperation, or recurrence of the lesion or disease. On occasion, after healing, a secondary procedure or revision may be recommended in order to obtain the best cosmetic or functional result.   After your surgery, a pressure bandage will be placed over the area that has sutures. This will help prevent bleeding. Please follow these instructions as they will help you to prevent complications as your wound heals.  For the First 48 hours After Surgery:  1. Leave the pressure bandage on and keep it dry. If it should come loose, you may retape it, but do not take it off.  2. Relax and take it easy. Do not do any vigorous exercise, heavy lifting, or bending forward. This could cause the wound to bleed.  3. Post-operative pain is usually mild. You may take plain or extra strength Tylenol every 4 hours as needed (do not take more than 4,000mg in one day). Do not take any medicine that contains aspirin, ibuprofen or motrin unless you have been recommended these by a doctor.  Avoid alcohol and vitamin E as these may increase your tendency to bleed.  4. You may put an ice pack around the bandaged area for 20 minutes every 2-3 hours. This may help reduce swelling, bruising, and pain. Make sure the ice pack is waterproof so that the pressure bandage does not get wet.   5. You may see a small amount of drainage or blood on your pressure bandage. This is normal. However, if drainage or bleeding  continues or saturates the bandage, you will need to apply firm pressure over the bandage with a washcloth for 15 minutes. If bleeding continues after applying pressure for 15 minutes then go to the nearest emergency room.  48 Hours After Surgery  Carefully remove the bandage and start daily wound care and dressing changes. You may also now shower and get the wound wet.  Daily Wound Care:  1. Wash wound with a mild soap and water.  Use caution when washing the wound, be gentle and do not let the forceful shower stream hit the wound directly.  2. Pat dry.  3. Apply Vaseline (from a new container or tube) over the suture line with a Q-tip. It is very important to keep the wound continuously moist, as wounds heal best in a moist environment.  4. Keep the site covered until sutures are removed, you can cover it with a Telfa (non-stick) dressing and tape or a band-aid.    5. If you are unable to keep wound covered, you must apply Vaseline every 2-3 hours (while awake) to ensure it is being kept moist for optimal healing. A dressing overnight is recommended to keep the area moist.  Call Us If:  1. You have pain that is not controlled with Tylenol.  2. You have signs or symptoms of an infection, such as: fever over 100 degrees F, redness, warmth, or foul-smelling or yellow/creamy drainage from the wound.  Who should I call with questions?    [facility]: [phone]     [facility]: [phone]    For urgent needs outside of business hours call the [facility] at [phone] and ask to speak with the dermatology resident on call

## 2021-08-23 NOTE — NURSING NOTE
Vaseline and pressure dressing applied to Mohs site on left sideburn.  Wound care instructions reviewed with patient and AVS provided.  Patient verbalized understanding.  No further questions or concerns at this time.    The following medication was given:     MEDICATION:  Lidocaine with epinephrine 1% 1:821400  ROUTE: SQ  SITE: see procedure note  DOSE: 9ml  LOT #: -[id number]  : Hospira  EXPIRATION DATE: 06/22  NDC#: 8654-6343-41  Was there drug waste? no  Multi-dose vial: Yes    Rosy Manning RN  August 23, 2021

## 2021-08-23 NOTE — LETTER
8/23/2021         RE: Sohan Marcano  [address] [address]        Dear Colleague,    Thank you for referring your patient, Sohan Marcano, to the [facility]. Please see a copy of my visit note below.    [facility] [place] Procedure Note      Date of Service:  Aug 23, 2021  Surgery: Mohs micrographic surgery    Case 1  Repair Type: intermediate  Repair Size: 2.7 cm  Suture Material: Fast Absorbing Gut 5-0  Tumor Type: BCC - Basal cell carcinoma  Location: left sideburn  Derm-Path Accession #: [id number]  PreOp Size: 0.5x0.5 cm  PostOp Size: 1.4x1.3 cm  Mohs Accession #: [id number]  Level of Defect: fat      Procedure:  We discussed the principles of treatment and most likely complications including scarring, bleeding, infection, swelling, pain, crusting, nerve damage, large wound,  incomplete excision, wound dehiscence,  nerve damage, recurrence, and a second procedure may be recommended to obtain the best cosmetic or functional result.    Informed consent was obtained and the patient underwent the procedure as follows:  The patient was placed supine on the operating table.  The cancer was identified, outlined with a marker, and verified by the patient.  The entire surgical field was prepped with Hibiclens.  The surgical site was anesthetized using Lidocaine 1% with epi 1:100,000.      The area of clinically apparent tumor was debulked. The layer of tissue was then surgically excised using a #15 blade and was then transferred onto a specimen sheet maintaining the orientation of the specimen. Hemostasis was obtained using bipolar electrocoagulation. The wound site was then covered with a dressing while the tissue samples were processed for examination.    The excised tissue was transported to the Mohs histology laboratory maintaining the tissue orientation.  The tissue specimen was relaxed so that the entire surgical margin  was in a a single horizontal plane for sectioning and inked for precise mapping.  A precise reference map was drawn to reflect the sectioning of the specimen, colored inking of the margins, and orientation on the patient. The tissue was processed using horizontal sectioning of the base and continuous peripheral margins.  The histopathologic sections were reviewed in conjunction with the reference map.    Total blocks: 1    Total slides:  1    There were no cancer cells visualized on examination, therefore Mohs surgery was complete.    REPAIR: An intermediate layered linear closure was selected as the procedure which would maximally preserve both function and cosmesis.    After the excision of the tumor, the area was undermined. Hemostasis was obtained with bipolar electrocoagulation.  Closure was oriented so that the wound was in the patient's natural skin tension lines. The subcutaneous and dermal layers were then closed with 5-0 monocryl buried vertical mattress sutures. The epidermis was then carefully approximated along the length of the wound using 5-0 Fast Absorbing Gut simple running sutures.     Estimated blood loss was less than 10 ml for all surgical sites. A sterile pressure dressing was applied and wound care instructions, with a written handout, were given. The patient was discharged from the [facility] alert and ambulatory.    Follow-up in PRN      I personally performed the procedures today.    Maurilio Rutledge DO    Department of Dermatology  [facility]: Phone: [phone], Fax:[phone]  [facility]: Phone: [phone], Fax: [phone]    Care and Laboratory Testing Performed at:  [facility]   Dermatology Clinic  [address] [place] [address]              Again, thank you for allowing me to participate in the care of your  patient.        Sincerely,        Maurilio Rutledge MD

## 2021-08-23 NOTE — PROGRESS NOTES
[facility] [place] Procedure Note      Date of Service:  Aug 23, 2021  Surgery: Mohs micrographic surgery    Case 1  Repair Type: intermediate  Repair Size: 2.7 cm  Suture Material: Fast Absorbing Gut 5-0  Tumor Type: BCC - Basal cell carcinoma  Location: left sideburn  Derm-Path Accession #: [id number]  PreOp Size: 0.5x0.5 cm  PostOp Size: 1.4x1.3 cm  Mohs Accession #: [id number]  Level of Defect: fat      Procedure:  We discussed the principles of treatment and most likely complications including scarring, bleeding, infection, swelling, pain, crusting, nerve damage, large wound,  incomplete excision, wound dehiscence,  nerve damage, recurrence, and a second procedure may be recommended to obtain the best cosmetic or functional result.    Informed consent was obtained and the patient underwent the procedure as follows:  The patient was placed supine on the operating table.  The cancer was identified, outlined with a marker, and verified by the patient.  The entire surgical field was prepped with Hibiclens.  The surgical site was anesthetized using Lidocaine 1% with epi 1:100,000.      The area of clinically apparent tumor was debulked. The layer of tissue was then surgically excised using a #15 blade and was then transferred onto a specimen sheet maintaining the orientation of the specimen. Hemostasis was obtained using bipolar electrocoagulation. The wound site was then covered with a dressing while the tissue samples were processed for examination.    The excised tissue was transported to the Mohs histology laboratory maintaining the tissue orientation.  The tissue specimen was relaxed so that the entire surgical margin was in a a single horizontal plane for sectioning and inked for precise mapping.  A precise reference map was drawn to reflect the sectioning of the specimen, colored inking of the margins, and orientation on the patient. The tissue was processed using  horizontal sectioning of the base and continuous peripheral margins.  The histopathologic sections were reviewed in conjunction with the reference map.    Total blocks: 1    Total slides:  1    There were no cancer cells visualized on examination, therefore Mohs surgery was complete.    REPAIR: An intermediate layered linear closure was selected as the procedure which would maximally preserve both function and cosmesis.    After the excision of the tumor, the area was undermined. Hemostasis was obtained with bipolar electrocoagulation.  Closure was oriented so that the wound was in the patient's natural skin tension lines. The subcutaneous and dermal layers were then closed with 5-0 monocryl buried vertical mattress sutures. The epidermis was then carefully approximated along the length of the wound using 5-0 Fast Absorbing Gut simple running sutures.     Estimated blood loss was less than 10 ml for all surgical sites. A sterile pressure dressing was applied and wound care instructions, with a written handout, were given. The patient was discharged from the [facility] Surgery Center alert and ambulatory.    Follow-up in PRN      I personally performed the procedures today.    Maurilio Rutledge DO    Department of Dermatology  [facility]: Phone: [phone], Fax:[phone]  [facility]: Phone: [phone], Fax: [phone]    Care and Laboratory Testing Performed at:  [facility]   Dermatology Clinic  [address] [place] [address]

## 2021-08-23 NOTE — NURSING NOTE
Sohan Marcano's goals for this visit include:   Chief Complaint   Patient presents with     Derm Problem     Sohan is here today for MOHS on his left sideburn due to BCC       He requests these members of his care team be copied on today's visit information:     PCP: Gracie Winters    Referring Provider:  No referring provider defined for this encounter.    BP (!) 154/88 (BP Location: Right arm, Patient Position: Sitting, Cuff Size: Adult Regular)   Pulse 83     Do you need any medication refills at today's visit? No    Yasmine Linda LPN

## 2021-09-13 ENCOUNTER — OFFICE VISIT (OUTPATIENT)
Dept: DERMATOLOGY | Facility: CLINIC | Age: 61
End: 2021-09-13
Payer: COMMERCIAL

## 2021-09-13 DIAGNOSIS — Z12.83 SKIN CANCER SCREENING: ICD-10-CM

## 2021-09-13 DIAGNOSIS — L81.4 SOLAR LENTIGINOSIS: ICD-10-CM

## 2021-09-13 DIAGNOSIS — L57.8 PHOTOAGING OF SKIN: ICD-10-CM

## 2021-09-13 DIAGNOSIS — Z85.828 HISTORY OF NONMELANOMA SKIN CANCER: Primary | ICD-10-CM

## 2021-09-13 DIAGNOSIS — L57.0 ACTINIC KERATOSIS: ICD-10-CM

## 2021-09-13 PROCEDURE — 17000 DESTRUCT PREMALG LESION: CPT | Performed by: PHYSICIAN ASSISTANT

## 2021-09-13 PROCEDURE — 99213 OFFICE O/P EST LOW 20 MIN: CPT | Mod: 25 | Performed by: PHYSICIAN ASSISTANT

## 2021-09-13 ASSESSMENT — PAIN SCALES - GENERAL: PAINLEVEL: NO PAIN (0)

## 2021-09-13 NOTE — PROGRESS NOTES
[facility] Dermatology Note  Encounter Date: Sep 13, 2021  Office Visit     Dermatology Problem List:  1. 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 Family history of Melanoma (Father)  3.  Actinic keratosis  -s/p cryo 12/13/2014, 10/17/2019, 1/25/21, 9/13/2021   -Right elbow, s/p biopsy 2/23/2021  4. Seborrheic keratosis  5. Telangiectasia    ____________________________________________    Assessment & Plan:     # History of NMSC. No evidence of recurrent disease.  - Continue photoprotection - recommend SPF 30 or higher with frequent reapplication  - Continue yearly skin exams  - Advised to monitor for changing, non-healing, bleeding, painful, changing, or otherwise symptomatic lesions    # Actinic keratosis, right temple  - Treated 1 with cryo, see note below    # Dermatoheliosis and sun protection (hat)  Stressed importance of daily spf.    # Cherry angiomas  # Seborrheic keratoses    # Multiple benign nevi.   # Solar lentigines  - No concerning lesions today  - Counseled on ABCDEs of melanoma and sun protection - recommend SPF 30 or higher with frequent application   - Return sooner if noticing changing or symptomatic lesions      Procedures Performed:   - Cryotherapy procedure note, location(s): see above. After verbal consent and discussion of risks and benefits including, but not limited to, dyspigmentation/scar, blister, and pain, 1 lesion(s) was(were) treated with 1-2 mm freeze border for 1-2 cycles with liquid nitrogen. Post cryotherapy instructions were provided.    Follow-up: 6 months for FBSE, sooner for new concerns.     Staff and Scribe:     Provider Disclosure:   The documentation recorded by the scribe accurately reflects the services I personally performed and the decisions made by  me.    All risks, benefits and alternatives were discussed with patient.  Patient is in agreement and understands the assessment and plan.  All questions were answered.  Sun Screen Education was given.   Return to Clinic in 6 months or sooner as needed.   Marlena Sosa PA-C   [facility]     Scribe Disclosure:  I, Sukumar Bethanie, am serving as a scribe to document services personally performed by Marlena Sosa PA-C based on data collection and the provider's statements to me.     Provider Disclosure:   The documentation recorded by the scribe accurately reflects the services I personally performed and the decisions made by me.    All risks, benefits and alternatives were discussed with patient.  Patient is in agreement and understands the assessment and plan.  All questions were answered.  Sun Screen Education was given.   Return to Clinic in 6 months or sooner as needed.   Marlena Sosa PA-C   [facility]   ____________________________________________    CC: Skin Check (pt states he is here for a full body skin check, spot on left arm. )    HPI:  Mr. Sohan Marcano is a(n) 61 year old male who presents today as a return patient for FBSE.     Last seen by Dr. Rutledge on 21, at which time the patient underwent Mohs at the [facility] for treatment of BCC.     Today, patient is concerned about a spot on his left arm. Patient reports that his father and brother both  of skin cancer. He also reports a history of a lot of sun exposure. 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 He reports that he uses SPF on his arm when outside for long periods of time, but he does not use it on his face.    Patient is otherwise feeling well, without additional skin concerns.    Labs Reviewed:  N/A    Physical Exam:  Vitals: There were no vitals taken for this visit.  SKIN: Full skin, which includes the head/face, both arms, chest, back,  abdomen,both legs, buttocks, digits and/or nails, was examined.  - Well healed scars at sites of previous NMSC. No nodularity or swelling  - Pink gritty papule on the R temple.   - Hyperlinearity to face  - There are dome shaped bright red papules on the trunk and extremities.  - Multiple regular brown pigmented macules and papules are identified on the trunk and extremities.   - Scattered brown macules on sun exposed areas.  - There are waxy stuck on tan to brown papules on the trunk and extremities..    - No other lesions of concern on areas examined.     Medications:  Current Outpatient Medications   Medication     albuterol (PROAIR HFA/PROVENTIL HFA/VENTOLIN HFA) 108 (90 Base) MCG/ACT inhaler     calcium carbonate (TUMS) 500 MG chewable tablet     diclofenac (VOLTAREN) 1 % topical gel     ferrous sulfate (FEROSUL) 325 (65 Fe) MG tablet     folic acid (FOLVITE) 1 MG tablet     magnesium 250 MG tablet     multivitamin w/minerals (THERA-VIT-M) tablet     potassium chloride ER (KLOR-CON M) 10 MEQ CR tablet     thiamine (B-1) 100 MG tablet     Current Facility-Administered Medications   Medication     lidocaine 1% with EPINEPHrine 1:100,000 injection 3 mL      Past Medical History:   Patient Active Problem List   Diagnosis     Alcohol withdrawal (H)     Chemical dependency (H)     Basal cell carcinoma of anterior chest s/p excision 3-10-15     Squamous cell carcinoma mid upper chest s/p excision 3-10-15     Abdominal pain     Alcohol dependence in remission (H)     Hepatitis C virus infection without hepatic coma, unspecified chronicity     Arthritis of both hands     Past Medical History:   Diagnosis Date     Actinic keratosis      Basal cell carcinoma      Squamous cell carcinoma      Substance abuse (H)     alcohol     Uncomplicated asthma         CC Maurilio Rutledge MD  [address] [place] [address] on close of this encounter.

## 2021-09-13 NOTE — NURSING NOTE
Chief Complaint   Patient presents with     Skin Check     pt states he is here for a full body skin check, spot on left arm.      Tayla Mahoney MA

## 2021-09-13 NOTE — PATIENT INSTRUCTIONS
Cryotherapy    What is it?    Use of a very cold liquid, such as liquid nitrogen, to freeze and destroy abnormal skin cells that need to be removed    What should I expect?    Tenderness and redness    A small blister that might grow and fill with dark purple blood. There may be crusting.    More than one treatment may be needed if the lesions do not go away.    How do I care for the treated area?    Gently wash the area with your hands when bathing.    Use a thin layer of Vaseline to help with healing. You may use a Band-Aid.     The area should heal within 7-10 days and may leave behind a pink or lighter color.     Do not use an antibiotic or Neosporin ointment.     You may take acetaminophen (Tylenol) for pain.     Call your doctor if you have:    Severe pain    Signs of infection (warmth, redness, cloudy yellow drainage, and or a bad smell)    Questions or concerns    Who should I call with questions?       [facility]: [phone]       [facility]: [phone]       For urgent needs outside of business hours call the [facility] at [phone] and ask for the dermatology resident on call

## 2021-09-13 NOTE — LETTER
9/13/2021       RE: Sohan Marcano  1945 [facility] [address]     Dear Colleague,    Thank you for referring your patient, Sohan Marcano, to the [facility] DERMATOLOGY CLINIC [place] at [facility]. Please see a copy of my visit note below.    [facility] Dermatology Note  Encounter Date: Sep 13, 2021  Office Visit     Dermatology Problem List:  1. 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 Family history of Melanoma (Father)  3.  Actinic keratosis  -s/p cryo 12/13/2014, 10/17/2019, 1/25/21, 9/13/2021   -Right elbow, s/p biopsy 2/23/2021  4. Seborrheic keratosis  5. Telangiectasia    ____________________________________________    Assessment & Plan:     # History of NMSC. No evidence of recurrent disease.  - Continue photoprotection - recommend SPF 30 or higher with frequent reapplication  - Continue yearly skin exams  - Advised to monitor for changing, non-healing, bleeding, painful, changing, or otherwise symptomatic lesions    # Actinic keratosis, right temple  - Treated 1 with cryo, see note below    # Dermatoheliosis and sun protection (hat)  Stressed importance of daily spf.    # Cherry angiomas  # Seborrheic keratoses    # Multiple benign nevi.   # Solar lentigines  - No concerning lesions today  - Counseled on ABCDEs of melanoma and sun protection - recommend SPF 30 or higher with frequent application   - Return sooner if noticing changing or symptomatic lesions      Procedures Performed:   - Cryotherapy procedure note, location(s): see above. After verbal consent and discussion of risks and benefits including, but not limited to, dyspigmentation/scar, blister, and pain, 1 lesion(s) was(were) treated with 1-2 mm  freeze border for 1-2 cycles with liquid nitrogen. Post cryotherapy instructions were provided.    Follow-up: 6 months for FBSE, sooner for new concerns.     Staff and Scribe:     Provider Disclosure:   The documentation recorded by the scribe accurately reflects the services I personally performed and the decisions made by me.    All risks, benefits and alternatives were discussed with patient.  Patient is in agreement and understands the assessment and plan.  All questions were answered.  Sun Screen Education was given.   Return to Clinic in 6 months or sooner as needed.   Marlena Sosa PA-C   [facility]     Scribe Disclosure:  I, Sukumar Kovacs, am serving as a scribe to document services personally performed by Marlena Sosa PA-C based on data collection and the provider's statements to me.     Provider Disclosure:   The documentation recorded by the scribe accurately reflects the services I personally performed and the decisions made by me.    All risks, benefits and alternatives were discussed with patient.  Patient is in agreement and understands the assessment and plan.  All questions were answered.  Sun Screen Education was given.   Return to Clinic in 6 months or sooner as needed.   Marlena Sosa PA-C   [facility]   ____________________________________________    CC: Skin Check (pt states he is here for a full body skin check, spot on left arm. )    HPI:  Mr. Sohan Marcano is a(n) 61 year old male who presents today as a return patient for FBSE.     Last seen by Dr. Rutledge on 21, at which time the patient underwent Mohs at the [facility] for treatment of BCC.     Today, patient is concerned about a spot on his left arm. Patient reports that his father and brother both  of skin cancer. He also reports a history of a lot of sun exposure. 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 He reports that he uses SPF on  his arm when outside for long periods of time, but he does not use it on his face.    Patient is otherwise feeling well, without additional skin concerns.    Labs Reviewed:  N/A    Physical Exam:  Vitals: There were no vitals taken for this visit.  SKIN: Full skin, which includes the head/face, both arms, chest, back, abdomen,both legs, buttocks, digits and/or nails, was examined.  - Well healed scars at sites of previous NMSC. No nodularity or swelling  - Pink gritty papule on the R temple.   - Hyperlinearity to face  - There are dome shaped bright red papules on the trunk and extremities.  - Multiple regular brown pigmented macules and papules are identified on the trunk and extremities.   - Scattered brown macules on sun exposed areas.  - There are waxy stuck on tan to brown papules on the trunk and extremities..    - No other lesions of concern on areas examined.     Medications:  Current Outpatient Medications   Medication     albuterol (PROAIR HFA/PROVENTIL HFA/VENTOLIN HFA) 108 (90 Base) MCG/ACT inhaler     calcium carbonate (TUMS) 500 MG chewable tablet     diclofenac (VOLTAREN) 1 % topical gel     ferrous sulfate (FEROSUL) 325 (65 Fe) MG tablet     folic acid (FOLVITE) 1 MG tablet     magnesium 250 MG tablet     multivitamin w/minerals (THERA-VIT-M) tablet     potassium chloride ER (KLOR-CON M) 10 MEQ CR tablet     thiamine (B-1) 100 MG tablet     Current Facility-Administered Medications   Medication     lidocaine 1% with EPINEPHrine 1:100,000 injection 3 mL      Past Medical History:   Patient Active Problem List   Diagnosis     Alcohol withdrawal (H)     Chemical dependency (H)     Basal cell carcinoma of anterior chest s/p excision 3-10-15     Squamous cell carcinoma mid upper chest s/p excision 3-10-15     Abdominal pain     Alcohol dependence in remission (H)     Hepatitis C virus infection without hepatic coma, unspecified chronicity     Arthritis of both hands     Past Medical History:   Diagnosis  Date     Actinic keratosis      Basal cell carcinoma      Squamous cell carcinoma      Substance abuse (H)     alcohol     Uncomplicated asthma         CC Maurilio Rutledge MD  [address] [place] [address] on close of this encounter.

## 2021-12-16 NOTE — PROGRESS NOTES
Subjective:  HPI  Physical Exam       Knee Activity of Daily Living Score: 61.43            Objective:  System    Physical Exam    General     ROS    Assessment/Plan:    DISCHARGE REPORT    Progress reporting period is from 4/22/21 to 4/22/21.       SUBJECTIVE  Subjective changes noted by patient:  unknown.  Subjective: eval    Current pain level is NA  .     Previous pain level was  NA  .   Changes in function:  unknown  Adverse reaction to treatment or activity: None    OBJECTIVE  Changes noted in objective findings:  Patient has failed to return to therapy so current objective findings are unknown.  Objective: eval     ASSESSMENT/PLAN  Updated problem list and treatment plan: Diagnosis 1:  Knee pain  Pain -  hot/cold therapy, self management and education  Decreased ROM/flexibility - manual therapy and therapeutic exercise  Decreased strength - therapeutic exercise and therapeutic activities  STG/LTGs have been met or progress has been made towards goals:  Yes (See Goal flow sheet completed today.)  Assessment of Progress: Patient has not returned to therapy.  Current status is unknown and discharge G code cannot be reported.  Self Management Plans:    Pt did not return to PT  Sohan continues to require the following intervention to meet STG and LTG's:  unknown    Recommendations:  The progress note/discharge summary was written in collaboration with and reviewed by the physical therapist.    Please refer to the daily flowsheet for treatment today, total treatment time and time spent performing 1:1 timed codes.

## 2022-03-14 ENCOUNTER — OFFICE VISIT (OUTPATIENT)
Dept: DERMATOLOGY | Facility: CLINIC | Age: 62
End: 2022-03-14
Payer: COMMERCIAL

## 2022-03-14 DIAGNOSIS — L82.0 INFLAMED SEBORRHEIC KERATOSIS: ICD-10-CM

## 2022-03-14 DIAGNOSIS — Z85.828 HISTORY OF NONMELANOMA SKIN CANCER: ICD-10-CM

## 2022-03-14 DIAGNOSIS — L82.1 SEBORRHEIC KERATOSES: ICD-10-CM

## 2022-03-14 DIAGNOSIS — D22.9 MULTIPLE PIGMENTED NEVI: ICD-10-CM

## 2022-03-14 DIAGNOSIS — D18.01 CHERRY ANGIOMA: ICD-10-CM

## 2022-03-14 DIAGNOSIS — L81.4 SOLAR LENTIGINOSIS: ICD-10-CM

## 2022-03-14 DIAGNOSIS — Z12.83 SKIN CANCER SCREENING: Primary | ICD-10-CM

## 2022-03-14 PROCEDURE — 99213 OFFICE O/P EST LOW 20 MIN: CPT | Mod: 25 | Performed by: PHYSICIAN ASSISTANT

## 2022-03-14 PROCEDURE — 17110 DESTRUCTION B9 LES UP TO 14: CPT | Performed by: PHYSICIAN ASSISTANT

## 2022-03-14 ASSESSMENT — PAIN SCALES - GENERAL: PAINLEVEL: NO PAIN (0)

## 2022-03-14 NOTE — PROGRESS NOTES
[facility] Dermatology Note  Encounter Date: Mar 14, 2022  {kkvisit3:501787}    Dermatology Problem List:  1. 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 Family history of Melanoma (Father)  3.  Actinic keratosis  -s/p cryo 12/13/2014, 10/17/2019, 1/25/21, 9/13/2021   -Right elbow, s/p biopsy 2/23/2021  4. Seborrheic keratosis  5. Telangiectasia    ____________________________________________    Assessment & Plan:    # {Diagnosesder:387942}.   {kkplans:641617}   - ***     # History of NMSC. No evidence of recurrent disease.  - Continue photoprotection - recommend SPF 30 or higher with frequent reapplication  - Continue yearly skin exams  - Advised to monitor for changing, non-healing, bleeding, painful, changing, or otherwise symptomatic lesions    Procedures Performed:   {kkprocedurenotes:553015}  {kkprocedurenotes:841783}    Follow-up: {kkfollowup:550403}    Staff and Scribe:     Scribe Disclosure:  ILucinda, am serving as a scribe to document services personally performed by Marlena Sosa PA-C based on data collection and the provider's statements to me.     ***  ____________________________________________    CC: No chief complaint on file.    HPI:  Mr. Sohan Marcano is a(n) 61 year old male who presents today as a return patient for ***. Last seen in dermatology on 9/13/21 by myself at which point 1 AK on the R temple was treated with cryotherapy.     Patient is otherwise feeling well, without additional skin concerns.    Labs Reviewed:  ***N/A    Physical Exam:  Vitals: There were no vitals taken for this visit.  SKIN: {kkSkinExam:703114}  - ***  - {Skin Exam Derm:277458}.   - {Skin Exam Derm:243646}.   - {Skin Exam Derm:016753}.   - No other lesions of concern on areas  examined.     Medications:  Current Outpatient Medications   Medication     albuterol (PROAIR HFA/PROVENTIL HFA/VENTOLIN HFA) 108 (90 Base) MCG/ACT inhaler     calcium carbonate (TUMS) 500 MG chewable tablet     diclofenac (VOLTAREN) 1 % topical gel     ferrous sulfate (FEROSUL) 325 (65 Fe) MG tablet     folic acid (FOLVITE) 1 MG tablet     magnesium 250 MG tablet     multivitamin w/minerals (THERA-VIT-M) tablet     potassium chloride ER (KLOR-CON M) 10 MEQ CR tablet     thiamine (B-1) 100 MG tablet     Current Facility-Administered Medications   Medication     lidocaine 1% with EPINEPHrine 1:100,000 injection 3 mL      Past Medical History:   Patient Active Problem List   Diagnosis     Alcohol withdrawal (H)     Chemical dependency (H)     Basal cell carcinoma of anterior chest s/p excision 3-10-15     Squamous cell carcinoma mid upper chest s/p excision 3-10-15     Abdominal pain     Alcohol dependence in remission (H)     Hepatitis C virus infection without hepatic coma, unspecified chronicity     Arthritis of both hands     Past Medical History:   Diagnosis Date     Actinic keratosis      Basal cell carcinoma      Squamous cell carcinoma      Substance abuse (H)     alcohol     Uncomplicated asthma         CC Gracie Winters, APRN CNP  [address]  [place] [address] on close of this encounter.

## 2022-03-14 NOTE — LETTER
3/14/2022       RE: Sohan Marcano  1945 [facility] [address]     Dear Colleague,    Thank you for referring your patient, Sohan Marcano, to the [facility] DERMATOLOGY CLINIC [place] at [facility]. Please see a copy of my visit note below.    [facility] Dermatology Note  Encounter Date: Mar 14, 2022  Office Visit     Dermatology Problem List:  1. 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 Family history of Melanoma (Father). Brother, both passed   3.  Actinic keratosis  -s/p cryo 12/13/2014, 10/17/2019, 1/25/21, 9/13/2021   -Right elbow, s/p biopsy 2/23/2021  4. Seborrheic keratosis  5. Telangiectasia    ____________________________________________    Assessment & Plan:     # History of NMSC. No evidence of recurrent disease.  - Continue photoprotection - recommend SPF 30 or higher with frequent reapplication  - Continue yearly skin exams  - Advised to monitor for changing, non-healing, bleeding, painful, changing, or otherwise symptomatic lesions    # hx Actinic keratosis, right temple  -Resolved    # Inflamed seborrheic keratosis right upper back,   - Treated 1 with cryo, see note below    # Dermatoheliosis and sun protection (hat)  Stressed importance of daily spf.    # Cherry angiomas  # Seborrheic keratoses    # Multiple benign nevi.   # Solar lentigines  - No concerning lesions today  - Counseled on ABCDEs of melanoma and sun protection - recommend SPF 30 or higher with frequent application   - Return sooner if noticing changing or symptomatic lesions      Procedures Performed:   - Cryotherapy procedure note, location(s): see above. After verbal consent and discussion of risks and benefits including, but not  limited to, dyspigmentation/scar, blister, and pain, 1 lesion(s) was(were) treated with 1-2 mm freeze border for 1-2 cycles with liquid nitrogen. Post cryotherapy instructions were provided.    Follow-up: 6 months for FBSE, sooner for new concerns.     Staff :  All risks, benefits and alternatives were discussed with patient.  Patient is in agreement and understands the assessment and plan.  All questions were answered.  Sun Screen Education was given.   Return to Clinic in 6 months or sooner as needed.   Marlena Sosa PA-C   [facility]      ____________________________________________    CC: Skin Check (pt states he is here for a full body skin check. spot on right arm )    HPI:  Mr. Sohan Marcano is a(n) 61 year old male who presents today as a return patient for FBSE. He was last seen here 9/13/21 when he had an actinic keratosis treated on his right temple with cryotherapy.     Today he is here for full skin check. He states he had a spot on his right arm that he picked off. It has not grown back.     There is an itchy growth on the right upper back that he feels like he needs to pick off but doesn;t want to hurt his skin.     Patient is otherwise feeling well, without additional skin concerns.    Labs Reviewed:  N/A    Physical Exam:  Vitals: There were no vitals taken for this visit.  SKIN: Full skin, which includes the head/face, both arms, chest, back, abdomen,both legs, buttocks, digits and/or nails, was examined.  - Well healed scars at sites of previous NMSC. No nodularity or swelling  - No active lesion R temple.   - Hyperlinearity to face  -There is a waxy stuck on papule on the right upper back with surrounding erythema.   - There are dome shaped bright red papules on the trunk and extremities.  - Multiple regular brown pigmented macules and papules are identified on the trunk and extremities.   - Scattered brown macules on sun exposed areas.  - There are waxy  stuck on tan to brown papules on the trunk and extremities..    - No other lesions of concern on areas examined.     Medications:  Current Outpatient Medications   Medication     albuterol (PROAIR HFA/PROVENTIL HFA/VENTOLIN HFA) 108 (90 Base) MCG/ACT inhaler     calcium carbonate (TUMS) 500 MG chewable tablet     diclofenac (VOLTAREN) 1 % topical gel     ferrous sulfate (FEROSUL) 325 (65 Fe) MG tablet     folic acid (FOLVITE) 1 MG tablet     magnesium 250 MG tablet     multivitamin w/minerals (THERA-VIT-M) tablet     potassium chloride ER (KLOR-CON M) 10 MEQ CR tablet     thiamine (B-1) 100 MG tablet     Current Facility-Administered Medications   Medication     lidocaine 1% with EPINEPHrine 1:100,000 injection 3 mL      Past Medical History:   Patient Active Problem List   Diagnosis     Alcohol withdrawal (H)     Chemical dependency (H)     Basal cell carcinoma of anterior chest s/p excision 3-10-15     Squamous cell carcinoma mid upper chest s/p excision 3-10-15     Abdominal pain     Alcohol dependence in remission (H)     Hepatitis C virus infection without hepatic coma, unspecified chronicity     Arthritis of both hands     Past Medical History:   Diagnosis Date     Actinic keratosis      Basal cell carcinoma      Squamous cell carcinoma      Substance abuse (H)     alcohol     Uncomplicated asthma         CC Maurilio Rutledge MD  [address] [place] [address] on close of this encounter.

## 2022-03-14 NOTE — PATIENT INSTRUCTIONS
Cryotherapy    What is it?    Use of a very cold liquid, such as liquid nitrogen, to freeze and destroy abnormal skin cells that need to be removed    What should I expect?    Tenderness and redness    A small blister that might grow and fill with dark purple blood. There may be crusting.    More than one treatment may be needed if the lesions do not go away.    How do I care for the treated area?    Gently wash the area with your hands when bathing.    Use a thin layer of Vaseline to help with healing. You may use a Band-Aid.     The area should heal within 7-10 days and may leave behind a pink or lighter color.     Do not use an antibiotic or Neosporin ointment.     You may take acetaminophen (Tylenol) for pain.     Call your doctor if you have:    Severe pain    Signs of infection (warmth, redness, cloudy yellow drainage, and or a bad smell)    Questions or concerns    Who should I call with questions?       [facility]: [phone]       [facility]: [phone]       For urgent needs outside of business hours call the [facility] at [phone] and ask for the dermatology resident on call

## 2022-03-14 NOTE — PROGRESS NOTES
[facility] Dermatology Note  Encounter Date: Mar 14, 2022  Office Visit     Dermatology Problem List:  1. 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 Family history of Melanoma (Father). Brother, both passed   3.  Actinic keratosis  -s/p cryo 12/13/2014, 10/17/2019, 1/25/21, 9/13/2021   -Right elbow, s/p biopsy 2/23/2021  4. Seborrheic keratosis  5. Telangiectasia    ____________________________________________    Assessment & Plan:     # History of NMSC. No evidence of recurrent disease.  - Continue photoprotection - recommend SPF 30 or higher with frequent reapplication  - Continue yearly skin exams  - Advised to monitor for changing, non-healing, bleeding, painful, changing, or otherwise symptomatic lesions    # hx Actinic keratosis, right temple  -Resolved    # Inflamed seborrheic keratosis right upper back,   - Treated 1 with cryo, see note below    # Dermatoheliosis and sun protection (hat)  Stressed importance of daily spf.    # Cherry angiomas  # Seborrheic keratoses    # Multiple benign nevi.   # Solar lentigines  - No concerning lesions today  - Counseled on ABCDEs of melanoma and sun protection - recommend SPF 30 or higher with frequent application   - Return sooner if noticing changing or symptomatic lesions      Procedures Performed:   - Cryotherapy procedure note, location(s): see above. After verbal consent and discussion of risks and benefits including, but not limited to, dyspigmentation/scar, blister, and pain, 1 lesion(s) was(were) treated with 1-2 mm freeze border for 1-2 cycles with liquid nitrogen. Post cryotherapy instructions were provided.    Follow-up: 6 months for FBSE, sooner for new concerns.     Staff :  All risks, benefits and alternatives were discussed with  patient.  Patient is in agreement and understands the assessment and plan.  All questions were answered.  Sun Screen Education was given.   Return to Clinic in 6 months or sooner as needed.   Marlena Sosa PA-C   [facility]      ____________________________________________    CC: Skin Check (pt states he is here for a full body skin check. spot on right arm )    HPI:  Mr. Sohan Marcano is a(n) 61 year old male who presents today as a return patient for FBSE. He was last seen here 9/13/21 when he had an actinic keratosis treated on his right temple with cryotherapy.     Today he is here for full skin check. He states he had a spot on his right arm that he picked off. It has not grown back.     There is an itchy growth on the right upper back that he feels like he needs to pick off but doesn;t want to hurt his skin.     Patient is otherwise feeling well, without additional skin concerns.    Labs Reviewed:  N/A    Physical Exam:  Vitals: There were no vitals taken for this visit.  SKIN: Full skin, which includes the head/face, both arms, chest, back, abdomen,both legs, buttocks, digits and/or nails, was examined.  - Well healed scars at sites of previous NMSC. No nodularity or swelling  - No active lesion R temple.   - Hyperlinearity to face  -There is a waxy stuck on papule on the right upper back with surrounding erythema.   - There are dome shaped bright red papules on the trunk and extremities.  - Multiple regular brown pigmented macules and papules are identified on the trunk and extremities.   - Scattered brown macules on sun exposed areas.  - There are waxy stuck on tan to brown papules on the trunk and extremities..    - No other lesions of concern on areas examined.     Medications:  Current Outpatient Medications   Medication     albuterol (PROAIR HFA/PROVENTIL HFA/VENTOLIN HFA) 108 (90 Base) MCG/ACT inhaler     calcium carbonate (TUMS) 500 MG chewable tablet      diclofenac (VOLTAREN) 1 % topical gel     ferrous sulfate (FEROSUL) 325 (65 Fe) MG tablet     folic acid (FOLVITE) 1 MG tablet     magnesium 250 MG tablet     multivitamin w/minerals (THERA-VIT-M) tablet     potassium chloride ER (KLOR-CON M) 10 MEQ CR tablet     thiamine (B-1) 100 MG tablet     Current Facility-Administered Medications   Medication     lidocaine 1% with EPINEPHrine 1:100,000 injection 3 mL      Past Medical History:   Patient Active Problem List   Diagnosis     Alcohol withdrawal (H)     Chemical dependency (H)     Basal cell carcinoma of anterior chest s/p excision 3-10-15     Squamous cell carcinoma mid upper chest s/p excision 3-10-15     Abdominal pain     Alcohol dependence in remission (H)     Hepatitis C virus infection without hepatic coma, unspecified chronicity     Arthritis of both hands     Past Medical History:   Diagnosis Date     Actinic keratosis      Basal cell carcinoma      Squamous cell carcinoma      Substance abuse (H)     alcohol     Uncomplicated asthma         CC Maurilio Rutledge MD  [address] [place] [address] on close of this encounter.

## 2022-04-05 NOTE — PROGRESS NOTES
Assessment & Plan     ICD-10-CM    1. Chronic obstructive pulmonary disease, unspecified COPD type (H)  J44.9 albuterol (PROAIR HFA/PROVENTIL HFA/VENTOLIN HFA) 108 (90 Base) MCG/ACT inhaler     budesonide-formoterol (SYMBICORT) 80-4.5 MCG/ACT Inhaler     Adult Pulmonary Medicine Referral   2. Alcohol dependence with alcohol-induced sleep disorder (H)  F10.282 Hemoglobin A1c     Comprehensive metabolic panel (BMP + Alb, Alk Phos, ALT, AST, Total. Bili, TP)     TSH with free T4 reflex     Vitamin D Deficiency   3. Alcoholic polyneuropathy (H)  G62.1 gabapentin (NEURONTIN) 300 MG capsule     Adult Mental Health  Referral     CBC with platelets and differential   4. Chronic hepatitis C without hepatic coma (H)  B18.2    5. Alcohol abuse  F10.10 Adult Mental Health  Referral   6. Encounter for smoking cessation counseling  Z71.6       Chronic obstructive pulmonary disease, unspecified COPD type (H)  ACT score 13 today- although this appears to align more with COPD symptoms. Worsening shortness of breath with activity, needing to take more breaks, needed rescue inhaler more frequently. Will start low dose Symbicort today and refer to Pulm.  - albuterol (PROAIR HFA/PROVENTIL HFA/VENTOLIN HFA) 108 (90 Base) MCG/ACT inhaler; Inhale 2 puffs into the lungs every 6 hours as needed for shortness of breath / dyspnea or wheezing  - budesonide-formoterol (SYMBICORT) 80-4.5 MCG/ACT Inhaler; Inhale 2 puffs into the lungs 2 times daily  - Adult Pulmonary Medicine Referral; Future    Alcohol dependence with alcohol-induced sleep disorder (H)  Has been drinking more lately, about 20 drinks per week. Has bad dreams when he doesn't drink. Not interested in counseling today.  - Hemoglobin A1c; Future  - Comprehensive metabolic panel (BMP + Alb, Alk Phos, ALT, AST, Total. Bili, TP); Future  - TSH with free T4 reflex; Future  - Vitamin D Deficiency; Future    Alcoholic polyneuropathy (H)  Has had numbness and tingling in  "his feet. Discussed likelihood that this is related to alcohol abuse. Will also rule out diabetes today. Will start low dose gabapentin today, can increase dose to effectiveness and side effects.  - gabapentin (NEURONTIN) 300 MG capsule; Take 1 capsule (300 mg) by mouth 3 times daily as needed for neuropathic pain  - Adult Mental Health  Referral; Future    Encounter for smoking cessation counseling  Patient has had good luck with nicotine gum in the past and is planning on doing this again. He does not need any additional help or resources today.       Tobacco Cessation:   reports that he has been smoking. He has been smoking about 0.25 packs per day. He has never used smokeless tobacco.  Tobacco Cessation Action Plan: nicotine gum    BMI:   Estimated body mass index is 28.46 kg/m  as calculated from the following:    Height as of this encounter: 1.956 m (6' 5\").    Weight as of this encounter: 108.9 kg (240 lb).   Weight management plan: Discussed healthy diet and exercise guidelines        Return in about 4 weeks (around 5/4/2022) for annual exam with fasting lab work.    Yumiko Mistry DNP FNP student    I was present with the NP Student during the history and exam.  I discussed the case with the NP Student and agree with the findings as documented in the assessment and plan.     40 minutes spent on the date of the encounter doing chart review, history and exam, documentation and further activities per the note   ELIU Christian CNP  [facility]    Harris Parry is a 61 year old who presents for the following health issues     HPI     Neuropathy in feet  - started last Spring, went up a size in shoes and that helped  - numbness and tingling    Asthma Follow-Up    Was ACT completed today?    Yes    ACT Total Scores 4/6/2022   ACT TOTAL SCORE (Goal Greater than or Equal to 20) 13   In the past 12 months, how many times did you visit the emergency room for your asthma " "without being admitted to the hospital? 0   In the past 12 months, how many times were you hospitalized overnight because of your asthma? 0     How many days per week do you miss taking your asthma controller medication?  I do not have an asthma controller medication    Please describe any recent triggers for your asthma: smoke and exercise or sports    Have you had any Emergency Room Visits, Urgent Care Visits, or Hospital Admissions since your last office visit?  No      How many servings of fruits and vegetables do you eat daily?  2-3    On average, how many sweetened beverages do you drink each day (Examples: soda, juice, sweet tea, etc.  Do NOT count diet or artificially sweetened beverages)?   2    How many days per week do you exercise enough to make your heart beat faster? 3 or less    How many minutes a day do you exercise enough to make your heart beat faster? 9 or less    How many days per week do you miss taking your medication? 0    ETOH  - went through detox at [facility] once  - he has also seen Hepatology a few years ago as well. He quit drinking when he was on Hep C medications- this was curative  - he has never been able to quit drinking long term  - he is interested in quitting drinking again today- he has trouble sleeping when he doesn't drink (this is usually what causes him to relapse)    Review of Systems   Constitutional, HEENT, cardiovascular, pulmonary, gi and gu systems are negative, except as otherwise noted.      Objective    /86 (BP Location: Left arm, Patient Position: Sitting, Cuff Size: Adult Regular)   Pulse 95   Temp 99.1  F (37.3  C) (Temporal)   Ht 1.956 m (6' 5\")   Wt 108.9 kg (240 lb)   SpO2 92%   BMI 28.46 kg/m    Body mass index is 28.46 kg/m .  Physical Exam   GENERAL: healthy, alert and no distress  RESP: lungs clear to auscultation - no rales, rhonchi or wheezes  CV: regular rate and rhythm, normal S1 S2, no S3 or S4, no murmur, click or rub, no peripheral edema " and peripheral pulses strong  MS: no gross musculoskeletal defects noted, no edema

## 2022-04-06 ENCOUNTER — OFFICE VISIT (OUTPATIENT)
Dept: FAMILY MEDICINE | Facility: CLINIC | Age: 62
End: 2022-04-06
Payer: COMMERCIAL

## 2022-04-06 ENCOUNTER — LAB (OUTPATIENT)
Dept: LAB | Facility: CLINIC | Age: 62
End: 2022-04-06

## 2022-04-06 VITALS
HEART RATE: 95 BPM | WEIGHT: 240 LBS | HEIGHT: 77 IN | BODY MASS INDEX: 28.34 KG/M2 | TEMPERATURE: 99.1 F | SYSTOLIC BLOOD PRESSURE: 133 MMHG | OXYGEN SATURATION: 92 % | DIASTOLIC BLOOD PRESSURE: 86 MMHG

## 2022-04-06 DIAGNOSIS — F10.10 ALCOHOL ABUSE: ICD-10-CM

## 2022-04-06 DIAGNOSIS — G62.1 ALCOHOLIC POLYNEUROPATHY (H): ICD-10-CM

## 2022-04-06 DIAGNOSIS — J44.9 CHRONIC OBSTRUCTIVE PULMONARY DISEASE, UNSPECIFIED COPD TYPE (H): Primary | ICD-10-CM

## 2022-04-06 DIAGNOSIS — B18.2 CHRONIC HEPATITIS C WITHOUT HEPATIC COMA (H): ICD-10-CM

## 2022-04-06 DIAGNOSIS — Z71.6 ENCOUNTER FOR SMOKING CESSATION COUNSELING: ICD-10-CM

## 2022-04-06 DIAGNOSIS — F10.282 ALCOHOL DEPENDENCE WITH ALCOHOL-INDUCED SLEEP DISORDER (H): ICD-10-CM

## 2022-04-06 PROBLEM — F10.21 ALCOHOL DEPENDENCE IN REMISSION (H): Status: RESOLVED | Noted: 2019-07-15 | Resolved: 2022-04-06

## 2022-04-06 PROBLEM — B19.20 HEPATITIS C VIRUS INFECTION WITHOUT HEPATIC COMA, UNSPECIFIED CHRONICITY: Status: RESOLVED | Noted: 2019-07-15 | Resolved: 2022-04-06

## 2022-04-06 LAB
ALBUMIN SERPL-MCNC: 4.1 G/DL (ref 3.4–5)
ALP SERPL-CCNC: 78 U/L (ref 40–150)
ALT SERPL W P-5'-P-CCNC: 46 U/L (ref 0–70)
ANION GAP SERPL CALCULATED.3IONS-SCNC: 8 MMOL/L (ref 3–14)
AST SERPL W P-5'-P-CCNC: 47 U/L (ref 0–45)
BASOPHILS # BLD AUTO: 0.1 10E3/UL (ref 0–0.2)
BASOPHILS NFR BLD AUTO: 1 %
BILIRUB SERPL-MCNC: 1.1 MG/DL (ref 0.2–1.3)
BUN SERPL-MCNC: 9 MG/DL (ref 7–30)
CALCIUM SERPL-MCNC: 9.5 MG/DL (ref 8.5–10.1)
CHLORIDE BLD-SCNC: 105 MMOL/L (ref 94–109)
CO2 SERPL-SCNC: 26 MMOL/L (ref 20–32)
CREAT SERPL-MCNC: 0.76 MG/DL (ref 0.66–1.25)
DEPRECATED CALCIDIOL+CALCIFEROL SERPL-MC: 22 UG/L (ref 20–75)
EOSINOPHIL # BLD AUTO: 0.3 10E3/UL (ref 0–0.7)
EOSINOPHIL NFR BLD AUTO: 6 %
ERYTHROCYTE [DISTWIDTH] IN BLOOD BY AUTOMATED COUNT: 12.8 % (ref 10–15)
GFR SERPL CREATININE-BSD FRML MDRD: >90 ML/MIN/1.73M2
GLUCOSE BLD-MCNC: 123 MG/DL (ref 70–99)
HBA1C MFR BLD: 5.2 % (ref 0–5.6)
HCT VFR BLD AUTO: 46.7 % (ref 40–53)
HGB BLD-MCNC: 16.5 G/DL (ref 13.3–17.7)
LYMPHOCYTES # BLD AUTO: 1.9 10E3/UL (ref 0.8–5.3)
LYMPHOCYTES NFR BLD AUTO: 31 %
MCH RBC QN AUTO: 35.5 PG (ref 26.5–33)
MCHC RBC AUTO-ENTMCNC: 35.3 G/DL (ref 31.5–36.5)
MCV RBC AUTO: 100 FL (ref 78–100)
MONOCYTES # BLD AUTO: 0.8 10E3/UL (ref 0–1.3)
MONOCYTES NFR BLD AUTO: 12 %
NEUTROPHILS # BLD AUTO: 3 10E3/UL (ref 1.6–8.3)
NEUTROPHILS NFR BLD AUTO: 50 %
PLATELET # BLD AUTO: 210 10E3/UL (ref 150–450)
POTASSIUM BLD-SCNC: 3.4 MMOL/L (ref 3.4–5.3)
PROT SERPL-MCNC: 8.4 G/DL (ref 6.8–8.8)
RBC # BLD AUTO: 4.65 10E6/UL (ref 4.4–5.9)
SODIUM SERPL-SCNC: 139 MMOL/L (ref 133–144)
T4 FREE SERPL-MCNC: 1.01 NG/DL (ref 0.76–1.46)
TSH SERPL DL<=0.005 MIU/L-ACNC: 4.29 MU/L (ref 0.4–4)
WBC # BLD AUTO: 6.1 10E3/UL (ref 4–11)

## 2022-04-06 PROCEDURE — 90471 IMMUNIZATION ADMIN: CPT | Performed by: NURSE PRACTITIONER

## 2022-04-06 PROCEDURE — 82306 VITAMIN D 25 HYDROXY: CPT

## 2022-04-06 PROCEDURE — 90750 HZV VACC RECOMBINANT IM: CPT | Performed by: NURSE PRACTITIONER

## 2022-04-06 PROCEDURE — 90715 TDAP VACCINE 7 YRS/> IM: CPT | Performed by: NURSE PRACTITIONER

## 2022-04-06 PROCEDURE — 36415 COLL VENOUS BLD VENIPUNCTURE: CPT

## 2022-04-06 PROCEDURE — 80050 GENERAL HEALTH PANEL: CPT

## 2022-04-06 PROCEDURE — 83036 HEMOGLOBIN GLYCOSYLATED A1C: CPT

## 2022-04-06 PROCEDURE — 84439 ASSAY OF FREE THYROXINE: CPT

## 2022-04-06 PROCEDURE — 99215 OFFICE O/P EST HI 40 MIN: CPT | Mod: 25 | Performed by: NURSE PRACTITIONER

## 2022-04-06 PROCEDURE — 90670 PCV13 VACCINE IM: CPT | Performed by: NURSE PRACTITIONER

## 2022-04-06 PROCEDURE — 90472 IMMUNIZATION ADMIN EACH ADD: CPT | Performed by: NURSE PRACTITIONER

## 2022-04-06 RX ORDER — ALBUTEROL SULFATE 90 UG/1
2 AEROSOL, METERED RESPIRATORY (INHALATION) EVERY 6 HOURS PRN
Qty: 8 G | Refills: 3 | Status: SHIPPED | OUTPATIENT
Start: 2022-04-06 | End: 2022-07-18

## 2022-04-06 RX ORDER — GABAPENTIN 300 MG/1
300 CAPSULE ORAL 3 TIMES DAILY PRN
Qty: 90 CAPSULE | Refills: 0 | Status: SHIPPED | OUTPATIENT
Start: 2022-04-06 | End: 2022-06-03

## 2022-04-06 RX ORDER — GABAPENTIN 100 MG/1
100 CAPSULE ORAL 3 TIMES DAILY
Qty: 270 CAPSULE | Refills: 3 | Status: CANCELLED | OUTPATIENT
Start: 2022-04-06 | End: 2022-07-05

## 2022-04-06 RX ORDER — BUDESONIDE AND FORMOTEROL FUMARATE DIHYDRATE 80; 4.5 UG/1; UG/1
2 AEROSOL RESPIRATORY (INHALATION) 2 TIMES DAILY
Status: CANCELLED | OUTPATIENT
Start: 2022-04-06

## 2022-04-06 RX ORDER — BUDESONIDE AND FORMOTEROL FUMARATE DIHYDRATE 80; 4.5 UG/1; UG/1
2 AEROSOL RESPIRATORY (INHALATION) 2 TIMES DAILY
Qty: 10 G | Refills: 1 | Status: SHIPPED | OUTPATIENT
Start: 2022-04-06 | End: 2022-07-07

## 2022-04-06 ASSESSMENT — ASTHMA QUESTIONNAIRES: ACT_TOTALSCORE: 13

## 2022-04-07 ENCOUNTER — TELEPHONE (OUTPATIENT)
Dept: PULMONOLOGY | Facility: CLINIC | Age: 62
End: 2022-04-07
Payer: COMMERCIAL

## 2022-04-07 DIAGNOSIS — J44.9 CHRONIC OBSTRUCTIVE PULMONARY DISEASE, UNSPECIFIED COPD TYPE (H): Primary | ICD-10-CM

## 2022-04-11 ENCOUNTER — TELEPHONE (OUTPATIENT)
Dept: FAMILY MEDICINE | Facility: CLINIC | Age: 62
End: 2022-04-11
Payer: COMMERCIAL

## 2022-04-11 ENCOUNTER — TELEPHONE (OUTPATIENT)
Dept: PULMONOLOGY | Facility: CLINIC | Age: 62
End: 2022-04-11
Payer: COMMERCIAL

## 2022-04-11 NOTE — TELEPHONE ENCOUNTER
Attempted to call, left vm to please call clinic back.     Please call with results. Please let him know we will recheck his thyroid result once he is sober to see if this improves with that alone, also increase amount of vitamin D3 he is taking daily by 5000 units..      Thanks,   ELIU Christian CNP      Thanks,  JENNIFER Naylor  [facility]

## 2022-04-14 NOTE — TELEPHONE ENCOUNTER
RECORDS RECEIVED FROM: internal    DATE RECEIVED: 7/18/22    NOTES STATUS DETAILS   OFFICE NOTE from referring provider internal  Gracie Winters APRN CNP   OFFICE NOTE from other specialist     DISCHARGE SUMMARY from hospital     DISCHARGE REPORT from the ER     MEDICATION LIST internal     IMAGING  (NEED IMAGES AND REPORTS)     CT SCAN     CHEST XRAY (CXR) internal  Scheduled 7/18/22    TESTS     PULMONARY FUNCTION TESTING (PFT) internal  Scheduled 7/18/22

## 2022-04-22 NOTE — PROGRESS NOTES
Subjective:  HPI  Physical Exam       Knee Activity of Daily Living Score: 61.43            Objective:  System    Physical Exam    General     ROS    Assessment/Plan:    DISCHARGE REPORT    Progress reporting period is from 4/21/22 to 4/22/22.       SUBJECTIVE  Subjective changes noted by patient:  unknown.  Subjective: eval    Current pain level is NA  .     Previous pain level was  NA  .   Changes in function:  None  Adverse reaction to treatment or activity: None    OBJECTIVE  Changes noted in objective findings:  Patient has failed to return to therapy so current objective findings are unknown.  Objective: eval     ASSESSMENT/PLAN  Updated problem list and treatment plan: Diagnosis 1: knee pain  Pain -  home program  Decreased ROM/flexibility - manual therapy and therapeutic exercise  Decreased strength - therapeutic exercise and therapeutic activities  STG/LTGs have been met or progress has been made towards goals:  Pt did not return to PT  Assessment of Progress: The patient has not returned to therapy. Current status is unknown.  Self Management Plans:  unknown  Pt did not return to PT  Sohan continues to require the following intervention to meet STG and LTG's:  PT    Recommendations:  The progress note/discharge summary was written in collaboration with and reviewed by the physical therapist.    Please refer to the daily flowsheet for treatment today, total treatment time and time spent performing 1:1 timed codes.

## 2022-06-03 ENCOUNTER — VIRTUAL VISIT (OUTPATIENT)
Dept: FAMILY MEDICINE | Facility: CLINIC | Age: 62
End: 2022-06-03
Payer: COMMERCIAL

## 2022-06-03 DIAGNOSIS — G62.1 ALCOHOLIC POLYNEUROPATHY (H): Primary | ICD-10-CM

## 2022-06-03 DIAGNOSIS — D50.8 IRON DEFICIENCY ANEMIA SECONDARY TO INADEQUATE DIETARY IRON INTAKE: ICD-10-CM

## 2022-06-03 DIAGNOSIS — E55.9 VITAMIN D DEFICIENCY: ICD-10-CM

## 2022-06-03 PROCEDURE — 99443 PR PHYSICIAN TELEPHONE EVALUATION 21-30 MIN: CPT | Performed by: NURSE PRACTITIONER

## 2022-06-03 RX ORDER — FERROUS SULFATE 325(65) MG
325 TABLET ORAL
Qty: 90 TABLET | Refills: 0
Start: 2022-06-03 | End: 2023-05-17

## 2022-06-03 RX ORDER — CHOLECALCIFEROL (VITAMIN D3) 125 MCG
5000 CAPSULE ORAL DAILY
Qty: 90 CAPSULE | Refills: 3
Start: 2022-06-03

## 2022-06-03 RX ORDER — GABAPENTIN 300 MG/1
300 CAPSULE ORAL 3 TIMES DAILY PRN
Qty: 90 CAPSULE | Refills: 3 | Status: SHIPPED | OUTPATIENT
Start: 2022-06-03 | End: 2022-11-15

## 2022-06-03 NOTE — PROGRESS NOTES
"Sohan is a 61 year old who is being evaluated via a billable telephone visit.      What phone number would you like to be contacted at?   How would you like to obtain your AVS?     Assessment & Plan       ICD-10-CM    1. Alcoholic polyneuropathy (H)  G62.1 gabapentin (NEURONTIN) 300 MG capsule   2. Iron deficiency anemia secondary to inadequate dietary iron intake  D50.8 ferrous sulfate (FEROSUL) 325 (65 Fe) MG tablet   3. Vitamin D deficiency  E55.9 Cholecalciferol (VITAMIN D) 125 MCG (5000 UT) capsule                 Regular exercise  See Patient Instructions    No follow-ups on file.    Gracie Winters, ELIU CNP  [facility]    Subjective   Sohan is a 61 year old who presents for the following health issues     HPI     Things going well  Gabapentin three times a day, went down to twice a day, then realized was running out so taking daily and that wasn't enough  2 or three times a day does help. Did try 2 and then 3 hs and didn't help with the craving for his one drink   Alcohol cut down, not sure if can ever get fully sober, likes one drink at the end of the day \"and that's just who he is\"  Knows it hurts his nerves, but taking MVI and used to take iron-rarely takes it now and Hgb little low, doesn'thave any concerns about his drinking at this point    symbicort really works well, rinsing mouth, takes in am   has health maintenance exam scheduled with me  Will come fasting for labs   Taking D thinks 5000 units        Review of Systems   Constitutional, HEENT, cardiovascular, pulmonary, GI, , musculoskeletal, neuro, skin, endocrine and psych systems are negative, except as otherwise noted.      Objective           Vitals:  No vitals were obtained today due to virtual visit.    Physical Exam   healthy, alert and no distress  PSYCH: Alert and oriented times 3; coherent speech, normal   rate and volume, able to articulate logical thoughts, able   to abstract reason, no tangential " thoughts, no hallucinations   or delusions  His affect is normal  RESP: No cough, no audible wheezing, able to talk in full sentences  Remainder of exam unable to be completed due to telephone visits                Phone call duration: 21 minutes

## 2022-06-21 NOTE — PROGRESS NOTES
SUBJECTIVE:   CC: Sohan Marcano is an 61 year old male who presents for preventative health visit.       Patient has been advised of split billing requirements and indicates understanding: Yes  Healthy Habits:     Getting at least 3 servings of Calcium per day:  NO    Bi-annual eye exam:  NO    Dental care twice a year:  NO    Sleep apnea or symptoms of sleep apnea:  None    Diet:  Regular (no restrictions)    Frequency of exercise:  None    Taking medications regularly:  Yes    Medication side effects:  None    PHQ-2 Total Score: 0    Additional concerns today:  No    Gabapentin has been working well for feet tingling/numbness. Takes one in morning and one in evening.   Doing symbicort once a day. Has not needed to use albuterol as much (2-3 times a month).  Physical therapy for hand and knee pain, much better now.    Ferrous sulfate takes occasionally but has some leaking with bowel movements.   Drinks a few drinks everyday (john). Smoking off and on. Has attempted to quit but unsuccessful. Not interested in resources to quit at this time.   Follows with dermatology every 3 months due to history of skin cancer.   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         Today's PHQ-2 Score:   PHQ-2 ( 1999 Pfizer) 6/22/2022   Q1: Little interest or pleasure in doing things 0   Q2: Feeling down, depressed or hopeless 0   PHQ-2 Score 0   PHQ-2 Total Score (12-17 Years)- Positive if 3 or more points; Administer PHQ-A if positive -   Q1: Little interest or pleasure in doing things Not at all   Q2: Feeling down, depressed or hopeless Not at all   PHQ-2 Score 0       Abuse: Current or Past(Physical, Sexual or Emotional)- No  Do you feel safe in your environment? Yes        Social History     Tobacco Use     Smoking status: Current Every Day Smoker     Packs/day: 0.25     Smokeless tobacco: Never Used   Substance Use Topics     Alcohol use: Yes     Alcohol/week: 20.0 standard drinks     Types: 20 Standard drinks or equivalent per  week     If you drink alcohol do you typically have >3 drinks per day or >7 drinks per week? Yes      Alcohol Use 6/22/2022   Prescreen: >3 drinks/day or >7 drinks/week? Yes   Prescreen: >3 drinks/day or >7 drinks/week? -   AUDIT SCORE  13     AUDIT - Alcohol Use Disorders Identification Test - Reproduced from the World Health Organization Audit 2001 (Second Edition) 6/22/2022   1.  How often do you have a drink containing alcohol? 4 or more times a week   2.  How many drinks containing alcohol do you have on a typical day when you are drinking? 3 or 4   3.  How often do you have five or more drinks on one occasion? Weekly   4.  How often during the last year have you found that you were not able to stop drinking once you had started? Less than monthly   5.  How often during the last year have you failed to do what was normally expected of you because of drinking? Never   6.  How often during the last year have you needed a first drink in the morning to get yourself going after a heavy drinking session? Never   7.  How often during the last year have you had a feeling of guilt or remorse after drinking? Never   8.  How often during the last year have you been unable to remember what happened the night before because of your drinking? Never   9.  Have you or someone else been injured because of your drinking? No   10. Has a relative, friend, doctor or other health care worker been concerned about your drinking or suggested you cut down? Yes, during the last year   TOTAL SCORE 13     Roomed by MARILU Velasquez    Last PSA: No results found for: PSA    Reviewed orders with patient. Reviewed health maintenance and updated orders accordingly - Yes  Lab work is in process  Labs reviewed in EPIC    Reviewed and updated as needed this visit by clinical staff   Tobacco  Allergies  Meds  Problems  Med Hx  Surg Hx  Fam Hx  Soc   Hx          Reviewed and updated as needed this visit by Provider    Allergies  Meds   "Problems   Surg Hx                Review of Systems   Constitutional: Negative for chills and fever.   HENT: Negative for congestion, ear pain, hearing loss and sore throat.    Eyes: Negative for pain and visual disturbance.   Respiratory: Positive for shortness of breath. Negative for cough.    Cardiovascular: Negative for chest pain, palpitations and peripheral edema.   Gastrointestinal: Negative for abdominal pain, constipation, diarrhea, heartburn, hematochezia and nausea.   Genitourinary: Positive for urgency. Negative for dysuria, frequency, genital sores and hematuria.   Musculoskeletal: Positive for arthralgias. Negative for joint swelling and myalgias.   Skin: Negative for rash.   Neurological: Positive for weakness. Negative for dizziness, headaches and paresthesias.   Psychiatric/Behavioral: Negative for mood changes. The patient is not nervous/anxious.      Muscle pain and weakness: Loss of strength and get sore when working. Less endurance.   Urgency: Occasional going a little at a time, gets up 1-2 times a night       OBJECTIVE:   BP (!) 155/92   Pulse 84   Temp 98.6  F (37  C) (Temporal)   Ht 1.95 m (6' 4.77\")   Wt 105.5 kg (232 lb 8 oz)   SpO2 94%   BMI 27.73 kg/m      Physical Exam  GENERAL: healthy, alert and no distress  EYES: Eyes grossly normal to inspection, PERRL and conjunctivae and sclerae normal  HENT: ear canals and TM's normal, nose without ulcers or lesions. Multiple teeth missing. Fillings for cavities present.   NECK: no adenopathy, no asymmetry, masses, or scars and thyroid normal to palpation  RESP: lungs clear to auscultation - no rales, rhonchi or wheezes  CV: regular rate and rhythm, normal S1 S2, no S3 or S4, no murmur, click or rub, no peripheral edema and peripheral pulses strong  ABDOMEN: soft, nontender, no hepatosplenomegaly, no masses and bowel sounds normal  MS: no gross musculoskeletal defects noted, no edema  Male exam: declined         Diagnostic Test " Results:  Labs reviewed in Epic    ASSESSMENT/PLAN:       ICD-10-CM    1. Routine general medical examination at a health care facility  Z00.00    2. Screen for colon cancer  Z12.11 Fecal colorectal cancer screen (FIT)   3. Alcohol use  Z72.89 CBC with platelets and differential     TSH with free T4 reflex     Comprehensive metabolic panel (BMP + Alb, Alk Phos, ALT, AST, Total. Bili, TP)   4. Chronic hepatitis C without hepatic coma (H)  B18.2    5. Lipid screening  Z13.220 Lipid panel reflex to direct LDL Fasting   6. Alcoholic polyneuropathy (H)  G62.1    7. Need for pneumococcal vaccine  Z23 PNEUMOCOCCAL 20 VALENT CONJUGATE (PREVNAR 20)   8. Advanced directives, counseling/discussion  Z71.89     POLST done   9. Elevated blood pressure reading without diagnosis of hypertension  R03.0     declines PSA, and did POLST, supportive of goals of care and pt mostly comfort/ limited interventions, updated chart    1. Routine general medical examination at a health care facility      2. Screen for colon cancer  - Fecal colorectal cancer screen (FIT); Future    3. Alcohol use  -Cessation encouraged. Will check CMP today.   - CBC with platelets and differential; Future  - TSH with free T4 reflex; Future  - Comprehensive metabolic panel (BMP + Alb, Alk Phos, ALT, AST, Total. Bili, TP); Future    4. Chronic hepatitis C without hepatic coma (H)  -Will check CMP today. Has been treated for this in the past.     5. Lipid screening  - Lipid panel reflex to direct LDL Fasting; Future    6. Alcoholic polyneuropathy (H)  -Improved after starting gabapentin. Continue current regime.     7. Need for pneumococcal vaccine  - PNEUMOCOCCAL 20 VALENT CONJUGATE (PREVNAR 20)    8. Hypertension, unspecified type  -Blood pressure elevated today. Educated on lifestyle changes. Patient would like to wait to start a medication and work on lifestyle changes. He reports he will check his BP at home and let us know if it is still education. Patient  "aware of effects of high blood pressure.     Patient has been advised of split billing requirements and indicates understanding: Yes    COUNSELING:   Reviewed preventive health counseling, as reflected in patient instructions       Regular exercise       Healthy diet/nutrition       Alcohol Use        Colorectal cancer screening    Estimated body mass index is 27.73 kg/m  as calculated from the following:    Height as of this encounter: 1.95 m (6' 4.77\").    Weight as of this encounter: 105.5 kg (232 lb 8 oz).     Weight management plan: Discussed healthy diet and exercise guidelines    He reports that he has been smoking. He has been smoking about 0.25 packs per day. He has never used smokeless tobacco.  Tobacco Cessation Action Plan:   Information offered: Patient not interested at this time      Counseling Resources:  ATP IV Guidelines  Pooled Cohorts Equation Calculator  FRAX Risk Assessment  ICSI Preventive Guidelines  Dietary Guidelines for Americans, 2010  USDA's MyPlate  ASA Prophylaxis  Lung CA Screening    This patient was seen along with, Marlen Barnard-student, history and review of systems obtained by student and confirmed by attending. Objective, exams, assessment and plan reviewed with attending.     CLAUDINE Grahma Student     Gracie Winters, ELIU MARVIN  [facility]  "

## 2022-06-21 NOTE — PATIENT INSTRUCTIONS
Check blood pressure at home. Watch salt intake. Go for walks at night. Let us know if it is still elevated and contact us.    Preventive Health Recommendations  Male Ages 50 - 64    Yearly exam:             See your health care provider every year in order to  o   Review health changes.   o   Discuss preventive care.    o   Review your medicines if your doctor has prescribed any.   Have a cholesterol test every 5 years, or more frequently if you are at risk for high cholesterol/heart disease.   Have a diabetes test (fasting glucose) every three years. If you are at risk for diabetes, you should have this test more often.   Have a colonoscopy at age 50, or have a yearly FIT test (stool test). These exams will check for colon cancer.    Talk with your health care provider about whether or not a prostate cancer screening test (PSA) is right for you.  You should be tested each year for STDs (sexually transmitted diseases), if you re at risk.     Shots: Get a flu shot each year. Get a tetanus shot every 10 years.     Nutrition:  Eat at least 5 servings of fruits and vegetables daily.   Eat whole-grain bread, whole-wheat pasta and brown rice instead of white grains and rice.   Get adequate Calcium and Vitamin D.     Lifestyle  Exercise for at least 150 minutes a week (30 minutes a day, 5 days a week). This will help you control your weight and prevent disease.   Limit alcohol to one drink per day.   No smoking.   Wear sunscreen to prevent skin cancer.   See your dentist every six months for an exam and cleaning.   See your eye doctor every 1 to 2 years.

## 2022-06-22 ENCOUNTER — LAB (OUTPATIENT)
Dept: LAB | Facility: CLINIC | Age: 62
End: 2022-06-22
Payer: COMMERCIAL

## 2022-06-22 ENCOUNTER — OFFICE VISIT (OUTPATIENT)
Dept: FAMILY MEDICINE | Facility: CLINIC | Age: 62
End: 2022-06-22

## 2022-06-22 VITALS
TEMPERATURE: 98.6 F | OXYGEN SATURATION: 94 % | HEIGHT: 77 IN | BODY MASS INDEX: 27.45 KG/M2 | SYSTOLIC BLOOD PRESSURE: 155 MMHG | HEART RATE: 84 BPM | DIASTOLIC BLOOD PRESSURE: 92 MMHG | WEIGHT: 232.5 LBS

## 2022-06-22 DIAGNOSIS — Z78.9 ALCOHOL USE: ICD-10-CM

## 2022-06-22 DIAGNOSIS — Z12.11 SCREEN FOR COLON CANCER: ICD-10-CM

## 2022-06-22 DIAGNOSIS — Z00.00 ROUTINE GENERAL MEDICAL EXAMINATION AT A HEALTH CARE FACILITY: Primary | ICD-10-CM

## 2022-06-22 DIAGNOSIS — G62.1 ALCOHOLIC POLYNEUROPATHY (H): ICD-10-CM

## 2022-06-22 DIAGNOSIS — B18.2 CHRONIC HEPATITIS C WITHOUT HEPATIC COMA (H): ICD-10-CM

## 2022-06-22 DIAGNOSIS — Z71.89 ADVANCED DIRECTIVES, COUNSELING/DISCUSSION: ICD-10-CM

## 2022-06-22 DIAGNOSIS — Z13.220 LIPID SCREENING: ICD-10-CM

## 2022-06-22 DIAGNOSIS — Z23 NEED FOR PNEUMOCOCCAL VACCINE: ICD-10-CM

## 2022-06-22 DIAGNOSIS — R03.0 ELEVATED BLOOD PRESSURE READING WITHOUT DIAGNOSIS OF HYPERTENSION: ICD-10-CM

## 2022-06-22 LAB
BASOPHILS # BLD AUTO: 0.1 10E3/UL (ref 0–0.2)
BASOPHILS NFR BLD AUTO: 1 %
EOSINOPHIL # BLD AUTO: 0.5 10E3/UL (ref 0–0.7)
EOSINOPHIL NFR BLD AUTO: 10 %
ERYTHROCYTE [DISTWIDTH] IN BLOOD BY AUTOMATED COUNT: 12.8 % (ref 10–15)
HCT VFR BLD AUTO: 44.6 % (ref 40–53)
HGB BLD-MCNC: 15.6 G/DL (ref 13.3–17.7)
LYMPHOCYTES # BLD AUTO: 1.5 10E3/UL (ref 0.8–5.3)
LYMPHOCYTES NFR BLD AUTO: 30 %
MCH RBC QN AUTO: 35.1 PG (ref 26.5–33)
MCHC RBC AUTO-ENTMCNC: 35 G/DL (ref 31.5–36.5)
MCV RBC AUTO: 101 FL (ref 78–100)
MONOCYTES # BLD AUTO: 0.5 10E3/UL (ref 0–1.3)
MONOCYTES NFR BLD AUTO: 11 %
NEUTROPHILS # BLD AUTO: 2.3 10E3/UL (ref 1.6–8.3)
NEUTROPHILS NFR BLD AUTO: 48 %
PLATELET # BLD AUTO: 175 10E3/UL (ref 150–450)
RBC # BLD AUTO: 4.44 10E6/UL (ref 4.4–5.9)
WBC # BLD AUTO: 4.8 10E3/UL (ref 4–11)

## 2022-06-22 PROCEDURE — 90677 PCV20 VACCINE IM: CPT | Performed by: NURSE PRACTITIONER

## 2022-06-22 PROCEDURE — 80061 LIPID PANEL: CPT

## 2022-06-22 PROCEDURE — 99396 PREV VISIT EST AGE 40-64: CPT | Mod: 25 | Performed by: NURSE PRACTITIONER

## 2022-06-22 PROCEDURE — 80050 GENERAL HEALTH PANEL: CPT

## 2022-06-22 PROCEDURE — 36415 COLL VENOUS BLD VENIPUNCTURE: CPT

## 2022-06-22 PROCEDURE — 90472 IMMUNIZATION ADMIN EACH ADD: CPT | Performed by: NURSE PRACTITIONER

## 2022-06-22 PROCEDURE — 0054A COVID-19,PF,PFIZER (12+ YRS): CPT | Performed by: NURSE PRACTITIONER

## 2022-06-22 PROCEDURE — 91305 COVID-19,PF,PFIZER (12+ YRS): CPT | Performed by: NURSE PRACTITIONER

## 2022-06-22 PROCEDURE — 90750 HZV VACC RECOMBINANT IM: CPT | Performed by: NURSE PRACTITIONER

## 2022-06-22 PROCEDURE — 90471 IMMUNIZATION ADMIN: CPT | Performed by: NURSE PRACTITIONER

## 2022-06-22 ASSESSMENT — ENCOUNTER SYMPTOMS
DYSURIA: 0
CONSTIPATION: 0
NAUSEA: 0
PALPITATIONS: 0
HEMATURIA: 0
HEMATOCHEZIA: 0
DIARRHEA: 0
EYE PAIN: 0
ARTHRALGIAS: 1
MYALGIAS: 0
FREQUENCY: 0
FEVER: 0
JOINT SWELLING: 0
HEADACHES: 0
WEAKNESS: 1
SORE THROAT: 0
NERVOUS/ANXIOUS: 0
CHILLS: 0
ABDOMINAL PAIN: 0
HEARTBURN: 0
DIZZINESS: 0
COUGH: 0
PARESTHESIAS: 0
SHORTNESS OF BREATH: 1

## 2022-06-23 LAB
ALBUMIN SERPL-MCNC: 4.2 G/DL (ref 3.4–5)
ALP SERPL-CCNC: 60 U/L (ref 40–150)
ALT SERPL W P-5'-P-CCNC: 36 U/L (ref 0–70)
ANION GAP SERPL CALCULATED.3IONS-SCNC: 9 MMOL/L (ref 3–14)
AST SERPL W P-5'-P-CCNC: 40 U/L (ref 0–45)
BILIRUB SERPL-MCNC: 1.6 MG/DL (ref 0.2–1.3)
BUN SERPL-MCNC: 8 MG/DL (ref 7–30)
CALCIUM SERPL-MCNC: 9.1 MG/DL (ref 8.5–10.1)
CHLORIDE BLD-SCNC: 109 MMOL/L (ref 94–109)
CHOLEST SERPL-MCNC: 163 MG/DL
CO2 SERPL-SCNC: 22 MMOL/L (ref 20–32)
CREAT SERPL-MCNC: 0.72 MG/DL (ref 0.66–1.25)
FASTING STATUS PATIENT QL REPORTED: YES
GFR SERPL CREATININE-BSD FRML MDRD: >90 ML/MIN/1.73M2
GLUCOSE BLD-MCNC: 110 MG/DL (ref 70–99)
HDLC SERPL-MCNC: 51 MG/DL
LDLC SERPL CALC-MCNC: 99 MG/DL
NONHDLC SERPL-MCNC: 112 MG/DL
POTASSIUM BLD-SCNC: 3.9 MMOL/L (ref 3.4–5.3)
PROT SERPL-MCNC: 8.1 G/DL (ref 6.8–8.8)
SODIUM SERPL-SCNC: 140 MMOL/L (ref 133–144)
TRIGL SERPL-MCNC: 65 MG/DL
TSH SERPL DL<=0.005 MIU/L-ACNC: 2.93 MU/L (ref 0.4–4)

## 2022-06-27 ENCOUNTER — DOCUMENTATION ONLY (OUTPATIENT)
Dept: OTHER | Facility: CLINIC | Age: 62
End: 2022-06-27

## 2022-07-06 DIAGNOSIS — J44.9 CHRONIC OBSTRUCTIVE PULMONARY DISEASE, UNSPECIFIED COPD TYPE (H): ICD-10-CM

## 2022-07-07 RX ORDER — BUDESONIDE AND FORMOTEROL FUMARATE DIHYDRATE 80; 4.5 UG/1; UG/1
2 AEROSOL RESPIRATORY (INHALATION) 2 TIMES DAILY
Qty: 10 G | Refills: 1 | Status: SHIPPED | OUTPATIENT
Start: 2022-07-07 | End: 2022-07-18

## 2022-07-07 NOTE — TELEPHONE ENCOUNTER
"Requested Prescriptions   Pending Prescriptions Disp Refills     budesonide-formoterol (SYMBICORT) 80-4.5 MCG/ACT Inhaler 10 g 1     Sig: Inhale 2 puffs into the lungs 2 times daily       Inhaled Steroids Protocol Failed - 7/6/2022 12:50 PM        Failed - Asthma control assessment score within normal limits in last 6 months     Please review ACT score.           Passed - Patient is age 12 or older        Passed - Medication is active on med list        Passed - Recent (6 mo) or future (30 days) visit within the authorizing provider's specialty     Patient had office visit in the last 6 months or has a visit in the next 30 days with authorizing provider or within the authorizing provider's specialty.  See \"Patient Info\" tab in inbasket, or \"Choose Columns\" in Meds & Orders section of the refill encounter.           Long-Acting Beta Agonist Inhalers Protocol  Failed - 7/6/2022 12:50 PM        Failed - Asthma control assessment score within normal limits in last 6 months     Please review ACT score.           Passed - Patient is age 12 or older        Passed - Medication is active on med list        Passed - Recent (6 mo) or future (30 days) visit within the authorizing provider's specialty     Patient had office visit in the last 6 months or has a visit in the next 30 days with authorizing provider or within the authorizing provider's specialty.  See \"Patient Info\" tab in inbasket, or \"Choose Columns\" in Meds & Orders section of the refill encounter.               Asthma not on prob list, ACT not needed?  Cyn STERN RN    "

## 2022-07-18 ENCOUNTER — OFFICE VISIT (OUTPATIENT)
Dept: PULMONOLOGY | Facility: CLINIC | Age: 62
End: 2022-07-18
Attending: NURSE PRACTITIONER
Payer: COMMERCIAL

## 2022-07-18 ENCOUNTER — PRE VISIT (OUTPATIENT)
Dept: PULMONOLOGY | Facility: CLINIC | Age: 62
End: 2022-07-18

## 2022-07-18 VITALS — DIASTOLIC BLOOD PRESSURE: 84 MMHG | SYSTOLIC BLOOD PRESSURE: 147 MMHG | HEART RATE: 82 BPM | OXYGEN SATURATION: 94 %

## 2022-07-18 DIAGNOSIS — Z87.891 PERSONAL HISTORY OF TOBACCO USE: Primary | ICD-10-CM

## 2022-07-18 DIAGNOSIS — J44.9 CHRONIC OBSTRUCTIVE PULMONARY DISEASE, UNSPECIFIED COPD TYPE (H): ICD-10-CM

## 2022-07-18 PROCEDURE — 94726 PLETHYSMOGRAPHY LUNG VOLUMES: CPT | Performed by: INTERNAL MEDICINE

## 2022-07-18 PROCEDURE — 94729 DIFFUSING CAPACITY: CPT | Performed by: INTERNAL MEDICINE

## 2022-07-18 PROCEDURE — 99204 OFFICE O/P NEW MOD 45 MIN: CPT | Mod: 25 | Performed by: INTERNAL MEDICINE

## 2022-07-18 PROCEDURE — 94060 EVALUATION OF WHEEZING: CPT | Performed by: INTERNAL MEDICINE

## 2022-07-18 PROCEDURE — G0463 HOSPITAL OUTPT CLINIC VISIT: HCPCS | Mod: 25

## 2022-07-18 PROCEDURE — G0296 VISIT TO DETERM LDCT ELIG: HCPCS | Performed by: INTERNAL MEDICINE

## 2022-07-18 RX ORDER — BUDESONIDE AND FORMOTEROL FUMARATE DIHYDRATE 80; 4.5 UG/1; UG/1
2 AEROSOL RESPIRATORY (INHALATION) 2 TIMES DAILY
Qty: 10 G | Refills: 4 | Status: SHIPPED | OUTPATIENT
Start: 2022-07-18 | End: 2022-11-15

## 2022-07-18 RX ORDER — ALBUTEROL SULFATE 90 UG/1
2 AEROSOL, METERED RESPIRATORY (INHALATION) EVERY 6 HOURS PRN
Qty: 8 G | Refills: 3 | Status: SHIPPED | OUTPATIENT
Start: 2022-07-18 | End: 2022-11-15

## 2022-07-18 ASSESSMENT — ENCOUNTER SYMPTOMS
HEARTBURN: 0
SHORTNESS OF BREATH: 1
WHEEZING: 1
COUGH: 0
SPUTUM PRODUCTION: 0
HEMOPTYSIS: 0

## 2022-07-18 NOTE — LETTER
7/18/2022         RE: Sohan Marcano  [address] [facility] [address]        Dear Colleague,    Thank you for referring your patient, Sohan Marcano, to the [facility] [place]. Please see a copy of my visit note below.              Pulmonary Clinic  New Patient Evaluation    Name: Sohan Marcano MRN: [id number]     Age: 62 year old   YOB: 1960             HPI:   CC: Dyspnea on exertion    Sohan aMrcano is a 62 year old male with H/O alcohol use disorder with alcoholic polyneuropathy, hepatitis C s/p therapy, hypertension, and tobacco use with approximately 45-50-pack-year smoking history and ongoing smoking who presents to discuss COPD.    He feels that his breathing has been slowly worsening over the past several years, but acutely worsened last summer when working outside on high smoke days from [place] [facility].  He initially discussed this with his primary care provider and was started on albuterol which provides symptomatic benefit.  Symbicort was then added to his regimen.  The Symbicort as ordered twice daily, however he only uses it once daily because he does not feel that he has symptoms overnight.  He is working on smoking cessation with nicotine gum, though occasionally slips and estimates he is currently smoking approximately 1/4 pack/month.  He denies significant coughing or mucus production currently, though he did have quite a bit when he was smoking, and does notice that these symptoms return when he smokes.  He also feels that his breathing is worse on hot humid days.        Exposure History:   Tobacco: Started age 12, 1ppd. Down to about 0.5ppd   Other inhaled substances (Vaping, hookah. Marijuana): None   Occupation: [employer]           Past Medical History:     Past Medical History:   Diagnosis Date     Actinic keratosis      Basal cell carcinoma       "Squamous cell carcinoma      Substance abuse (H)     alcohol     Uncomplicated asthma              Past Surgical History:      Past Surgical History:   Procedure Laterality Date     HERNIA REPAIR               Social History:     Social History     Socioeconomic History     Marital status: Single     Spouse name: Not on file     Number of children: Not on file     Years of education: Not on file     Highest education level: Not on file   Occupational History     Not on file   Tobacco Use     Smoking status: Current Every Day Smoker     Packs/day: 0.25     Smokeless tobacco: Never Used   Vaping Use     Vaping Use: Former   Substance and Sexual Activity     Alcohol use: Yes     Alcohol/week: 20.0 standard drinks     Types: 20 Standard drinks or equivalent per week     Drug use: No     Sexual activity: Not Currently   Other Topics Concern     Parent/sibling w/ CABG, MI or angioplasty before 65F 55M? Not Asked   Social History Narrative     Not on file     Social Determinants of Health     Financial Resource Strain: Not on file   Food Insecurity: Not on file   Transportation Needs: Not on file   Physical Activity: Not on file   Stress: Not on file   Social Connections: Not on file   Intimate Partner Violence: Not on file   Housing Stability: Not on file            Family History:     Family History   Problem Relation Age of Onset     Other - See Comments Mother         Patient states his Mother ? had skin cancer.     Other - See Comments Father         Patient reports Father had \"all types of skin cancer\".     Other - See Comments Sister         Basal cell carcinoma             Immunizations:     Immunization History   Administered Date(s) Administered     COVID-19,PF,Pfizer (12+ Yrs) 05/03/2021, 05/24/2021, 12/10/2021     COVID-19,PF,Pfizer 12+ Yrs (2022 and After) 06/22/2022     Influenza Quad, Recombinant, pf(RIV4) (Flublok) 10/11/2019, 09/24/2021     Influenza,INJ,MDCK,PF,Quad >4yrs 11/16/2020     Pneumo Conj 13-V " (2010&after) 04/06/2022     Pneumococcal 20 valent Conjugate (Prevnar 20) 06/22/2022     Pneumococcal 23 valent 03/09/2014     Td (Adult), Adsorbed 03/01/2000     Tdap (Adacel,Boostrix) 04/06/2022     Zoster vaccine recombinant adjuvanted (SHINGRIX) 04/06/2022, 06/22/2022             Allergies:   No Known Allergies          Medications:   albuterol (PROAIR HFA/PROVENTIL HFA/VENTOLIN HFA) 108 (90 Base) MCG/ACT inhaler, Inhale 2 puffs into the lungs every 6 hours as needed for shortness of breath / dyspnea or wheezing  budesonide-formoterol (SYMBICORT) 80-4.5 MCG/ACT Inhaler, Inhale 2 puffs into the lungs 2 times daily  Cholecalciferol (VITAMIN D) 125 MCG (5000 UT) capsule, Take 1 capsule (5,000 Units) by mouth daily  diclofenac (VOLTAREN) 1 % topical gel, Apply 2 g topically 4 times daily  ferrous sulfate (FEROSUL) 325 (65 Fe) MG tablet, Take 1 tablet (325 mg) by mouth daily (with breakfast)  gabapentin (NEURONTIN) 300 MG capsule, Take 1 capsule (300 mg) by mouth 3 times daily as needed for neuropathic pain  multivitamin w/minerals (THERA-VIT-M) tablet, Take 1 tablet by mouth daily (with breakfast)    lidocaine 1% with EPINEPHrine 1:100,000 injection 3 mL             Review of Systems:     Review of Systems   Respiratory: Positive for shortness of breath and wheezing. Negative for cough, hemoptysis and sputum production.    Gastrointestinal: Negative for heartburn.              Exam:   BP (!) 147/84   Pulse 82   SpO2 94%     Physical Exam  Vitals and nursing note reviewed.   Cardiovascular:      Rate and Rhythm: Normal rate and regular rhythm.      Heart sounds: No murmur heard.  Pulmonary:      Effort: Pulmonary effort is normal.      Breath sounds: Normal breath sounds. No wheezing, rhonchi or rales.   Musculoskeletal:         General: Normal range of motion.      Right lower leg: No edema.      Left lower leg: No edema.   Skin:     General: Skin is warm and dry.   Neurological:      General: No focal deficit  present.      Mental Status: He is alert and oriented to person, place, and time.       Fingernails without clubbing         Data:     PFT: 7/18/2022      The FVC is within normal limits.  The FEV1 and FEV1/FVC ratio are reduced.  Following administration of bronchodilators there is significant improvement in the FVC and FEF 2575.  The FRC, RV, and TLC are elevated.  The diffusion capacity is within normal limits.    IMPRESSION:  Obstructive lung disease with bronchodilator response, elevated lung volumes, normal diffusion capacity.      CXR 7/18/2022  Emphysematous change without acute airspace opacity.        All studies listed here were independently reviewed and interpreted by me today.          Assessment and Plan:     ## COPD/asthma overlap syndrome  ## Emphysema  Recently started on albuterol and Symbicort with good symptomatic response.  He is currently only using Symbicort once daily.  We discussed that inhalers may provide symptomatic benefit but will not alter the disease trajectory and I advised him that he may get better symptom control if he uses the Symbicort twice daily but ultimately it is up to him to decide when he symptomatically feels well controlled with once daily.  He is not a frequent exacerbator.  -Continue Symbicort-refills ordered  -Continue as needed albuterol-refills ordered      ## Tobacco use  Started smoking at age 12, approximately 1 pack/day for approximately 45-50-pack-year smoking history before he began cutting down.  Has been trying to quit for the last year, currently down to approximately 1/4 pack/week using nicotine gum.  We discussed additional smoking cessation options but he is comfortable with the gum for the time being.  -Approximately 12 minutes spent on smoking cessation counseling      ## Lung cancer screening  Low-dose CT ordered this visit    Lung Cancer Screening Shared Decision Making Visit     Sohan Marcano, a 62 year old male, is eligible for lung cancer  screening    History   Smoking Status     Current Some Day Smoker     Packs/day: 0.25   Smokeless Tobacco     Never Used       I have discussed with patient the risks and benefits of screening for lung cancer with low-dose CT.     The risks include:    radiation exposure: one low dose chest CT has as much ionizing radiation as about 15 chest x-rays, or 6 months of background radiation living in [place]      false positives: most findings/nodules are NOT cancer, but some might still require additional diagnostic evaluation, including biopsy    over-diagnosis: some slow growing cancers that might never have been clinically significant will be detected and treated unnecessarily     The benefit of early detection of lung cancer is contingent upon adherence to annual screening or more frequent follow up if indicated.     Furthermore, to benefit from screening, Sohan must be willing and able to undergo diagnostic procedures, if indicated. Although no specific guide is available for determining severity of comorbidities, it is reasonable to withhold screening in patients who have greater mortality risk from other diseases.     We did discuss that the best way to prevent lung cancer is to not smoke.    Some patients may value a numeric estimation of lung cancer risk when evaluating if lung cancer screening is right for them, here is one calculator:    ShouldIScreen              Return to clinic: 6 months        Joey Kevin M.D.  Pulmonary & Critical Care  Pager: Click Here to page      The above note was dictated using voice recognition software and may include typographical errors. Please contact the author for any clarifications.

## 2022-07-18 NOTE — NURSING NOTE
Chief Complaint   Patient presents with     New Patient     copd     Vitals were taken and medications were reconciled.     RAJIV Newman

## 2022-07-18 NOTE — PATIENT INSTRUCTIONS
Lung Cancer Screening   Frequently Asked Questions  If you are at high-risk for lung cancer, getting screened with low-dose computed tomography (LDCT) every year can help save your life. This handout offers answers to some of the most common questions about lung cancer screening. If you have other questions, please call [phone] ([phone]).     What is it?  Lung cancer screening uses special X-ray technology to create an image of your lung tissue. The exam is quick and easy and takes less than 10 seconds. We don t give you any medicine or use any needles. You can eat before and after the exam. You don t need to change your clothes as long as the clothing on your chest doesn t contain metal. But, you do need to be able to hold your breath for at least 6 seconds during the exam.    What is the goal of lung cancer screening?  The goal of lung cancer screening is to save lives. Many times, lung cancer is not found until a person starts having physical symptoms. Lung cancer screening can help detect lung cancer in the earliest stages when it may be easier to treat.    Who should be screened for lung cancer?  We suggest lung cancer screening for anyone who is at high-risk for lung cancer. You are in the high-risk group if you:      are between the ages of 55 and 79, and    have smoked at least 1 pack of cigarettes a day for 20 or more years, and    still smoke or have quit within the past 15 years.    However, if you have a new cough or shortness of breath, you should talk to your doctor before being screened.    Why does it matter if I have symptoms?  Certain symptoms can be a sign that you have a condition in your lungs that should be checked and treated by your doctor. These symptoms include fever, chest pain, a new or changing cough, shortness of breath that you have never felt before, coughing up blood or unexplained weight loss. Having any of these symptoms can greatly affect the results of lung  cancer screening.       Should all smokers get an LDCT lung cancer screening exam?  It depends. Lung cancer screening is for a very specific group of men and women who have a history of heavy smoking over a long period of time (see  Who should be screened for lung cancer  above).  I am in the high-risk group, but have been diagnosed with cancer in the past. Is LDCT lung cancer screening right for me?  In some cases, you should not have LDCT lung screening, such as when your doctor is already following your cancer with CT scan studies. Your doctor will help you decide if LDCT lung screening is right for you.  Do I need to have a screening exam every year?  Yes. If you are in the high-risk group described earlier, you should get an LDCT lung cancer screening exam every year until you are 79, or are no longer willing or able to undergo screening and possible procedures to diagnose and treat lung cancer.  How effective is LDCT at preventing death from lung cancer?  Studies have shown that LDCT lung cancer screening can lower the risk of death from lung cancer by 20 percent in people who are at high-risk.  What are the risks?  There are some risks and limitations of LDCT lung cancer screening. We want to make sure you understand the risks and benefits, so please let us know if you have any questions. Your doctor may want to talk with you more about these risks.    Radiation exposure: As with any exam that uses radiation, there is a very small increased risk of cancer. The amount of radiation in LDCT is small--about the same amount a person would get from a mammogram. Your doctor orders the exam when he or she feels the potential benefits outweigh the risks.    False negatives: No test is perfect, including LDCT. It is possible that you may have a medical condition, including lung cancer, that is not found during your exam. This is called a false negative result.    False positives and more testing: LDCT very often finds  something in the lung that could be cancer, but in fact is not. This is called a false positive result. False positive tests often cause anxiety. To make sure these findings are not cancer, you may need to have more tests. These tests will be done only if you give us permission. Sometimes patients need a treatment that can have side effects, such as a biopsy. For more information on false positives, see  What can I expect from the results?     Findings not related to lung cancer: Your LDCT exam also takes pictures of areas of your body next to your lungs. In a very small number of cases, the CT scan will show an abnormal finding in one of these areas, such as your kidneys, adrenal glands, liver or thyroid. This finding may not be serious, but you may need more tests. Your doctor can help you decide what other tests you may need, if any.  What can I expect from the results?  About 1 out of 4 LDCT exams will find something that may need more tests. Most of the time, these findings are lung nodules. Lung nodules are very small collections of tissue in the lung. These nodules are very common, and the vast majority--more than 97 percent--are not cancer (benign). Most are normal lymph nodes or small areas of scarring from past infections.  But, if a small lung nodule is found to be cancer, the cancer can be cured more than 90 percent of the time. To know if the nodule is cancer, we may need to get more images before your next yearly screening exam. If the nodule has suspicious features (for example, it is large, has an odd shape or grows over time), we will refer you to a specialist for further testing.  Will my doctor also get the results?  Yes. Your doctor will get a copy of your results.  Is it okay to keep smoking now that there s a cancer screening exam?  No. Tobacco is one of the strongest cancer-causing agents. It causes not only lung cancer, but other cancers and cardiovascular (heart) diseases as well. The damage  caused by smoking builds over time. This means that the longer you smoke, the higher your risk of disease. While it is never too late to quit, the sooner you quit, the better.  Where can I find help to quit smoking?  The best way to prevent lung cancer is to stop smoking. If you have already quit smoking, congratulations and keep it up! For help on quitting smoking, please call [employer] at 9-900-QUITNOW (1-271.321.7141) or the [employer] at 1-696.159.8482 to find local resources near you.  One-on-one health coaching:  If you d prefer to work individually with a health care provider on tobacco cessation, we offer:      Medication Therapy Management:  Our specially trained pharmacists work closely with you and your doctor to help you quit smoking.  Call [phone] or [phone] (toll free).
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
## 2022-07-19 LAB
DLCOUNC-%PRED-PRE: 82 %
DLCOUNC-PRE: 27.27 ML/MIN/MMHG
DLCOUNC-PRED: 33.07 ML/MIN/MMHG
ERV-%PRED-PRE: 108 %
ERV-PRE: 1.78 L
ERV-PRED: 1.65 L
EXPTIME-PRE: 14.02 SEC
FEF2575-%PRED-POST: 33 %
FEF2575-%PRED-PRE: 25 %
FEF2575-POST: 1.1 L/SEC
FEF2575-PRE: 0.84 L/SEC
FEF2575-PRED: 3.25 L/SEC
FEFMAX-%PRED-PRE: 56 %
FEFMAX-PRE: 5.93 L/SEC
FEFMAX-PRED: 10.57 L/SEC
FEV1-%PRED-PRE: 68 %
FEV1-PRE: 2.9 L
FEV1FEV6-PRE: 58 %
FEV1FEV6-PRED: 78 %
FEV1FVC-PRE: 49 %
FEV1FVC-PRED: 76 %
FEV1SVC-PRE: 46 %
FEV1SVC-PRED: 69 %
FIFMAX-PRE: 7.17 L/SEC
FRCPLETH-%PRED-PRE: 142 %
FRCPLETH-PRE: 5.8 L
FRCPLETH-PRED: 4.06 L
FVC-%PRED-PRE: 103 %
FVC-PRE: 5.86 L
FVC-PRED: 5.65 L
IC-%PRED-PRE: 99 %
IC-PRE: 4.47 L
IC-PRED: 4.48 L
RVPLETH-%PRED-PRE: 146 %
RVPLETH-PRE: 4.02 L
RVPLETH-PRED: 2.74 L
TLCPLETH-%PRED-PRE: 120 %
TLCPLETH-PRE: 10.27 L
TLCPLETH-PRED: 8.55 L
VA-%PRED-PRE: 108 %
VA-PRE: 8.85 L
VC-%PRED-PRE: 102 %
VC-PRE: 6.25 L
VC-PRED: 6.13 L

## 2022-07-26 ENCOUNTER — ANCILLARY PROCEDURE (OUTPATIENT)
Dept: CT IMAGING | Facility: CLINIC | Age: 62
End: 2022-07-26
Attending: INTERNAL MEDICINE
Payer: COMMERCIAL

## 2022-07-26 DIAGNOSIS — Z87.891 PERSONAL HISTORY OF TOBACCO USE: ICD-10-CM

## 2022-07-26 PROCEDURE — 71271 CT THORAX LUNG CANCER SCR C-: CPT | Performed by: STUDENT IN AN ORGANIZED HEALTH CARE EDUCATION/TRAINING PROGRAM

## 2022-07-27 ENCOUNTER — TELEPHONE (OUTPATIENT)
Dept: PULMONOLOGY | Facility: CLINIC | Age: 62
End: 2022-07-27

## 2022-07-27 NOTE — TELEPHONE ENCOUNTER
[facility] Call Center    Phone Message    May a detailed message be left on voicemail: yes     Reason for Call: Other:      incidental findings from 7/26/22 CT scan.     Please call back ASAP to discuss.    Action Taken: Message routed to:  [facility] ([facility] Pulm    Travel Screening: Not Applicable

## 2022-07-27 NOTE — TELEPHONE ENCOUNTER
Spoke with Sravanthi from [employer] returning her call. She wants Dr. Kevin to know of incidental findings on patient's CT from yesterday:  Significant Incidental Finding(s):  Category S: Yes.  a. Cirrhotic configuration of the liver. Evidence for portal venous  hypertension with varicosities in the left upper quadrant and a  recannulized umbilical vein.    Message emailed to Dr. Kevin to advise.    8/1 - per Dr. Kevin-- Noted, this is just from a previous infection. Nothing to be concerned about.

## 2022-08-15 NOTE — TELEPHONE ENCOUNTER
Health Call Center    Phone Message    May a detailed message be left on voicemail: yes     Reason for Call: Symptoms or Concerns     If patient has red-flag symptoms, warm transfer to triage line    Current symptom or concern: Pt called and said that he had a surgery done with Dr. Rutledge on 3/09 on his shoulder and his forehead. Pt said he recently noticed that there was a growth growing on the site on his shoulder. Pt said he is getting weird sensation at the site where the growth is at. Pt feels like maybe during the surgery a piece was missed. Pt said that every since it grew back, it's growing quite aggressively.  Pt doesn't know what he should do next. Please call him back. Thanks     Symptoms have been present for:     Has patient previously been seen for this? Yes    By : Dr. Miller and Dr. Rutledge    Date: 5/12/21 and 3/09/21    Are there any new or worsening symptoms? Yes: see above      Action Taken: Message routed to:  [facility] ([facility] DERM    Travel Screening: Not Applicable                                                                       Catheter removed intact.

## 2022-09-02 NOTE — PROGRESS NOTES
Addended by: RAÚL MCDOWELL on: 9/2/2022 10:53 AM     Modules accepted: Orders     [facility] Procedure Note    Dermatology Problem List:  0. NUB - Right elbow, bx'd 02/23/21, R/O SCC vs HAK  1. 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 Family history of Melanoma (Father)  3.  Actinic keratosis  -s/p cryo 12/13/2014, 10/17/2019, 1/25/21  4. Seborrheic keratosis  5. Telangiectasia      Date of Service:  Feb 23, 2021  Surgery: Mohs micrographic surgery, WLE, skin biopsy    Case 1  Repair Type: intermediate linear  Repair Size: 3.4 cm  Suture Material: 5-0 Monocryl and 5-0 FAG  Tumor Type: SCC  Location: R temple  Derm-Path Accession #:   PreOp Size: 0.5 x 0.5 cm  PostOp Size: 1.4 x 1.0 cm  Mohs Accession #:   Level of Defect: fat      Procedure:  We discussed the principles of treatment and most likely complications including scarring, bleeding, infection, swelling, pain, crusting, nerve damage, large wound,  incomplete excision, wound dehiscence,  nerve damage, recurrence, and a second procedure may be recommended to obtain the best cosmetic or functional result.    Informed consent was obtained and the patient underwent the procedure as follows:  The patient was placed supine on the operating table.  The cancer was identified, outlined with a marker, and verified by the patient.  The entire surgical field was prepped with chlorhexidine.  The surgical site was anesthetized using  1% lidocaine with 1:100,000 epinephrine    The area of clinically apparent tumor was not debulked. The layer of tissue was then surgically excised using a #15 blade and was then transferred onto a specimen sheet maintaining the orientation of the specimen. Hemostasis was obtained using electrocoagulation. The wound site was then covered with a dressing while the tissue samples were processed for  examination.    The excised tissue was transported to the Mohs histology laboratory maintaining the tissue orientation.  The tissue specimen was relaxed so that the entire surgical margin was in a a single horizontal plane for sectioning and inked for precise mapping.  A precise reference map was drawn to reflect the sectioning of the specimen, colored inking of the margins, and orientation on the patient. The tissue was processed using horizontal sectioning of the base and continuous peripheral margins.  The histopathologic sections were reviewed in conjunction with the reference map.    Total blocks: 1   Total slides:  2    There were no cancer cells visualized on examination, therefore Mohs surgery was complete.    REPAIR: An intermediate layered linear closure was selected as the procedure which would maximally preserve both function and cosmesis.    After the excision of the tumor, the area was carefully undermined. Hemostasis was obtained with electrocoagulation.  Closure was oriented so that the wound was in the patient's natural skin tension lines.   The subcutaneous and dermal layers were then closed with 5-0 Monocryl sutures. The epidermis was then carefully approximated along the length of the wound using 5-0 Fast absorbing gut simple running sutures.     Estimated blood loss was less than 10 ml for all surgical sites. A sterile pressure dressing was applied and wound care instructions, with a written handout, were given. The patient was discharged from the [facility] Center alert and ambulatory.    Dr. Rutledge was immediately available for the entire surgery and was physicially present for the key portions of the procedure.                    Photo placed into patients chart note for further reference.     Case 2 - Wide local excision    DERMATOLOGY EXCISION PROCEDURE NOTE    NAME OF PROCEDURE: Excision intermediate layered linear closure  Staff surgeon: DO Eliceo Osuna: Ankur Bear  MD  Resident: Eduarda Marrero MD  Scrub Nurse: Dayan SHORE    PRE-OPERATIVE DIAGNOSIS:  Basal Cell Carcinoma  POST-OPERATIVE DIAGNOSIS: Same   LOCATION: Right shoulder  FINAL EXCISION SIZE(DEFECT SIZE): 1.8 x 1.9 cm  MARGIN: 0.4 cm  FINAL REPAIR LENGTH: 6.9 cm   ANESTHESIA: 1% lidocaine with 1:100,000 epinephrine         INDICATIONS: This patient presented with a 1.0 x 1.1 cm Basal Cell Carcinoma. Excision was indicated. We discussed the principles of treatment and most likely complications including scarring, bleeding, infection, incomplete excision, wound dehiscence, pain, nerve damage, and recurrence. Informed consent was obtained and the patient underwent the procedure as follows:    PROCEDURE: The patient was taken to the operative suite. Time-out was performed.  The treatment area was anesthetized with 1% lidocaine with epinephrine. The area was prepped with Chlorhexidine and rinsed with sterile saline and draped with sterile towels. The lesion was delineated and excised down to subcutaneous fat in a elliptical manner. Hemostasis was obtained by electrocoagulation.     REPAIR: An intermediate layered linear closure was selected as the procedure which would maximally preserve both function and cosmesis.    After the excision of the tumor, the area was carefully undermined. Hemostasis was obtained with electrocoagulation.  Closure was oriented so that the wound was in the patient's natural skin tension lines. The subcutaneous and dermal layers were then closed with 4-0 Monocryl sutures. The epidermis was then carefully approximated along the length of the wound using 4-0 Prolene running sutures.     Estimated blood loss was less than 10 ml for all surgical sites. A sterile pressure dressing was applied and wound care instructions, with a written handout, were given. The patient was discharged from the [facility] Center alert and ambulatory.    Follow-up in 2 weeks for suture removal.     Dr. Rutledge was  immediately available for the entire surgery and was physicially present for the key portions of the procedure.            Photo placed into patients chart note for further reference.     Anatomic Pathology Results: pending    Clinical Follow-Up: not yet scheduled    Staff Involved:  Resident/Staff       Case 3 - shave biopsy    S: patient expressed concern about a scaly lesion on the right arm. Denies any tenderness of the spot. Says that this spot has been frozen in the past.     O:  Physical exam:  On the right arm just proximal to the R elbow, there is a hyperkeratotic pink papule       A/P  1. NUB, right arm just proximal to R elbow  History and exam concerning for NMSC. Discussed with patient the option of doing a skin biopsy at today's visit and patient elected for this. DDx HAK vs SCC vs BCC  - SHAVE BIOPSY PROCEDURE NOTE: After written informed consent was obtained, a time out was taken to identify the patient and the correct site for biopsy. The lesion on the Right elbow was cleansed with a 70% isopropyl alcohol wipe, and then injected with 2cc of lidocaine 1% with epinephrine 1:100,000. Once anesthesia was ensured, the lesion was biopsied using a Dermablade in standard technique. Hemostasis was obtained with electrocoagulation. The specimen was placed in a labeled formalin container and sent to pathology for sectioning and analysis. The wound was dressed with white petrolatum and an adhesive bandage. The patient tolerated the procedure well. Post-procedure instructions and recommendations were provided both verbally and in writing.      Patient seen and discussed with attending physician, Dr. Rutledge.     Eduarda Marrero MD  Dermatology resident     Staff Physician Comments:   I saw and evaluated the patient with the resident (Dr. Eduarda Marrero) and I agree with the assessment and plan and descriptions of the procedures as above. I was present for the key portions of the above procedures.       Maurilio  DO Aamir    Department of Dermatology  [facility]: Phone: [phone], Fax:[phone]  [facility]: Phone: [phone], Fax: [phone]    Care and Laboratory Testing Performed at:  [facility]   [facility] - [facility] Clinic  [address] [place] [address]   Mail Code [id number], [place] [address]

## 2022-09-14 ENCOUNTER — OFFICE VISIT (OUTPATIENT)
Dept: DERMATOLOGY | Facility: CLINIC | Age: 62
End: 2022-09-14
Payer: COMMERCIAL

## 2022-09-14 DIAGNOSIS — L81.4 SOLAR LENTIGINOSIS: ICD-10-CM

## 2022-09-14 DIAGNOSIS — D18.01 CHERRY ANGIOMA: ICD-10-CM

## 2022-09-14 DIAGNOSIS — L57.0 ACTINIC KERATOSIS: ICD-10-CM

## 2022-09-14 DIAGNOSIS — L82.1 SEBORRHEIC KERATOSES: ICD-10-CM

## 2022-09-14 DIAGNOSIS — Z85.828 HISTORY OF NONMELANOMA SKIN CANCER: ICD-10-CM

## 2022-09-14 DIAGNOSIS — D22.9 MULTIPLE PIGMENTED NEVI: ICD-10-CM

## 2022-09-14 DIAGNOSIS — L57.8 PHOTOAGING OF SKIN: ICD-10-CM

## 2022-09-14 DIAGNOSIS — Z12.83 SKIN CANCER SCREENING: Primary | ICD-10-CM

## 2022-09-14 PROCEDURE — 99213 OFFICE O/P EST LOW 20 MIN: CPT | Mod: 25 | Performed by: PHYSICIAN ASSISTANT

## 2022-09-14 PROCEDURE — 17003 DESTRUCT PREMALG LES 2-14: CPT | Performed by: PHYSICIAN ASSISTANT

## 2022-09-14 PROCEDURE — 17000 DESTRUCT PREMALG LESION: CPT | Performed by: PHYSICIAN ASSISTANT

## 2022-09-14 ASSESSMENT — PAIN SCALES - GENERAL: PAINLEVEL: NO PAIN (0)

## 2022-09-14 NOTE — LETTER
9/14/2022       RE: Sohan Marcano  [address] [facility] [id number]     Dear Colleague,    Thank you for referring your patient, Sohan Marcano, to the [facility] DERMATOLOGY CLINIC [place] at [facility]. Please see a copy of my visit note below.    Formerly [facility] Dermatology Note  Encounter Date: Sep 14, 2022  Office Visit     Dermatology Problem List:  1. 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 Family history of Melanoma (Father). Brother, both passed   3.  Actinic keratosis  -s/p cryo 12/13/2014, 10/17/2019, 1/25/21, 9/13/2021   -Right elbow, s/p biopsy 2/23/2021  4. Seborrheic keratosis  5. Telangiectasia    ____________________________________________    Assessment & Plan:    # History of NMSC. No evidence of recurrent disease.  - Continue photoprotection - recommend SPF 30 or higher with frequent reapplication  - Continue yearly skin exams  - Advised to monitor for changing, non-healing, bleeding, painful, changing, or otherwise symptomatic lesions     # Actinic keratosis, left clavicle x 1, left posterior shoulder x 2  -will perform cryotherapy today.         # Dermatoheliosis and sun protection (hat)  Stressed importance of daily spf.     # Cherry angiomas  # Seborrheic keratoses     # Multiple benign nevi.   # Solar lentigines  - No concerning lesions today  - Counseled on ABCDEs of melanoma and sun protection - recommend SPF 30 or higher with frequent application   - Return sooner if noticing changing or symptomatic lesions    Procedures Performed:   - Cryotherapy procedure note, location(s):  left clavicle x 1, left posterior shoulder x 2. After verbal consent and discussion of risks and benefits including, but not  limited to, dyspigmentation/scar, blister, and pain, 3 lesion(s) was(were) treated with 1-2 mm freeze border for 1-2 cycles with liquid nitrogen. Post cryotherapy instructions were provided.      Follow-up: 6 month(s) in-person, or earlier for new or changing lesions    Staff and Scribe:     Scribe Disclosure:  I, Sukumar Kovacs, am serving as a scribe to prepare notes for Marlena Sosa PA-C based on chart review.     Provider Disclosure:   The documentation recorded by the scribe accurately reflects the services I personally performed and the decisions made by me.    All risks, benefits and alternatives were discussed with patient.  Patient is in agreement and understands the assessment and plan.  All questions were answered.  Sun Screen Education was given.   Return to Clinic in 6 months or sooner as needed.   Marlena Sosa PA-C   [facility]   ____________________________________________    CC: Skin Check (Pt is here for a full body skin check. States no new concerns. )    HPI:  Mr. Sohan Marcano is a(n) 62 year old male who presents today as a return patient for a skin check. Last seen by me on 3/14/2022, at which time patient underwent cryo for treatment of ISK on the right upper back.    Today, he states he notices a rough spot near his left clavicle. No pain or bleeding. It is new since the last visit.     Patient is otherwise feeling well, without additional skin concerns.    Labs Reviewed:  N/A    Physical Exam:  Vitals: There were no vitals taken for this visit.  SKIN: Full skin, which includes the head/face, both arms, chest, back, abdomen,both legs, genitalia and/or groin buttocks, digits and/or nails, was examined.  - Red hair, fair skin.   - There are erythematous macules with overyling adherent scale on the left clavicle x 1, left posterior shoulder x 2.   There are dome shaped bright red papules on the trunk.   Multiple regular brown pigmented  macules and papules are identified on the trunk and extremities.   There is no erythema, telangectasias, nodularity, or pigmentation on the right shoulder, chest, right temple, left sideburn.  Scattered brown macules on sun exposed areas.  There are waxy stuck on tan to brown papules on the trunk and extremities..   - There are fine lines and dyspigmentation on sun exposed areas of the face and chest..   - No other lesions of concern on areas examined.     Medications:  Current Outpatient Medications   Medication     albuterol (PROAIR HFA/PROVENTIL HFA/VENTOLIN HFA) 108 (90 Base) MCG/ACT inhaler     budesonide-formoterol (SYMBICORT) 80-4.5 MCG/ACT Inhaler     Cholecalciferol (VITAMIN D) 125 MCG (5000 UT) capsule     diclofenac (VOLTAREN) 1 % topical gel     ferrous sulfate (FEROSUL) 325 (65 Fe) MG tablet     gabapentin (NEURONTIN) 300 MG capsule     multivitamin w/minerals (THERA-VIT-M) tablet     Current Facility-Administered Medications   Medication     lidocaine 1% with EPINEPHrine 1:100,000 injection 3 mL      Past Medical History:   Patient Active Problem List   Diagnosis     Chemical dependency (H)     Basal cell carcinoma of anterior chest s/p excision 3-10-15     Squamous cell carcinoma mid upper chest s/p excision 3-10-15     Abdominal pain     Arthritis of both hands     Hepatitis C, chronic (H)     Alcohol dependence (H)     Elevated blood pressure reading without diagnosis of hypertension     Advanced directives, counseling/discussion     Alcoholic polyneuropathy (H)     Past Medical History:   Diagnosis Date     Actinic keratosis      Basal cell carcinoma      Squamous cell carcinoma      Substance abuse (H)     alcohol     Uncomplicated asthma         CC Gracie Winters, APRN CNP  [address]  [place] [address] on close of this encounter.

## 2022-09-14 NOTE — NURSING NOTE
Dermatology Rooming Note    Sohan Marcano's goals for this visit include:   Chief Complaint   Patient presents with     Skin Check     Pt is here for a full body skin check. States no new concerns.      Tri Castrejon, Visit Facilitator

## 2022-09-14 NOTE — PROGRESS NOTES
[facility] Dermatology Note  Encounter Date: Sep 14, 2022  Office Visit     Dermatology Problem List:  1. 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 Family history of Melanoma (Father). Brother, both passed   3.  Actinic keratosis  -s/p cryo 12/13/2014, 10/17/2019, 1/25/21, 9/13/2021   -Right elbow, s/p biopsy 2/23/2021  4. Seborrheic keratosis  5. Telangiectasia    ____________________________________________    Assessment & Plan:    # History of NMSC. No evidence of recurrent disease.  - Continue photoprotection - recommend SPF 30 or higher with frequent reapplication  - Continue yearly skin exams  - Advised to monitor for changing, non-healing, bleeding, painful, changing, or otherwise symptomatic lesions     # Actinic keratosis, left clavicle x 1, left posterior shoulder x 2  -will perform cryotherapy today.         # Dermatoheliosis and sun protection (hat)  Stressed importance of daily spf.     # Cherry angiomas  # Seborrheic keratoses     # Multiple benign nevi.   # Solar lentigines  - No concerning lesions today  - Counseled on ABCDEs of melanoma and sun protection - recommend SPF 30 or higher with frequent application   - Return sooner if noticing changing or symptomatic lesions    Procedures Performed:   - Cryotherapy procedure note, location(s):  left clavicle x 1, left posterior shoulder x 2. After verbal consent and discussion of risks and benefits including, but not limited to, dyspigmentation/scar, blister, and pain, 3 lesion(s) was(were) treated with 1-2 mm freeze border for 1-2 cycles with liquid nitrogen. Post cryotherapy instructions were provided.      Follow-up: 6 month(s) in-person, or earlier for new or changing lesions    Staff and Scribe:     Scribe Disclosure:  Sukumar MARSHALL  Bethanie, am serving as a scribe to prepare notes for Marlena Sosa PA-C based on chart review.     Provider Disclosure:   The documentation recorded by the scribe accurately reflects the services I personally performed and the decisions made by me.    All risks, benefits and alternatives were discussed with patient.  Patient is in agreement and understands the assessment and plan.  All questions were answered.  Sun Screen Education was given.   Return to Clinic in 6 months or sooner as needed.   Marlena Sosa PA-C   [facility]   ____________________________________________    CC: Skin Check (Pt is here for a full body skin check. States no new concerns. )    HPI:  Mr. Sohan Marcano is a(n) 62 year old male who presents today as a return patient for a skin check. Last seen by me on 3/14/2022, at which time patient underwent cryo for treatment of ISK on the right upper back.    Today, he states he notices a rough spot near his left clavicle. No pain or bleeding. It is new since the last visit.     Patient is otherwise feeling well, without additional skin concerns.    Labs Reviewed:  N/A    Physical Exam:  Vitals: There were no vitals taken for this visit.  SKIN: Full skin, which includes the head/face, both arms, chest, back, abdomen,both legs, genitalia and/or groin buttocks, digits and/or nails, was examined.  - Red hair, fair skin.   - There are erythematous macules with overyling adherent scale on the left clavicle x 1, left posterior shoulder x 2.   There are dome shaped bright red papules on the trunk.   Multiple regular brown pigmented macules and papules are identified on the trunk and extremities.   There is no erythema, telangectasias, nodularity, or pigmentation on the right shoulder, chest, right temple, left sideburn.  Scattered brown macules on sun exposed areas.  There are waxy stuck on tan to brown papules on the trunk and extremities..   - There are  fine lines and dyspigmentation on sun exposed areas of the face and chest..   - No other lesions of concern on areas examined.     Medications:  Current Outpatient Medications   Medication     albuterol (PROAIR HFA/PROVENTIL HFA/VENTOLIN HFA) 108 (90 Base) MCG/ACT inhaler     budesonide-formoterol (SYMBICORT) 80-4.5 MCG/ACT Inhaler     Cholecalciferol (VITAMIN D) 125 MCG (5000 UT) capsule     diclofenac (VOLTAREN) 1 % topical gel     ferrous sulfate (FEROSUL) 325 (65 Fe) MG tablet     gabapentin (NEURONTIN) 300 MG capsule     multivitamin w/minerals (THERA-VIT-M) tablet     Current Facility-Administered Medications   Medication     lidocaine 1% with EPINEPHrine 1:100,000 injection 3 mL      Past Medical History:   Patient Active Problem List   Diagnosis     Chemical dependency (H)     Basal cell carcinoma of anterior chest s/p excision 3-10-15     Squamous cell carcinoma mid upper chest s/p excision 3-10-15     Abdominal pain     Arthritis of both hands     Hepatitis C, chronic (H)     Alcohol dependence (H)     Elevated blood pressure reading without diagnosis of hypertension     Advanced directives, counseling/discussion     Alcoholic polyneuropathy (H)     Past Medical History:   Diagnosis Date     Actinic keratosis      Basal cell carcinoma      Squamous cell carcinoma      Substance abuse (H)     alcohol     Uncomplicated asthma         CC Gracie Winters, APRN CNP  [address]  [place] [address] on close of this encounter.

## 2022-09-14 NOTE — PATIENT INSTRUCTIONS
Cryotherapy    What is it?  Use of a very cold liquid, such as liquid nitrogen, to freeze and destroy abnormal skin cells that need to be removed    What should I expect?  Tenderness and redness  A small blister that might grow and fill with dark purple blood. There may be crusting.  More than one treatment may be needed if the lesions do not go away.    How do I care for the treated area?  Gently wash the area with your hands when bathing.  Use a thin layer of Vaseline to help with healing. You may use a Band-Aid.   The area should heal within 7-10 days and may leave behind a pink or lighter color.   Do not use an antibiotic or Neosporin ointment.   You may take acetaminophen (Tylenol) for pain.     Call your doctor if you have:  Severe pain  Signs of infection (warmth, redness, cloudy yellow drainage, and or a bad smell)  Questions or concerns    Who should I call with questions?      [facility]: [phone]      [facility]: [phone]      For urgent needs outside of business hours call the [facility] at [phone] and ask for the dermatology resident on call

## 2022-10-25 ENCOUNTER — TELEPHONE (OUTPATIENT)
Dept: PULMONOLOGY | Facility: CLINIC | Age: 62
End: 2022-10-25

## 2022-11-15 ENCOUNTER — OFFICE VISIT (OUTPATIENT)
Dept: FAMILY MEDICINE | Facility: CLINIC | Age: 62
End: 2022-11-15
Payer: COMMERCIAL

## 2022-11-15 VITALS
TEMPERATURE: 100.1 F | SYSTOLIC BLOOD PRESSURE: 159 MMHG | WEIGHT: 246.5 LBS | RESPIRATION RATE: 21 BRPM | DIASTOLIC BLOOD PRESSURE: 96 MMHG | HEART RATE: 98 BPM | HEIGHT: 77 IN | BODY MASS INDEX: 29.11 KG/M2 | OXYGEN SATURATION: 95 %

## 2022-11-15 DIAGNOSIS — I10 BENIGN ESSENTIAL HYPERTENSION: ICD-10-CM

## 2022-11-15 DIAGNOSIS — G62.1 ALCOHOLIC POLYNEUROPATHY (H): ICD-10-CM

## 2022-11-15 DIAGNOSIS — J44.9 CHRONIC OBSTRUCTIVE PULMONARY DISEASE, UNSPECIFIED COPD TYPE (H): Primary | ICD-10-CM

## 2022-11-15 PROCEDURE — 99214 OFFICE O/P EST MOD 30 MIN: CPT

## 2022-11-15 RX ORDER — GABAPENTIN 300 MG/1
300 CAPSULE ORAL 3 TIMES DAILY PRN
Qty: 90 CAPSULE | Refills: 3 | Status: SHIPPED | OUTPATIENT
Start: 2022-11-15 | End: 2023-04-04

## 2022-11-15 RX ORDER — BUDESONIDE AND FORMOTEROL FUMARATE DIHYDRATE 80; 4.5 UG/1; UG/1
2 AEROSOL RESPIRATORY (INHALATION) 2 TIMES DAILY
Qty: 10 G | Refills: 4 | Status: SHIPPED | OUTPATIENT
Start: 2022-11-15 | End: 2023-07-17 | Stop reason: ALTCHOICE

## 2022-11-15 RX ORDER — ALBUTEROL SULFATE 90 UG/1
2 AEROSOL, METERED RESPIRATORY (INHALATION) EVERY 6 HOURS PRN
Qty: 8 G | Refills: 3 | Status: SHIPPED | OUTPATIENT
Start: 2022-11-15 | End: 2024-01-01

## 2022-11-15 ASSESSMENT — PAIN SCALES - GENERAL: PAINLEVEL: NO PAIN (0)

## 2022-11-15 NOTE — PROGRESS NOTES
Assessment & Plan     Chronic obstructive pulmonary disease, unspecified COPD type (H)  Stable  - albuterol (PROAIR HFA/PROVENTIL HFA/VENTOLIN HFA) 108 (90 Base) MCG/ACT inhaler; Inhale 2 puffs into the lungs every 6 hours as needed for shortness of breath / dyspnea or wheezing  - budesonide-formoterol (SYMBICORT) 80-4.5 MCG/ACT Inhaler; Inhale 2 puffs into the lungs 2 times daily  Continue medications as prescribed.    Alcoholic polyneuropathy (H)  - gabapentin (NEURONTIN) 300 MG capsule; Take 1 capsule (300 mg) by mouth 3 times daily as needed for neuropathic pain    Benign essential hypertension  [place] reassessing blood pressure when patient no longer has a fever, or is ill. Advised patient to take blood pressure at home. Labs not needed due to recent labs from 06/22     Return in about 3 months (around 2/15/2023) for Follow up for blood pressure recheck.    Yannick Womack NP  [facility]    Harris Parry is a 62 year old presenting for the following health issues:  Establish Care and Recheck Medication    Patient would like medication refills. Interested in flu shot  Uses both inhalers everyday  Patient has also quit smoking. 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  Breathing has been controlled. [place] with lot of stairs- works about 5-6 hours/day.    BP a little elevated today, but has also had a fever. Fatigue since Monday and seasonal blues. No new cough, sore throat, loss of taste/smell, shortness of breath diarrhea, issues with urination.    Patient had carried hepatitis C- 40 years, but had treatment for this.    History of Present Illness       Reason for visit:  Est Care, Med Check   He is taking medications regularly.     Review of Systems   Constitutional, HEENT, cardiovascular, pulmonary, gi and gu systems are negative, except as otherwise noted.      Objective    BP (!) 159/96  "(BP Location: Left arm, Patient Position: Sitting, Cuff Size: Adult Large)   Pulse 98   Temp 100.1  F (37.8  C) (Temporal)   Resp 21   Ht 1.95 m (6' 4.77\")   Wt 111.8 kg (246 lb 8 oz)   SpO2 95%   BMI 29.40 kg/m    Body mass index is 29.4 kg/m .  Physical Exam   GENERAL: healthy, alert and no distress  EYES: Eyes grossly normal to inspection, PERRL and conjunctivae and sclerae normal  HENT: ear canals and TM's normal, nose and mouth without ulcers or lesions  NECK: no adenopathy, no asymmetry, masses, or scars and thyroid normal to palpation  RESP: Lungs diminished to auscultation diffusely. No rales, wheezing.  CV: regular rate and rhythm, normal S1 S2, no S3 or S4, no murmur, click or rub, no peripheral edema  ABDOMEN: soft, nontender, no hepatosplenomegaly, no masses and bowel sounds normal  MS: no gross musculoskeletal defects noted, no edema  SKIN: no suspicious lesions or rashes  NEURO: Normal strength and tone, mentation intact and speech normal  PSYCH: mentation appears normal, affect normal/bright              "

## 2022-12-09 ENCOUNTER — ALLIED HEALTH/NURSE VISIT (OUTPATIENT)
Dept: FAMILY MEDICINE | Facility: CLINIC | Age: 62
End: 2022-12-09
Payer: COMMERCIAL

## 2022-12-09 ENCOUNTER — NURSE TRIAGE (OUTPATIENT)
Dept: FAMILY MEDICINE | Facility: CLINIC | Age: 62
End: 2022-12-09

## 2022-12-09 ENCOUNTER — HOSPITAL ENCOUNTER (OUTPATIENT)
Dept: GENERAL RADIOLOGY | Facility: CLINIC | Age: 62
Discharge: HOME | End: 2022-12-09
Payer: COMMERCIAL

## 2022-12-09 ENCOUNTER — TELEPHONE (OUTPATIENT)
Dept: FAMILY MEDICINE | Facility: CLINIC | Age: 62
End: 2022-12-09

## 2022-12-09 DIAGNOSIS — J11.1 INFLUENZA: Primary | ICD-10-CM

## 2022-12-09 DIAGNOSIS — R05.1 ACUTE COUGH: Primary | ICD-10-CM

## 2022-12-09 DIAGNOSIS — R06.02 SHORTNESS OF BREATH: Primary | ICD-10-CM

## 2022-12-09 DIAGNOSIS — J18.9 ATYPICAL PNEUMONIA: ICD-10-CM

## 2022-12-09 DIAGNOSIS — R06.02 SHORTNESS OF BREATH: ICD-10-CM

## 2022-12-09 LAB
FLUAV AG SPEC QL IA: NEGATIVE
FLUBV AG SPEC QL IA: NEGATIVE

## 2022-12-09 PROCEDURE — 71046 X-RAY EXAM CHEST 2 VIEWS: CPT | Mod: 26 | Performed by: RADIOLOGY

## 2022-12-09 PROCEDURE — 99207 PR NO CHARGE NURSE ONLY: CPT

## 2022-12-09 PROCEDURE — 87804 INFLUENZA ASSAY W/OPTIC: CPT

## 2022-12-09 PROCEDURE — 71046 X-RAY EXAM CHEST 2 VIEWS: CPT

## 2022-12-09 RX ORDER — AZITHROMYCIN 250 MG/1
TABLET, FILM COATED ORAL
Qty: 6 TABLET | Refills: 0 | Status: SHIPPED | OUTPATIENT
Start: 2022-12-09 | End: 2022-12-14

## 2022-12-09 NOTE — TELEPHONE ENCOUNTER
"Experiencing mild SOB, especially when walking up stairs, but coughing up a lot of green phlegm since before 11/15/22. Cough is much worse in morning or after laying down, takes pt about 1/2 hr to try and clear cough. Chest feels tight, has nasal congestion but cough is worse part.     Hx of COPD and emphysema. Thinks he may have pneumonia. Per protocol patient should be seen in clinic today.   Pended order for imaging. Should come in for swab as well? Please advise      Reason for Disposition    MILD difficulty breathing (e.g., minimal/no SOB at rest, SOB with walking, pulse <100) and still present when not coughing    Answer Assessment - Initial Assessment Questions  1. ONSET: \"When did the cough begin?\"       Since   2. SEVERITY: \"How bad is the cough today?\"       Very bad in the morning or after laying down really coughing hard for about 1/2 hour. Very winded from coughing   3. SPUTUM: \"Describe the color of your sputum\" (none, dry cough; clear, white, yellow, green)      green  4. HEMOPTYSIS: \"Are you coughing up any blood?\" If so ask: \"How much?\" (flecks, streaks, tablespoons, etc.)      Not from cough but from nose  5. DIFFICULTY BREATHING: \"Are you having difficulty breathing?\" If Yes, ask: \"How bad is it?\" (e.g., mild, moderate, severe)     - MILD: No SOB at rest, mild SOB with walking, speaks normally in sentences, can lie down, no retractions, pulse < 100.     - MODERATE: SOB at rest, SOB with minimal exertion and prefers to sit, cannot lie down flat, speaks in phrases, mild retractions, audible wheezing, pulse 100-120.     - SEVERE: Very SOB at rest, speaks in single words, struggling to breathe, sitting hunched forward, retractions, pulse > 120       Mild - SOB when walking up stairs very out of breath  6. FEVER: \"Do you have a fever?\" If Yes, ask: \"What is your temperature, how was it measured, and when did it start?\"      98  7. CARDIAC HISTORY: \"Do you have any history of heart disease?\" (e.g., " "heart attack, congestive heart failure)       *No Answer*  8. LUNG HISTORY: \"Do you have any history of lung disease?\"  (e.g., pulmonary embolus, asthma, emphysema)     Pt has COPD and emphysema - Using inhalers but they are not working right now  9. PE RISK FACTORS: \"Do you have a history of blood clots?\" (or: recent major surgery, recent prolonged travel, bedridden)      No   10. OTHER SYMPTOMS: \"Do you have any other symptoms?\" (e.g., runny nose, wheezing, chest pain)        Congestion, SOB, winded when walking, coughing with phlegm, flu like sc  11. PREGNANCY: \"Is there any chance you are pregnant?\" \"When was your last menstrual period?\"       n/a  12. TRAVEL: \"Have you traveled out of the country in the last month?\" (e.g., travel history, exposures)        No    Protocols used: MT STERN RN  [facility]      "

## 2022-12-09 NOTE — TELEPHONE ENCOUNTER
Scheduled pt for nurse only appt today at 1:30 to swab for flu.   Yannick Womack NP Hennessy-Barcenilla, Anna L, RN  Caller: Unspecified (Today,  8:58 AM)  Signed orders for flu swab and chest x-ray. If he's willing to come in for swab, we could see if MA is available to do it.   If workup negative, he'll need to schedule a visit.   Thanks.        Marlen STERN RN  [facility]

## 2022-12-09 NOTE — PROGRESS NOTES
Per Yannick Womack, nasal swab preformed for influenza A and B.     Patient will be getting X-Ray today at 2:30pm      REA CERVANTES RN on 12/9/2022 at 1:47 PM:

## 2022-12-09 NOTE — TELEPHONE ENCOUNTER
Updated patient of XR and flu results. Will treat with macrolide for atypical pneumonia. If not improving, recommended patient to be evaluated in clinic.

## 2022-12-19 ENCOUNTER — TELEPHONE (OUTPATIENT)
Dept: FAMILY MEDICINE | Facility: CLINIC | Age: 62
End: 2022-12-19

## 2022-12-19 NOTE — TELEPHONE ENCOUNTER
Yannick    Was prescribed antibiotic, but has finished the med, today the phelgm is getting more again, but clear and feeling like lungs are full again and he is short of breath with exertion like walking up stairs and even walking to the kitchen and back in the last 2 months  Pt did have a CT done this past July, he does have an appointment on 1/18/23  Should he be seen sooner  Advised to continue meds  If worsens seek urgent care    Bibi Valadez RN   [facility]

## 2022-12-22 NOTE — TELEPHONE ENCOUNTER
Routed to TC/Reception pool  Yannick Womack, NP  You 3 days ago     ROOSEVELT Mtz,   Thank you for the heads up. Yes, I would recommend he been seen sooner for physical exam and workup (CT will likely be the direction we go in). Okay to use a same-day slot for him.   Yannick      Thank you  Bibi Valadez RN   [facility]

## 2022-12-23 ENCOUNTER — OFFICE VISIT (OUTPATIENT)
Dept: FAMILY MEDICINE | Facility: CLINIC | Age: 62
End: 2022-12-23
Payer: COMMERCIAL

## 2022-12-23 ENCOUNTER — LAB (OUTPATIENT)
Dept: LAB | Facility: CLINIC | Age: 62
End: 2022-12-23
Payer: COMMERCIAL

## 2022-12-23 VITALS
DIASTOLIC BLOOD PRESSURE: 68 MMHG | SYSTOLIC BLOOD PRESSURE: 122 MMHG | BODY MASS INDEX: 30.5 KG/M2 | HEART RATE: 109 BPM | HEIGHT: 76 IN | OXYGEN SATURATION: 92 % | RESPIRATION RATE: 20 BRPM | TEMPERATURE: 98.6 F | WEIGHT: 250.5 LBS

## 2022-12-23 DIAGNOSIS — J18.9 ATYPICAL PNEUMONIA: Primary | ICD-10-CM

## 2022-12-23 DIAGNOSIS — R06.02 SHORTNESS OF BREATH: ICD-10-CM

## 2022-12-23 LAB
ALBUMIN SERPL BCG-MCNC: 4.3 G/DL (ref 3.5–5.2)
ALP SERPL-CCNC: 67 U/L (ref 40–129)
ALT SERPL W P-5'-P-CCNC: 61 U/L (ref 10–50)
ANION GAP SERPL CALCULATED.3IONS-SCNC: 17 MMOL/L (ref 7–15)
AST SERPL W P-5'-P-CCNC: 100 U/L (ref 10–50)
BASOPHILS # BLD AUTO: 0.1 10E3/UL (ref 0–0.2)
BASOPHILS NFR BLD AUTO: 1 %
BILIRUB SERPL-MCNC: 0.9 MG/DL
BUN SERPL-MCNC: 10.7 MG/DL (ref 8–23)
CALCIUM SERPL-MCNC: 8.9 MG/DL (ref 8.8–10.2)
CHLORIDE SERPL-SCNC: 107 MMOL/L (ref 98–107)
CREAT SERPL-MCNC: 0.82 MG/DL (ref 0.67–1.17)
DEPRECATED HCO3 PLAS-SCNC: 22 MMOL/L (ref 22–29)
EOSINOPHIL # BLD AUTO: 0.3 10E3/UL (ref 0–0.7)
EOSINOPHIL NFR BLD AUTO: 5 %
ERYTHROCYTE [DISTWIDTH] IN BLOOD BY AUTOMATED COUNT: 12.3 % (ref 10–15)
GFR SERPL CREATININE-BSD FRML MDRD: >90 ML/MIN/1.73M2
GLUCOSE SERPL-MCNC: 104 MG/DL (ref 70–99)
HCT VFR BLD AUTO: 45 % (ref 40–53)
HGB BLD-MCNC: 15.8 G/DL (ref 13.3–17.7)
LYMPHOCYTES # BLD AUTO: 2.1 10E3/UL (ref 0.8–5.3)
LYMPHOCYTES NFR BLD AUTO: 45 %
MCH RBC QN AUTO: 35.3 PG (ref 26.5–33)
MCHC RBC AUTO-ENTMCNC: 35.1 G/DL (ref 31.5–36.5)
MCV RBC AUTO: 100 FL (ref 78–100)
MONOCYTES # BLD AUTO: 0.6 10E3/UL (ref 0–1.3)
MONOCYTES NFR BLD AUTO: 13 %
NEUTROPHILS # BLD AUTO: 1.7 10E3/UL (ref 1.6–8.3)
NEUTROPHILS NFR BLD AUTO: 36 %
NT-PROBNP SERPL-MCNC: 10 PG/ML (ref 0–900)
PLATELET # BLD AUTO: 178 10E3/UL (ref 150–450)
POTASSIUM SERPL-SCNC: 3.7 MMOL/L (ref 3.4–5.3)
PROT SERPL-MCNC: 7.7 G/DL (ref 6.4–8.3)
RBC # BLD AUTO: 4.48 10E6/UL (ref 4.4–5.9)
SODIUM SERPL-SCNC: 146 MMOL/L (ref 136–145)
WBC # BLD AUTO: 4.8 10E3/UL (ref 4–11)

## 2022-12-23 PROCEDURE — 85025 COMPLETE CBC W/AUTO DIFF WBC: CPT

## 2022-12-23 PROCEDURE — 36415 COLL VENOUS BLD VENIPUNCTURE: CPT

## 2022-12-23 PROCEDURE — 99214 OFFICE O/P EST MOD 30 MIN: CPT

## 2022-12-23 PROCEDURE — 83880 ASSAY OF NATRIURETIC PEPTIDE: CPT

## 2022-12-23 PROCEDURE — 80053 COMPREHEN METABOLIC PANEL: CPT

## 2022-12-23 ASSESSMENT — PAIN SCALES - GENERAL: PAINLEVEL: NO PAIN (0)

## 2022-12-23 NOTE — PROGRESS NOTES
Assessment & Plan     Atypical pneumonia  - amoxicillin-clavulanate (AUGMENTIN) 875-125 MG tablet; Take 1 tablet by mouth 2 times daily for 7 days  Given that this Zithromax was previously helpful, this may be a treatment resistant pneumonia.  We will try treatment with Augmentin for 7 days to see if this is helpful.    Shortness of breath  - BNP-N terminal pro; Future  - Comprehensive metabolic panel (BMP + Alb, Alk Phos, ALT, AST, Total. Bili, TP); Future  - CBC with platelets and differential; Future  - CT Chest w/o Contrast; Future  Previous x-ray had shown that results may either be an atypical infection or fluid buildup.  Given the trace edema in the legs, I would like to rule out heart failure with a BNP today.  We will also perform a chest CT if this is not improving for further work-up.    Patient also reported that he had quit smoking by today.  I provided him with some encouragement and praise on quitting smoking.  He is currently chewing Nicorette gum.    Return in about 1 week (around 12/30/2022), or if symptoms worsen or fail to improve.    Yannick Womack NP  [facility]    Harris Parry is a 62 year old presenting for the following health issues    Follow Up (LUNGS-STILL NOT ABLE TO BREATH WELL)  Patient reports that symptoms had improved after the zithromax (became clear fluid), but then it worsened again 1 week ago, and now he is coughing brownish phlegm. One month ago, he had woken up drenched in sweat   Patient has had pronounced chronic fatigue for about 2.5 months. Patient reports feeling less of a tolerance.  Patient is still taking the inhalers, reports that the symbicort and albuterol.    Fever off and on - had a fever of 100.4 on Monday.   No weight loss. Patient has gained about 10 pounds.     HPI     Review of Systems   Constitutional, HEENT, cardiovascular, pulmonary, gi and gu systems are negative, except as otherwise noted.      Objective    /68    "Pulse 109   Temp 98.6  F (37  C) (Oral)   Resp 20   Ht 1.93 m (6' 3.98\")   Wt 113.6 kg (250 lb 8 oz)   SpO2 92%   BMI 30.50 kg/m    Body mass index is 30.5 kg/m .  Physical Exam   GENERAL: healthy, alert and no distress  EYES: Eyes grossly normal to inspection, PERRL and conjunctivae and sclerae normal  HENT: ear canals and TM's normal, nose and mouth without ulcers or lesions  NECK: no adenopathy, no asymmetry, masses, or scars and thyroid normal to palpation  RESP: lungs clear to auscultation - no rales, rhonchi or wheezes  CV: regular rate and rhythm, normal S1 S2, no S3 or S4, no murmur, click or rub. Trace edema on right leg.  NEURO: Normal strength and tone, mentation intact and speech normal  PSYCH: mentation appears normal, affect normal/bright          "

## 2022-12-26 ENCOUNTER — TELEPHONE (OUTPATIENT)
Dept: FAMILY MEDICINE | Facility: CLINIC | Age: 62
End: 2022-12-26

## 2022-12-26 NOTE — TELEPHONE ENCOUNTER
Called patient regarding lab results and liver enzymes. Patient reports he has been drinking more lately due to being sick as well. Recommended reducing alcohol intake to improve his liver function.  Patient reports taking a probiotic with his augmentin. He reports cough has improved, but he still feels crummy overall. Recommended continuing his antibiotic regimen.  Noted that he also has CT schedule coming up on 12/29.

## 2022-12-29 ENCOUNTER — ANCILLARY PROCEDURE (OUTPATIENT)
Dept: CT IMAGING | Facility: CLINIC | Age: 62
End: 2022-12-29
Payer: COMMERCIAL

## 2022-12-29 DIAGNOSIS — D73.89 NODULE OF SPLEEN: Primary | ICD-10-CM

## 2022-12-29 DIAGNOSIS — R93.2 ABNORMAL CT OF LIVER: ICD-10-CM

## 2022-12-29 DIAGNOSIS — R06.02 SHORTNESS OF BREATH: ICD-10-CM

## 2022-12-29 PROCEDURE — 99207 E-CONSULT TO PULMONARY (ADULT OUTPT PROVIDER TO SPECIALIST WRITTEN QUESTION & RESPONSE): CPT

## 2022-12-29 PROCEDURE — 71250 CT THORAX DX C-: CPT | Performed by: RADIOLOGY

## 2022-12-30 ENCOUNTER — TELEPHONE (OUTPATIENT)
Dept: FAMILY MEDICINE | Facility: CLINIC | Age: 62
End: 2022-12-30

## 2022-12-30 ENCOUNTER — E-CONSULT (OUTPATIENT)
Dept: PULMONOLOGY | Facility: CLINIC | Age: 62
End: 2022-12-30
Payer: COMMERCIAL

## 2022-12-30 ENCOUNTER — TELEPHONE (OUTPATIENT)
Dept: PULMONOLOGY | Facility: CLINIC | Age: 62
End: 2022-12-30

## 2022-12-30 DIAGNOSIS — R93.2 ABNORMAL CT OF LIVER: Primary | ICD-10-CM

## 2022-12-30 DIAGNOSIS — R06.02 SHORTNESS OF BREATH: ICD-10-CM

## 2022-12-30 DIAGNOSIS — I71.20 THORACIC AORTIC ANEURYSM WITHOUT RUPTURE, UNSPECIFIED PART (H): ICD-10-CM

## 2022-12-30 PROCEDURE — 99207 PR NON-BILLABLE SERV PER CHARTING: CPT | Performed by: INTERNAL MEDICINE

## 2022-12-30 NOTE — TELEPHONE ENCOUNTER
Talked with patient today. He reports breathing symptoms are still present, although the green mucous is no longer present. He was able to bump up his pulmonology appointment to January.  Patient reports not using his Symbicort inhaler while being sick, and only started using it yesterday. I advised him to use it twice daily as prescribed, and to see how he feels over several days. If not resolving, we could try an anti-muscarinic to see if this is helpful. Patient reminded that ED is always an option is breathing is not improving.  Patient also advised to follow up in 6 months with ultrasound regarding his thoracic aortic aneurysm (measured 4.5 cm). He reports that he would be interested in speaking with a specialist, as his sister also had a history of an aortic aneurysm.  Patient also reminded to follow up with liver MRI once his symptoms improve.

## 2022-12-30 NOTE — TELEPHONE ENCOUNTER
Patient Contacted for the patient to call back and schedule the following:    Appointment type: RTN  Provider: Jhony  Return date: 1/23/23  Specialty phone number: [phone]  Additional appointment(s) needed: NA  Additonal Notes: Pt rescheduled. Pt stated he was having worsening symptoms. I informed patient that he should call our pulmonary nursing phone line. Pt agreeable. Pt was given the pulmonary nursing phone number. Pt scheduled for next available.

## 2022-12-30 NOTE — PROGRESS NOTES
Patient updated of CT results by phone. Recommended liver MRI per radiologist recommendation. Also placed heme/onc referral given presence of nodules in the spleen.  Patient reports that infectious symptoms have improved, but he is still having some shortness of breath. Given that he is already on Symbicort, I advised him that we could contact pulmonology for recommendations of workup/treatment.  Will order the MRI after his breathing improves.  I did advise him that he should not hesitate to go to the ER if breathing worsens as well.

## 2022-12-30 NOTE — PROGRESS NOTES
12/30/2022     E-Consult has been denied due to: Complexity of question, needs in-person referral.  Also has recent Pulmonary consult visit (7/22) with Dr. Kevin.    Interprofessional consultation requested by:  Yannick Womack NP      Clinical Question/Purpose: MY CLINICAL QUESTION IS: Patient has been having worsening shortness of breath: he reports needing to rest 10 minutes after walking for 10 minutes. Reports lightheadedness and sometimes needing to sit for 30 minutes.    Patient assessment and information reviewed: chart notes; recent medical provider notes    Recommendations: Discuss questions/concerns with Dr. Kevin       The recommendations provided in this E-Consult are based on a review of clinical data pertinent to the clinical question presented, without a review of the patient's complete medical record or, the benefit of a comprehensive in-person or virtual patient evaluation. This consultation should not replace the clinical judgement and evaluation of the provider ordering this E-Consult. Any new clinical issues, or changes in patient status since the filing of this E-Consult will need to be taken into account when assessing these recommendations. Please contact me if you have further questions.    My total time spent reviewing clinical information and formulating assessment was 10 minutes.    {Report sent automatically to requesting provider once signed  [id number]      Melchor Amanda MD

## 2022-12-30 NOTE — TELEPHONE ENCOUNTER
Spoke with patient and he has been sick for 2 months and finally saw PCP who gave antibiotics time 2 rounds and completed chest xray noting:   Impression: Mild increased bibasilar reticular opacities, representing  atelectasis/edema, or atypical infection.    He states he is finishing last of antibiotic today. He states he is feeling better and SOB is also improved. Had noted 2 elevated temps during this time of 100.4 and 100.1. Had one night of night sweats. Lungs are less congested with cough that is improved. Sputum started out being green but now clear. Has return apt scheduled with Dr Booker 1/23/23. Instructed patient to call clinic back if feeling worse before upcoming apt.

## 2023-01-01 ENCOUNTER — APPOINTMENT (OUTPATIENT)
Dept: CT IMAGING | Facility: CLINIC | Age: 63
End: 2023-01-01
Attending: EMERGENCY MEDICINE
Payer: COMMERCIAL

## 2023-01-01 ENCOUNTER — HOSPITAL ENCOUNTER (EMERGENCY)
Facility: CLINIC | Age: 63
Discharge: HOME | End: 2023-10-20
Attending: EMERGENCY MEDICINE | Admitting: EMERGENCY MEDICINE
Payer: COMMERCIAL

## 2023-01-01 ENCOUNTER — HOSPITAL ENCOUNTER (EMERGENCY)
Facility: CLINIC | Age: 63
Discharge: HOME | End: 2023-11-07
Attending: EMERGENCY MEDICINE | Admitting: EMERGENCY MEDICINE
Payer: COMMERCIAL

## 2023-01-01 ENCOUNTER — TELEPHONE (OUTPATIENT)
Dept: GASTROENTEROLOGY | Facility: CLINIC | Age: 63
End: 2023-01-01
Payer: COMMERCIAL

## 2023-01-01 VITALS
OXYGEN SATURATION: 91 % | BODY MASS INDEX: 30.04 KG/M2 | RESPIRATION RATE: 16 BRPM | SYSTOLIC BLOOD PRESSURE: 135 MMHG | TEMPERATURE: 98.5 F | DIASTOLIC BLOOD PRESSURE: 83 MMHG | HEART RATE: 110 BPM | WEIGHT: 246.8 LBS

## 2023-01-01 VITALS
SYSTOLIC BLOOD PRESSURE: 134 MMHG | RESPIRATION RATE: 16 BRPM | OXYGEN SATURATION: 95 % | DIASTOLIC BLOOD PRESSURE: 88 MMHG | HEART RATE: 106 BPM | TEMPERATURE: 98.3 F

## 2023-01-01 DIAGNOSIS — S09.90XA CLOSED HEAD INJURY, INITIAL ENCOUNTER: ICD-10-CM

## 2023-01-01 DIAGNOSIS — S06.0X9D CONCUSSION WITH LOSS OF CONSCIOUSNESS, SUBSEQUENT ENCOUNTER: Primary | ICD-10-CM

## 2023-01-01 DIAGNOSIS — C22.0 HEPATOCELLULAR CARCINOMA (H): Primary | ICD-10-CM

## 2023-01-01 DIAGNOSIS — S06.0X1A CONCUSSION WITH LOSS OF CONSCIOUSNESS OF 30 MINUTES OR LESS, INITIAL ENCOUNTER: ICD-10-CM

## 2023-01-01 LAB
ALBUMIN SERPL BCG-MCNC: 4.1 G/DL (ref 3.5–5.2)
ALP SERPL-CCNC: 92 U/L (ref 40–129)
ALT SERPL W P-5'-P-CCNC: 85 U/L (ref 0–70)
ANION GAP SERPL CALCULATED.3IONS-SCNC: 11 MMOL/L (ref 7–15)
AST SERPL W P-5'-P-CCNC: 178 U/L (ref 0–45)
BASOPHILS # BLD AUTO: 0.1 10E3/UL (ref 0–0.2)
BASOPHILS NFR BLD AUTO: 2 %
BILIRUB SERPL-MCNC: 3.9 MG/DL
BUN SERPL-MCNC: 7.2 MG/DL (ref 8–23)
CALCIUM SERPL-MCNC: 9 MG/DL (ref 8.8–10.2)
CHLORIDE SERPL-SCNC: 101 MMOL/L (ref 98–107)
CREAT SERPL-MCNC: 0.72 MG/DL (ref 0.67–1.17)
DEPRECATED HCO3 PLAS-SCNC: 28 MMOL/L (ref 22–29)
EGFRCR SERPLBLD CKD-EPI 2021: >90 ML/MIN/1.73M2
EOSINOPHIL # BLD AUTO: 0.2 10E3/UL (ref 0–0.7)
EOSINOPHIL NFR BLD AUTO: 4 %
ERYTHROCYTE [DISTWIDTH] IN BLOOD BY AUTOMATED COUNT: 13.4 % (ref 10–15)
GLUCOSE SERPL-MCNC: 125 MG/DL (ref 70–99)
HCT VFR BLD AUTO: 44.8 % (ref 40–53)
HGB BLD-MCNC: 15.7 G/DL (ref 13.3–17.7)
IMM GRANULOCYTES # BLD: 0 10E3/UL
IMM GRANULOCYTES NFR BLD: 0 %
LYMPHOCYTES # BLD AUTO: 1.2 10E3/UL (ref 0.8–5.3)
LYMPHOCYTES NFR BLD AUTO: 29 %
MCH RBC QN AUTO: 36.1 PG (ref 26.5–33)
MCHC RBC AUTO-ENTMCNC: 35 G/DL (ref 31.5–36.5)
MCV RBC AUTO: 103 FL (ref 78–100)
MONOCYTES # BLD AUTO: 0.7 10E3/UL (ref 0–1.3)
MONOCYTES NFR BLD AUTO: 16 %
NEUTROPHILS # BLD AUTO: 2 10E3/UL (ref 1.6–8.3)
NEUTROPHILS NFR BLD AUTO: 49 %
NRBC # BLD AUTO: 0 10E3/UL
NRBC BLD AUTO-RTO: 0 /100
PLATELET # BLD AUTO: 95 10E3/UL (ref 150–450)
POTASSIUM SERPL-SCNC: 3.5 MMOL/L (ref 3.4–5.3)
PROT SERPL-MCNC: 7.9 G/DL (ref 6.4–8.3)
RBC # BLD AUTO: 4.35 10E6/UL (ref 4.4–5.9)
SODIUM SERPL-SCNC: 140 MMOL/L (ref 135–145)
WBC # BLD AUTO: 4.1 10E3/UL (ref 4–11)

## 2023-01-01 PROCEDURE — 99284 EMERGENCY DEPT VISIT MOD MDM: CPT | Mod: 25

## 2023-01-01 PROCEDURE — 85025 COMPLETE CBC W/AUTO DIFF WBC: CPT | Performed by: EMERGENCY MEDICINE

## 2023-01-01 PROCEDURE — 36415 COLL VENOUS BLD VENIPUNCTURE: CPT | Performed by: EMERGENCY MEDICINE

## 2023-01-01 PROCEDURE — 99284 EMERGENCY DEPT VISIT MOD MDM: CPT | Performed by: EMERGENCY MEDICINE

## 2023-01-01 PROCEDURE — 80053 COMPREHEN METABOLIC PANEL: CPT | Performed by: EMERGENCY MEDICINE

## 2023-01-01 PROCEDURE — 99283 EMERGENCY DEPT VISIT LOW MDM: CPT | Performed by: EMERGENCY MEDICINE

## 2023-01-01 PROCEDURE — 70450 CT HEAD/BRAIN W/O DYE: CPT

## 2023-01-01 ASSESSMENT — ACTIVITIES OF DAILY LIVING (ADL)
ADLS_ACUITY_SCORE: 37
ADLS_ACUITY_SCORE: 35
ADLS_ACUITY_SCORE: 37

## 2023-01-03 ENCOUNTER — PATIENT OUTREACH (OUTPATIENT)
Dept: SURGERY | Facility: CLINIC | Age: 63
End: 2023-01-03

## 2023-01-03 DIAGNOSIS — B18.2 CHRONIC HEPATITIS C WITHOUT HEPATIC COMA (H): ICD-10-CM

## 2023-01-03 DIAGNOSIS — R93.2 ABNORMAL FINDING ON IMAGING OF LIVER: ICD-10-CM

## 2023-01-03 DIAGNOSIS — D73.89 NODULE OF SPLEEN: Primary | ICD-10-CM

## 2023-01-03 DIAGNOSIS — R10.9 ABDOMINAL PAIN: ICD-10-CM

## 2023-01-03 NOTE — TELEPHONE ENCOUNTER
New Patient Oncology Nurse Navigator Note     Referring provider: Yannick Womack NP     Referring Clinic/Organization: [facility]     Referred to: Hepatobiliary Surgery      Requested provider (if applicable): First available provider    Referral Received: 01/03/23       Evaluation for : Splenic nodules and abnormal imaging of liver      Clinical History (per Nurse review of records provided):      See book marked documents:       Referring MD office note    Imaging reports     Lab Results   Component Value Date/Time    ALBUMIN 4.3 12/23/2022 12:47 PM    ALBUMIN 4.2 06/22/2022 11:51 AM    ALBUMIN 2.9 (L) 09/27/2020 06:45 AM     (H) 12/23/2022 12:47 PM    AST 48 (H) 09/27/2020 06:45 AM    ALT 61 (H) 12/23/2022 12:47 PM    ALT 36 09/27/2020 06:45 AM    ALKPHOS 67 12/23/2022 12:47 PM    ALKPHOS 84 09/27/2020 06:45 AM    BILITOTAL 0.9 12/23/2022 12:47 PM    BILITOTAL 3.3 (H) 09/27/2020 06:45 AM    WBC 4.8 12/23/2022 12:47 PM    WBC 5.6 09/27/2020 06:45 AM          Past Medical History:   Diagnosis Date     Actinic keratosis      Basal cell carcinoma      Squamous cell carcinoma      Substance abuse (H)     alcohol     Uncomplicated asthma        Past Surgical History:   Procedure Laterality Date     HERNIA REPAIR         Current Outpatient Medications   Medication Sig Dispense Refill     albuterol (PROAIR HFA/PROVENTIL HFA/VENTOLIN HFA) 108 (90 Base) MCG/ACT inhaler Inhale 2 puffs into the lungs every 6 hours as needed for shortness of breath / dyspnea or wheezing 8 g 3     budesonide-formoterol (SYMBICORT) 80-4.5 MCG/ACT Inhaler Inhale 2 puffs into the lungs 2 times daily 10 g 4     Cholecalciferol (VITAMIN D) 125 MCG (5000 UT) capsule Take 1 capsule (5,000 Units) by mouth daily 90 capsule 3     diclofenac (VOLTAREN) 1 % topical gel Apply 2 g topically 4 times daily (Patient not taking: Reported on 12/23/2022) 350 g 3     ferrous sulfate (FEROSUL) 325 (65 Fe) MG tablet Take 1 tablet (325 mg) by mouth  daily (with breakfast) (Patient not taking: Reported on 12/23/2022) 90 tablet 0     gabapentin (NEURONTIN) 300 MG capsule Take 1 capsule (300 mg) by mouth 3 times daily as needed for neuropathic pain 90 capsule 3     ipratropium (ATROVENT HFA) 17 MCG/ACT inhaler Inhale 2 puffs into the lungs every 6 hours 12.9 g 0     multivitamin w/minerals (THERA-VIT-M) tablet Take 1 tablet by mouth daily (with breakfast) 30 tablet 0         No Known Allergies       Patient Active Problem List   Diagnosis     Chemical dependency (H)     Basal cell carcinoma of anterior chest s/p excision 3-10-15     Squamous cell carcinoma mid upper chest s/p excision 3-10-15     Abdominal pain     Arthritis of both hands     Hepatitis C, chronic (H)     Alcohol dependence (H)     Elevated blood pressure reading without diagnosis of hypertension     Advanced directives, counseling/discussion     Alcoholic polyneuropathy (H)         Clinical Assessment / Barriers to Care (Per Nurse):    None at this time.     Records Location:     [facility]     Records Needed:     NONE AT THIS TIME      Additional testing needed prior to consult:     CT A/P with Liver protocol     Referral updates and Plan:       Consult with Surgical Oncology   CT A/P liver protocol     01/03/2023 9:23 AM - Called and left a message for patient to call back and schedule consult with [facility] surgeon and also schedule a dedicated abdominal CT scan prior to consult to further evaluate the spleen and the liver prior to consult. Provided scheduling number in voicemail for patient to call back.     Vy Winters, RN, BSN   Surgical Oncology New Patient Nurse Navigator  [facility]  [phone]

## 2023-01-04 ENCOUNTER — TELEPHONE (OUTPATIENT)
Dept: CARDIOLOGY | Facility: CLINIC | Age: 63
End: 2023-01-04

## 2023-01-04 NOTE — TELEPHONE ENCOUNTER
[facility] Call Center    Phone Message    May a detailed message be left on voicemail: yes     Reason for Call: Other: Patient called to schedule an appointment for Thoracic aortic aneurysm without rupture, unspecified part [I71.20],. Please call the patient back to further coordinate.      Action Taken: Message routed to:  Other: cardiology    Travel Screening: Not Applicable   Thank you!  [facility]

## 2023-01-06 ENCOUNTER — OFFICE VISIT (OUTPATIENT)
Dept: CARDIOLOGY | Facility: CLINIC | Age: 63
End: 2023-01-06
Attending: INTERNAL MEDICINE
Payer: COMMERCIAL

## 2023-01-06 VITALS
DIASTOLIC BLOOD PRESSURE: 82 MMHG | BODY MASS INDEX: 30.06 KG/M2 | OXYGEN SATURATION: 92 % | HEART RATE: 99 BPM | SYSTOLIC BLOOD PRESSURE: 127 MMHG | WEIGHT: 241.8 LBS | HEIGHT: 75 IN

## 2023-01-06 DIAGNOSIS — I25.84 CORONARY ARTERY DISEASE DUE TO CALCIFIED CORONARY LESION: ICD-10-CM

## 2023-01-06 DIAGNOSIS — E78.5 HYPERLIPIDEMIA LDL GOAL <70: ICD-10-CM

## 2023-01-06 DIAGNOSIS — I10 ESSENTIAL HYPERTENSION: ICD-10-CM

## 2023-01-06 DIAGNOSIS — I71.21 ANEURYSM OF ASCENDING AORTA WITHOUT RUPTURE (H): Primary | ICD-10-CM

## 2023-01-06 DIAGNOSIS — I25.10 CORONARY ARTERY DISEASE DUE TO CALCIFIED CORONARY LESION: ICD-10-CM

## 2023-01-06 LAB
ATRIAL RATE - MUSE: 83 BPM
DIASTOLIC BLOOD PRESSURE - MUSE: NORMAL MMHG
INTERPRETATION ECG - MUSE: NORMAL
P AXIS - MUSE: 52 DEGREES
PR INTERVAL - MUSE: 146 MS
QRS DURATION - MUSE: 104 MS
QT - MUSE: 384 MS
QTC - MUSE: 451 MS
R AXIS - MUSE: -45 DEGREES
SYSTOLIC BLOOD PRESSURE - MUSE: NORMAL MMHG
T AXIS - MUSE: 42 DEGREES
VENTRICULAR RATE- MUSE: 83 BPM

## 2023-01-06 PROCEDURE — G0463 HOSPITAL OUTPT CLINIC VISIT: HCPCS | Mod: 25 | Performed by: INTERNAL MEDICINE

## 2023-01-06 PROCEDURE — G0463 HOSPITAL OUTPT CLINIC VISIT: HCPCS | Mod: 25

## 2023-01-06 PROCEDURE — 93005 ELECTROCARDIOGRAM TRACING: CPT

## 2023-01-06 PROCEDURE — 99204 OFFICE O/P NEW MOD 45 MIN: CPT | Performed by: INTERNAL MEDICINE

## 2023-01-06 PROCEDURE — G0463 HOSPITAL OUTPT CLINIC VISIT: HCPCS | Performed by: INTERNAL MEDICINE

## 2023-01-06 RX ORDER — ATORVASTATIN CALCIUM 20 MG/1
20 TABLET, FILM COATED ORAL DAILY
Qty: 90 TABLET | Refills: 3 | Status: SHIPPED | OUTPATIENT
Start: 2023-01-06 | End: 2023-05-17

## 2023-01-06 RX ORDER — LOSARTAN POTASSIUM 25 MG/1
25 TABLET ORAL DAILY
Qty: 90 TABLET | Refills: 3 | Status: SHIPPED | OUTPATIENT
Start: 2023-01-06 | End: 2023-09-07

## 2023-01-06 ASSESSMENT — PAIN SCALES - GENERAL: PAINLEVEL: NO PAIN (0)

## 2023-01-06 NOTE — LETTER
2023      RE: Sohan Marcano   [facility] [id number]       Dear Colleague,    Thank you for the opportunity to participate in the care of your patient, Sohan Marcano, at the [facility] [place] [facility]. Please see a copy of my visit note below.    CARDIOLOGY CLINIC INITIAL CONSULTATION    HPI:  Sohan Marcano is a 62 year old male who receives primary care from Mr. Yannick Womack. He was referred to Cardiology clinic for management of thoracic aortic aneurysm.    Sohan is a pleasant man with no history of an atherosclerotic CVD event.  He was found to have a moderately dilated ascending thoracic aorta measuring 4.5 cm on CT chest of done for evaluation of shortness of breath.  On his chest CT he was also noted to have significant coronary artery calcifications.  CVD risk factors include HTN, HLD and an extensive history of smoking, around 50 pack years, but he quit in October.      Sohan has a very significant and substantial family history of CVD.  His sister had a thoracic aortic aneurysm diagnosed at age 55 when it ruptured while she was at the hospital visiting her brother.  Fortunately she survived the event likely because she was already in the hospital when it happened .  She also had a long history of smoking.    Most of the men on his father side  of ischemic heart disease.  His brother has CAD and has had multiple PCI's.  His sister has CAD.  His father had CAD and had a bypass surgery.    Sohan has been receiving evaluation for dyspnea on exertion which led to to the CT chest.  He has a longstanding history of COPD.  He denies any exertional or rest chest pain.  No orthopnea, PND, LE edema.    Despite his COPD, Sohan stays quite active working in [employer] during the summer.  He also eats relatively healthy meal.      The 10-year ASCVD risk score (New MESA, et al., 2019) is: 11.5%    Values  used to calculate the score:      Age: 62 years      Sex: Male      Is Non- : No      Diabetic: No      Tobacco smoker: No      Systolic Blood Pressure: 127 mmHg      Is BP treated: Yes      HDL Cholesterol: 51 mg/dL      Total Cholesterol: 163 mg/dL     ASSESSMENT AND PLAN  Sohan Marcano is a 62 year old male recently diagnosed with thoracic aortic aneurysm of 4.5 cm.  He has CAD as noted on the CT chest with CV risk factors of HTN, HLD, 50-pack-year smoking history (quit in October 2022).  He also has COPD.    #.  Thoracic aortic aneurysm  #.  HTN: Mildly elevated  #.  HLD: LDL 99 mg/dL  #.  CAD: No angina  - Echo to evaluate for bicuspid aortic valve  - CT chest in 6 months  - Start losartan 25 mg daily  - Start atorvastatin 20 mg daily    #.  Former smoker: Congratulated him on being able to stop and encouraged him to continue with complete cessation.    Follow-up in 6 months after CT chest.    Thank you for the opportunity to participate in the care of Mr. Sohan Marcano. Please feel free to contact me with any additional questions or concerns.    Augustin Tyler MD    Preventive Cardiology  Pager: [phone]    This note was dictated with voice recognition software and then edited. Please excuse any unintentional errors.    PAST MEDICAL HISTORY:  Patient Active Problem List   Diagnosis     Chemical dependency (H)     Basal cell carcinoma of anterior chest s/p excision 3-10-15     Squamous cell carcinoma mid upper chest s/p excision 3-10-15     Abdominal pain     Arthritis of both hands     Hepatitis C, chronic (H)     Alcohol dependence (H)     Elevated blood pressure reading without diagnosis of hypertension     Advanced directives, counseling/discussion     Alcoholic polyneuropathy (H)     Past Medical History:   Diagnosis Date     Actinic keratosis      Basal cell carcinoma      Squamous cell carcinoma      Substance abuse (H)     alcohol     Uncomplicated  "asthma        CURRENT MEDICATIONS:  Current Outpatient Medications   Medication Sig Dispense Refill     albuterol (PROAIR HFA/PROVENTIL HFA/VENTOLIN HFA) 108 (90 Base) MCG/ACT inhaler Inhale 2 puffs into the lungs every 6 hours as needed for shortness of breath / dyspnea or wheezing 8 g 3     atorvastatin (LIPITOR) 20 MG tablet Take 1 tablet (20 mg) by mouth daily 90 tablet 3     budesonide-formoterol (SYMBICORT) 80-4.5 MCG/ACT Inhaler Inhale 2 puffs into the lungs 2 times daily 10 g 4     Cholecalciferol (VITAMIN D) 125 MCG (5000 UT) capsule Take 1 capsule (5,000 Units) by mouth daily 90 capsule 3     gabapentin (NEURONTIN) 300 MG capsule Take 1 capsule (300 mg) by mouth 3 times daily as needed for neuropathic pain 90 capsule 3     losartan (COZAAR) 25 MG tablet Take 1 tablet (25 mg) by mouth daily 90 tablet 3     multivitamin w/minerals (THERA-VIT-M) tablet Take 1 tablet by mouth daily (with breakfast) 30 tablet 0     diclofenac (VOLTAREN) 1 % topical gel Apply 2 g topically 4 times daily (Patient not taking: Reported on 12/23/2022) 350 g 3     ferrous sulfate (FEROSUL) 325 (65 Fe) MG tablet Take 1 tablet (325 mg) by mouth daily (with breakfast) (Patient not taking: Reported on 12/23/2022) 90 tablet 0     ipratropium (ATROVENT HFA) 17 MCG/ACT inhaler Inhale 2 puffs into the lungs every 6 hours (Patient not taking: Reported on 1/6/2023) 12.9 g 0       PAST SURGICAL HISTORY:  Past Surgical History:   Procedure Laterality Date     HERNIA REPAIR         ALLERGIES  Patient has no known allergies.    FAMILY HX:  Family History   Problem Relation Age of Onset     Other - See Comments Mother         Patient states his Mother ? had skin cancer.     Other - See Comments Father         Patient reports Father had \"all types of skin cancer\".     Coronary Artery Disease Father      CABG Father      Other - See Comments Sister         Basal cell carcinoma     Coronary Artery Disease Sister      Aortic aneurysm Sister      " "Coronary Artery Disease Brother        SOCIAL HX:  Social History     Tobacco Use     Smoking status: Former     Packs/day: 0.25     Types: Cigarettes     Quit date: 10/1/2022     Years since quittin.2     Smokeless tobacco: Never     Tobacco comments:     Hasn't smoked since end of Oct 2022   Vaping Use     Vaping Use: Former   Substance Use Topics     Alcohol use: Yes     Alcohol/week: 20.0 standard drinks     Types: 20 Standard drinks or equivalent per week     Comment: 4-5/day     Drug use: No        VITAL SIGNS:  /82 (BP Location: Right arm, Patient Position: Sitting, Cuff Size: Adult Large)   Pulse 99   Ht 1.9 m (6' 2.8\")   Wt 109.7 kg (241 lb 12.8 oz)   SpO2 92%   BMI 30.38 kg/m    Body mass index is 30.38 kg/m .  Wt Readings from Last 2 Encounters:   23 109.7 kg (241 lb 12.8 oz)   22 113.6 kg (250 lb 8 oz)       PHYSICAL EXAM  Gen: pleasant man sitting comfortably in NAD  Head: nc/at  Eyes: EOMI  CV: nml s1/s2, no murmur or gallop; no JVD  Chest: clear lungs, no wheezing  Ext: warm, no LE edema  Skin: no rash on limited exam  Neuro: awake, alert, oriented, nml speech and gait  Vasc: 2+ bilateral carotid, radial, pulses; no bruits noted    LABS: personally reviewed  CMP  Recent Labs   Lab Test 22  1247 22  1151 22  1040 20  0645 20  1738 20  1020 10/11/19  1114 19  1136 19  2148 18  0609 18  1846   * 140 139 139 136 140 138   < > 142   < > 143   POTASSIUM 3.7 3.9 3.4 3.3* 3.4 3.0* 3.7   < > 3.6   < > 3.4   CHLORIDE 107 109 105 108 102 105 105   < > 107   < > 106   CO2 22 22 26 25 25 26 25   < > 26   < > 29   ANIONGAP 17* 9 8 6 9 9 8   < > 9   < > 8   * 110* 123* 103* 93 138* 107*   < > 107*   < > 141*   BUN 10.7 8 9 16 21 7 10   < > 7   < > 9   CR 0.82 0.72 0.76 0.82 0.93 0.84 0.74   < > 0.73   < > 0.80   GFRESTIMATED >90 >90 >90 >90 88 >90 >90   < > >90   < > >90   GFRESTBLACK  --   --   --  >90 >90 >90 >90   " < > >90   < > >90   LEIGHA 8.9 9.1 9.5 7.8* 8.9 8.7 8.9   < > 8.3*   < > 8.5   MAG  --   --   --   --   --   --   --   --  2.1  --  1.8   PROTTOTAL 7.7 8.1 8.4 6.8 8.3 8.1 7.6   < > 7.7   < > 8.2   ALBUMIN 4.3 4.2 4.1 2.9* 3.7 3.7 3.3*   < > 3.0*   < > 3.5   BILITOTAL 0.9 1.6* 1.1 3.3* 4.6* 3.4* 2.6*   < > 3.7*   < > 1.2   ALKPHOS 67 60 78 84 98 86 86   < > 103   < > 103   * 40 47* 48* 58* 131* 43   < > 106*   < > 100*   ALT 61* 36 46 36 47 67 38   < > 52   < > 74*    < > = values in this interval not displayed.     CBC  Recent Labs   Lab Test 12/23/22  1247 06/22/22  1151 04/06/22  1040 09/27/20  0645   WBC 4.8 4.8 6.1 5.6   RBC 4.48 4.44 4.65 3.59*   HGB 15.8 15.6 16.5 13.2*   HCT 45.0 44.6 46.7 38.7*    101* 100 108*   MCH 35.3* 35.1* 35.5* 36.8*   MCHC 35.1 35.0 35.3 34.1   RDW 12.3 12.8 12.8 13.2    175 210 106*     INR  Recent Labs   Lab Test 09/27/20  0645 09/26/20  1738 10/14/19  1429   INR 1.64* 1.56* 1.44*     Recent Labs   Lab Test 06/22/22  1151 10/11/19  1114   CHOL 163 129   HDL 51 48   LDL 99 67   TRIG 65 72     Recent Labs   Lab Test 04/06/22  1040   A1C 5.2       Most recent EKG: reviewed and discussed with the patient   1/6/2023 NSR, left anterior fascicular block    Other Imaging: reviewed and discussed with the patient  12/29/22 CT chest  Ascending aorta 4.5cm  Extensive coronary calcification        Please do not hesitate to contact me if you have any questions/concerns.     Sincerely,     Augustin Tyler MD

## 2023-01-06 NOTE — LETTER
2023      RE: Sohan Marcano   [facility] [address]       CARDIOLOGY CLINIC INITIAL CONSULTATION    HPI:  Sohan Marcano is a 62 year old male who receives primary care from Mr. Yannick Womack. He was referred to Cardiology clinic for management of thoracic aortic aneurysm.    Sohan is a pleasant man with no history of an atherosclerotic CVD event.  He was found to have a moderately dilated ascending thoracic aorta measuring 4.5 cm on CT chest of done for evaluation of shortness of breath.  On his chest CT he was also noted to have significant coronary artery calcifications.  CVD risk factors include HTN, HLD and an extensive history of smoking, around 50 pack years, but he quit in October.      Sohan has a very significant and substantial family history of CVD.  His sister had a thoracic aortic aneurysm diagnosed at age 55 when it ruptured while she was at the hospital visiting her brother.  Fortunately she survived the event likely because she was already in the hospital when it happened .  She also had a long history of smoking.    Most of the men on his father side  of ischemic heart disease.  His brother has CAD and has had multiple PCI's.  His sister has CAD.  His father had CAD and had a bypass surgery.    Sohan has been receiving evaluation for dyspnea on exertion which led to to the CT chest.  He has a longstanding history of COPD.  He denies any exertional or rest chest pain.  No orthopnea, PND, LE edema.    Despite his COPD, Sohan stays quite active working in [employer] during the summer.  He also eats relatively healthy meal.      The 10-year ASCVD risk score (New MESA, et al., 2019) is: 11.5%    Values used to calculate the score:      Age: 62 years      Sex: Male      Is Non- : No      Diabetic: No      Tobacco smoker: No      Systolic Blood Pressure: 127 mmHg      Is BP treated: Yes      HDL Cholesterol: 51 mg/dL      Total  Cholesterol: 163 mg/dL     ASSESSMENT AND PLAN  Sohan Marcano is a 62 year old male recently diagnosed with thoracic aortic aneurysm of 4.5 cm.  He has CAD as noted on the CT chest with CV risk factors of HTN, HLD, 50-pack-year smoking history (quit in October 2022).  He also has COPD.    #.  Thoracic aortic aneurysm  #.  HTN: Mildly elevated  #.  HLD: LDL 99 mg/dL  #.  CAD: No angina  - Echo to evaluate for bicuspid aortic valve  - CT chest in 6 months  - Start losartan 25 mg daily  - Start atorvastatin 20 mg daily    #.  Former smoker: Congratulated him on being able to stop and encouraged him to continue with complete cessation.    Follow-up in 6 months after CT chest.    Thank you for the opportunity to participate in the care of Mr. Sohan Marcano. Please feel free to contact me with any additional questions or concerns.    Augusitn Tyler MD    Preventive Cardiology  Pager: [phone]    This note was dictated with voice recognition software and then edited. Please excuse any unintentional errors.    PAST MEDICAL HISTORY:  Patient Active Problem List   Diagnosis     Chemical dependency (H)     Basal cell carcinoma of anterior chest s/p excision 3-10-15     Squamous cell carcinoma mid upper chest s/p excision 3-10-15     Abdominal pain     Arthritis of both hands     Hepatitis C, chronic (H)     Alcohol dependence (H)     Elevated blood pressure reading without diagnosis of hypertension     Advanced directives, counseling/discussion     Alcoholic polyneuropathy (H)     Past Medical History:   Diagnosis Date     Actinic keratosis      Basal cell carcinoma      Squamous cell carcinoma      Substance abuse (H)     alcohol     Uncomplicated asthma        CURRENT MEDICATIONS:  Current Outpatient Medications   Medication Sig Dispense Refill     albuterol (PROAIR HFA/PROVENTIL HFA/VENTOLIN HFA) 108 (90 Base) MCG/ACT inhaler Inhale 2 puffs into the lungs every 6 hours as needed for shortness  "of breath / dyspnea or wheezing 8 g 3     atorvastatin (LIPITOR) 20 MG tablet Take 1 tablet (20 mg) by mouth daily 90 tablet 3     budesonide-formoterol (SYMBICORT) 80-4.5 MCG/ACT Inhaler Inhale 2 puffs into the lungs 2 times daily 10 g 4     Cholecalciferol (VITAMIN D) 125 MCG (5000 UT) capsule Take 1 capsule (5,000 Units) by mouth daily 90 capsule 3     gabapentin (NEURONTIN) 300 MG capsule Take 1 capsule (300 mg) by mouth 3 times daily as needed for neuropathic pain 90 capsule 3     losartan (COZAAR) 25 MG tablet Take 1 tablet (25 mg) by mouth daily 90 tablet 3     multivitamin w/minerals (THERA-VIT-M) tablet Take 1 tablet by mouth daily (with breakfast) 30 tablet 0     diclofenac (VOLTAREN) 1 % topical gel Apply 2 g topically 4 times daily (Patient not taking: Reported on 2022) 350 g 3     ferrous sulfate (FEROSUL) 325 (65 Fe) MG tablet Take 1 tablet (325 mg) by mouth daily (with breakfast) (Patient not taking: Reported on 2022) 90 tablet 0     ipratropium (ATROVENT HFA) 17 MCG/ACT inhaler Inhale 2 puffs into the lungs every 6 hours (Patient not taking: Reported on 2023) 12.9 g 0       PAST SURGICAL HISTORY:  Past Surgical History:   Procedure Laterality Date     HERNIA REPAIR         ALLERGIES  Patient has no known allergies.    FAMILY HX:  Family History   Problem Relation Age of Onset     Other - See Comments Mother         Patient states his Mother ? had skin cancer.     Other - See Comments Father         Patient reports Father had \"all types of skin cancer\".     Coronary Artery Disease Father      CABG Father      Other - See Comments Sister         Basal cell carcinoma     Coronary Artery Disease Sister      Aortic aneurysm Sister      Coronary Artery Disease Brother        SOCIAL HX:  Social History     Tobacco Use     Smoking status: Former     Packs/day: 0.25     Types: Cigarettes     Quit date: 10/1/2022     Years since quittin.2     Smokeless tobacco: Never     Tobacco comments: " "    Hasn't smoked since end of Oct 2022   Vaping Use     Vaping Use: Former   Substance Use Topics     Alcohol use: Yes     Alcohol/week: 20.0 standard drinks     Types: 20 Standard drinks or equivalent per week     Comment: 4-5/day     Drug use: No        VITAL SIGNS:  /82 (BP Location: Right arm, Patient Position: Sitting, Cuff Size: Adult Large)   Pulse 99   Ht 1.9 m (6' 2.8\")   Wt 109.7 kg (241 lb 12.8 oz)   SpO2 92%   BMI 30.38 kg/m    Body mass index is 30.38 kg/m .  Wt Readings from Last 2 Encounters:   01/06/23 109.7 kg (241 lb 12.8 oz)   12/23/22 113.6 kg (250 lb 8 oz)       PHYSICAL EXAM  Gen: pleasant man sitting comfortably in NAD  Head: nc/at  Eyes: EOMI  CV: nml s1/s2, no murmur or gallop; no JVD  Chest: clear lungs, no wheezing  Ext: warm, no LE edema  Skin: no rash on limited exam  Neuro: awake, alert, oriented, nml speech and gait  Vasc: 2+ bilateral carotid, radial, pulses; no bruits noted    LABS: personally reviewed  CMP  Recent Labs   Lab Test 12/23/22  1247 06/22/22  1151 04/06/22  1040 09/27/20  0645 09/26/20  1738 09/22/20  1020 10/11/19  1114 07/06/19  1136 07/04/19  2148 09/24/18  0609 09/23/18  1846   * 140 139 139 136 140 138   < > 142   < > 143   POTASSIUM 3.7 3.9 3.4 3.3* 3.4 3.0* 3.7   < > 3.6   < > 3.4   CHLORIDE 107 109 105 108 102 105 105   < > 107   < > 106   CO2 22 22 26 25 25 26 25   < > 26   < > 29   ANIONGAP 17* 9 8 6 9 9 8   < > 9   < > 8   * 110* 123* 103* 93 138* 107*   < > 107*   < > 141*   BUN 10.7 8 9 16 21 7 10   < > 7   < > 9   CR 0.82 0.72 0.76 0.82 0.93 0.84 0.74   < > 0.73   < > 0.80   GFRESTIMATED >90 >90 >90 >90 88 >90 >90   < > >90   < > >90   GFRESTBLACK  --   --   --  >90 >90 >90 >90   < > >90   < > >90   LEIGHA 8.9 9.1 9.5 7.8* 8.9 8.7 8.9   < > 8.3*   < > 8.5   MAG  --   --   --   --   --   --   --   --  2.1  --  1.8   PROTTOTAL 7.7 8.1 8.4 6.8 8.3 8.1 7.6   < > 7.7   < > 8.2   ALBUMIN 4.3 4.2 4.1 2.9* 3.7 3.7 3.3*   < > 3.0*   < > 3.5 "   BILITOTAL 0.9 1.6* 1.1 3.3* 4.6* 3.4* 2.6*   < > 3.7*   < > 1.2   ALKPHOS 67 60 78 84 98 86 86   < > 103   < > 103   * 40 47* 48* 58* 131* 43   < > 106*   < > 100*   ALT 61* 36 46 36 47 67 38   < > 52   < > 74*    < > = values in this interval not displayed.     CBC  Recent Labs   Lab Test 12/23/22  1247 06/22/22  1151 04/06/22  1040 09/27/20  0645   WBC 4.8 4.8 6.1 5.6   RBC 4.48 4.44 4.65 3.59*   HGB 15.8 15.6 16.5 13.2*   HCT 45.0 44.6 46.7 38.7*    101* 100 108*   MCH 35.3* 35.1* 35.5* 36.8*   MCHC 35.1 35.0 35.3 34.1   RDW 12.3 12.8 12.8 13.2    175 210 106*     INR  Recent Labs   Lab Test 09/27/20  0645 09/26/20  1738 10/14/19  1429   INR 1.64* 1.56* 1.44*     Recent Labs   Lab Test 06/22/22  1151 10/11/19  1114   CHOL 163 129   HDL 51 48   LDL 99 67   TRIG 65 72     Recent Labs   Lab Test 04/06/22  1040   A1C 5.2       Most recent EKG: reviewed and discussed with the patient   1/6/2023 NSR, left anterior fascicular block    Other Imaging: reviewed and discussed with the patient  12/29/22 CT chest  Ascending aorta 4.5cm  Extensive coronary calcification        Augustin Tyler MD

## 2023-01-06 NOTE — PATIENT INSTRUCTIONS
"You were seen today in the Cardiovascular Clinic at the [facility].     Cardiology Providers you saw during your visit: Dr. Dennis Tyler     Diagnosis:   Encounter Diagnoses   Name Primary?    Aneurysm of ascending aorta without rupture Yes    Essential hypertension     Coronary artery disease due to calcified coronary lesion     Hyperlipidemia LDL goal <70         Recommendations:   1. Start atorvastatin 20mg daily.  2. Start losartan 25mg daily.  3. Check metabolic panel (blood draw) in 1 week.  4. Schedule an echocardiogram (heart ultrasound) whenever it is convenient.   5. Repeat CT chest in 6 months.  6. Follow up with Dr. Dennis Tyler in 6 months with fasting labs prior.       Please feel free to call me with any questions or concerns.       Taryn Lance RN     Questions: [phone].   First press #1 for the [employer] and then press #4 for \"Medical Questions\" to reach us Cardiology Nurses.     [phone].   First press #1 for the [employer] and then press #1     On Call Cardiologist for after hours or on weekends: [phone]   option #4 and ask to speak to the on-call Cardiologist.          If you need a medication refill please contact your pharmacy.  Please allow 3 business days for your refill to be completed.  ________________________________________________________________________________________________________________________________         "

## 2023-01-06 NOTE — PROGRESS NOTES
CARDIOLOGY CLINIC INITIAL CONSULTATION    HPI:  Sohan Marcano is a 62 year old male who receives primary care from Mr. Yannick Womack. He was referred to Cardiology clinic for management of thoracic aortic aneurysm.    Sohan is a pleasant man with no history of an atherosclerotic CVD event.  He was found to have a moderately dilated ascending thoracic aorta measuring 4.5 cm on CT chest of done for evaluation of shortness of breath.  On his chest CT he was also noted to have significant coronary artery calcifications.  CVD risk factors include HTN, HLD and an extensive history of smoking, around 50 pack years, but he quit in October.      Sohan has a very significant and substantial family history of CVD.  His sister had a thoracic aortic aneurysm diagnosed at age 55 when it ruptured while she was at the hospital visiting her brother.  Fortunately she survived the event likely because she was already in the hospital when it happened .  She also had a long history of smoking.    Most of the men on his father side  of ischemic heart disease.  His brother has CAD and has had multiple PCI's.  His sister has CAD.  His father had CAD and had a bypass surgery.    Sohan has been receiving evaluation for dyspnea on exertion which led to to the CT chest.  He has a longstanding history of COPD.  He denies any exertional or rest chest pain.  No orthopnea, PND, LE edema.    Despite his COPD, Sohan stays quite active working in [employer] during the summer.  He also eats relatively healthy meal.      The 10-year ASCVD risk score (New MESA, et al., 2019) is: 11.5%    Values used to calculate the score:      Age: 62 years      Sex: Male      Is Non- : No      Diabetic: No      Tobacco smoker: No      Systolic Blood Pressure: 127 mmHg      Is BP treated: Yes      HDL Cholesterol: 51 mg/dL      Total Cholesterol: 163 mg/dL     ASSESSMENT AND PLAN  Sohan Marcano is a 62 year old male recently  diagnosed with thoracic aortic aneurysm of 4.5 cm.  He has CAD as noted on the CT chest with CV risk factors of HTN, HLD, 50-pack-year smoking history (quit in October 2022).  He also has COPD.    #.  Thoracic aortic aneurysm  #.  HTN: Mildly elevated  #.  HLD: LDL 99 mg/dL  #.  CAD: No angina  - Echo to evaluate for bicuspid aortic valve  - CT chest in 6 months  - Start losartan 25 mg daily  - Start atorvastatin 20 mg daily    #.  Former smoker: Congratulated him on being able to stop and encouraged him to continue with complete cessation.    Follow-up in 6 months after CT chest.    Thank you for the opportunity to participate in the care of Mr. Sohan Marcano. Please feel free to contact me with any additional questions or concerns.    Augustin Tyler MD    Preventive Cardiology  Pager: [phone]    This note was dictated with voice recognition software and then edited. Please excuse any unintentional errors.    PAST MEDICAL HISTORY:  Patient Active Problem List   Diagnosis     Chemical dependency (H)     Basal cell carcinoma of anterior chest s/p excision 3-10-15     Squamous cell carcinoma mid upper chest s/p excision 3-10-15     Abdominal pain     Arthritis of both hands     Hepatitis C, chronic (H)     Alcohol dependence (H)     Elevated blood pressure reading without diagnosis of hypertension     Advanced directives, counseling/discussion     Alcoholic polyneuropathy (H)     Past Medical History:   Diagnosis Date     Actinic keratosis      Basal cell carcinoma      Squamous cell carcinoma      Substance abuse (H)     alcohol     Uncomplicated asthma        CURRENT MEDICATIONS:  Current Outpatient Medications   Medication Sig Dispense Refill     albuterol (PROAIR HFA/PROVENTIL HFA/VENTOLIN HFA) 108 (90 Base) MCG/ACT inhaler Inhale 2 puffs into the lungs every 6 hours as needed for shortness of breath / dyspnea or wheezing 8 g 3     atorvastatin (LIPITOR) 20 MG tablet Take 1 tablet (20  "mg) by mouth daily 90 tablet 3     budesonide-formoterol (SYMBICORT) 80-4.5 MCG/ACT Inhaler Inhale 2 puffs into the lungs 2 times daily 10 g 4     Cholecalciferol (VITAMIN D) 125 MCG (5000 UT) capsule Take 1 capsule (5,000 Units) by mouth daily 90 capsule 3     gabapentin (NEURONTIN) 300 MG capsule Take 1 capsule (300 mg) by mouth 3 times daily as needed for neuropathic pain 90 capsule 3     losartan (COZAAR) 25 MG tablet Take 1 tablet (25 mg) by mouth daily 90 tablet 3     multivitamin w/minerals (THERA-VIT-M) tablet Take 1 tablet by mouth daily (with breakfast) 30 tablet 0     diclofenac (VOLTAREN) 1 % topical gel Apply 2 g topically 4 times daily (Patient not taking: Reported on 2022) 350 g 3     ferrous sulfate (FEROSUL) 325 (65 Fe) MG tablet Take 1 tablet (325 mg) by mouth daily (with breakfast) (Patient not taking: Reported on 2022) 90 tablet 0     ipratropium (ATROVENT HFA) 17 MCG/ACT inhaler Inhale 2 puffs into the lungs every 6 hours (Patient not taking: Reported on 2023) 12.9 g 0       PAST SURGICAL HISTORY:  Past Surgical History:   Procedure Laterality Date     HERNIA REPAIR         ALLERGIES  Patient has no known allergies.    FAMILY HX:  Family History   Problem Relation Age of Onset     Other - See Comments Mother         Patient states his Mother ? had skin cancer.     Other - See Comments Father         Patient reports Father had \"all types of skin cancer\".     Coronary Artery Disease Father      CABG Father      Other - See Comments Sister         Basal cell carcinoma     Coronary Artery Disease Sister      Aortic aneurysm Sister      Coronary Artery Disease Brother        SOCIAL HX:  Social History     Tobacco Use     Smoking status: Former     Packs/day: 0.25     Types: Cigarettes     Quit date: 10/1/2022     Years since quittin.2     Smokeless tobacco: Never     Tobacco comments:     Hasn't smoked since end of Oct 2022   Vaping Use     Vaping Use: Former   Substance Use " "Topics     Alcohol use: Yes     Alcohol/week: 20.0 standard drinks     Types: 20 Standard drinks or equivalent per week     Comment: 4-5/day     Drug use: No        VITAL SIGNS:  /82 (BP Location: Right arm, Patient Position: Sitting, Cuff Size: Adult Large)   Pulse 99   Ht 1.9 m (6' 2.8\")   Wt 109.7 kg (241 lb 12.8 oz)   SpO2 92%   BMI 30.38 kg/m    Body mass index is 30.38 kg/m .  Wt Readings from Last 2 Encounters:   01/06/23 109.7 kg (241 lb 12.8 oz)   12/23/22 113.6 kg (250 lb 8 oz)       PHYSICAL EXAM  Gen: pleasant man sitting comfortably in NAD  Head: nc/at  Eyes: EOMI  CV: nml s1/s2, no murmur or gallop; no JVD  Chest: clear lungs, no wheezing  Ext: warm, no LE edema  Skin: no rash on limited exam  Neuro: awake, alert, oriented, nml speech and gait  Vasc: 2+ bilateral carotid, radial, pulses; no bruits noted    LABS: personally reviewed  CMP  Recent Labs   Lab Test 12/23/22  1247 06/22/22  1151 04/06/22  1040 09/27/20  0645 09/26/20  1738 09/22/20  1020 10/11/19  1114 07/06/19  1136 07/04/19  2148 09/24/18  0609 09/23/18  1846   * 140 139 139 136 140 138   < > 142   < > 143   POTASSIUM 3.7 3.9 3.4 3.3* 3.4 3.0* 3.7   < > 3.6   < > 3.4   CHLORIDE 107 109 105 108 102 105 105   < > 107   < > 106   CO2 22 22 26 25 25 26 25   < > 26   < > 29   ANIONGAP 17* 9 8 6 9 9 8   < > 9   < > 8   * 110* 123* 103* 93 138* 107*   < > 107*   < > 141*   BUN 10.7 8 9 16 21 7 10   < > 7   < > 9   CR 0.82 0.72 0.76 0.82 0.93 0.84 0.74   < > 0.73   < > 0.80   GFRESTIMATED >90 >90 >90 >90 88 >90 >90   < > >90   < > >90   GFRESTBLACK  --   --   --  >90 >90 >90 >90   < > >90   < > >90   LEIGHA 8.9 9.1 9.5 7.8* 8.9 8.7 8.9   < > 8.3*   < > 8.5   MAG  --   --   --   --   --   --   --   --  2.1  --  1.8   PROTTOTAL 7.7 8.1 8.4 6.8 8.3 8.1 7.6   < > 7.7   < > 8.2   ALBUMIN 4.3 4.2 4.1 2.9* 3.7 3.7 3.3*   < > 3.0*   < > 3.5   BILITOTAL 0.9 1.6* 1.1 3.3* 4.6* 3.4* 2.6*   < > 3.7*   < > 1.2   ALKPHOS 67 60 78 84 98 86 " 86   < > 103   < > 103   * 40 47* 48* 58* 131* 43   < > 106*   < > 100*   ALT 61* 36 46 36 47 67 38   < > 52   < > 74*    < > = values in this interval not displayed.     CBC  Recent Labs   Lab Test 12/23/22  1247 06/22/22  1151 04/06/22  1040 09/27/20  0645   WBC 4.8 4.8 6.1 5.6   RBC 4.48 4.44 4.65 3.59*   HGB 15.8 15.6 16.5 13.2*   HCT 45.0 44.6 46.7 38.7*    101* 100 108*   MCH 35.3* 35.1* 35.5* 36.8*   MCHC 35.1 35.0 35.3 34.1   RDW 12.3 12.8 12.8 13.2    175 210 106*     INR  Recent Labs   Lab Test 09/27/20  0645 09/26/20  1738 10/14/19  1429   INR 1.64* 1.56* 1.44*     Recent Labs   Lab Test 06/22/22  1151 10/11/19  1114   CHOL 163 129   HDL 51 48   LDL 99 67   TRIG 65 72     Recent Labs   Lab Test 04/06/22  1040   A1C 5.2       Most recent EKG: reviewed and discussed with the patient   1/6/2023 NSR, left anterior fascicular block    Other Imaging: reviewed and discussed with the patient  12/29/22 CT chest  Ascending aorta 4.5cm  Extensive coronary calcification

## 2023-01-06 NOTE — NURSING NOTE
Chief Complaint   Patient presents with     New Patient     [facility] [place] [facility] pt, referred d/t TAA         Vitals were taken, medications reconciled and EKG performed.     Chuy Henriquez, EMT   2:58 PM

## 2023-01-10 ENCOUNTER — ANCILLARY PROCEDURE (OUTPATIENT)
Dept: CT IMAGING | Facility: CLINIC | Age: 63
End: 2023-01-10
Attending: SURGERY
Payer: COMMERCIAL

## 2023-01-10 DIAGNOSIS — R93.2 ABNORMAL FINDING ON IMAGING OF LIVER: ICD-10-CM

## 2023-01-10 DIAGNOSIS — D73.89 NODULE OF SPLEEN: ICD-10-CM

## 2023-01-10 PROCEDURE — 74178 CT ABD&PLV WO CNTR FLWD CNTR: CPT | Mod: GC | Performed by: RADIOLOGY

## 2023-01-10 RX ORDER — IOPAMIDOL 755 MG/ML
135 INJECTION, SOLUTION INTRAVASCULAR ONCE
Status: COMPLETED | OUTPATIENT
Start: 2023-01-10 | End: 2023-01-10

## 2023-01-10 RX ADMIN — IOPAMIDOL 135 ML: 755 INJECTION, SOLUTION INTRAVASCULAR at 14:59

## 2023-01-10 NOTE — CONFIDENTIAL NOTE
RECORDS STATUS - ALL OTHER DIAGNOSIS      RECORDS RECEIVED FROM: [facility]   DATE RECEIVED: 1/17/2023   NOTES STATUS DETAILS   OFFICE NOTE from referring provider Complete Epic   Yannick Womack NP   OPERATIVE REPORT Complete General PFT 7/18/2022   MEDICATION LIST Complete [facility]   CLINICAL TRIAL TREATMENTS TO DATE     LABS     PATHOLOGY REPORTS     ANYTHING RELATED TO DIAGNOSIS Complete Labs last updated on 1/6/2023   GENONOMIC TESTING     TYPE:     IMAGING (NEED IMAGES & REPORT)     Xray chest Complete 12/9/2022, 7/18/2022   CT SCANS Complete CT Chest 12/29/2022    CT Chest 7/26/2022   MRI     MAMMO     ULTRASOUND     PET

## 2023-01-17 ENCOUNTER — PRE VISIT (OUTPATIENT)
Dept: SURGERY | Facility: CLINIC | Age: 63
End: 2023-01-17

## 2023-01-17 ENCOUNTER — OFFICE VISIT (OUTPATIENT)
Dept: SURGERY | Facility: CLINIC | Age: 63
End: 2023-01-17
Attending: SURGERY
Payer: COMMERCIAL

## 2023-01-17 VITALS
RESPIRATION RATE: 16 BRPM | HEART RATE: 86 BPM | DIASTOLIC BLOOD PRESSURE: 86 MMHG | WEIGHT: 246.3 LBS | OXYGEN SATURATION: 96 % | SYSTOLIC BLOOD PRESSURE: 145 MMHG | TEMPERATURE: 98.6 F | BODY MASS INDEX: 30.95 KG/M2

## 2023-01-17 DIAGNOSIS — D73.89 NODULE OF SPLEEN: ICD-10-CM

## 2023-01-17 PROCEDURE — 99202 OFFICE O/P NEW SF 15 MIN: CPT | Performed by: SURGERY

## 2023-01-17 PROCEDURE — G0463 HOSPITAL OUTPT CLINIC VISIT: HCPCS

## 2023-01-17 PROCEDURE — G0463 HOSPITAL OUTPT CLINIC VISIT: HCPCS | Performed by: SURGERY

## 2023-01-17 RX ORDER — IBUPROFEN 200 MG
200 TABLET ORAL EVERY 4 HOURS PRN
COMMUNITY
End: 2023-05-17

## 2023-01-17 ASSESSMENT — PAIN SCALES - GENERAL: PAINLEVEL: NO PAIN (0)

## 2023-01-17 NOTE — PROGRESS NOTES
Surgical Oncology - New Patient  1/17/2023    62 M w/ known cirrhosis, likely from alcohol and HCV (treated).  He has undergone imaging with incidental findings of liver and splenic lesions.  Given this, he has been referred for recommendations on management.    Of Note: He is feeling well.  Still drinking alcohol.  Also has HTN and HLD.    BP (!) 145/86   Pulse 86   Temp 98.6  F (37  C) (Oral)   Resp 16   Wt 111.7 kg (246 lb 4.8 oz)   SpO2 96%   BMI 30.95 kg/m      CTAP Liver Protocol (1/10/2023): IMPRESSION:  1. Hepatic cirrhosis and hepatic steatosis with likely several regenerative/dysplastic nodules, mildly enhancing on all contrast phases, a few of which are visualized on noncontrast images. These are characterized as LIRADS 3. No suspicious lesion/mass meeting criteria for hepatocellular carcinoma. Continued imaging follow-up is recommended.   2. Recanalization of the umbilical vein with periumbilical collaterals/caput medusa and other upper abdominal portosystemic collaterals.  3. No suspicious splenic lesion/mass, however, there are multiple scattered calcified splenic granulomas. Additional sequela of granulomatous disease including large calcified granuloma in the right lung lower lobe and a few hepatic calcified granulomas.  4. Cholelithiasis without evidence of cholecystitis.    CBC (12/23/2022): WBC 4.8, Hgb 15.8, Plts 178.  CMP (12/23/2022): Na+ 146, Cr 0.82, Alk Phos 67, , ALT 61, Bilirubin 0.9, Albumin 4.3.    Assessment/Plan:  62 M w/ known cirrhosis, likely from alcohol and HCV (treated).  He has undergone imaging with incidental findings of liver and splenic lesions.   Recent liver CT shows likely regenerative nodules, possibly LIRADS 3, and splenic granulomas that are not suspicious.  Given this, he has no indications for surgical management.  We discussed his situation and that MRI of the liver is a good idea.  This is already ordered and the patient will call to schedule it.  In  addition, he should re-establish care and follow-up with hepatology given his liver disease and imaging findings.  The patient was in agreement and understanding.  He last saw Dr. Brown, so we will send the patient back to him.  Questions answered and the patient was in agreement with and understanding of the plan.    A total of 15 minutes were spent on this encounter including more than 50% in face to face time and the remainder in imaging review, chart review, documentation, and coordination of care.

## 2023-01-17 NOTE — LETTER
1/17/2023         RE: Sohan Marcano  1945 [facility] [address]        Dear Colleague,    Thank you for referring your patient, Sohan Marcano, to the [facility]. Please see a copy of my visit note below.    Surgical Oncology - New Patient  1/17/2023    62 M w/ known cirrhosis, likely from alcohol and HCV (treated).  He has undergone imaging with incidental findings of liver and splenic lesions.  Given this, he has been referred for recommendations on management.    Of Note: He is feeling well.  Still drinking alcohol.  Also has HTN and HLD.    BP (!) 145/86   Pulse 86   Temp 98.6  F (37  C) (Oral)   Resp 16   Wt 111.7 kg (246 lb 4.8 oz)   SpO2 96%   BMI 30.95 kg/m      CTAP Liver Protocol (1/10/2023): IMPRESSION:  1. Hepatic cirrhosis and hepatic steatosis with likely several regenerative/dysplastic nodules, mildly enhancing on all contrast phases, a few of which are visualized on noncontrast images. These are characterized as LIRADS 3. No suspicious lesion/mass meeting criteria for hepatocellular carcinoma. Continued imaging follow-up is recommended.   2. Recanalization of the umbilical vein with periumbilical collaterals/caput medusa and other upper abdominal portosystemic collaterals.  3. No suspicious splenic lesion/mass, however, there are multiple scattered calcified splenic granulomas. Additional sequela of granulomatous disease including large calcified granuloma in the right lung lower lobe and a few hepatic calcified granulomas.  4. Cholelithiasis without evidence of cholecystitis.    CBC (12/23/2022): WBC 4.8, Hgb 15.8, Plts 178.  CMP (12/23/2022): Na+ 146, Cr 0.82, Alk Phos 67, , ALT 61, Bilirubin 0.9, Albumin 4.3.    Assessment/Plan:  62 M w/ known cirrhosis, likely from alcohol and HCV (treated).  He has undergone imaging with incidental findings of liver and splenic lesions.   Recent liver CT shows likely regenerative nodules,  possibly LIRADS 3, and splenic granulomas that are not suspicious.  Given this, he has no indications for surgical management.  We discussed his situation and that MRI of the liver is a good idea.  This is already ordered and the patient will call to schedule it.  In addition, he should re-establish care and follow-up with hepatology given his liver disease and imaging findings.  The patient was in agreement and understanding.  He last saw Dr. Brown, so we will send the patient back to him.  Questions answered and the patient was in agreement with and understanding of the plan.    A total of 15 minutes were spent on this encounter including more than 50% in face to face time and the remainder in imaging review, chart review, documentation, and coordination of care.    Again, thank you for allowing me to participate in the care of your patient.        Sincerely,        Rafael Jordan MD

## 2023-01-17 NOTE — NURSING NOTE
"Oncology Rooming Note    January 17, 2023 12:58 PM   Sohan Marcano is a 62 year old male who presents for:    Chief Complaint   Patient presents with     Oncology Clinic Visit     New; Nodule of Spleen     Initial Vitals: BP (!) 145/86   Pulse 86   Temp 98.6  F (37  C) (Oral)   Resp 16   Wt 111.7 kg (246 lb 4.8 oz)   SpO2 96%   BMI 30.95 kg/m   Estimated body mass index is 30.95 kg/m  as calculated from the following:    Height as of 1/6/23: 1.9 m (6' 2.8\").    Weight as of this encounter: 111.7 kg (246 lb 4.8 oz). Body surface area is 2.43 meters squared.  No Pain (0) Comment: Data Unavailable   No LMP for male patient.  Allergies reviewed: Yes  Medications reviewed: Yes    Medications: Medication refills not needed today.  Pharmacy name entered into [employer]: online travellers club:    [id number] - [place], [place] - [address] AT [facility] [address]  [facility] - [place], [place] - [address] [facility] [place], [place] - [address] [id number] - [place], [place] - [address] [address] [facility]    Clinical concerns:  New pt consult.      Lety Horowitz CMA              "

## 2023-01-18 ENCOUNTER — ANCILLARY PROCEDURE (OUTPATIENT)
Dept: CARDIOLOGY | Facility: CLINIC | Age: 63
End: 2023-01-18
Attending: INTERNAL MEDICINE
Payer: COMMERCIAL

## 2023-01-18 ENCOUNTER — LAB (OUTPATIENT)
Dept: LAB | Facility: CLINIC | Age: 63
End: 2023-01-18
Payer: COMMERCIAL

## 2023-01-18 DIAGNOSIS — E78.5 HYPERLIPIDEMIA LDL GOAL <70: ICD-10-CM

## 2023-01-18 DIAGNOSIS — I25.10 CORONARY ARTERY DISEASE DUE TO CALCIFIED CORONARY LESION: ICD-10-CM

## 2023-01-18 DIAGNOSIS — I25.84 CORONARY ARTERY DISEASE DUE TO CALCIFIED CORONARY LESION: ICD-10-CM

## 2023-01-18 DIAGNOSIS — I71.21 ANEURYSM OF ASCENDING AORTA WITHOUT RUPTURE (H): ICD-10-CM

## 2023-01-18 DIAGNOSIS — I10 ESSENTIAL HYPERTENSION: ICD-10-CM

## 2023-01-18 LAB
ANION GAP SERPL CALCULATED.3IONS-SCNC: 12 MMOL/L (ref 7–15)
BUN SERPL-MCNC: 10.5 MG/DL (ref 8–23)
CALCIUM SERPL-MCNC: 9.4 MG/DL (ref 8.8–10.2)
CHLORIDE SERPL-SCNC: 102 MMOL/L (ref 98–107)
CHOLEST SERPL-MCNC: 173 MG/DL
CREAT SERPL-MCNC: 0.78 MG/DL (ref 0.67–1.17)
DEPRECATED HCO3 PLAS-SCNC: 27 MMOL/L (ref 22–29)
GFR SERPL CREATININE-BSD FRML MDRD: >90 ML/MIN/1.73M2
GLUCOSE SERPL-MCNC: 115 MG/DL (ref 70–99)
HDLC SERPL-MCNC: 49 MG/DL
LDLC SERPL CALC-MCNC: 112 MG/DL
LVEF ECHO: NORMAL
NONHDLC SERPL-MCNC: 124 MG/DL
POTASSIUM SERPL-SCNC: 3.7 MMOL/L (ref 3.4–5.3)
SODIUM SERPL-SCNC: 141 MMOL/L (ref 136–145)
TRIGL SERPL-MCNC: 58 MG/DL

## 2023-01-18 PROCEDURE — 80061 LIPID PANEL: CPT | Performed by: PATHOLOGY

## 2023-01-18 PROCEDURE — 80048 BASIC METABOLIC PNL TOTAL CA: CPT | Performed by: PATHOLOGY

## 2023-01-18 PROCEDURE — 36415 COLL VENOUS BLD VENIPUNCTURE: CPT | Performed by: PATHOLOGY

## 2023-01-18 PROCEDURE — 93306 TTE W/DOPPLER COMPLETE: CPT | Performed by: INTERNAL MEDICINE

## 2023-01-19 NOTE — RESULT ENCOUNTER NOTE
Normal biventricular function. Tricuspid aortic valve. Thoracic aorta caliber similar to CT chest.  Please let patient know.

## 2023-01-20 ENCOUNTER — TELEPHONE (OUTPATIENT)
Dept: GASTROENTEROLOGY | Facility: CLINIC | Age: 63
End: 2023-01-20

## 2023-01-23 ENCOUNTER — OFFICE VISIT (OUTPATIENT)
Dept: PULMONOLOGY | Facility: CLINIC | Age: 63
End: 2023-01-23
Attending: INTERNAL MEDICINE
Payer: COMMERCIAL

## 2023-01-23 VITALS — SYSTOLIC BLOOD PRESSURE: 149 MMHG | HEART RATE: 102 BPM | OXYGEN SATURATION: 92 % | DIASTOLIC BLOOD PRESSURE: 90 MMHG

## 2023-01-23 DIAGNOSIS — Z23 NEED FOR INFLUENZA VACCINATION: Primary | ICD-10-CM

## 2023-01-23 PROCEDURE — G0008 ADMIN INFLUENZA VIRUS VAC: HCPCS | Performed by: STUDENT IN AN ORGANIZED HEALTH CARE EDUCATION/TRAINING PROGRAM

## 2023-01-23 PROCEDURE — G0463 HOSPITAL OUTPT CLINIC VISIT: HCPCS | Mod: 25 | Performed by: STUDENT IN AN ORGANIZED HEALTH CARE EDUCATION/TRAINING PROGRAM

## 2023-01-23 PROCEDURE — 250N000011 HC RX IP 250 OP 636: Performed by: STUDENT IN AN ORGANIZED HEALTH CARE EDUCATION/TRAINING PROGRAM

## 2023-01-23 PROCEDURE — 99215 OFFICE O/P EST HI 40 MIN: CPT | Performed by: STUDENT IN AN ORGANIZED HEALTH CARE EDUCATION/TRAINING PROGRAM

## 2023-01-23 PROCEDURE — 90682 RIV4 VACC RECOMBINANT DNA IM: CPT | Performed by: STUDENT IN AN ORGANIZED HEALTH CARE EDUCATION/TRAINING PROGRAM

## 2023-01-23 RX ADMIN — INFLUENZA A VIRUS A/WISCONSIN/588/2019 (H1N1) RECOMBINANT HEMAGGLUTININ ANTIGEN, INFLUENZA A VIRUS A/DARWIN/6/2021 (H3N2) RECOMBINANT HEMAGGLUTININ ANTIGEN, INFLUENZA B VIRUS B/AUSTRIA/1359417/2021 RECOMBINANT HEMAGGLUTININ ANTIGEN, AND INFLUENZA B VIRUS B/PHUKET/3073/2013 RECOMBINANT HEMAGGLUTININ ANTIGEN 0.5 ML: 45; 45; 45; 45 INJECTION INTRAMUSCULAR at 16:29
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
## 2023-01-23 NOTE — NURSING NOTE
Chief Complaint   Patient presents with     General Visit     F/u     Vitals were taken and medications were reconciled.     RAJIV Newman

## 2023-01-23 NOTE — PATIENT INSTRUCTIONS
Flu shot today. Work on getting the COVID booster. Wear a mask when indoors around other.     Use your Symbicort once in the morning and once at night, rinse your mouth out after use.    Can use the albuterol as needed during the day for cough or shortness of breath. Use as pre-treatment when exerting self.     Continue to monitor your oxygen sats at home, goal to have O2 sats above 90%. If dropping lower than that call the clinic.    Great job staying off cigarettes, let the clinic know if you want prescriptions for nicotine replacement.

## 2023-02-07 ENCOUNTER — ANCILLARY PROCEDURE (OUTPATIENT)
Dept: MRI IMAGING | Facility: CLINIC | Age: 63
End: 2023-02-07
Payer: COMMERCIAL

## 2023-02-07 DIAGNOSIS — R93.2 ABNORMAL CT OF LIVER: ICD-10-CM

## 2023-02-07 PROCEDURE — 74183 MRI ABD W/O CNTR FLWD CNTR: CPT | Performed by: RADIOLOGY

## 2023-02-07 PROCEDURE — A9585 GADOBUTROL INJECTION: HCPCS | Performed by: RADIOLOGY

## 2023-02-07 RX ORDER — GADOBUTROL 604.72 MG/ML
15 INJECTION INTRAVENOUS ONCE
Status: COMPLETED | OUTPATIENT
Start: 2023-02-07 | End: 2023-02-07

## 2023-02-07 RX ADMIN — GADOBUTROL 11 ML: 604.72 INJECTION INTRAVENOUS at 19:13

## 2023-02-08 NOTE — DISCHARGE INSTRUCTIONS
MRI Contrast Discharge Instructions    The IV contrast you received today will pass out of your body in your  urine. This will happen in the next 24 hours. You will not feel this process.  Your urine will not change color.    Drink at least 4 extra glasses of water or juice today (unless your doctor  has restricted your fluids). This reduces the stress on your kidneys.  You may take your regular medicines.    If you are on dialysis: It is best to have dialysis today.    If you have a reaction: Most reactions happen right away. If you have  any new symptoms after leaving the hospital (such as hives or swelling),  call your hospital at the correct number below. Or call your family doctor.  If you have breathing distress or wheezing, call 911.    Special instructions: ***    I have read and understand the above information.    Signature:______________________________________ Date:___________    Staff:__________________________________________ Date:___________     Time:__________    [place] [facility] Departments:    ___[place]: [phone]  ___[facility]: [phone]  ___[place]: [phone] ___[facility]: [phone]  ___[facility]: [phone]  ___[facility]: [phone]  ___[phone]  ___[facility]: [phone]  ___[phone]

## 2023-02-09 ENCOUNTER — TELEPHONE (OUTPATIENT)
Dept: FAMILY MEDICINE | Facility: CLINIC | Age: 63
End: 2023-02-09

## 2023-02-09 NOTE — TELEPHONE ENCOUNTER
Called the GI office to see if possible to move appointment up, told they have no openings prior but that he is now on a wait list for any upcoming cancellations they will reach out to him for an earlier appointment.    Pipe Fam RN   [facility]

## 2023-02-09 NOTE — TELEPHONE ENCOUNTER
Updated patient of MRI results including concern for HCC. Recommended earlier follow-up with hepatology if possible. Patient had verbalized understanding.

## 2023-02-09 NOTE — TELEPHONE ENCOUNTER
----- Message from Yannick Womack NP sent at 2/9/2023  9:19 AM CST -----  Greetings,  I'm wondering if one of you can reach out to some of the nurses at GI/hepatology to see if we can bump this patient's appointment up with Dr. Leventhal?  Patient had a recent liver MRI which showed potential hepatocellular carcinoma.  Thank you,  Yannick

## 2023-02-27 DIAGNOSIS — B18.2 CHRONIC HEPATITIS C WITHOUT HEPATIC COMA (H): Primary | ICD-10-CM

## 2023-03-07 ENCOUNTER — LAB (OUTPATIENT)
Dept: LAB | Facility: CLINIC | Age: 63
End: 2023-03-07
Attending: INTERNAL MEDICINE
Payer: COMMERCIAL

## 2023-03-07 ENCOUNTER — OFFICE VISIT (OUTPATIENT)
Dept: GASTROENTEROLOGY | Facility: CLINIC | Age: 63
End: 2023-03-07
Attending: INTERNAL MEDICINE
Payer: COMMERCIAL

## 2023-03-07 VITALS
OXYGEN SATURATION: 94 % | HEART RATE: 82 BPM | WEIGHT: 253.5 LBS | TEMPERATURE: 97.9 F | DIASTOLIC BLOOD PRESSURE: 84 MMHG | BODY MASS INDEX: 31.85 KG/M2 | SYSTOLIC BLOOD PRESSURE: 148 MMHG

## 2023-03-07 DIAGNOSIS — K70.30 ALCOHOLIC CIRRHOSIS OF LIVER WITHOUT ASCITES (H): Primary | ICD-10-CM

## 2023-03-07 DIAGNOSIS — B18.2 CHRONIC HEPATITIS C WITHOUT HEPATIC COMA (H): ICD-10-CM

## 2023-03-07 DIAGNOSIS — K70.30 ALCOHOLIC CIRRHOSIS OF LIVER WITHOUT ASCITES (H): ICD-10-CM

## 2023-03-07 DIAGNOSIS — D73.89 NODULE OF SPLEEN: ICD-10-CM

## 2023-03-07 DIAGNOSIS — I85.00 ESOPHAGEAL VARICES WITHOUT BLEEDING, UNSPECIFIED ESOPHAGEAL VARICES TYPE (H): ICD-10-CM

## 2023-03-07 DIAGNOSIS — C22.0 HEPATOCELLULAR CARCINOMA (H): ICD-10-CM

## 2023-03-07 DIAGNOSIS — F10.20 ALCOHOL USE DISORDER, SEVERE, DEPENDENCE (H): ICD-10-CM

## 2023-03-07 DIAGNOSIS — E44.1 MILD PROTEIN-CALORIE MALNUTRITION (H): ICD-10-CM

## 2023-03-07 LAB
AFP SERPL-MCNC: 5.2 NG/ML
ALBUMIN SERPL BCG-MCNC: 4.5 G/DL (ref 3.5–5.2)
ALP SERPL-CCNC: 72 U/L (ref 40–129)
ALT SERPL W P-5'-P-CCNC: 60 U/L (ref 10–50)
ANION GAP SERPL CALCULATED.3IONS-SCNC: 12 MMOL/L (ref 7–15)
AST SERPL W P-5'-P-CCNC: 87 U/L (ref 10–50)
BILIRUB DIRECT SERPL-MCNC: 0.49 MG/DL (ref 0–0.3)
BILIRUB SERPL-MCNC: 1.4 MG/DL
BUN SERPL-MCNC: 9.3 MG/DL (ref 8–23)
CALCIUM SERPL-MCNC: 9.5 MG/DL (ref 8.8–10.2)
CHLORIDE SERPL-SCNC: 103 MMOL/L (ref 98–107)
CREAT SERPL-MCNC: 0.79 MG/DL (ref 0.67–1.17)
DEPRECATED HCO3 PLAS-SCNC: 25 MMOL/L (ref 22–29)
ERYTHROCYTE [DISTWIDTH] IN BLOOD BY AUTOMATED COUNT: 12.6 % (ref 10–15)
GFR SERPL CREATININE-BSD FRML MDRD: >90 ML/MIN/1.73M2
GLUCOSE SERPL-MCNC: 103 MG/DL (ref 70–99)
HCT VFR BLD AUTO: 46.7 % (ref 40–53)
HGB BLD-MCNC: 16.2 G/DL (ref 13.3–17.7)
INR PPP: 1.23 (ref 0.85–1.15)
MCH RBC QN AUTO: 35.3 PG (ref 26.5–33)
MCHC RBC AUTO-ENTMCNC: 34.7 G/DL (ref 31.5–36.5)
MCV RBC AUTO: 102 FL (ref 78–100)
PLATELET # BLD AUTO: 178 10E3/UL (ref 150–450)
POTASSIUM SERPL-SCNC: 3.8 MMOL/L (ref 3.4–5.3)
PROT SERPL-MCNC: 8 G/DL (ref 6.4–8.3)
RBC # BLD AUTO: 4.59 10E6/UL (ref 4.4–5.9)
SODIUM SERPL-SCNC: 140 MMOL/L (ref 136–145)
WBC # BLD AUTO: 6 10E3/UL (ref 4–11)

## 2023-03-07 PROCEDURE — 85610 PROTHROMBIN TIME: CPT | Performed by: PATHOLOGY

## 2023-03-07 PROCEDURE — 99000 SPECIMEN HANDLING OFFICE-LAB: CPT | Performed by: PATHOLOGY

## 2023-03-07 PROCEDURE — 85027 COMPLETE CBC AUTOMATED: CPT | Performed by: PATHOLOGY

## 2023-03-07 PROCEDURE — G0463 HOSPITAL OUTPT CLINIC VISIT: HCPCS | Performed by: INTERNAL MEDICINE

## 2023-03-07 PROCEDURE — 82105 ALPHA-FETOPROTEIN SERUM: CPT | Mod: 90 | Performed by: PATHOLOGY

## 2023-03-07 PROCEDURE — 99204 OFFICE O/P NEW MOD 45 MIN: CPT | Performed by: INTERNAL MEDICINE

## 2023-03-07 PROCEDURE — 87522 HEPATITIS C REVRS TRNSCRPJ: CPT | Mod: 90 | Performed by: PATHOLOGY

## 2023-03-07 PROCEDURE — 82248 BILIRUBIN DIRECT: CPT | Performed by: PATHOLOGY

## 2023-03-07 PROCEDURE — 80053 COMPREHEN METABOLIC PANEL: CPT | Performed by: PATHOLOGY

## 2023-03-07 PROCEDURE — 36415 COLL VENOUS BLD VENIPUNCTURE: CPT | Performed by: PATHOLOGY

## 2023-03-07 ASSESSMENT — PAIN SCALES - GENERAL: PAINLEVEL: NO PAIN (0)

## 2023-03-07 NOTE — PROGRESS NOTES
Date of Service: March 7, 2023     Referring Provider: Rafael Jordan    Subjective:            Sohan Marcano is a 62 year old male presenting for evaluation of liver disease    History of Present Illness   Sohan Marcano is a 62 year old male with past medical history of IV and inhaled substance use, long-standing history of alcohol use disorder with resultant cirrhosis presenting to re-establish care for his chronic liver disease and assessment of liver masses    When he was initially seen in 2019 he underwent treatment for his chronic hepatitis C.  Follow-up HCVRNA was negative in late 2019 and again in late 2020.  Unfortunately he was lost to follow-up.    Reports that after completion of therapy for his hepatitis C he did have relapse drinking behavior.  Notes that it escalated from drinking approximately a pint of john a day to 2 pints.  He did have personal concerns about his use and has worked to de-escalate it, but still notes drinking a pint plus per day.  Notes that he had a severe respiratory infection last fall that led to remaining in bed for much of several months, and he has had follow-up with pulmonary and cardiology and unclear etiology of his persistent dyspnea.    In undergoing imaging for his chest he was noted to have nonspecific lesions in his liver as well as his spleen.  He was referred to surgical oncology for further assessment and a follow-up MRI of the abdomen with contrast was performed.  MRI was completed February 7 which demonstrated a 3 cm segment 8 lesion and a 1.6 cm segment 8 lesion, both considered LR 4.  Also noted that there was significant steatosis in the liver, that was preventing diagnosis of LR 5 for both of these lesions.    He does note ongoing issues with shortness of breath, pain with ambulation due to neuropathy, does feel that his memory is worsening      Past Medical History:  Past Medical History:   Diagnosis Date     Actinic keratosis      Basal cell  "carcinoma      Squamous cell carcinoma      Substance abuse (H)     alcohol     Uncomplicated asthma        Surgical History:  Past Surgical History:   Procedure Laterality Date     HERNIA REPAIR         Social History:  Social History     Tobacco Use     Smoking status: Former     Packs/day: 0.25     Types: Cigarettes     Quit date: 10/1/2022     Years since quittin.4     Smokeless tobacco: Never     Tobacco comments:     Hasn't smoked since end of Oct 2022   Vaping Use     Vaping Use: Former   Substance Use Topics     Alcohol use: Yes     Alcohol/week: 20.0 standard drinks     Types: 20 Standard drinks or equivalent per week     Comment: 4-5/day     Drug use: No       Family History:  Family History   Problem Relation Age of Onset     Other - See Comments Mother         Patient states his Mother ? had skin cancer.     Other - See Comments Father         Patient reports Father had \"all types of skin cancer\".     Coronary Artery Disease Father      CABG Father      Other - See Comments Sister         Basal cell carcinoma     Coronary Artery Disease Sister      Aortic aneurysm Sister      Coronary Artery Disease Brother        Medications:  Current Outpatient Medications   Medication     albuterol (PROAIR HFA/PROVENTIL HFA/VENTOLIN HFA) 108 (90 Base) MCG/ACT inhaler     budesonide-formoterol (SYMBICORT) 80-4.5 MCG/ACT Inhaler     Cholecalciferol (VITAMIN D) 125 MCG (5000 UT) capsule     gabapentin (NEURONTIN) 300 MG capsule     ibuprofen (ADVIL/MOTRIN) 200 MG tablet     multivitamin w/minerals (THERA-VIT-M) tablet     atorvastatin (LIPITOR) 20 MG tablet     diclofenac (VOLTAREN) 1 % topical gel     ferrous sulfate (FEROSUL) 325 (65 Fe) MG tablet     ipratropium (ATROVENT HFA) 17 MCG/ACT inhaler     losartan (COZAAR) 25 MG tablet     Current Facility-Administered Medications   Medication     lidocaine 1% with EPINEPHrine 1:100,000 injection 3 mL       Review of Systems  A complete 10 point review of systems was " asked and answered in the negative unless specifically commented upon in the HPI    Objective:         Vitals:    03/07/23 0844 03/07/23 0846   BP: (!) 158/102 (!) 148/84   Pulse: 82    Temp: 97.9  F (36.6  C)    TempSrc: Oral    SpO2: 94%    Weight: 115 kg (253 lb 8 oz)      Body mass index is 31.85 kg/m .     Physical Exam  Constitutional: Well-developed, well-nourished, in no apparent distress.    HEENT: Normocephalic. no scleral icterus.   Neck/Lymph: Normal ROM  Cardiac:  Regular rate  Respiratory: scattered wheezes  GI:  Abdomen soft, obese  Skin:   + spider nevi noted.  + palmar erythema  Peripheral Vascular: trace lower extremity edema. 2+ pulses in all extremities  Musculoskeletal:  ROM intact, normal muscle bulk    Psychiatric: Normal mood and affect. Behavior is normal.  Neuro:  no asterixis, no tremor    Labs and Diagnostic tests:  MELD-Na score: 10 at 3/7/2023  8:34 AM  MELD score: 10 at 3/7/2023  8:34 AM  Calculated from:  Serum Creatinine: 0.79 mg/dL (Using min of 1 mg/dL) at 3/7/2023  8:34 AM  Serum Sodium: 140 mmol/L (Using max of 137 mmol/L) at 3/7/2023  8:34 AM  Total Bilirubin: 1.4 mg/dL at 3/7/2023  8:34 AM  INR(ratio): 1.23 at 3/7/2023  8:34 AM  Age: 62 years      Imaging:  MRI Liver w/wo 2/7/2023  FINDINGS:     Comparison study: CT 1/10/2023     Liver: Cirrhosis including nodular liver contour, prominent fissures,  relatively right lobe atrophy and reticular intensity indicating  fibrosis. Scattered nonenhancing regenerative nodules with intrinsic  T1 hyperintense signal (LIRADS 2). Several tiny T2 hyperintense cysts.  Marked hepatic steatosis.     Lesion 1: There is a 3.0 cm mild arterially enhancing lesion in  hepatic segment  8 (series 15/32). It does not washout on portal  venous or delayed phase imaging. Pseudocapsule is not identified.  There is associated increased T1/T2 signal.  There is slightly  increased signal on diffusion weighted images. Previously this  measured 3.0 cm on  1/10/2023. There is not  evidence of venous  invasion.  OPTN/LIRADS class 4     Lesion 2: There is a 1.6 cm arterially enhancing lesion in hepatic  segment  8 (series 15/36).  It does not washout on portal venous or  delayed phase imaging. Pseudocapsule is not identified. There is  associated increased T1/T2 signal.  There is slightly increased signal  on diffusion weighted images. Previously this measured 1.6 cm on  1/10/2023. There is not  evidence of venous invasion.  OPTN/LIRADS  class 4     Biliary System: Cholelithiasis. No extrahepatic biliary ductal  dilation.     Pancreas: Preserved intrinsic T1 signal. No pancreatic ductal  dilation. Several scattered tiny T2 hyperintense foci (4/24, 12)     Adrenal glands: No mass or nodules     Spleen: Normal.     Kidneys: No suspicious mass, obstructing calculus or hydronephrosis.     Gastrointestinal tract :Normal caliber small bowel.     Mesentery/peritoneum/retroperitoneum: No mass. No free fluid.     Lymph nodes: No significant lymphadenopathy.     Vasculature: Patent major abdominal vasculature. Mild narrowing of the  celiac trunk origin at the level of median arcuate ligament.  Recanalized umbilical vein. Gastroesophageal and periumbilical  collaterals.     Osseous structures: Multilevel degenerative changes of the visualized  spine.     Lower thorax: Normal.      Soft tissues: Within normal limits.     Ascites: None      Fibrosis Scan 10/14/2019  - EXAMINATION:  Fibrosis Scan  DATE OF EXAM:  10/14/2019  HISTORY:  Alcohol and HCV related liver disease  FINDINGS:  Median elasticity 60.1kPa  IMPRESSION:  Consistent with METAVIR stage F4 fibrosis    Procedures:    EGD: none    Colonoscopy: none    Assessment and Plan:    Cirrhosis secondary to chronic hepatitis C and alcohol use:    - Well compensated at this time  - MELD-Na: 10 (March 7, 2023)  - Discussed role of alcohol cessation in promoting liver health  - HCV with SVR    Alcohol use disorder:  - Ongoing  excessive use  - Offered resources for cessation; deferred at this time    Chronic hepatitis C:  - S/p SVR 2019    Concern for Hepatocellular Cancer:   - Will refer back to Surgical Oncology for biopsy of lesion and ablation  - After treated would perform bone scan to assess for mets  - Not interested in pursuing liver transplantation  - Have ordered an AFP    Ascites:  - No overt issues at this time  - Continue to follow a sodium restricted (<2g sodium diet)     Hepatic Encephalopathy:  -No acute issues    Esophageal Varices:   - Will address at next clinic visit    Colon cancer screening:  - We will further address at next visit    Nutrition:  As with most patients with chronic liver disease, there is a significant degree of protein malnutrition.  Dicussed need to change dietary habits to improve overall protein balance.  Discussed the importance of eating between 1.2-1.5g/kg/day lean protein like eggs, fish, chicken, nuts, and legumes, in addition to a diet rich in fresh fruits and vegetables.  Continue to follow a sodium restricted (<2g sodium diet) and discussed the need to minimize the intake of carbohydrates and sugars, to avoid obesity.   - Strongly encourage protein supplements 2-3 times daily (Boost, Ensure, [place] Instant Milk, etc.) to meet protein and caloric intake.  - Recommend a bedtime snack with protein and complex carbohydrate to minimize risk of muscle catabolism overnight    Routine Health Care in Patient with Chronic Liver Disease:  - We recommend screening for hepatitis A and B, please vaccinate if not immune  - All patients with liver disease, particularly those with cholestatic liver disease, are at an increased risk for osteoporosis.  We strongly recommend screening for Vitamin D deficiency at least twice yearly with aggressive supplementation/replacement as indicated.    - We also recommend a screening DEXA scan to evaluate for osteoporosis.  If present, should treat with calcium,  Vitamin D supplementation, and recommend consideration of bisphosphonate therapy.  Also recommend follow up DEXA scans to evaluate for improvement of bone density on therapy.  - All patients with liver disease should avoid the use of Non-steroidal Anti-Inflammatory (NSAID) medications as they can cause significant injury to the kidneys in this population    Follow Up:  4 months     Thank you very much for the opportunity to participate in the care of this patient.  If you have any further questions, please don't hesitate to contact our office.    Thomas M. Leventhal, M.D.   of Medicine  Advanced & Transplant Hepatology  [facility]

## 2023-03-07 NOTE — Clinical Note
I think best option is biopsy large Seg 8 lesion and MWA.  I like you guys to do it, not IR, if possible.  Order placed. TL

## 2023-03-07 NOTE — NURSING NOTE
Chief Complaint   Patient presents with     RECHECK       BP (!) 148/84   Pulse 82   Temp 97.9  F (36.6  C) (Oral)   Wt 115 kg (253 lb 8 oz)   SpO2 94%   BMI 31.85 kg/m      Cody Winters on 3/7/2023 at 8:47 AM

## 2023-03-07 NOTE — LETTER
3/7/2023      RE: Sohan Marcano  [address] [facility] [id number]       Date of Service: March 7, 2023     Referring Provider: Rafael Jordan    Subjective:            Sohan Marcano is a 62 year old male presenting for evaluation of liver disease    History of Present Illness   Sohan Marcano is a 62 year old male with past medical history of IV and inhaled substance use, long-standing history of alcohol use disorder with resultant cirrhosis presenting to re-establish care for his chronic liver disease and assessment of liver masses    When he was initially seen in 2019 he underwent treatment for his chronic hepatitis C.  Follow-up HCVRNA was negative in late 2019 and again in late 2020.  Unfortunately he was lost to follow-up.    Reports that after completion of therapy for his hepatitis C he did have relapse drinking behavior.  Notes that it escalated from drinking approximately a pint of john a day to 2 pints.  He did have personal concerns about his use and has worked to de-escalate it, but still notes drinking a pint plus per day.  Notes that he had a severe respiratory infection last fall that led to remaining in bed for much of several months, and he has had follow-up with pulmonary and cardiology and unclear etiology of his persistent dyspnea.    In undergoing imaging for his chest he was noted to have nonspecific lesions in his liver as well as his spleen.  He was referred to surgical oncology for further assessment and a follow-up MRI of the abdomen with contrast was performed.  MRI was completed February 7 which demonstrated a 3 cm segment 8 lesion and a 1.6 cm segment 8 lesion, both considered LR 4.  Also noted that there was significant steatosis in the liver, that was preventing diagnosis of LR 5 for both of these lesions.    He does note ongoing issues with shortness of breath, pain with ambulation due to neuropathy, does feel that his memory is worsening      Past Medical  "History:  Past Medical History:   Diagnosis Date     Actinic keratosis      Basal cell carcinoma      Squamous cell carcinoma      Substance abuse (H)     alcohol     Uncomplicated asthma        Surgical History:  Past Surgical History:   Procedure Laterality Date     HERNIA REPAIR         Social History:  Social History     Tobacco Use     Smoking status: Former     Packs/day: 0.25     Types: Cigarettes     Quit date: 10/1/2022     Years since quittin.4     Smokeless tobacco: Never     Tobacco comments:     Hasn't smoked since end of Oct 2022   Vaping Use     Vaping Use: Former   Substance Use Topics     Alcohol use: Yes     Alcohol/week: 20.0 standard drinks     Types: 20 Standard drinks or equivalent per week     Comment: 4-5/day     Drug use: No       Family History:  Family History   Problem Relation Age of Onset     Other - See Comments Mother         Patient states his Mother ? had skin cancer.     Other - See Comments Father         Patient reports Father had \"all types of skin cancer\".     Coronary Artery Disease Father      CABG Father      Other - See Comments Sister         Basal cell carcinoma     Coronary Artery Disease Sister      Aortic aneurysm Sister      Coronary Artery Disease Brother        Medications:  Current Outpatient Medications   Medication     albuterol (PROAIR HFA/PROVENTIL HFA/VENTOLIN HFA) 108 (90 Base) MCG/ACT inhaler     budesonide-formoterol (SYMBICORT) 80-4.5 MCG/ACT Inhaler     Cholecalciferol (VITAMIN D) 125 MCG (5000 UT) capsule     gabapentin (NEURONTIN) 300 MG capsule     ibuprofen (ADVIL/MOTRIN) 200 MG tablet     multivitamin w/minerals (THERA-VIT-M) tablet     atorvastatin (LIPITOR) 20 MG tablet     diclofenac (VOLTAREN) 1 % topical gel     ferrous sulfate (FEROSUL) 325 (65 Fe) MG tablet     ipratropium (ATROVENT HFA) 17 MCG/ACT inhaler     losartan (COZAAR) 25 MG tablet     Current Facility-Administered Medications   Medication     lidocaine 1% with EPINEPHrine " 1:100,000 injection 3 mL       Review of Systems  A complete 10 point review of systems was asked and answered in the negative unless specifically commented upon in the HPI    Objective:         Vitals:    03/07/23 0844 03/07/23 0846   BP: (!) 158/102 (!) 148/84   Pulse: 82    Temp: 97.9  F (36.6  C)    TempSrc: Oral    SpO2: 94%    Weight: 115 kg (253 lb 8 oz)      Body mass index is 31.85 kg/m .     Physical Exam  Constitutional: Well-developed, well-nourished, in no apparent distress.    HEENT: Normocephalic. no scleral icterus.   Neck/Lymph: Normal ROM  Cardiac:  Regular rate  Respiratory: scattered wheezes  GI:  Abdomen soft, obese  Skin:   + spider nevi noted.  + palmar erythema  Peripheral Vascular: trace lower extremity edema. 2+ pulses in all extremities  Musculoskeletal:  ROM intact, normal muscle bulk    Psychiatric: Normal mood and affect. Behavior is normal.  Neuro:  no asterixis, no tremor    Labs and Diagnostic tests:  MELD-Na score: 10 at 3/7/2023  8:34 AM  MELD score: 10 at 3/7/2023  8:34 AM  Calculated from:  Serum Creatinine: 0.79 mg/dL (Using min of 1 mg/dL) at 3/7/2023  8:34 AM  Serum Sodium: 140 mmol/L (Using max of 137 mmol/L) at 3/7/2023  8:34 AM  Total Bilirubin: 1.4 mg/dL at 3/7/2023  8:34 AM  INR(ratio): 1.23 at 3/7/2023  8:34 AM  Age: 62 years      Imaging:  MRI Liver w/wo 2/7/2023  FINDINGS:     Comparison study: CT 1/10/2023     Liver: Cirrhosis including nodular liver contour, prominent fissures,  relatively right lobe atrophy and reticular intensity indicating  fibrosis. Scattered nonenhancing regenerative nodules with intrinsic  T1 hyperintense signal (LIRADS 2). Several tiny T2 hyperintense cysts.  Marked hepatic steatosis.     Lesion 1: There is a 3.0 cm mild arterially enhancing lesion in  hepatic segment  8 (series 15/32). It does not washout on portal  venous or delayed phase imaging. Pseudocapsule is not identified.  There is associated increased T1/T2 signal.  There is  slightly  increased signal on diffusion weighted images. Previously this  measured 3.0 cm on 1/10/2023. There is not  evidence of venous  invasion.  OPTN/LIRADS class 4     Lesion 2: There is a 1.6 cm arterially enhancing lesion in hepatic  segment  8 (series 15/36).  It does not washout on portal venous or  delayed phase imaging. Pseudocapsule is not identified. There is  associated increased T1/T2 signal.  There is slightly increased signal  on diffusion weighted images. Previously this measured 1.6 cm on  1/10/2023. There is not  evidence of venous invasion.  OPTN/LIRADS  class 4     Biliary System: Cholelithiasis. No extrahepatic biliary ductal  dilation.     Pancreas: Preserved intrinsic T1 signal. No pancreatic ductal  dilation. Several scattered tiny T2 hyperintense foci (4/24, 12)     Adrenal glands: No mass or nodules     Spleen: Normal.     Kidneys: No suspicious mass, obstructing calculus or hydronephrosis.     Gastrointestinal tract :Normal caliber small bowel.     Mesentery/peritoneum/retroperitoneum: No mass. No free fluid.     Lymph nodes: No significant lymphadenopathy.     Vasculature: Patent major abdominal vasculature. Mild narrowing of the  celiac trunk origin at the level of median arcuate ligament.  Recanalized umbilical vein. Gastroesophageal and periumbilical  collaterals.     Osseous structures: Multilevel degenerative changes of the visualized  spine.     Lower thorax: Normal.      Soft tissues: Within normal limits.     Ascites: None      Fibrosis Scan 10/14/2019  - EXAMINATION:  Fibrosis Scan  DATE OF EXAM:  10/14/2019  HISTORY:  Alcohol and HCV related liver disease  FINDINGS:  Median elasticity 60.1kPa  IMPRESSION:  Consistent with METAVIR stage F4 fibrosis    Procedures:    EGD: none    Colonoscopy: none    Assessment and Plan:    Cirrhosis secondary to chronic hepatitis C and alcohol use:    - Well compensated at this time  - MELD-Na: 10 (March 7, 2023)  - Discussed role of alcohol  cessation in promoting liver health  - HCV with SVR    Alcohol use disorder:  - Ongoing excessive use  - Offered resources for cessation; deferred at this time    Chronic hepatitis C:  - S/p SVR 2019    Concern for Hepatocellular Cancer:   - Will refer back to Surgical Oncology for biopsy of lesion and ablation  - After treated would perform bone scan to assess for mets  - Not interested in pursuing liver transplantation  - Have ordered an AFP    Ascites:  - No overt issues at this time  - Continue to follow a sodium restricted (<2g sodium diet)     Hepatic Encephalopathy:  -No acute issues    Esophageal Varices:   - Will address at next clinic visit    Colon cancer screening:  - We will further address at next visit    Nutrition:  As with most patients with chronic liver disease, there is a significant degree of protein malnutrition.  Dicussed need to change dietary habits to improve overall protein balance.  Discussed the importance of eating between 1.2-1.5g/kg/day lean protein like eggs, fish, chicken, nuts, and legumes, in addition to a diet rich in fresh fruits and vegetables.  Continue to follow a sodium restricted (<2g sodium diet) and discussed the need to minimize the intake of carbohydrates and sugars, to avoid obesity.   - Strongly encourage protein supplements 2-3 times daily (Boost, Ensure, [place] Instant Milk, etc.) to meet protein and caloric intake.  - Recommend a bedtime snack with protein and complex carbohydrate to minimize risk of muscle catabolism overnight    Routine Health Care in Patient with Chronic Liver Disease:  - We recommend screening for hepatitis A and B, please vaccinate if not immune  - All patients with liver disease, particularly those with cholestatic liver disease, are at an increased risk for osteoporosis.  We strongly recommend screening for Vitamin D deficiency at least twice yearly with aggressive supplementation/replacement as indicated.    - We also recommend a  screening DEXA scan to evaluate for osteoporosis.  If present, should treat with calcium, Vitamin D supplementation, and recommend consideration of bisphosphonate therapy.  Also recommend follow up DEXA scans to evaluate for improvement of bone density on therapy.  - All patients with liver disease should avoid the use of Non-steroidal Anti-Inflammatory (NSAID) medications as they can cause significant injury to the kidneys in this population    Follow Up:  4 months     Thank you very much for the opportunity to participate in the care of this patient.  If you have any further questions, please don't hesitate to contact our office.    Thomas M. Leventhal, M.D.   of Medicine  Advanced & Transplant Hepatology  [facility]       Thomas M. Leventhal, MD

## 2023-03-07 NOTE — LETTER
3/7/2023         RE: Sohan Marcano  [address] [facility] [id number]        Dear Colleague,    Thank you for referring your patient, Sohan Marcano, to the [facility] [place]. Please see a copy of my visit note below.    Date of Service: March 7, 2023     Referring Provider: Rafael Jordan    Subjective:            Sohan Marcano is a 62 year old male presenting for evaluation of liver disease    History of Present Illness   Sohan Marcano is a 62 year old male with past medical history of IV and inhaled substance use, long-standing history of alcohol use disorder with resultant cirrhosis presenting to re-establish care for his chronic liver disease and assessment of liver masses    When he was initially seen in 2019 he underwent treatment for his chronic hepatitis C.  Follow-up HCVRNA was negative in late 2019 and again in late 2020.  Unfortunately he was lost to follow-up.    Reports that after completion of therapy for his hepatitis C he did have relapse drinking behavior.  Notes that it escalated from drinking approximately a pint of john a day to 2 pints.  He did have personal concerns about his use and has worked to de-escalate it, but still notes drinking a pint plus per day.  Notes that he had a severe respiratory infection last fall that led to remaining in bed for much of several months, and he has had follow-up with pulmonary and cardiology and unclear etiology of his persistent dyspnea.    In undergoing imaging for his chest he was noted to have nonspecific lesions in his liver as well as his spleen.  He was referred to surgical oncology for further assessment and a follow-up MRI of the abdomen with contrast was performed.  MRI was completed February 7 which demonstrated a 3 cm segment 8 lesion and a 1.6 cm segment 8 lesion, both considered LR 4.  Also noted that there was significant steatosis in the liver, that was preventing diagnosis of LR 5  "for both of these lesions.    He does note ongoing issues with shortness of breath, pain with ambulation due to neuropathy, does feel that his memory is worsening      Past Medical History:  Past Medical History:   Diagnosis Date     Actinic keratosis      Basal cell carcinoma      Squamous cell carcinoma      Substance abuse (H)     alcohol     Uncomplicated asthma        Surgical History:  Past Surgical History:   Procedure Laterality Date     HERNIA REPAIR         Social History:  Social History     Tobacco Use     Smoking status: Former     Packs/day: 0.25     Types: Cigarettes     Quit date: 10/1/2022     Years since quittin.4     Smokeless tobacco: Never     Tobacco comments:     Hasn't smoked since end of Oct 2022   Vaping Use     Vaping Use: Former   Substance Use Topics     Alcohol use: Yes     Alcohol/week: 20.0 standard drinks     Types: 20 Standard drinks or equivalent per week     Comment: 4-5/day     Drug use: No       Family History:  Family History   Problem Relation Age of Onset     Other - See Comments Mother         Patient states his Mother ? had skin cancer.     Other - See Comments Father         Patient reports Father had \"all types of skin cancer\".     Coronary Artery Disease Father      CABG Father      Other - See Comments Sister         Basal cell carcinoma     Coronary Artery Disease Sister      Aortic aneurysm Sister      Coronary Artery Disease Brother        Medications:  Current Outpatient Medications   Medication     albuterol (PROAIR HFA/PROVENTIL HFA/VENTOLIN HFA) 108 (90 Base) MCG/ACT inhaler     budesonide-formoterol (SYMBICORT) 80-4.5 MCG/ACT Inhaler     Cholecalciferol (VITAMIN D) 125 MCG (5000 UT) capsule     gabapentin (NEURONTIN) 300 MG capsule     ibuprofen (ADVIL/MOTRIN) 200 MG tablet     multivitamin w/minerals (THERA-VIT-M) tablet     atorvastatin (LIPITOR) 20 MG tablet     diclofenac (VOLTAREN) 1 % topical gel     ferrous sulfate (FEROSUL) 325 (65 Fe) MG tablet     " ipratropium (ATROVENT HFA) 17 MCG/ACT inhaler     losartan (COZAAR) 25 MG tablet     Current Facility-Administered Medications   Medication     lidocaine 1% with EPINEPHrine 1:100,000 injection 3 mL       Review of Systems  A complete 10 point review of systems was asked and answered in the negative unless specifically commented upon in the HPI    Objective:         Vitals:    03/07/23 0844 03/07/23 0846   BP: (!) 158/102 (!) 148/84   Pulse: 82    Temp: 97.9  F (36.6  C)    TempSrc: Oral    SpO2: 94%    Weight: 115 kg (253 lb 8 oz)      Body mass index is 31.85 kg/m .     Physical Exam  Constitutional: Well-developed, well-nourished, in no apparent distress.    HEENT: Normocephalic. no scleral icterus.   Neck/Lymph: Normal ROM  Cardiac:  Regular rate  Respiratory: scattered wheezes  GI:  Abdomen soft, obese  Skin:   + spider nevi noted.  + palmar erythema  Peripheral Vascular: trace lower extremity edema. 2+ pulses in all extremities  Musculoskeletal:  ROM intact, normal muscle bulk    Psychiatric: Normal mood and affect. Behavior is normal.  Neuro:  no asterixis, no tremor    Labs and Diagnostic tests:  MELD-Na score: 10 at 3/7/2023  8:34 AM  MELD score: 10 at 3/7/2023  8:34 AM  Calculated from:  Serum Creatinine: 0.79 mg/dL (Using min of 1 mg/dL) at 3/7/2023  8:34 AM  Serum Sodium: 140 mmol/L (Using max of 137 mmol/L) at 3/7/2023  8:34 AM  Total Bilirubin: 1.4 mg/dL at 3/7/2023  8:34 AM  INR(ratio): 1.23 at 3/7/2023  8:34 AM  Age: 62 years      Imaging:  MRI Liver w/wo 2/7/2023  FINDINGS:     Comparison study: CT 1/10/2023     Liver: Cirrhosis including nodular liver contour, prominent fissures,  relatively right lobe atrophy and reticular intensity indicating  fibrosis. Scattered nonenhancing regenerative nodules with intrinsic  T1 hyperintense signal (LIRADS 2). Several tiny T2 hyperintense cysts.  Marked hepatic steatosis.     Lesion 1: There is a 3.0 cm mild arterially enhancing lesion in  hepatic segment  8  (series 15/32). It does not washout on portal  venous or delayed phase imaging. Pseudocapsule is not identified.  There is associated increased T1/T2 signal.  There is slightly  increased signal on diffusion weighted images. Previously this  measured 3.0 cm on 1/10/2023. There is not  evidence of venous  invasion.  OPTN/LIRADS class 4     Lesion 2: There is a 1.6 cm arterially enhancing lesion in hepatic  segment  8 (series 15/36).  It does not washout on portal venous or  delayed phase imaging. Pseudocapsule is not identified. There is  associated increased T1/T2 signal.  There is slightly increased signal  on diffusion weighted images. Previously this measured 1.6 cm on  1/10/2023. There is not  evidence of venous invasion.  OPTN/LIRADS  class 4     Biliary System: Cholelithiasis. No extrahepatic biliary ductal  dilation.     Pancreas: Preserved intrinsic T1 signal. No pancreatic ductal  dilation. Several scattered tiny T2 hyperintense foci (4/24, 12)     Adrenal glands: No mass or nodules     Spleen: Normal.     Kidneys: No suspicious mass, obstructing calculus or hydronephrosis.     Gastrointestinal tract :Normal caliber small bowel.     Mesentery/peritoneum/retroperitoneum: No mass. No free fluid.     Lymph nodes: No significant lymphadenopathy.     Vasculature: Patent major abdominal vasculature. Mild narrowing of the  celiac trunk origin at the level of median arcuate ligament.  Recanalized umbilical vein. Gastroesophageal and periumbilical  collaterals.     Osseous structures: Multilevel degenerative changes of the visualized  spine.     Lower thorax: Normal.      Soft tissues: Within normal limits.     Ascites: None      Fibrosis Scan 10/14/2019  - EXAMINATION:  Fibrosis Scan  DATE OF EXAM:  10/14/2019  HISTORY:  Alcohol and HCV related liver disease  FINDINGS:  Median elasticity 60.1kPa  IMPRESSION:  Consistent with METAVIR stage F4 fibrosis    Procedures:    EGD: none    Colonoscopy: none    Assessment  and Plan:    Cirrhosis secondary to chronic hepatitis C and alcohol use:    - Well compensated at this time  - MELD-Na: 10 (March 7, 2023)  - Discussed role of alcohol cessation in promoting liver health  - HCV with SVR    Alcohol use disorder:  - Ongoing excessive use  - Offered resources for cessation; deferred at this time    Chronic hepatitis C:  - S/p SVR 2019    Concern for Hepatocellular Cancer:   - Will refer back to Surgical Oncology for biopsy of lesion and ablation  - After treated would perform bone scan to assess for mets  - Not interested in pursuing liver transplantation  - Have ordered an AFP    Ascites:  - No overt issues at this time  - Continue to follow a sodium restricted (<2g sodium diet)     Hepatic Encephalopathy:  -No acute issues    Esophageal Varices:   - Will address at next clinic visit    Colon cancer screening:  - We will further address at next visit    Nutrition:  As with most patients with chronic liver disease, there is a significant degree of protein malnutrition.  Dicussed need to change dietary habits to improve overall protein balance.  Discussed the importance of eating between 1.2-1.5g/kg/day lean protein like eggs, fish, chicken, nuts, and legumes, in addition to a diet rich in fresh fruits and vegetables.  Continue to follow a sodium restricted (<2g sodium diet) and discussed the need to minimize the intake of carbohydrates and sugars, to avoid obesity.   - Strongly encourage protein supplements 2-3 times daily (Boost, Ensure, [place] Instant Milk, etc.) to meet protein and caloric intake.  - Recommend a bedtime snack with protein and complex carbohydrate to minimize risk of muscle catabolism overnight    Routine Health Care in Patient with Chronic Liver Disease:  - We recommend screening for hepatitis A and B, please vaccinate if not immune  - All patients with liver disease, particularly those with cholestatic liver disease, are at an increased risk for osteoporosis.   We strongly recommend screening for Vitamin D deficiency at least twice yearly with aggressive supplementation/replacement as indicated.    - We also recommend a screening DEXA scan to evaluate for osteoporosis.  If present, should treat with calcium, Vitamin D supplementation, and recommend consideration of bisphosphonate therapy.  Also recommend follow up DEXA scans to evaluate for improvement of bone density on therapy.  - All patients with liver disease should avoid the use of Non-steroidal Anti-Inflammatory (NSAID) medications as they can cause significant injury to the kidneys in this population    Follow Up:  4 months     Thank you very much for the opportunity to participate in the care of this patient.  If you have any further questions, please don't hesitate to contact our office.    Thomas M. Leventhal, M.D.   of Medicine  Advanced & Transplant Hepatology  [facility]       Again, thank you for allowing me to participate in the care of your patient.        Sincerely,        Thomas M. Leventhal, MD

## 2023-03-09 LAB — HCV RNA SERPL NAA+PROBE-ACNC: NOT DETECTED IU/ML

## 2023-03-13 ENCOUNTER — OFFICE VISIT (OUTPATIENT)
Dept: DERMATOLOGY | Facility: CLINIC | Age: 63
End: 2023-03-13
Payer: COMMERCIAL

## 2023-03-13 DIAGNOSIS — Z80.8 FAMILY HX OF MELANOMA: ICD-10-CM

## 2023-03-13 DIAGNOSIS — D22.9 MULTIPLE BENIGN NEVI: ICD-10-CM

## 2023-03-13 DIAGNOSIS — D18.01 CHERRY ANGIOMA: ICD-10-CM

## 2023-03-13 DIAGNOSIS — L21.9 DERMATITIS, SEBORRHEIC: ICD-10-CM

## 2023-03-13 DIAGNOSIS — L57.0 ACTINIC KERATOSIS: ICD-10-CM

## 2023-03-13 DIAGNOSIS — Z85.828 HISTORY OF NONMELANOMA SKIN CANCER: Primary | ICD-10-CM

## 2023-03-13 DIAGNOSIS — L81.4 SOLAR LENTIGO: ICD-10-CM

## 2023-03-13 DIAGNOSIS — L82.1 SEBORRHEIC KERATOSIS: ICD-10-CM

## 2023-03-13 PROCEDURE — 99213 OFFICE O/P EST LOW 20 MIN: CPT | Mod: 25 | Performed by: PHYSICIAN ASSISTANT

## 2023-03-13 PROCEDURE — 17000 DESTRUCT PREMALG LESION: CPT | Performed by: PHYSICIAN ASSISTANT

## 2023-03-13 PROCEDURE — 17003 DESTRUCT PREMALG LES 2-14: CPT | Performed by: PHYSICIAN ASSISTANT

## 2023-03-13 NOTE — PATIENT INSTRUCTIONS
Cryotherapy    What is it?  Use of a very cold liquid, such as liquid nitrogen, to freeze and destroy abnormal skin cells that need to be removed    What should I expect?  Tenderness and redness  A small blister that might grow and fill with dark purple blood. There may be crusting.  More than one treatment may be needed if the lesions do not go away.    How do I care for the treated area?  Gently wash the area with your hands when bathing.  Use a thin layer of Vaseline to help with healing. You may use a Band-Aid.   The area should heal within 7-10 days and may leave behind a pink or lighter color.   Do not use an antibiotic or Neosporin ointment.   You may take acetaminophen (Tylenol) for pain.     Call your doctor if you have:  Severe pain  Signs of infection (warmth, redness, cloudy yellow drainage, and or a bad smell)  Questions or concerns    Who should I call with questions?      [facility]: [phone]      [facility]: [phone]      For urgent needs outside of business hours call the [facility] at [phone] and ask for the dermatology resident on call       Patient Education     Checking for Skin Cancer  You can find cancer early by checking your skin each month. There are 3 kinds of skin cancer. They are melanoma, basal cell carcinoma, and squamous cell carcinoma. Doing monthly skin checks is the best way to find new marks or skin changes. Follow the instructions below for checking your skin.   The ABCDEs of checking moles for melanoma   Check your moles or growths for signs of melanoma using ABCDE:   Asymmetry: the sides of the mole or growth don t match  Border: the edges are ragged, notched, or blurred  Color: the color within the mole or growth varies  Diameter: the mole or growth is larger than 6 mm (size of a pencil eraser)  Evolving: the size, shape, or color of the mole or growth is changing (evolving is not shown in the  images below)    Checking for other types of skin cancer  Basal cell carcinoma or squamous cell carcinoma have symptoms such as:     A spot or mole that looks different from all other marks on your skin  Changes in how an area feels, such as itching, tenderness, or pain  Changes in the skin's surface, such as oozing, bleeding, or scaliness  A sore that does not heal  New swelling or redness beyond the border of a mole    Who s at risk?  Anyone can get skin cancer. But you are at greater risk if you have:   Fair skin, light-colored hair, or light-colored eyes  Many moles or abnormal moles on your skin  A history of sunburns from sunlight or tanning beds  A family history of skin cancer  A history of exposure to radiation or chemicals  A weakened immune system  If you have had skin cancer in the past, you are at risk for recurring skin cancer.   How to check your skin  Do your monthly skin checkups in front of a full-length mirror. Check all parts of your body, including your:   Head (ears, face, neck, and scalp)  Torso (front, back, and sides)  Arms (tops, undersides, upper, and lower armpits)  Hands (palms, backs, and fingers, including under the nails)  Buttocks and genitals  Legs (front, back, and sides)  Feet (tops, soles, toes, including under the nails, and between toes)  If you have a lot of moles, take digital photos of them each month. Make sure to take photos both up close and from a distance. These can help you see if any moles change over time.   Most skin changes are not cancer. But if you see any changes in your skin, call your doctor right away. Only he or she can diagnose a problem. If you have skin cancer, seeing your doctor can be the first step toward getting the treatment that could save your life.   [employer] last reviewed this educational content on 4/1/2019 2000-2020 [employer]. [address] [place] [address], [place] [address]. All rights reserved. This information is not intended as  a substitute for professional medical care. Always follow your healthcare professional's instructions.       When should I call my doctor?  If you are worsening or not improving, please, contact us or seek urgent care as noted below.     Who should I call with questions (adults)?  [facility] (adult and pediatric): [phone]  [facility] (adult): [phone]  For urgent needs outside of business hours call the [facility] at [phone] and ask for the dermatology resident on call to be paged  If this is a medical emergency and you are unable to reach an ER, Call [phone]    Who should I call with questions (pediatric)?  [facility]- Pediatric Dermatology  Dr. Meaghan Shah, Dr. Cheyenne Mora, Dr. Brinda Vicente, JOSE Zamora, Dr. Shelbie Mathew, Dr. Sallie Haines & Dr. Melchor Campos  Non-urgent nurse triage line; [phone]- Ayde and Ofelia RODRIGUEZ Care Coordinators   Silvana (/Complex ) [phone]    If you need a prescription refill, please contact your pharmacy. Refills are approved or denied by our Physicians during normal business hours, Monday through Fridays  Per office policy, refills will not be granted if you have not been seen within the past year (or sooner depending on your child's condition)    Scheduling Information:  Pediatric Appointment Scheduling and Call Center [phone]  Radiology Scheduling- [phone]  Sedation Unit Scheduling- [phone]  [place] Scheduling- General [phone]; Pediatric Dermatology [phone]  Main  Services: [phone]  Sami: [phone]  Swazi: [phone]  Hmong/Cayman Islander/Korean: [phone]  Preadmission Nursing Department Fax Number: [phone] (Fax all pre-operative paperwork to this number)    For urgent matters arising during evenings, weekends, or holidays that cannot wait for normal business hours please  call [phone] and ask for the dermatology resident on call to be paged.

## 2023-03-13 NOTE — PROGRESS NOTES
[facility] Dermatology Note  Encounter Date: Mar 13, 2023  Office Visit     Dermatology Problem List:  1. 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 Family history of Melanoma (Father). Brother, both passed   3.  Actinic keratosis  - s/p cryo 12/13/2014, 10/17/2019, 1/25/21, 9/13/2021, 3/13/2023  - Right elbow, s/p biopsy 2/23/2021  4. Seborrheic keratosis  5. Telangiectasia    ____________________________________________    Assessment & Plan:    # Actinic keratoses, left shin x2, right top shoulder x1  - Cryo today, see procedure below    # Seborrheic dermatitis, scalp  - Patient declines treatment    # Cherry angiomas  # Seborrheic keratoses  - Reassured of benign nature, no treatment necessary. a    # Multiple benign nevi  # Solar lentigines  # Family hx melanoma (father and brother)  - No concerning lesions today  - Counseled on ABCDEs of melanoma and sun protection - recommend SPF 30 or higher with frequent application   - Return sooner if noticing changing or symptomatic lesions    # History of NMSC. No evidence of recurrent disease.  - Continue photoprotection - recommend SPF 30 or higher with frequent reapplication  - Continue yearly skin exams  - Advised to monitor for changing, non-healing, bleeding, painful, changing, or otherwise symptomatic lesions    Procedures Performed:   - Cryotherapy procedure note, location(s): see above. After verbal consent and discussion of risks and benefits including, but not limited to, dyspigmentation/scar, blister, and pain, 3 lesion(s) was(were) treated with 1-2 mm freeze border for 1-2 cycles with liquid nitrogen. Post cryotherapy instructions were provided.    Follow-up: 6 month(s) in-person, or earlier for new or changing lesions    Staff and Scribe:      Scribe Disclosure:  I, Sukumar Kovacs, am serving as a scribe to document services personally performed by Marlena Sosa PA-C based on data collection and the provider's statements to me.     Provider Disclosure:   The documentation recorded by the scribe accurately reflects the services I personally performed and the decisions made by me.    All risks, benefits and alternatives were discussed with patient.  Patient is in agreement and understands the assessment and plan.  All questions were answered.  Sun Screen Education was given.   Return to Clinic in 6 months or sooner as needed.   Marlena Sosa PA-C   [facility]   ____________________________________________    CC: Skin Check (FBSE)    HPI:  Mr. Sohan Marcano is a(n) 62 year old male who presents today as a return patient for FBSE. Last seen by me on 9/14/2022, at which time patient underwent cryo for treatment of actinic keratoses.    Today, patient does not report any spots of concern on his skin.    Patient is otherwise feeling well, without additional skin concerns.    Labs Reviewed:  N/A    Physical Exam:  Vitals: There were no vitals taken for this visit.  SKIN: Total skin excluding the undergarment areas was performed. The exam included the head/face, neck, both arms, chest, back, abdomen, both legs, digits and/or nails.   - Pink plaques with greasy scale noted throughout most of the scalp.  - There are erythematous macules with overyling adherent scale on the left shin x2, right shoulder x1.  - There are dome shaped bright red papules on the trunk and extremities.   - Multiple regular brown pigmented macules and papules are identified on the trunk and extremities.   - Scattered brown macules on sun exposed areas.  - There are waxy stuck on tan to brown papules on the trunk and extremities.   - No other lesions of concern on areas examined.     Medications:  Current Outpatient Medications    Medication     albuterol (PROAIR HFA/PROVENTIL HFA/VENTOLIN HFA) 108 (90 Base) MCG/ACT inhaler     atorvastatin (LIPITOR) 20 MG tablet     budesonide-formoterol (SYMBICORT) 80-4.5 MCG/ACT Inhaler     Cholecalciferol (VITAMIN D) 125 MCG (5000 UT) capsule     diclofenac (VOLTAREN) 1 % topical gel     ferrous sulfate (FEROSUL) 325 (65 Fe) MG tablet     gabapentin (NEURONTIN) 300 MG capsule     ibuprofen (ADVIL/MOTRIN) 200 MG tablet     ipratropium (ATROVENT HFA) 17 MCG/ACT inhaler     losartan (COZAAR) 25 MG tablet     multivitamin w/minerals (THERA-VIT-M) tablet     Current Facility-Administered Medications   Medication     lidocaine 1% with EPINEPHrine 1:100,000 injection 3 mL      Past Medical History:   Patient Active Problem List   Diagnosis     Chemical dependency (H)     Basal cell carcinoma of anterior chest s/p excision 3-10-15     Squamous cell carcinoma mid upper chest s/p excision 3-10-15     Abdominal pain     Arthritis of both hands     Hepatitis C, chronic (H)     Alcohol use disorder, severe, dependence (H)     Elevated blood pressure reading without diagnosis of hypertension     Advanced directives, counseling/discussion     Alcoholic polyneuropathy (H)     Past Medical History:   Diagnosis Date     Actinic keratosis      Basal cell carcinoma      Squamous cell carcinoma      Substance abuse (H)     alcohol     Uncomplicated asthma         CC Referred Self, MD  No address on file on close of this encounter.

## 2023-03-13 NOTE — NURSING NOTE
Dermatology Rooming Note    Sohan Marcano's goals for this visit include:   Chief Complaint   Patient presents with     Skin Check     FBSE     Beau Kelley, EMT-B

## 2023-03-13 NOTE — LETTER
3/13/2023       RE: Sohan Marcano  1945 [facility] [address]     Dear Colleague,    Thank you for referring your patient, Sohan Marcano, to the [facility] [place] at [facility]. Please see a copy of my visit note below.    [facility] Dermatology Note  Encounter Date: Mar 13, 2023  Office Visit     Dermatology Problem List:  1. 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 Family history of Melanoma (Father). Brother, both passed   3.  Actinic keratosis  - s/p cryo 12/13/2014, 10/17/2019, 1/25/21, 9/13/2021, 3/13/2023  - Right elbow, s/p biopsy 2/23/2021  4. Seborrheic keratosis  5. Telangiectasia    ____________________________________________    Assessment & Plan:    # Actinic keratoses, left shin x2, right top shoulder x1  - Cryo today, see procedure below    # Seborrheic dermatitis, scalp  - Patient declines treatment    # Cherry angiomas  # Seborrheic keratoses  - Reassured of benign nature, no treatment necessary. a    # Multiple benign nevi  # Solar lentigines  # Family hx melanoma (father and brother)  - No concerning lesions today  - Counseled on ABCDEs of melanoma and sun protection - recommend SPF 30 or higher with frequent application   - Return sooner if noticing changing or symptomatic lesions    # History of NMSC. No evidence of recurrent disease.  - Continue photoprotection - recommend SPF 30 or higher with frequent reapplication  - Continue yearly skin exams  - Advised to monitor for changing, non-healing, bleeding, painful, changing, or otherwise symptomatic lesions    Procedures Performed:   - Cryotherapy procedure note, location(s): see above. After verbal consent and discussion of  risks and benefits including, but not limited to, dyspigmentation/scar, blister, and pain, 3 lesion(s) was(were) treated with 1-2 mm freeze border for 1-2 cycles with liquid nitrogen. Post cryotherapy instructions were provided.    Follow-up: 6 month(s) in-person, or earlier for new or changing lesions    Staff and Scribe:     Scribe Disclosure:  I, Sukumar Kovacs, am serving as a scribe to document services personally performed by Marlena Sosa PA-C based on data collection and the provider's statements to me.     Provider Disclosure:   The documentation recorded by the scribe accurately reflects the services I personally performed and the decisions made by me.    All risks, benefits and alternatives were discussed with patient.  Patient is in agreement and understands the assessment and plan.  All questions were answered.  Sun Screen Education was given.   Return to Clinic in 6 months or sooner as needed.   Marlena Sosa PA-C   [facility]   ____________________________________________    CC: Skin Check (FBSE)    HPI:  Mr. Sohan Marcano is a(n) 62 year old male who presents today as a return patient for FBSE. Last seen by me on 9/14/2022, at which time patient underwent cryo for treatment of actinic keratoses.    Today, patient does not report any spots of concern on his skin.    Patient is otherwise feeling well, without additional skin concerns.    Labs Reviewed:  N/A    Physical Exam:  Vitals: There were no vitals taken for this visit.  SKIN: Total skin excluding the undergarment areas was performed. The exam included the head/face, neck, both arms, chest, back, abdomen, both legs, digits and/or nails.   - Pink plaques with greasy scale noted throughout most of the scalp.  - There are erythematous macules with overyling adherent scale on the left shin x2, right shoulder x1.  - There are dome shaped bright red papules on the trunk and extremities.   - Multiple  regular brown pigmented macules and papules are identified on the trunk and extremities.   - Scattered brown macules on sun exposed areas.  - There are waxy stuck on tan to brown papules on the trunk and extremities.   - No other lesions of concern on areas examined.     Medications:  Current Outpatient Medications   Medication     albuterol (PROAIR HFA/PROVENTIL HFA/VENTOLIN HFA) 108 (90 Base) MCG/ACT inhaler     atorvastatin (LIPITOR) 20 MG tablet     budesonide-formoterol (SYMBICORT) 80-4.5 MCG/ACT Inhaler     Cholecalciferol (VITAMIN D) 125 MCG (5000 UT) capsule     diclofenac (VOLTAREN) 1 % topical gel     ferrous sulfate (FEROSUL) 325 (65 Fe) MG tablet     gabapentin (NEURONTIN) 300 MG capsule     ibuprofen (ADVIL/MOTRIN) 200 MG tablet     ipratropium (ATROVENT HFA) 17 MCG/ACT inhaler     losartan (COZAAR) 25 MG tablet     multivitamin w/minerals (THERA-VIT-M) tablet     Current Facility-Administered Medications   Medication     lidocaine 1% with EPINEPHrine 1:100,000 injection 3 mL      Past Medical History:   Patient Active Problem List   Diagnosis     Chemical dependency (H)     Basal cell carcinoma of anterior chest s/p excision 3-10-15     Squamous cell carcinoma mid upper chest s/p excision 3-10-15     Abdominal pain     Arthritis of both hands     Hepatitis C, chronic (H)     Alcohol use disorder, severe, dependence (H)     Elevated blood pressure reading without diagnosis of hypertension     Advanced directives, counseling/discussion     Alcoholic polyneuropathy (H)     Past Medical History:   Diagnosis Date     Actinic keratosis      Basal cell carcinoma      Squamous cell carcinoma      Substance abuse (H)     alcohol     Uncomplicated asthma         CC Referred Self, MD  No address on file on close of this encounter.

## 2023-03-16 ENCOUNTER — PATIENT OUTREACH (OUTPATIENT)
Dept: DERMATOLOGY | Facility: CLINIC | Age: 63
End: 2023-03-16

## 2023-03-16 NOTE — TELEPHONE ENCOUNTER
Attempted to reach patient to schedule follow up in the Dermatology Clinic.  No answer,  LM on VM to call office and [employer] message sent..    Schedule with Marlena Sosa PA-C 9/2023.

## 2023-03-16 NOTE — LETTER
March 16, 2023          Sohan Marcano  1945 [facility] [address]        Dear Sohan Marcano:    We recently reviewed your medical records from [facility] and found that you are due for an appointment with Marlena Sosa PA-C in September.  Our office has attempted to reach you via telephone and left a message.    At your earliest convenience, please call the clinic at [phone] to arrange the recommended exam and possible testing.  Please kindly mention this letter when calling so that your appointment(s) can be accurately scheduled.      Sincerely,       The Clinic Staff        Medical Record Number:  [id number]

## 2023-03-21 ENCOUNTER — PATIENT OUTREACH (OUTPATIENT)
Dept: ONCOLOGY | Facility: CLINIC | Age: 63
End: 2023-03-21

## 2023-03-21 ENCOUNTER — ONCOLOGY VISIT (OUTPATIENT)
Dept: SURGERY | Facility: CLINIC | Age: 63
End: 2023-03-21
Attending: SURGERY
Payer: COMMERCIAL

## 2023-03-21 VITALS
RESPIRATION RATE: 18 BRPM | SYSTOLIC BLOOD PRESSURE: 133 MMHG | DIASTOLIC BLOOD PRESSURE: 79 MMHG | HEART RATE: 88 BPM | TEMPERATURE: 98 F | OXYGEN SATURATION: 97 % | BODY MASS INDEX: 32.04 KG/M2 | WEIGHT: 255 LBS

## 2023-03-21 DIAGNOSIS — D73.89 NODULE OF SPLEEN: Primary | ICD-10-CM

## 2023-03-21 DIAGNOSIS — R16.0 LIVER MASS, RIGHT LOBE: Primary | ICD-10-CM

## 2023-03-21 PROCEDURE — 99214 OFFICE O/P EST MOD 30 MIN: CPT | Performed by: SURGERY

## 2023-03-21 PROCEDURE — G0463 HOSPITAL OUTPT CLINIC VISIT: HCPCS | Performed by: SURGERY

## 2023-03-21 ASSESSMENT — PAIN SCALES - GENERAL: PAINLEVEL: NO PAIN (0)

## 2023-03-21 NOTE — LETTER
3/21/2023         RE: Sohan Marcano  1945 [facility] [address]        Dear Colleague,    Thank you for referring your patient, Sohan Marcano, to the [facility]. Please see a copy of my visit note below.    Surgical Oncology - Established Patient  3/21/2023     62 M w/ known cirrhosis, likely from alcohol and HCV (treated).  He has undergone imaging with incidental findings of liver and splenic lesions.  I saw him for this previously in January of this year.  Liver CT at that time showed the splenic findings to be non-concerning and the liver lesions to be likely LIRADS 3.  The patient established with hepatology and had a subsequent liver MRI now calling the two lesions in segment 8 of the liver as LIRADS 4.  The patient was discussed in liver tumor board and the overall consensus was to discuss biopsy and ablation of the lesions with the patient given the reasonable likelihood of HCC in this scenario.  Given this, he has been referred to discuss surgery.     Of Note: He is feeling well.  Still drinking alcohol.  Also has HTN and HLD.     /79   Pulse 88   Temp 98  F (36.7  C) (Oral)   Resp 18   Wt 115.7 kg (255 lb)   SpO2 97%   BMI 32.04 kg/m        CTAP Liver Protocol (1/10/2023): IMPRESSION:  1. Hepatic cirrhosis and hepatic steatosis with likely several regenerative/dysplastic nodules, mildly enhancing on all contrast phases, a few of which are visualized on noncontrast images. These are characterized as LIRADS 3. No suspicious lesion/mass meeting criteria for hepatocellular carcinoma. Continued imaging follow-up is recommended.   2. Recanalization of the umbilical vein with periumbilical collaterals/caput medusa and other upper abdominal portosystemic collaterals.  3. No suspicious splenic lesion/mass, however, there are multiple scattered calcified splenic granulomas. Additional sequela of granulomatous disease including large calcified  granuloma in the right lung lower lobe and a few hepatic calcified granulomas.  4. Cholelithiasis without evidence of cholecystitis.     MRI Liver (2/7/2023): IMPRESSION:    1. Cirrhosis and evidence of portal hypertension.  Marked hepatic steatosis.  2. 3.0 and 1.6 cm LIRADS 4 observations as described above.  Technically these are LR-4 since washout is not demonstrated, but this can be due to severe steatosis of the background liver. Given the increased T2 signal and restricted diffusion they are highly suspicious for HCC. Consider biopsy for confirmation.  3. Based on this exam only, the patient is within [place] criteria.  4. Mild narrowing of the celiac trunk origin at the level of median arcuate ligament.    Labs (3/7/2023): CBC w/ WBC 6, Hgb 16.2, Plts 178.  CMP w/ Na+ 140, Cr 0.79, Alk Phos 72, AST 87, ALT 60, Bilirubin 1.4, Albumin 4.5.  INR 1.23.  AFP 5.2.    MELD = 10  Child-Solo Score = A5  ARON-Grade = 1 (-2.91 pts.)  Clinically Significant Portal HTN = Yes (Recanalized Umbilical Vein/Portosystemic Collaterals)  BCLC Stage = A (2 tumors, 3.0 cm and 1.6 cm, ECOG 0)     Assessment/Plan:  62 M w/ two LIRADS 4 lesions in segment 8 in the setting of significant background steatosis.  These have high suspicion of HCC.  He has known cirrhosis, likely from alcohol and HCV (treated), and also clinically significant portal HTN.  He has not completed staging and his most recent imaging was nearly two months ago.  We will repeat an MRI of the liver with GADAVIST and obtain a CT chest for updated imaging and staging.  In addition, we will obtain new labs and AFP at the time of those images.  We discussed the situation in detail and the high suspicion for HCC.  We discussed that there is some chance that the lesions represent a different malignancy or are perhaps benign.  However, I think the likelihood of this is low.  We discussed HCC biology and natural history.  In these situations, we commonly perform biopsy and  ablation of the lesions as common practice at the [facility], and this was discussed in multidisciplinary fashion.  This is the recommended treatment option.  I did discuss this with him.  He does appear to have two treatable lesions (with microwave ablation), but given the proximity of these lesions to one another, this will be a large ablation zone.  With his portal HTN he is not a surgical resection candidate and his liver function is too good for transplant to be a viable option.  Given the potentially large ablation zone, I did specifically key on the fact that this procedure could propel him into decompensated cirrhosis or even overt liver failure.  I think the risk of this is around 10%, and he understands this can be fatal.  In addition to this, we discussed other risks including bleeding/need for transfusion, infection/abscess, pneumonia, COVID-19, cardiac events, renal failure, stroke, UTI, DVT/PE, CO2 embolus, pleural effusion, refractory ascites, damage to surrounding structures or adjacent liver segments (larger burn than intended), bile leakage, ileus, hernia, and wound complications related to incisions.  There is also potential need for further procedures/medications to manage complications.  As discussed above, biopsy results may end up not showing the suspected diagnosis of HCC.  We also discussed occasional inability to visualize lesions and need to convert to open surgery.  In having this discussion, the patient wishes to proceed with surgery despite the discussed risks.  We will go ahead and get him set up for this including the work-up above.  The procedure will be diagnostic laparoscopy, intra-operative ultrasound, biopsy of liver tumors, microwave ablation of liver tumors, possible open.  Questions answered and the patient was in agreement with and understanding of the plan.     A total of 30 minutes were spent on this encounter including more than 50% in face to face time and  the remainder in imaging review, chart review, documentation, and coordination of care.          Rafael Jordan MD

## 2023-03-21 NOTE — PROGRESS NOTES
[facility]: Surgical Oncology Plan of Care Education Note                                     Discussion with Patient:                                                         Met with Sohan following his visit with Dr. Jordan on 3/21/2023. Introduced self and explained role of RNCC.       Plan:                                                       Reviewed plan for diagnostic laparoscopy, intraoperative ultrasound, microwave ablation of liver tumor, possible open   at the [facility]. Provided appropriate OR location map. Discussed need for H&P within 30 days of surgery. Sohan will complete  With our PAC clinic. Based on the location of surgery, a Covid test is needed.  Reviewed shower instructions and provided/mailed written hand-out and bottle of surgical scrub.     Sohan will be scheduled for an MRI and labs prior to surgery. Sohan requested surgery the last week of May due to his work scheduled. Reviewed that MRI and labs will be scheduled within 30 days of surgery.      Informed patient they should get a call within the next three business days from our OR  with surgery date, H&P date and date of post-op visit with their surgeon. Writer answered all questions and concerns to the best of her ability and provided her contact information. Reviewed use of [employer] as alternative way to contact team.  Encouraged patient to contact with questions.         Anna Marie Jaime, RNCC  [facility]  Surgical Onolcogy      Approximately 10 minutes was spent in discussion with patient.

## 2023-03-21 NOTE — PROGRESS NOTES
Surgical Oncology - Established Patient  3/21/2023     62 M w/ known cirrhosis, likely from alcohol and HCV (treated).  He has undergone imaging with incidental findings of liver and splenic lesions.  I saw him for this previously in January of this year.  Liver CT at that time showed the splenic findings to be non-concerning and the liver lesions to be likely LIRADS 3.  The patient established with hepatology and had a subsequent liver MRI now calling the two lesions in segment 8 of the liver as LIRADS 4.  The patient was discussed in liver tumor board and the overall consensus was to discuss biopsy and ablation of the lesions with the patient given the reasonable likelihood of HCC in this scenario.  Given this, he has been referred to discuss surgery.     Of Note: He is feeling well.  Still drinking alcohol.  Also has HTN and HLD.     /79   Pulse 88   Temp 98  F (36.7  C) (Oral)   Resp 18   Wt 115.7 kg (255 lb)   SpO2 97%   BMI 32.04 kg/m        CTAP Liver Protocol (1/10/2023): IMPRESSION:  1. Hepatic cirrhosis and hepatic steatosis with likely several regenerative/dysplastic nodules, mildly enhancing on all contrast phases, a few of which are visualized on noncontrast images. These are characterized as LIRADS 3. No suspicious lesion/mass meeting criteria for hepatocellular carcinoma. Continued imaging follow-up is recommended.   2. Recanalization of the umbilical vein with periumbilical collaterals/caput medusa and other upper abdominal portosystemic collaterals.  3. No suspicious splenic lesion/mass, however, there are multiple scattered calcified splenic granulomas. Additional sequela of granulomatous disease including large calcified granuloma in the right lung lower lobe and a few hepatic calcified granulomas.  4. Cholelithiasis without evidence of cholecystitis.     MRI Liver (2/7/2023): IMPRESSION:    1. Cirrhosis and evidence of portal hypertension.  Marked hepatic steatosis.  2. 3.0 and 1.6 cm  LIRADS 4 observations as described above.  Technically these are LR-4 since washout is not demonstrated, but this can be due to severe steatosis of the background liver. Given the increased T2 signal and restricted diffusion they are highly suspicious for HCC. Consider biopsy for confirmation.  3. Based on this exam only, the patient is within [place] criteria.  4. Mild narrowing of the celiac trunk origin at the level of median arcuate ligament.    Labs (3/7/2023): CBC w/ WBC 6, Hgb 16.2, Plts 178.  CMP w/ Na+ 140, Cr 0.79, Alk Phos 72, AST 87, ALT 60, Bilirubin 1.4, Albumin 4.5.  INR 1.23.  AFP 5.2.    MELD = 10  Child-Solo Score = A5  ARON-Grade = 1 (-2.91 pts.)  Clinically Significant Portal HTN = Yes (Recanalized Umbilical Vein/Portosystemic Collaterals)  BCLC Stage = A (2 tumors, 3.0 cm and 1.6 cm, ECOG 0)     Assessment/Plan:  62 M w/ two LIRADS 4 lesions in segment 8 in the setting of significant background steatosis.  These have high suspicion of HCC.  He has known cirrhosis, likely from alcohol and HCV (treated), and also clinically significant portal HTN.  He has not completed staging and his most recent imaging was nearly two months ago.  We will repeat an MRI of the liver with GADAVIST and obtain a CT chest for updated imaging and staging.  In addition, we will obtain new labs and AFP at the time of those images.  We discussed the situation in detail and the high suspicion for HCC.  We discussed that there is some chance that the lesions represent a different malignancy or are perhaps benign.  However, I think the likelihood of this is low.  We discussed HCC biology and natural history.  In these situations, we commonly perform biopsy and ablation of the lesions as common practice at the [facility], and this was discussed in multidisciplinary fashion.  This is the recommended treatment option.  I did discuss this with him.  He does appear to have two treatable lesions (with microwave  ablation), but given the proximity of these lesions to one another, this will be a large ablation zone.  With his portal HTN he is not a surgical resection candidate and his liver function is too good for transplant to be a viable option.  Given the potentially large ablation zone, I did specifically key on the fact that this procedure could propel him into decompensated cirrhosis or even overt liver failure.  I think the risk of this is around 10%, and he understands this can be fatal.  In addition to this, we discussed other risks including bleeding/need for transfusion, infection/abscess, pneumonia, COVID-19, cardiac events, renal failure, stroke, UTI, DVT/PE, CO2 embolus, pleural effusion, refractory ascites, damage to surrounding structures or adjacent liver segments (larger burn than intended), bile leakage, ileus, hernia, and wound complications related to incisions.  There is also potential need for further procedures/medications to manage complications.  As discussed above, biopsy results may end up not showing the suspected diagnosis of HCC.  We also discussed occasional inability to visualize lesions and need to convert to open surgery.  In having this discussion, the patient wishes to proceed with surgery despite the discussed risks.  We will go ahead and get him set up for this including the work-up above.  The procedure will be diagnostic laparoscopy, intra-operative ultrasound, biopsy of liver tumors, microwave ablation of liver tumors, possible open.  Questions answered and the patient was in agreement with and understanding of the plan.     A total of 30 minutes were spent on this encounter including more than 50% in face to face time and the remainder in imaging review, chart review, documentation, and coordination of care.

## 2023-03-22 ENCOUNTER — TELEPHONE (OUTPATIENT)
Dept: SURGERY | Facility: CLINIC | Age: 63
End: 2023-03-22

## 2023-03-22 DIAGNOSIS — K76.9 LESION OF LIVER: ICD-10-CM

## 2023-03-22 DIAGNOSIS — D73.89 NODULE OF SPLEEN: Primary | ICD-10-CM

## 2023-03-22 NOTE — TELEPHONE ENCOUNTER
Left voicemail for patient regarding scheduling surgery with Dr. Jordan.    Provided contact number to discuss.    P: [phone]    __    Amanda Winters, on 3/22/2023 at 10:46 AM

## 2023-03-27 ENCOUNTER — TELEPHONE (OUTPATIENT)
Dept: PULMONOLOGY | Facility: CLINIC | Age: 63
End: 2023-03-27

## 2023-03-27 NOTE — TELEPHONE ENCOUNTER
Patient Contacted     Appointment type: RTN  Provider: RIA  Return date: 7/17/23  Specialty phone number: [phone]  Additional appointment(s) needed: NA  Additonal Notes: rescheduled from 7/24/23. Pt declined mailed itinerary.

## 2023-03-27 NOTE — TELEPHONE ENCOUNTER
FUTURE VISIT INFORMATION      SURGERY INFORMATION:    Date: 2023- Rafael Jordan MD    Consult: ov 3/21/23    RECORDS REQUESTED FROM:       Primary Care Provider: [facility]    Most recent EKG+ Tracin23    Most recent ECHO: 23    Most recent PFT's: 22

## 2023-04-03 ENCOUNTER — TELEPHONE (OUTPATIENT)
Dept: SURGERY | Facility: CLINIC | Age: 63
End: 2023-04-03

## 2023-04-03 ENCOUNTER — PATIENT OUTREACH (OUTPATIENT)
Dept: ONCOLOGY | Facility: CLINIC | Age: 63
End: 2023-04-03

## 2023-04-03 DIAGNOSIS — K76.9 LESION OF LIVER: Primary | ICD-10-CM

## 2023-04-03 NOTE — TELEPHONE ENCOUNTER
I called and left a voicemail as the patient did not answer his phone.  I mentioned that we (surgery, hepatology, IR) have been discussing his case and think that instead of doing a single biopsy and ablation on both lesions in one setting (would burn a very large area of the right anterior liver), it would be more likely to be lower risk for him if his biopsies and ablations were done in a staged fashion (biopsy and ablate the larger lesion on the first procedure, then do the other at a later date).  This would still allow for ablation but would minimize the magnitude of the hit taken from a single procedure.  Given this would be two procedures spaced out, we are recommending that interventional radiology does this.  They have agreed to see him to do this, so we will make the referral.  A callback number to the clinic was provided [phone], and we will also attempt to call the patient again in the near future.

## 2023-04-03 NOTE — PROGRESS NOTES
[facility]: Surgical Oncology Cancer Care Short Note                                     Discussion with Patient:                                                       INBOUND CALL:     Received call from Sohan stating her had received a message from Dr. Jordan regarding doing a staged approached with Interventional Radiology. Sohan verbalized agreement with Dr. Jordan's plan.     Message sent to Dr. Jordan letting him know of Sohan's decision. Referral for Interventional Radiation placed.      Encouraged patient to call with any further questions or concerns.     Anna Marie Jaime, RNCC  [facility]   Surgical Oncology

## 2023-04-04 ENCOUNTER — TELEPHONE (OUTPATIENT)
Dept: RADIOLOGY | Facility: CLINIC | Age: 63
End: 2023-04-04

## 2023-04-04 DIAGNOSIS — C22.0 HCC (HEPATOCELLULAR CARCINOMA) (H): Primary | ICD-10-CM

## 2023-04-04 DIAGNOSIS — G62.1 ALCOHOLIC POLYNEUROPATHY (H): ICD-10-CM

## 2023-04-04 RX ORDER — GABAPENTIN 300 MG/1
300 CAPSULE ORAL 3 TIMES DAILY PRN
Qty: 90 CAPSULE | Refills: 3 | Status: SHIPPED | OUTPATIENT
Start: 2023-04-04 | End: 2023-08-14

## 2023-04-04 NOTE — TELEPHONE ENCOUNTER
Maci Mancini is a 60 year old female presenting for DM follow up    Medications verified, no changes. Pharmacy verified.     Refills needed today: none    Allergies verified. Denies known Latex allergy or symptoms of Latex sensitivity.    Social History     Tobacco Use   Smoking Status Current Every Day Smoker   • Packs/day: 0.50   • Years: 45.00   • Pack years: 22.50   • Types: Cigarettes   • Start date:    • Last attempt to quit:    • Years since quittin.3   Smokeless Tobacco Never Used   Tobacco Comment    started smoking age 14       Health Maintenance Due   Topic Date Due   • COVID-19 Vaccine (1) Never done   • Shingles Vaccine (2 of 3) 2015   • Cervical Cancer Screening HPV CO-Testing  2019   • Colonoscopy Risk  2020   • Breast Cancer Screening  2020   • Medicare Wellness Visit  2021       Patient is due for topics as listed above but is not proceeding with Immunization(s) COVID-19 and Shingles and Cervical cancer screening at this time. Education provided for Immunization(s) COVID-19 and Shingles and Cervical cancer screening.          Patient would like communication of their results via:        Cell Phone:   Telephone Information:   Mobile [phone]     Okay to leave a message containing results? Yes         Called and spoke to Sohan regarding his referral to IR for liver directed therapy. Dr. Christopher would like an updated MRI prior to clinic and a diagnostic ultrasound. Patient verbalized understanding. Writer will assist in scheduling the ordered scans and a clinic visit.     Olivia LOVING RN, BSN   Interventional Radiology RNCC   [phone]

## 2023-04-04 NOTE — TELEPHONE ENCOUNTER
Requested Prescriptions   Pending Prescriptions Disp Refills     gabapentin (NEURONTIN) 300 MG capsule 90 capsule 3     Sig: Take 1 capsule (300 mg) by mouth 3 times daily as needed for neuropathic pain       There is no refill protocol information for this order        Routing refill request to provider for review/approval because:  Drug not on the [facility] [place] protocol     Guerline Cardona RN  [facility]

## 2023-04-19 ENCOUNTER — HOSPITAL ENCOUNTER (OUTPATIENT)
Dept: MRI IMAGING | Facility: CLINIC | Age: 63
Discharge: HOME | End: 2023-04-19
Attending: RADIOLOGY
Payer: COMMERCIAL

## 2023-04-19 ENCOUNTER — HOSPITAL ENCOUNTER (OUTPATIENT)
Dept: ULTRASOUND IMAGING | Facility: CLINIC | Age: 63
Discharge: HOME | End: 2023-04-19
Attending: RADIOLOGY
Payer: COMMERCIAL

## 2023-04-19 ENCOUNTER — OFFICE VISIT (OUTPATIENT)
Dept: VASCULAR SURGERY | Facility: CLINIC | Age: 63
End: 2023-04-19
Payer: COMMERCIAL

## 2023-04-19 VITALS — DIASTOLIC BLOOD PRESSURE: 64 MMHG | HEART RATE: 106 BPM | OXYGEN SATURATION: 90 % | SYSTOLIC BLOOD PRESSURE: 99 MMHG

## 2023-04-19 DIAGNOSIS — K76.9 LESION OF LIVER: ICD-10-CM

## 2023-04-19 DIAGNOSIS — C22.0 HCC (HEPATOCELLULAR CARCINOMA) (H): ICD-10-CM

## 2023-04-19 PROCEDURE — 99204 OFFICE O/P NEW MOD 45 MIN: CPT | Performed by: RADIOLOGY

## 2023-04-19 PROCEDURE — 74183 MRI ABD W/O CNTR FLWD CNTR: CPT

## 2023-04-19 PROCEDURE — 255N000002 HC RX 255 OP 636: Performed by: RADIOLOGY

## 2023-04-19 PROCEDURE — 258N000003 HC RX IP 258 OP 636: Performed by: RADIOLOGY

## 2023-04-19 PROCEDURE — 93975 VASCULAR STUDY: CPT | Mod: 26 | Performed by: RADIOLOGY

## 2023-04-19 PROCEDURE — A9585 GADOBUTROL INJECTION: HCPCS | Performed by: RADIOLOGY

## 2023-04-19 PROCEDURE — 93975 VASCULAR STUDY: CPT

## 2023-04-19 PROCEDURE — 74183 MRI ABD W/O CNTR FLWD CNTR: CPT | Mod: 26 | Performed by: RADIOLOGY

## 2023-04-19 RX ORDER — GADOBUTROL 604.72 MG/ML
15 INJECTION INTRAVENOUS ONCE
Status: COMPLETED | OUTPATIENT
Start: 2023-04-19 | End: 2023-04-19

## 2023-04-19 RX ADMIN — GADOBUTROL 11 ML: 604.72 INJECTION INTRAVENOUS at 06:55

## 2023-04-19 RX ADMIN — SODIUM CHLORIDE 66 ML: 9 INJECTION, SOLUTION INTRAVENOUS at 06:56

## 2023-04-19 NOTE — LETTER
4/19/2023       RE: Sohan Marcano  [address] [facility] [address]     Dear Colleague,    Thank you for referring your patient, Sohan Marcano, to the [facility] [place] at [facility]. Please see a copy of my visit note below.    Ms. Marcano is a very pleasant 62-year-old male with cirrhosis complicated by recent diagnosis of multifocal hepatocellular carcinoma which was picked up incidentally after receiving the chest CT in the setting of prolonged upper respiratory pulmonary symptoms.    This all developed and occurred in December 2022.  He was seen by Dr. Chang of surgical oncology service for consideration of surgery laparoscopic ablation.    He has 2 masses in the right lobe of the liver measuring approximately 3 and 2 cm, in close proximity, which are diagnosed on MRI as LI-RADS 4 lesions, which are highly suspicious for HCC but not definitive.    Given the close proximity and the concern for a very large ablation site, he was referred after a decision was made to stage the ablations, this would prevent him from needing 2 laparoscopic surgeries.    He is otherwise an adequate performance status, he is a little bit limited due to the chronic pulmonary symptoms related to possibly a chronic infection of his lungs with his slow but near complete recovery.  He is planning to work this spring and summer as a /[employer].    He denies any ascites or lower extremity swelling.  No abdominal pain specifically.    He does have a history of multiple skin cancers.  His brother passed away from skin cancer but sounds like he was immunocompromise.  His father also had a couple of cancers including pancreatic cancer.    PE:BP 99/64 (BP Location: Left arm, Patient Position: Sitting, Cuff Size: Adult Large)   Pulse 106   SpO2 90%   Alert no acute distress, normal mentation.  Missing anterior dentition.  Normocephalic.    Abdomen  is soft and nontender.    Labs:    Last Comprehensive Metabolic Panel:  Sodium   Date Value Ref Range Status   03/07/2023 140 136 - 145 mmol/L Final   09/27/2020 139 133 - 144 mmol/L Final     Potassium   Date Value Ref Range Status   03/07/2023 3.8 3.4 - 5.3 mmol/L Final   06/22/2022 3.9 3.4 - 5.3 mmol/L Final   09/27/2020 3.3 (L) 3.4 - 5.3 mmol/L Final     Chloride   Date Value Ref Range Status   03/07/2023 103 98 - 107 mmol/L Final   06/22/2022 109 94 - 109 mmol/L Final   09/27/2020 108 94 - 109 mmol/L Final     Carbon Dioxide   Date Value Ref Range Status   09/27/2020 25 20 - 32 mmol/L Final     Carbon Dioxide (CO2)   Date Value Ref Range Status   03/07/2023 25 22 - 29 mmol/L Final   06/22/2022 22 20 - 32 mmol/L Final     Anion Gap   Date Value Ref Range Status   03/07/2023 12 7 - 15 mmol/L Final   06/22/2022 9 3 - 14 mmol/L Final   09/27/2020 6 3 - 14 mmol/L Final     Glucose   Date Value Ref Range Status   03/07/2023 103 (H) 70 - 99 mg/dL Final   06/22/2022 110 (H) 70 - 99 mg/dL Final   09/27/2020 103 (H) 70 - 99 mg/dL Final     Urea Nitrogen   Date Value Ref Range Status   03/07/2023 9.3 8.0 - 23.0 mg/dL Final   06/22/2022 8 7 - 30 mg/dL Final   09/27/2020 16 7 - 30 mg/dL Final     Creatinine   Date Value Ref Range Status   03/07/2023 0.79 0.67 - 1.17 mg/dL Final   09/27/2020 0.82 0.66 - 1.25 mg/dL Final     GFR Estimate   Date Value Ref Range Status   03/07/2023 >90 >60 mL/min/1.73m2 Final     Comment:     eGFR calculated using 2021 CKD-EPI equation.   09/27/2020 >90 >60 mL/min/[1.73_m2] Final     Comment:     Non  GFR Calc  Starting 12/18/2018, serum creatinine based estimated GFR (eGFR) will be   calculated using the Chronic Kidney Disease Epidemiology Collaboration   (CKD-EPI) equation.       Calcium   Date Value Ref Range Status   03/07/2023 9.5 8.8 - 10.2 mg/dL Final   09/27/2020 7.8 (L) 8.5 - 10.1 mg/dL Final     Bilirubin Total   Date Value Ref Range Status   03/07/2023 1.4 (H) <=1.2  mg/dL Final   09/27/2020 3.3 (H) 0.2 - 1.3 mg/dL Final     Alkaline Phosphatase   Date Value Ref Range Status   03/07/2023 72 40 - 129 U/L Final   09/27/2020 84 40 - 150 U/L Final     ALT   Date Value Ref Range Status   03/07/2023 60 (H) 10 - 50 U/L Final   09/27/2020 36 0 - 70 U/L Final     AST   Date Value Ref Range Status   03/07/2023 87 (H) 10 - 50 U/L Final   09/27/2020 48 (H) 0 - 45 U/L Final     INR   Date Value Ref Range Status   03/07/2023 1.23 (H) 0.85 - 1.15 Final   09/27/2020 1.64 (H) 0.86 - 1.14 Final      Alpha-fetoprotein: 9.2    Imaging:    I reviewed the MRI of the liver obtained today which demonstrates:    1. Cirrhosis and evidence of portal hypertension. Marked hepatic  steatosis.  2. 3.5 and 2.3 cm LR4 observations in segments 8 and 7, respectively,  minimally increased since 1/10/2023, not meeting threshold growth  criteria. Again these are characterized as LR-4 since washout is not  demonstrated, though this may be due to severe steatosis of the  background liver. These remain highly suspicious for HCC given  suspicious T2 signal, diffusion signal, and growth.   3. Several LR 3 observations scattered throughout the right hepatic  lobe. Continued attention on follow-up recommended.  4. Based on this exam only, the patient is within Antony criteria.  5. Mild narrowing of the celiac trunk origin at the level of median  arcuate ligament.    I reviewed the ultrasound of the liver which was performed today which demonstrates:    Extremely limited evaluation. Multiple structures are not visualized  on this exam.     1. Cirrhotic liver.  a. LI-RADS US Category: US-3 Positive: Observation(s) detected that  warrant multiphase contrast-enhanced imaging  b. Redemonstration of suspicious right hepatic lesions compared to  2/7/2023 MRI suspicious for hepatocellular carcinoma. Please also see  same day MRI of the abdomen.  2. Mildly thickened gallbladder, most likely related to underlying  cirrhosis.  3.  Visualized vasculature appears patent.    Assessment/plan:    62-year-old with multifocal HCC, BCLC A/B, with the largest mass measuring approximately 3.2 cm, slightly increased in size from January, however does not meet threshold growth criteria.  There is an adjacent possibly 2 cm mass.  There are several additional small enhancing foci, including a 9 mm foci adjacent to the 3 cm mass, which is concerning for satellite lesion.    Both lesions remain categorized as LI-RADS 4 lesions.    The plan is to perform biopsies of both lesions along with ablation.  The larger,  3.2 cm lesion will be targeted first.  Given the presence of an adjacent satellite lesion, poor visualization on ultrasound and CT, and now borderline in size (>3cm), I would like to perform a combined treatment with cTACE with ablation at the same session.  There is increasing data to support the use of TACE/ablation in the treatment of 3 to 5 cm lesions in regards to improved radiologic response.    Following this treatment, we will perform biopsy and ablation of the smaller 2 cm lesion approximately 3 to 4 weeks later, depending on liver function preservation.    The patient had several questions regarding prognosis  with and without any treatment.    Given that he is currently within the [place], I encouraged him to pursue treatment rather than conservative management, as if we can keep him in the transplant criteria he potentially has significant survival ahead of him.    My concern currently is his residual pulmonary symptoms, and he does seem to be an active smoker.  In this regard, I would like him to see the preanesthesia clinic for risk factor assessment to undergo general anesthesia for his ablation procedure.      Again, thank you for allowing me to participate in the care of your patient.      Sincerely,    Parish Christopher MD

## 2023-04-19 NOTE — NURSING NOTE
Chief Complaint   Patient presents with     New Patient     Referral consult for liver directed therapy.          Vitals were taken and medications reconciled.     Kinsey Pérez, Visit Facilitator    3:26 PM

## 2023-04-20 NOTE — PROGRESS NOTES
Ms. Marcano is a very pleasant 62-year-old male with cirrhosis complicated by recent diagnosis of multifocal hepatocellular carcinoma which was picked up incidentally after receiving the chest CT in the setting of prolonged upper respiratory pulmonary symptoms.    This all developed and occurred in December 2022.  He was seen by Dr. Chang of surgical oncology service for consideration of surgery laparoscopic ablation.    He has 2 masses in the right lobe of the liver measuring approximately 3 and 2 cm, in close proximity, which are diagnosed on MRI as LI-RADS 4 lesions, which are highly suspicious for HCC but not definitive.    Given the close proximity and the concern for a very large ablation site, he was referred after a decision was made to stage the ablations, this would prevent him from needing 2 laparoscopic surgeries.    He is otherwise an adequate performance status, he is a little bit limited due to the chronic pulmonary symptoms related to possibly a chronic infection of his lungs with his slow but near complete recovery.  He is planning to work this spring and summer as a /[employer].    He denies any ascites or lower extremity swelling.  No abdominal pain specifically.    He does have a history of multiple skin cancers.  His brother passed away from skin cancer but sounds like he was immunocompromise.  His father also had a couple of cancers including pancreatic cancer.    PE:BP 99/64 (BP Location: Left arm, Patient Position: Sitting, Cuff Size: Adult Large)   Pulse 106   SpO2 90%   Alert no acute distress, normal mentation.  Missing anterior dentition.  Normocephalic.    Abdomen is soft and nontender.    Labs:    Last Comprehensive Metabolic Panel:  Sodium   Date Value Ref Range Status   03/07/2023 140 136 - 145 mmol/L Final   09/27/2020 139 133 - 144 mmol/L Final     Potassium   Date Value Ref Range Status   03/07/2023 3.8 3.4 - 5.3 mmol/L Final   06/22/2022 3.9 3.4 - 5.3 mmol/L Final    09/27/2020 3.3 (L) 3.4 - 5.3 mmol/L Final     Chloride   Date Value Ref Range Status   03/07/2023 103 98 - 107 mmol/L Final   06/22/2022 109 94 - 109 mmol/L Final   09/27/2020 108 94 - 109 mmol/L Final     Carbon Dioxide   Date Value Ref Range Status   09/27/2020 25 20 - 32 mmol/L Final     Carbon Dioxide (CO2)   Date Value Ref Range Status   03/07/2023 25 22 - 29 mmol/L Final   06/22/2022 22 20 - 32 mmol/L Final     Anion Gap   Date Value Ref Range Status   03/07/2023 12 7 - 15 mmol/L Final   06/22/2022 9 3 - 14 mmol/L Final   09/27/2020 6 3 - 14 mmol/L Final     Glucose   Date Value Ref Range Status   03/07/2023 103 (H) 70 - 99 mg/dL Final   06/22/2022 110 (H) 70 - 99 mg/dL Final   09/27/2020 103 (H) 70 - 99 mg/dL Final     Urea Nitrogen   Date Value Ref Range Status   03/07/2023 9.3 8.0 - 23.0 mg/dL Final   06/22/2022 8 7 - 30 mg/dL Final   09/27/2020 16 7 - 30 mg/dL Final     Creatinine   Date Value Ref Range Status   03/07/2023 0.79 0.67 - 1.17 mg/dL Final   09/27/2020 0.82 0.66 - 1.25 mg/dL Final     GFR Estimate   Date Value Ref Range Status   03/07/2023 >90 >60 mL/min/1.73m2 Final     Comment:     eGFR calculated using 2021 CKD-EPI equation.   09/27/2020 >90 >60 mL/min/[1.73_m2] Final     Comment:     Non  GFR Calc  Starting 12/18/2018, serum creatinine based estimated GFR (eGFR) will be   calculated using the Chronic Kidney Disease Epidemiology Collaboration   (CKD-EPI) equation.       Calcium   Date Value Ref Range Status   03/07/2023 9.5 8.8 - 10.2 mg/dL Final   09/27/2020 7.8 (L) 8.5 - 10.1 mg/dL Final     Bilirubin Total   Date Value Ref Range Status   03/07/2023 1.4 (H) <=1.2 mg/dL Final   09/27/2020 3.3 (H) 0.2 - 1.3 mg/dL Final     Alkaline Phosphatase   Date Value Ref Range Status   03/07/2023 72 40 - 129 U/L Final   09/27/2020 84 40 - 150 U/L Final     ALT   Date Value Ref Range Status   03/07/2023 60 (H) 10 - 50 U/L Final   09/27/2020 36 0 - 70 U/L Final     AST   Date Value Ref  Range Status   03/07/2023 87 (H) 10 - 50 U/L Final   09/27/2020 48 (H) 0 - 45 U/L Final     INR   Date Value Ref Range Status   03/07/2023 1.23 (H) 0.85 - 1.15 Final   09/27/2020 1.64 (H) 0.86 - 1.14 Final      Alpha-fetoprotein: 9.2    Imaging:    I reviewed the MRI of the liver obtained today which demonstrates:    1. Cirrhosis and evidence of portal hypertension. Marked hepatic  steatosis.  2. 3.5 and 2.3 cm LR4 observations in segments 8 and 7, respectively,  minimally increased since 1/10/2023, not meeting threshold growth  criteria. Again these are characterized as LR-4 since washout is not  demonstrated, though this may be due to severe steatosis of the  background liver. These remain highly suspicious for HCC given  suspicious T2 signal, diffusion signal, and growth.   3. Several LR 3 observations scattered throughout the right hepatic  lobe. Continued attention on follow-up recommended.  4. Based on this exam only, the patient is within Antony criteria.  5. Mild narrowing of the celiac trunk origin at the level of median  arcuate ligament.    I reviewed the ultrasound of the liver which was performed today which demonstrates:    Extremely limited evaluation. Multiple structures are not visualized  on this exam.     1. Cirrhotic liver.  a. LI-RADS US Category: US-3 Positive: Observation(s) detected that  warrant multiphase contrast-enhanced imaging  b. Redemonstration of suspicious right hepatic lesions compared to  2/7/2023 MRI suspicious for hepatocellular carcinoma. Please also see  same day MRI of the abdomen.  2. Mildly thickened gallbladder, most likely related to underlying  cirrhosis.  3. Visualized vasculature appears patent.    Assessment/plan:    62-year-old with multifocal HCC, BCLC A/B, with the largest mass measuring approximately 3.2 cm, slightly increased in size from January, however does not meet threshold growth criteria.  There is an adjacent possibly 2 cm mass.  There are several  additional small enhancing foci, including a 9 mm foci adjacent to the 3 cm mass, which is concerning for satellite lesion.    Both lesions remain categorized as LI-RADS 4 lesions.    The plan is to perform biopsies of both lesions along with ablation.  The larger,  3.2 cm lesion will be targeted first.  Given the presence of an adjacent satellite lesion, poor visualization on ultrasound and CT, and now borderline in size (>3cm), I would like to perform a combined treatment with cTACE with ablation at the same session.  There is increasing data to support the use of TACE/ablation in the treatment of 3 to 5 cm lesions in regards to improved radiologic response.    Following this treatment, we will perform biopsy and ablation of the smaller 2 cm lesion approximately 3 to 4 weeks later, depending on liver function preservation.    The patient had several questions regarding prognosis  with and without any treatment.    Given that he is currently within the [place], I encouraged him to pursue treatment rather than conservative management, as if we can keep him in the transplant criteria he potentially has significant survival ahead of him.    My concern currently is his residual pulmonary symptoms, and he does seem to be an active smoker.  In this regard, I would like him to see the preanesthesia clinic for risk factor assessment to undergo general anesthesia for his ablation procedure.

## 2023-05-01 ENCOUNTER — TELEPHONE (OUTPATIENT)
Dept: VASCULAR SURGERY | Facility: CLINIC | Age: 63
End: 2023-05-01

## 2023-05-01 NOTE — TELEPHONE ENCOUNTER
Called and spoke to Sohan regarding his upcoming procedure with Dr. Christopher. Mailed procedure instructions and sent e-mail copy as well as patient is not active on [employer]. Provided patient with the number to call to schedule his Pre-op eval with the PAC clinic.     Olivia LOVING RN, BSN   Interventional Radiology [facility]   [phone]

## 2023-05-08 ENCOUNTER — PRE VISIT (OUTPATIENT)
Dept: SURGERY | Facility: CLINIC | Age: 63
End: 2023-05-08

## 2023-05-08 ENCOUNTER — ANESTHESIA EVENT (OUTPATIENT)
Dept: SURGERY | Facility: CLINIC | Age: 63
End: 2023-05-08
Payer: COMMERCIAL

## 2023-05-08 ENCOUNTER — OFFICE VISIT (OUTPATIENT)
Dept: SURGERY | Facility: CLINIC | Age: 63
End: 2023-05-08
Payer: COMMERCIAL

## 2023-05-08 VITALS
RESPIRATION RATE: 16 BRPM | HEART RATE: 59 BPM | HEIGHT: 77 IN | DIASTOLIC BLOOD PRESSURE: 97 MMHG | OXYGEN SATURATION: 96 % | TEMPERATURE: 98 F | WEIGHT: 249 LBS | SYSTOLIC BLOOD PRESSURE: 159 MMHG | BODY MASS INDEX: 29.4 KG/M2

## 2023-05-08 DIAGNOSIS — Z01.818 PRE-OP EXAMINATION: Primary | ICD-10-CM

## 2023-05-08 DIAGNOSIS — K76.9 LIVER LESION: ICD-10-CM

## 2023-05-08 LAB
ABO/RH(D): NORMAL
ANTIBODY SCREEN: NEGATIVE
SPECIMEN EXPIRATION DATE: NORMAL

## 2023-05-08 PROCEDURE — 99204 OFFICE O/P NEW MOD 45 MIN: CPT | Performed by: PHYSICIAN ASSISTANT

## 2023-05-08 RX ORDER — LACTOBACILLUS RHAMNOSUS GG 10B CELL
1 CAPSULE ORAL DAILY
COMMUNITY
End: 2023-05-17

## 2023-05-08 RX ORDER — ASPIRIN 81 MG
100 TABLET, DELAYED RELEASE (ENTERIC COATED) ORAL 2 TIMES DAILY
COMMUNITY
End: 2023-05-17

## 2023-05-08 ASSESSMENT — ENCOUNTER SYMPTOMS: SEIZURES: 0

## 2023-05-08 ASSESSMENT — LIFESTYLE VARIABLES: TOBACCO_USE: 1

## 2023-05-08 ASSESSMENT — COPD QUESTIONNAIRES
CAT_SEVERITY: MILD
COPD: 1

## 2023-05-08 ASSESSMENT — PAIN SCALES - GENERAL: PAINLEVEL: NO PAIN (0)

## 2023-05-08 NOTE — H&P
Pre-Operative H & P     CC:  Preoperative exam to assess for increased cardiopulmonary risk while undergoing surgery and anesthesia.    Date of Encounter: 5/8/2023  Primary Care Physician:  Yannick Womack     Reason for visit:   Encounter Diagnoses   Name Primary?     Pre-op examination Yes     Liver lesion        HPI  Sohan Marcano is a 62 year old male who presents for pre-operative H & P in preparation for  Procedure Information     Case: [id number] Date/Time: 05/15/23 1130    Procedure: ANESTHESIA OUT OF OR COMPUTED TOMOGRAPHY LIVER ABLATION WITH BIOPSY AND POSSIBLE TRANSARTERIAL CHEMBOLIZATION(ctACE)@1130 (Abdomen)    Anesthesia type: General    Diagnosis: Liver lesion [K76.9]    Pre-op diagnosis: Liver lesion [K76.9]    Location: [facility] / [facility]    Providers: GENERIC ANESTHESIA PROVIDER          The patient is a 62-year-old man with a complicated past medical history significant for hypertension, hyperlipidemia, thoracic aortic aneurysm, chronic dyspnea on exertion, former smoker, COPD, neuropathy, obesity, liver cirrhosis with portal hypertension, steatosis, hepatocellular carcinoma, history of  hepatitis C status posttreatment, skin cancer, osteoarthritis, history of substance abuse and alcohol abuse.  Given the patient's newly diagnosed hepatocellular carcinoma he has met with vascular surgery and been counseled for the procedure as above.    History is obtained from the patient and chart review    Hx of abnormal bleeding or anti-platelet use: none      Past Medical History  Past Medical History:   Diagnosis Date     Actinic keratosis      Aneurysm of thoracic aorta (H)      Basal cell carcinoma      Cirrhosis of liver (H)      COPD (chronic obstructive pulmonary disease) (H)      HCC (hepatocellular carcinoma) (H)      Hepatic steatosis      History of hepatitis C      HLD (hyperlipidemia)      HTN (hypertension)      Neuropathy      Obesity      Osteoarthritis      Portal hypertension (H)       Squamous cell carcinoma      Substance abuse (H)     alcohol     Uncomplicated asthma        Past Surgical History  Past Surgical History:   Procedure Laterality Date     HERNIA REPAIR      as child     [facility] D[facility] PROCEDURE       wisdom teeth removal         Prior to Admission Medications  Current Outpatient Medications   Medication Sig Dispense Refill     albuterol (PROAIR HFA/PROVENTIL HFA/VENTOLIN HFA) 108 (90 Base) MCG/ACT inhaler Inhale 2 puffs into the lungs every 6 hours as needed for shortness of breath / dyspnea or wheezing (Patient taking differently: Inhale 1 puff into the lungs daily) 8 g 3     budesonide-formoterol (SYMBICORT) 80-4.5 MCG/ACT Inhaler Inhale 2 puffs into the lungs 2 times daily 10 g 4     Cholecalciferol (VITAMIN D) 125 MCG (5000 UT) capsule Take 1 capsule (5,000 Units) by mouth daily 90 capsule 3     docusate sodium (COLACE) 100 MG tablet Take 100 mg by mouth 2 times daily       gabapentin (NEURONTIN) 300 MG capsule Take 1 capsule (300 mg) by mouth 3 times daily as needed for neuropathic pain (Patient taking differently: Take 300 mg by mouth 2 times daily) 90 capsule 3     ibuprofen (ADVIL/MOTRIN) 200 MG tablet Take 200 mg by mouth every 4 hours as needed for pain       lactobacillus rhamnosus, GG, (CULTURELL) capsule Take 1 capsule by mouth daily       losartan (COZAAR) 25 MG tablet Take 1 tablet (25 mg) by mouth daily 90 tablet 3     multivitamin w/minerals (THERA-VIT-M) tablet Take 1 tablet by mouth daily (with breakfast) 30 tablet 0     atorvastatin (LIPITOR) 20 MG tablet Take 1 tablet (20 mg) by mouth daily (Patient not taking: Reported on 3/21/2023) 90 tablet 3     diclofenac (VOLTAREN) 1 % topical gel Apply 2 g topically 4 times daily (Patient not taking: Reported on 3/21/2023) 350 g 3     ferrous sulfate (FEROSUL) 325 (65 Fe) MG tablet Take 1 tablet (325 mg) by mouth daily (with breakfast) (Patient not taking: Reported on 3/21/2023) 90 tablet 0     ipratropium (ATROVENT  "HFA) 17 MCG/ACT inhaler Inhale 2 puffs into the lungs every 6 hours (Patient not taking: Reported on 5/8/2023) 12.9 g 0       Allergies  No Known Allergies    Social History  Social History     Socioeconomic History     Marital status: Single     Spouse name: Not on file     Number of children: Not on file     Years of education: Not on file     Highest education level: Not on file   Occupational History     Not on file   Tobacco Use     Smoking status: Former     Packs/day: 0.25     Types: Cigarettes     Smokeless tobacco: Never     Tobacco comments:     Hasn't smoked since end of Oct 2022 then had several relapses. Reports last smoke 3 cigarettes about 1-2 weeks ago (5/8/23)   Vaping Use     Vaping status: Former   Substance and Sexual Activity     Alcohol use: Yes     Alcohol/week: 20.0 standard drinks of alcohol     Types: 20 Standard drinks or equivalent per week     Comment: 4-5/day - trying to slowly cut back by 1 ounce per day.     Drug use: Not Currently     Sexual activity: Not Currently   Other Topics Concern     Parent/sibling w/ CABG, MI or angioplasty before 65F 55M? Not Asked   Social History Narrative     Not on file     Social Determinants of Health     Financial Resource Strain: Not on file   Food Insecurity: Not on file   Transportation Needs: Not on file   Physical Activity: Not on file   Stress: Not on file   Social Connections: Not on file   Intimate Partner Violence: Not on file   Housing Stability: Not on file       Family History  Family History   Problem Relation Age of Onset     Other - See Comments Mother         Patient states his Mother ? had skin cancer.     Other - See Comments Father         Patient reports Father had \"all types of skin cancer\".     Coronary Artery Disease Father      CABG Father      Other - See Comments Sister         Basal cell carcinoma     Coronary Artery Disease Sister      Aortic aneurysm Sister      Coronary Artery Disease Brother      Anesthesia Reaction No " "family hx of      Venous thrombosis No family hx of        Review of Systems  The complete review of systems is negative other than noted in the HPI or here.   Anesthesia Evaluation   Pt has had prior anesthetic. Type: General.    No history of anesthetic complications       ROS/MED HX  ENT/Pulmonary:     (+) tobacco use, Past use, Intermittent, asthma Treatment: Inhaler prn and Inhaler daily,  mild,  COPD,     Neurologic:     (+) peripheral neuropathy, - Feet.  (-) no seizures, no CVA, no TIA and migraines   Cardiovascular: Comment: Thoracic aortic aneurysm - 4.5 cm    (+) hypertension-----MCKENZIE. Previous cardiac testing   Echo: Date: 1/18/23 Results:    Stress Test: Date: Results:    ECG Reviewed: Date: 1/6/23 Results:  Sinus rhythm   Left anterior fascicular block   Abnormal ECG    Cath: Date: Results:      METS/Exercise Tolerance: 4 - Raking leaves, gardening    Hematologic:       Musculoskeletal:   (+) arthritis,     GI/Hepatic: Comment: Alcoholic cirrhosis with portal HTN    Hepatic steatosis    History of Hep C s/p treatment in SVR    HCC    (+) GERD, Other, hepatitis type Alcoholic and C, liver disease,     Renal/Genitourinary:  - neg Renal ROS     Endo:     (+) Obesity,     Psychiatric/Substance Use:     (+) alcohol abuse     Infectious Disease:  - neg infectious disease ROS     Malignancy:   (+) Malignancy, History of Skin and GI.GI CA Active status post.  Skin CA Remission status post.        Other:  - neg other ROS          BP (!) 159/97 (BP Location: Right arm, Patient Position: Sitting, Cuff Size: Adult Large)   Pulse 59   Temp 98  F (36.7  C) (Oral)   Resp 16   Ht 1.956 m (6' 5\")   Wt 112.9 kg (249 lb)   SpO2 96%   BMI 29.53 kg/m      Physical Exam   Constitutional: Awake, alert, cooperative, no apparent distress, and appears stated age.  Eyes: Pupils equal, round and reactive to light, extra ocular muscles intact, sclera clear, conjunctiva normal.  HENT: Normocephalic, oral pharynx with moist " mucus membranes, fair dentition - missing front teeth. No goiter appreciated. Full beard  Respiratory: Clear to auscultation bilaterally, no crackles or wheezing.  Cardiovascular: Regular rate and rhythm, normal S1 and S2, and no murmur noted.  Carotids +2, no bruits. No edema. Palpable pulses to radial  DP and PT arteries.   GI: Normal bowel sounds, soft, non-distended, non-tender, no masses palpated, no hepatosplenomegaly.    Lymph/Hematologic: No cervical lymphadenopathy and no supraclavicular lymphadenopathy.  Genitourinary:  defer  Skin: Warm and dry.  No rashes at anticipated surgical site.   Musculoskeletal: Slightly limited ROM of neck. There is no redness, warmth, or swelling of the joints. Gross motor strength is normal.    Neurologic: Awake, alert, oriented to name, place and time. Cranial nerves II-XII are grossly intact. Gait is normal.   Neuropsychiatric: Calm, cooperative. Normal affect.     Prior Labs/Diagnostic Studies   All labs and imaging personally reviewed     EKG/ stress test - if available please see in ROS above     Echo 1/18/23  Interpretation Summary  Global and regional left ventricular function is normal with an EF of 55-60%.  Right ventricular function, chamber size, wall motion, and thickness are  normal.  Sinuses of Valsalva 4.4 cm.  Ascending aorta 4.6 cm.  Mild dilation of aorta for BSA of 2.2m2.  Aortic root measured 4.3cms and ascending aorta 3.8cms in 2014.    CT chest without contrast contrast 12/29/22  IMPRESSION: Granulomatous disease. Ectatic mid ascending thoracic  aorta approximately 4.5 cm. Atherosclerosis including coronary artery  calcifications. Chronic liver disease with underlying fatty  infiltration. Cannot exclude relatively hyperdense mass in the right  lobe as described. Recommend contrasted hepatic mass protocol CT or  MRI for further evaluation.    CT Low Dose Lung Cancer Screening 7/26/22  Findings:      No suspicious pulmonary nodules.     Emphysema: Mild  centrilobular emphysema     Coronary artery calcium: moderate     Additional findings: Calcified granuloma in the right basilar lung.  Right hilar calcified granulomas. Fissural-based lymph node along the  left major failure. Cirrhotic configuration of the liver with nodular  surface contour. Likely a recannulized umbilical vein. Other  varicosities in the left upper quadrant. Calcified granulomas in the  spleen. Biapical scarring.                                                                      Impression:   1. ACR Assessment Category (v1.1):  Lung-RADS Category 2. Benign  appearance or behavior.       Recommendation:  Lung-RADS Category 2. Benign appearance or behavior.  Recommendation:  continue annual screening with Lung cancer screening  CT (please order exam code [id number]).         2. Significant Incidental Finding(s):  Category S: Yes.  a. Cirrhotic configuration of the liver. Evidence for portal venous  hypertension with varicosities in the left upper quadrant and a  recannulized umbilical vein.     3. Mild centrilobular emphysema.     4. Avoidance of tobacco smoke is strongly advised. Please consider  referral for smoking cessation to [facility] Medication Therapy Management  (MTM) if clinically appropriate.     5. Moderate coronary artery calcifications.     6. Chronic granulomatous disease.          Latest Ref Rng & Units 7/18/2022    10:01 AM   PFT   FVC L 5.86     FEV1 L 2.90     FVC% % 103     FEV1% % 68           The patient's records and results personally reviewed by this provider.     Outside records reviewed from: Care Everywhere      Assessment      Sohan Marcano is a 62 year old male seen as a PAC referral for risk assessment and optimization for anesthesia.    Plan/Recommendations  Pt will be optimized for the proposed procedure.  See below for details on the assessment, risk, and preoperative recommendations    NEUROLOGY  - No history of TIA, CVA or seizure   ~ neuropathy - feet  -Post Op  delirium risk factors:  High co-morbid index    ENT  - No current airway concerns.  Will need to be reassessed day of surgery.  Mallampati: I  TM: > 3    CARDIAC  - CAD - moderate coronary calcification seen on CT scan  - Hypertension  Blood pressure elevated at today's exam.  The patient isn't taking his cozaar anymore. He was counseled to restart it today. After discussion with Dr. Wagner will have him continue even on the day of surgery given his thoracic aortic aneurysm.   - Hyperlipidemia  Counseled to restart lipitor  - Thoracic aortic aneurysm 4.5 cm - seen by Dr. Ozzie Tyler on 1/6/23. At this time recommended follow up echo which was completed on 1/18/23. The patient was advised to continue on medications for HTN, HLD and follow up with CT chest in 6 months.   - METS (Metabolic Equivalents)  Patient performs 4 or more METS exercise without symptoms            Total Score: 0      RCRI-Low risk: Class 2 0.9% complication rate            Total Score: 1    RCRI: CAD    ~ The patient reports that in the past week his MCKENZIE has resolved and he is now able to go up a flight of stairs without symptoms. He also continues to work in [email or web address]. Given that he denies cardiac symptoms currently and reports 4 METS, given time sensitivity of procedure for cancer will continue without further work up.     PULMONARY  - Obstructive Sleep Apnea  No current risk of obstructive sleep apnea   MARLYN Medium Risk            Total Score: 3    MARLYN: Hypertension    MARLYN: Over 50 ys old    MARLYN: Male      - Asthma/ COPD - followed by pulmonology and seen on 1/23/23 with recommendations to follow up in 6 months  Well controlled -   ~ The patient had ongoing respiratory infection and was treated for pneumonia in 12/2022. He reports he continue with symptoms until about a week ago. He is using albuterol and symbicort.   - Tobacco History      History   Smoking Status     Former     Packs/day: 0.25     Types: Cigarettes   Smokeless Tobacco      "Never   ~ The patient had a couple of relapses of smoking since 10/2022. He had 3 cigarettes about 1-2 weeks ago. He will continue with smoking cessation for his procedure.     GI  - Alcoholic cirrhosis - portal HTN, hepatic steatosis - followed by GI.   ~ History of Hep C s/p treatment and SVR  ~ HCC - procedure as above.   ~ The patient reports GERD symptoms in the AM if he has too many drinks the night before.   PONV Medium Risk  Total Score: 2           1 AN PONV: Patient is not a current smoker    1 AN PONV: Intended Post Op Opioids        /RENAL  - Baseline Creatinine  0.79    ENDOCRINE    - BMI: Estimated body mass index is 29.53 kg/m  as calculated from the following:    Height as of this encounter: 1.956 m (6' 5\").    Weight as of this encounter: 112.9 kg (249 lb).  Obesity (BMI >30)  - No history of Diabetes Mellitus    HEME  VTE High Risk 3%            Total Score: 8    VTE: Greater than 59 yrs old    VTE: Male    VTE: Current cancer      - No history of abnormal bleeding or antiplatelet use.  A type and screen has not been done and deferred to the team day of surgery    MSK  ~ OA - hands    PSYCH  - history of IV substance abuse  ~ Alcohol abuse - the patient drinks 4-5 drinks a night. He reports he last quit in 2019. He has never been hospitalized for withdrawal symptoms. He reports his withdrawal symptoms are insomnia and hallucinations. He knows he needs to avoid alcohol for the procedure and is currently cutting back 1 ounce of alcohol a night. He plans to be done drinking by the end of this week. He was counseled to not drink for 24 hours prior to surgery which he feels he can do.     Different anesthesia methods/types have been discussed with the patient, but they are aware that the final plan will be decided by the assigned anesthesia provider on the date of service.    Patient was discussed with Dr Wagner    The patient is optimized for their procedure. The patient will be completing labs for " Dr. Jordan tomorrow. A type and screen was added to his laboratory orders.     AVS with information on surgery time/arrival time, meds and NPO status given by nursing staff. No further diagnostic testing indicated.      On the day of service:     Prep time: 11 minutes  Visit time: 25 minutes  Documentation time: 13 minutes  ------------------------------------------  Total time: 49 minutes      Tess Resendez PA-C  Preoperative Assessment Center  [facility]  Clinic and Surgery Center  Phone: [phone]  Fax: [phone]

## 2023-05-08 NOTE — PATIENT INSTRUCTIONS
Preparing for Your Surgery      Name:  Sohan Marcano   MRN:  [id number]   :  1960   Today's Date:  2023       Arriving for surgery:  Surgery date:  5/15/23  Arrival time:  09:30 am    Please come to:     [facility] [address]  [address] [place]  [address]    - ? parking is available in front of the hospital      -    Please proceed to [address] on the 3rd floor. [phone]?     - ?If you are in need of directions, wheelchair or escort please stop at the Information Desk in the lobby.  Inform the information person that you are here for surgery; a wheelchair and escort to [address] will be provided.?     What can I eat or drink?  -  You may eat and drink normally up to 8 hours prior to arrival time. (Until 01:30 am)  -  You may have clear liquids until 2 hours prior to arrival time. (Until 07:30 am)    Examples of clear liquids:  Water  Clear broth  Juices (apple, white grape, white cranberry  and cider) without pulp  Noncarbonated, powder based beverages  (lemonade and Roe-Aid)  Sodas (Sprite, 7-Up, ginger ale and seltzer)  Coffee or tea (without milk or cream)  Gatorade    -  No Alcohol for at least 24 hours before surgery.     Which medicines can I take?    Hold Aspirin for 7 days before surgery.   Hold Multivitamins for 7 days before surgery.  Hold Supplements for 7 days before surgery.  Hold Ibuprofen (Advil, Motrin) for 1 day(s) before surgery--unless otherwise directed by surgeon.  Hold Naproxen (Aleve) for 4 days before surgery.    HOLD NICOTINE GUM ON DAY OF SURGERY.      -  PLEASE TAKE these medications the day of surgery:  Albuterol inhaler - please bring inhaler to pre-op, bydesonide - formoterol (Symbicort),   Ipratropium (Atrovent HFA), Losartan (Cozaar)      How do I prepare myself?  - Please take 2 showers (one the night prior to surgery and one the morning of surgery) using Scrubcare or Hibiclens soap.    Use  this soap only from the neck to your toes.     Leave the soap on your skin for one minute--then rinse thoroughly.      You may use your own shampoo and conditioner. No other hair products.   - Please remove all jewelry and body piercings.  - No lotions, deodorants or fragrance.  - No makeup or fingernail polish.   - Bring your ID and insurance card.    -If you have a Deep Brain Stimulator, Spinal Cord Stimulator, or any Neuro Stimulator device---you must bring the remote control to the hospital.      ALL PATIENTS GOING HOME THE SAME DAY OF SURGERY ARE REQUIRED TO HAVE A RESPONSIBLE ADULT TO DRIVE AND BE IN ATTENDANCE WITH THEM FOR 24 HOURS FOLLOWING SURGERY.    Covid testing policy as of 12/06/2022  Your surgeon will notify and schedule you for a COVID test if one is needed before surgery--please direct any questions or COVID symptoms to your surgeon      Questions or Concerns:    - For any questions regarding the day of surgery or your hospital stay, please contact the Pre Admission Nursing Office at [phone].       - If you have health changes between today and your surgery, please call your surgeon.       - For questions after surgery, please call your surgeons office.           Current Visitor Guidelines       Surgeries and procedures: Adult patients can have 2 visitors all through the surgery process.     Visiting hours: 8 a.m. to 8:30 p.m.     Hospital: Adult patients and children under age 18 can have 4 visitor at a time       No visitors under the age of 5 are allowed for hospital patients.       Double occupancy rooms: Patients can have only two visitors at a time.       Patients confirmed or suspected to have symptoms of COVID 19 or flu:     No visitors allowed for adult patients.   Children (under age 18) can have 1 named visitor.     People who are sick or showing symptoms of COVID 19 or flu:    Are not allowed to visit patients--we can only make exceptions in special situations.       Please follow  these guidelines for your visit:          Please maintain social distance          Masking is optional--however at times you may be asked to wear a mask for the safety of yourself and others     Clean your hands with alcohol hand . Do this when you arrive at and leave the building and patient room,    And again after you touch your mask or anything in the room.     Go directly to and from the room you are visiting.     Stay in the patient s room during your visit. Limit going to other places in the hospital as much as possible     Leave bags and jackets at home or in the car.     For everyone s health, please don t come and go during your visit. That includes for smoking   during your visit.

## 2023-05-09 ENCOUNTER — ANCILLARY PROCEDURE (OUTPATIENT)
Dept: CT IMAGING | Facility: CLINIC | Age: 63
End: 2023-05-09
Attending: SURGERY
Payer: COMMERCIAL

## 2023-05-09 ENCOUNTER — LAB (OUTPATIENT)
Dept: LAB | Facility: CLINIC | Age: 63
End: 2023-05-09
Payer: COMMERCIAL

## 2023-05-09 DIAGNOSIS — K76.9 LESION OF LIVER: ICD-10-CM

## 2023-05-09 DIAGNOSIS — K76.9 LIVER LESION: ICD-10-CM

## 2023-05-09 DIAGNOSIS — D73.89 NODULE OF SPLEEN: ICD-10-CM

## 2023-05-09 DIAGNOSIS — Z01.818 PRE-OP EXAMINATION: ICD-10-CM

## 2023-05-09 LAB
AFP SERPL-MCNC: 5.1 NG/ML
ALBUMIN SERPL BCG-MCNC: 4.1 G/DL (ref 3.5–5.2)
ALP SERPL-CCNC: 77 U/L (ref 40–129)
ALT SERPL W P-5'-P-CCNC: 62 U/L (ref 10–50)
ANION GAP SERPL CALCULATED.3IONS-SCNC: 9 MMOL/L (ref 7–15)
AST SERPL W P-5'-P-CCNC: 79 U/L (ref 10–50)
BASOPHILS # BLD AUTO: 0.1 10E3/UL (ref 0–0.2)
BASOPHILS NFR BLD AUTO: 2 %
BILIRUB SERPL-MCNC: 1.3 MG/DL
BUN SERPL-MCNC: 11.9 MG/DL (ref 8–23)
CALCIUM SERPL-MCNC: 9.3 MG/DL (ref 8.8–10.2)
CHLORIDE SERPL-SCNC: 103 MMOL/L (ref 98–107)
CREAT BLD-MCNC: 0.9 MG/DL (ref 0.7–1.3)
CREAT SERPL-MCNC: 0.89 MG/DL (ref 0.67–1.17)
DEPRECATED HCO3 PLAS-SCNC: 27 MMOL/L (ref 22–29)
EOSINOPHIL # BLD AUTO: 0.5 10E3/UL (ref 0–0.7)
EOSINOPHIL NFR BLD AUTO: 11 %
ERYTHROCYTE [DISTWIDTH] IN BLOOD BY AUTOMATED COUNT: 12.5 % (ref 10–15)
GFR SERPL CREATININE-BSD FRML MDRD: >60 ML/MIN/1.73M2
GFR SERPL CREATININE-BSD FRML MDRD: >90 ML/MIN/1.73M2
GLUCOSE SERPL-MCNC: 100 MG/DL (ref 70–99)
HCT VFR BLD AUTO: 43.7 % (ref 40–53)
HGB BLD-MCNC: 15.7 G/DL (ref 13.3–17.7)
IMM GRANULOCYTES # BLD: 0 10E3/UL
IMM GRANULOCYTES NFR BLD: 0 %
INR PPP: 1.29 (ref 0.85–1.15)
LYMPHOCYTES # BLD AUTO: 1.6 10E3/UL (ref 0.8–5.3)
LYMPHOCYTES NFR BLD AUTO: 31 %
MCH RBC QN AUTO: 36.3 PG (ref 26.5–33)
MCHC RBC AUTO-ENTMCNC: 35.9 G/DL (ref 31.5–36.5)
MCV RBC AUTO: 101 FL (ref 78–100)
MONOCYTES # BLD AUTO: 0.8 10E3/UL (ref 0–1.3)
MONOCYTES NFR BLD AUTO: 15 %
NEUTROPHILS # BLD AUTO: 2.1 10E3/UL (ref 1.6–8.3)
NEUTROPHILS NFR BLD AUTO: 41 %
NRBC # BLD AUTO: 0 10E3/UL
NRBC BLD AUTO-RTO: 0 /100
PLATELET # BLD AUTO: 199 10E3/UL (ref 150–450)
POTASSIUM SERPL-SCNC: 3.8 MMOL/L (ref 3.4–5.3)
PROT SERPL-MCNC: 7.4 G/DL (ref 6.4–8.3)
RBC # BLD AUTO: 4.32 10E6/UL (ref 4.4–5.9)
SODIUM SERPL-SCNC: 139 MMOL/L (ref 136–145)
WBC # BLD AUTO: 5 10E3/UL (ref 4–11)

## 2023-05-09 PROCEDURE — 84134 ASSAY OF PREALBUMIN: CPT | Mod: 90 | Performed by: PATHOLOGY

## 2023-05-09 PROCEDURE — 99000 SPECIMEN HANDLING OFFICE-LAB: CPT | Performed by: PATHOLOGY

## 2023-05-09 PROCEDURE — 80053 COMPREHEN METABOLIC PANEL: CPT | Performed by: PATHOLOGY

## 2023-05-09 PROCEDURE — 85610 PROTHROMBIN TIME: CPT | Performed by: PATHOLOGY

## 2023-05-09 PROCEDURE — 86901 BLOOD TYPING SEROLOGIC RH(D): CPT | Mod: 90 | Performed by: PATHOLOGY

## 2023-05-09 PROCEDURE — 71260 CT THORAX DX C+: CPT | Mod: GC | Performed by: RADIOLOGY

## 2023-05-09 PROCEDURE — 86850 RBC ANTIBODY SCREEN: CPT | Mod: 90 | Performed by: PATHOLOGY

## 2023-05-09 PROCEDURE — 36415 COLL VENOUS BLD VENIPUNCTURE: CPT | Performed by: PATHOLOGY

## 2023-05-09 PROCEDURE — 86900 BLOOD TYPING SEROLOGIC ABO: CPT | Mod: 90 | Performed by: PATHOLOGY

## 2023-05-09 PROCEDURE — 82105 ALPHA-FETOPROTEIN SERUM: CPT | Mod: 90 | Performed by: PATHOLOGY

## 2023-05-09 PROCEDURE — 85025 COMPLETE CBC W/AUTO DIFF WBC: CPT | Performed by: PATHOLOGY

## 2023-05-09 PROCEDURE — 82565 ASSAY OF CREATININE: CPT | Mod: 91 | Performed by: PATHOLOGY

## 2023-05-09 RX ORDER — IOPAMIDOL 755 MG/ML
122 INJECTION, SOLUTION INTRAVASCULAR ONCE
Status: COMPLETED | OUTPATIENT
Start: 2023-05-09 | End: 2023-05-09

## 2023-05-09 RX ADMIN — IOPAMIDOL 122 ML: 755 INJECTION, SOLUTION INTRAVASCULAR at 11:23

## 2023-05-10 LAB — PREALB SERPL IA-MCNC: 13 MG/DL (ref 15–45)

## 2023-05-15 ENCOUNTER — APPOINTMENT (OUTPATIENT)
Dept: INTERVENTIONAL RADIOLOGY/VASCULAR | Facility: CLINIC | Age: 63
End: 2023-05-15
Attending: RADIOLOGY
Payer: COMMERCIAL

## 2023-05-15 ENCOUNTER — HOSPITAL ENCOUNTER (OUTPATIENT)
Facility: CLINIC | Age: 63
Discharge: HOME | End: 2023-05-15
Attending: RADIOLOGY | Admitting: RADIOLOGY
Payer: COMMERCIAL

## 2023-05-15 ENCOUNTER — ANESTHESIA (OUTPATIENT)
Dept: SURGERY | Facility: CLINIC | Age: 63
End: 2023-05-15
Payer: COMMERCIAL

## 2023-05-15 VITALS
TEMPERATURE: 97.5 F | HEIGHT: 77 IN | BODY MASS INDEX: 28.48 KG/M2 | HEART RATE: 91 BPM | OXYGEN SATURATION: 95 % | RESPIRATION RATE: 16 BRPM | DIASTOLIC BLOOD PRESSURE: 105 MMHG | SYSTOLIC BLOOD PRESSURE: 138 MMHG | WEIGHT: 241.18 LBS

## 2023-05-15 DIAGNOSIS — K76.9 LESION OF LIVER: Primary | ICD-10-CM

## 2023-05-15 DIAGNOSIS — F10.20 ALCOHOL USE DISORDER, SEVERE, DEPENDENCE (H): ICD-10-CM

## 2023-05-15 DIAGNOSIS — K76.9 LESION OF LIVER: ICD-10-CM

## 2023-05-15 DIAGNOSIS — F19.20 CHEMICAL DEPENDENCY (H): ICD-10-CM

## 2023-05-15 DIAGNOSIS — C22.0 HCC (HEPATOCELLULAR CARCINOMA) (H): ICD-10-CM

## 2023-05-15 DIAGNOSIS — C22.0 HCC (HEPATOCELLULAR CARCINOMA) (H): Primary | ICD-10-CM

## 2023-05-15 DIAGNOSIS — R03.0 ELEVATED BLOOD PRESSURE READING WITHOUT DIAGNOSIS OF HYPERTENSION: ICD-10-CM

## 2023-05-15 LAB
ABO/RH(D): NORMAL
ALBUMIN SERPL BCG-MCNC: 4.2 G/DL (ref 3.5–5.2)
ALP SERPL-CCNC: 63 U/L (ref 40–129)
ALT SERPL W P-5'-P-CCNC: 46 U/L (ref 10–50)
ANION GAP SERPL CALCULATED.3IONS-SCNC: 13 MMOL/L (ref 7–15)
ANTIBODY SCREEN: NEGATIVE
AST SERPL W P-5'-P-CCNC: 54 U/L (ref 10–50)
BILIRUB DIRECT SERPL-MCNC: 0.61 MG/DL (ref 0–0.3)
BILIRUB SERPL-MCNC: 1.8 MG/DL
BUN SERPL-MCNC: 10.1 MG/DL (ref 8–23)
CALCIUM SERPL-MCNC: 9.2 MG/DL (ref 8.8–10.2)
CHLORIDE SERPL-SCNC: 106 MMOL/L (ref 98–107)
CREAT SERPL-MCNC: 0.8 MG/DL (ref 0.67–1.17)
DEPRECATED HCO3 PLAS-SCNC: 20 MMOL/L (ref 22–29)
ERYTHROCYTE [DISTWIDTH] IN BLOOD BY AUTOMATED COUNT: 12.2 % (ref 10–15)
GFR SERPL CREATININE-BSD FRML MDRD: >90 ML/MIN/1.73M2
GLUCOSE SERPL-MCNC: 124 MG/DL (ref 70–99)
HCT VFR BLD AUTO: 46.7 % (ref 40–53)
HGB BLD-MCNC: 16.3 G/DL (ref 13.3–17.7)
INR PPP: 1.32 (ref 0.85–1.15)
MCH RBC QN AUTO: 35.8 PG (ref 26.5–33)
MCHC RBC AUTO-ENTMCNC: 34.9 G/DL (ref 31.5–36.5)
MCV RBC AUTO: 103 FL (ref 78–100)
PLATELET # BLD AUTO: 164 10E3/UL (ref 150–450)
POTASSIUM SERPL-SCNC: 3.7 MMOL/L (ref 3.4–5.3)
PROT SERPL-MCNC: 7.4 G/DL (ref 6.4–8.3)
RBC # BLD AUTO: 4.55 10E6/UL (ref 4.4–5.9)
SODIUM SERPL-SCNC: 139 MMOL/L (ref 136–145)
SPECIMEN EXPIRATION DATE: NORMAL
WBC # BLD AUTO: 5.5 10E3/UL (ref 4–11)

## 2023-05-15 PROCEDURE — C1769 GUIDE WIRE: HCPCS

## 2023-05-15 PROCEDURE — 76937 US GUIDE VASCULAR ACCESS: CPT | Mod: 26 | Performed by: RADIOLOGY

## 2023-05-15 PROCEDURE — 710N000009 HC RECOVERY PHASE 1, LEVEL 1, PER MIN

## 2023-05-15 PROCEDURE — 250N000009 HC RX 250: Performed by: NURSE PRACTITIONER

## 2023-05-15 PROCEDURE — 85610 PROTHROMBIN TIME: CPT | Performed by: NURSE PRACTITIONER

## 2023-05-15 PROCEDURE — 37243 VASC EMBOLIZE/OCCLUDE ORGAN: CPT | Mod: GC | Performed by: RADIOLOGY

## 2023-05-15 PROCEDURE — 75726 ARTERY X-RAYS ABDOMEN: CPT | Mod: XU

## 2023-05-15 PROCEDURE — 75774 ARTERY X-RAY EACH VESSEL: CPT | Mod: 26 | Performed by: RADIOLOGY

## 2023-05-15 PROCEDURE — 86850 RBC ANTIBODY SCREEN: CPT | Performed by: ANESTHESIOLOGY

## 2023-05-15 PROCEDURE — 710N000012 HC RECOVERY PHASE 2, PER MINUTE

## 2023-05-15 PROCEDURE — 255N000002 HC RX 255 OP 636: Performed by: RADIOLOGY

## 2023-05-15 PROCEDURE — 88307 TISSUE EXAM BY PATHOLOGIST: CPT | Mod: 26 | Performed by: PATHOLOGY

## 2023-05-15 PROCEDURE — 36247 INS CATH ABD/L-EXT ART 3RD: CPT | Mod: GC | Performed by: RADIOLOGY

## 2023-05-15 PROCEDURE — 88307 TISSUE EXAM BY PATHOLOGIST: CPT | Mod: TC | Performed by: RADIOLOGY

## 2023-05-15 PROCEDURE — C1889 IMPLANT/INSERT DEVICE, NOC: HCPCS

## 2023-05-15 PROCEDURE — 37243 VASC EMBOLIZE/OCCLUDE ORGAN: CPT

## 2023-05-15 PROCEDURE — 47382 PERCUT ABLATE LIVER RF: CPT | Mod: GC | Performed by: RADIOLOGY

## 2023-05-15 PROCEDURE — 250N000025 HC SEVOFLURANE, PER MIN

## 2023-05-15 PROCEDURE — 77013 CT GUIDE FOR TISSUE ABLATION: CPT | Mod: 26 | Performed by: RADIOLOGY

## 2023-05-15 PROCEDURE — 272N000505 HC NEEDLE CR5

## 2023-05-15 PROCEDURE — 47382 PERCUT ABLATE LIVER RF: CPT

## 2023-05-15 PROCEDURE — C1887 CATHETER, GUIDING: HCPCS

## 2023-05-15 PROCEDURE — 370N000017 HC ANESTHESIA TECHNICAL FEE, PER MIN

## 2023-05-15 PROCEDURE — 85018 HEMOGLOBIN: CPT | Performed by: NURSE PRACTITIONER

## 2023-05-15 PROCEDURE — 250N000011 HC RX IP 250 OP 636: Performed by: NURSE ANESTHETIST, CERTIFIED REGISTERED

## 2023-05-15 PROCEDURE — 47000 NEEDLE BIOPSY OF LIVER PERQ: CPT

## 2023-05-15 PROCEDURE — 250N000009 HC RX 250: Performed by: NURSE ANESTHETIST, CERTIFIED REGISTERED

## 2023-05-15 PROCEDURE — 250N000011 HC RX IP 250 OP 636: Performed by: RADIOLOGY

## 2023-05-15 PROCEDURE — 258N000003 HC RX IP 258 OP 636: Performed by: NURSE ANESTHETIST, CERTIFIED REGISTERED

## 2023-05-15 PROCEDURE — 75726 ARTERY X-RAYS ABDOMEN: CPT | Mod: 26 | Performed by: RADIOLOGY

## 2023-05-15 PROCEDURE — 250N000011 HC RX IP 250 OP 636: Performed by: NURSE PRACTITIONER

## 2023-05-15 PROCEDURE — 36415 COLL VENOUS BLD VENIPUNCTURE: CPT | Performed by: NURSE PRACTITIONER

## 2023-05-15 PROCEDURE — 82248 BILIRUBIN DIRECT: CPT | Performed by: NURSE PRACTITIONER

## 2023-05-15 PROCEDURE — 272N000143 HC KIT CR3

## 2023-05-15 PROCEDURE — 999N000141 HC STATISTIC PRE-PROCEDURE NURSING ASSESSMENT

## 2023-05-15 PROCEDURE — 96420 CHEMO IA PUSH TECNIQUE: CPT

## 2023-05-15 PROCEDURE — 75774 ARTERY X-RAY EACH VESSEL: CPT | Mod: XU

## 2023-05-15 PROCEDURE — 77013 CT GUIDE FOR TISSUE ABLATION: CPT

## 2023-05-15 PROCEDURE — 80053 COMPREHEN METABOLIC PANEL: CPT | Performed by: NURSE PRACTITIONER

## 2023-05-15 PROCEDURE — 36247 INS CATH ABD/L-EXT ART 3RD: CPT

## 2023-05-15 PROCEDURE — 250N000009 HC RX 250: Performed by: RADIOLOGY

## 2023-05-15 RX ORDER — ONDANSETRON 2 MG/ML
INJECTION INTRAMUSCULAR; INTRAVENOUS PRN
Status: DISCONTINUED | OUTPATIENT
Start: 2023-05-15 | End: 2023-05-15

## 2023-05-15 RX ORDER — FENTANYL CITRATE 50 UG/ML
50 INJECTION, SOLUTION INTRAMUSCULAR; INTRAVENOUS EVERY 5 MIN PRN
Status: DISCONTINUED | OUTPATIENT
Start: 2023-05-15 | End: 2023-05-15 | Stop reason: HOSPADM

## 2023-05-15 RX ORDER — LIDOCAINE HYDROCHLORIDE 20 MG/ML
INJECTION, SOLUTION INFILTRATION; PERINEURAL PRN
Status: DISCONTINUED | OUTPATIENT
Start: 2023-05-15 | End: 2023-05-15

## 2023-05-15 RX ORDER — ONDANSETRON 4 MG/1
4 TABLET, ORALLY DISINTEGRATING ORAL EVERY 30 MIN PRN
Status: DISCONTINUED | OUTPATIENT
Start: 2023-05-15 | End: 2023-05-15 | Stop reason: HOSPADM

## 2023-05-15 RX ORDER — PROPOFOL 10 MG/ML
INJECTION, EMULSION INTRAVENOUS PRN
Status: DISCONTINUED | OUTPATIENT
Start: 2023-05-15 | End: 2023-05-15

## 2023-05-15 RX ORDER — FENTANYL CITRATE 50 UG/ML
INJECTION, SOLUTION INTRAMUSCULAR; INTRAVENOUS PRN
Status: DISCONTINUED | OUTPATIENT
Start: 2023-05-15 | End: 2023-05-15

## 2023-05-15 RX ORDER — PROCHLORPERAZINE MALEATE 5 MG
10 TABLET ORAL EVERY 6 HOURS PRN
Status: DISCONTINUED | OUTPATIENT
Start: 2023-05-15 | End: 2023-05-15 | Stop reason: HOSPADM

## 2023-05-15 RX ORDER — SODIUM CHLORIDE 9 MG/ML
INJECTION, SOLUTION INTRAVENOUS CONTINUOUS
Status: DISCONTINUED | OUTPATIENT
Start: 2023-05-15 | End: 2023-05-15 | Stop reason: HOSPADM

## 2023-05-15 RX ORDER — ONDANSETRON 2 MG/ML
4 INJECTION INTRAMUSCULAR; INTRAVENOUS EVERY 6 HOURS PRN
Status: DISCONTINUED | OUTPATIENT
Start: 2023-05-15 | End: 2023-05-15 | Stop reason: HOSPADM

## 2023-05-15 RX ORDER — ONDANSETRON 4 MG/1
4-8 TABLET, FILM COATED ORAL EVERY 6 HOURS PRN
Qty: 40 TABLET | Refills: 0 | Status: SHIPPED | OUTPATIENT
Start: 2023-05-15 | End: 2023-05-17

## 2023-05-15 RX ORDER — FENTANYL CITRATE 50 UG/ML
25 INJECTION, SOLUTION INTRAMUSCULAR; INTRAVENOUS EVERY 5 MIN PRN
Status: DISCONTINUED | OUTPATIENT
Start: 2023-05-15 | End: 2023-05-15 | Stop reason: HOSPADM

## 2023-05-15 RX ORDER — PROCHLORPERAZINE 25 MG
25 SUPPOSITORY, RECTAL RECTAL EVERY 12 HOURS PRN
Status: DISCONTINUED | OUTPATIENT
Start: 2023-05-15 | End: 2023-05-15 | Stop reason: HOSPADM

## 2023-05-15 RX ORDER — ONDANSETRON 2 MG/ML
4 INJECTION INTRAMUSCULAR; INTRAVENOUS EVERY 30 MIN PRN
Status: DISCONTINUED | OUTPATIENT
Start: 2023-05-15 | End: 2023-05-15 | Stop reason: HOSPADM

## 2023-05-15 RX ORDER — ONDANSETRON 4 MG/1
4 TABLET, ORALLY DISINTEGRATING ORAL EVERY 6 HOURS PRN
Status: DISCONTINUED | OUTPATIENT
Start: 2023-05-15 | End: 2023-05-15 | Stop reason: HOSPADM

## 2023-05-15 RX ORDER — SODIUM CHLORIDE, SODIUM LACTATE, POTASSIUM CHLORIDE, CALCIUM CHLORIDE 600; 310; 30; 20 MG/100ML; MG/100ML; MG/100ML; MG/100ML
INJECTION, SOLUTION INTRAVENOUS CONTINUOUS
Status: DISCONTINUED | OUTPATIENT
Start: 2023-05-15 | End: 2023-05-15 | Stop reason: HOSPADM

## 2023-05-15 RX ORDER — OXYCODONE HYDROCHLORIDE 5 MG/1
5 TABLET ORAL EVERY 4 HOURS PRN
Status: DISCONTINUED | OUTPATIENT
Start: 2023-05-15 | End: 2023-05-15 | Stop reason: HOSPADM

## 2023-05-15 RX ORDER — ONDANSETRON 2 MG/ML
4 INJECTION INTRAMUSCULAR; INTRAVENOUS ONCE
Status: DISCONTINUED | OUTPATIENT
Start: 2023-05-15 | End: 2023-05-15 | Stop reason: HOSPADM

## 2023-05-15 RX ORDER — HYDROMORPHONE HYDROCHLORIDE 1 MG/ML
0.4 INJECTION, SOLUTION INTRAMUSCULAR; INTRAVENOUS; SUBCUTANEOUS EVERY 5 MIN PRN
Status: DISCONTINUED | OUTPATIENT
Start: 2023-05-15 | End: 2023-05-15 | Stop reason: HOSPADM

## 2023-05-15 RX ORDER — SCOLOPAMINE TRANSDERMAL SYSTEM 1 MG/1
1 PATCH, EXTENDED RELEASE TRANSDERMAL ONCE
Status: DISCONTINUED | OUTPATIENT
Start: 2023-05-15 | End: 2023-05-15 | Stop reason: HOSPADM

## 2023-05-15 RX ORDER — VASOPRESSIN IN 0.9 % NACL 2 UNIT/2ML
SYRINGE (ML) INTRAVENOUS PRN
Status: DISCONTINUED | OUTPATIENT
Start: 2023-05-15 | End: 2023-05-15

## 2023-05-15 RX ORDER — OXYCODONE HYDROCHLORIDE 5 MG/1
10 TABLET ORAL EVERY 4 HOURS PRN
Qty: 20 TABLET | Refills: 0 | Status: SHIPPED | OUTPATIENT
Start: 2023-05-15 | End: 2023-05-17

## 2023-05-15 RX ORDER — HYDROMORPHONE HYDROCHLORIDE 1 MG/ML
0.2 INJECTION, SOLUTION INTRAMUSCULAR; INTRAVENOUS; SUBCUTANEOUS EVERY 5 MIN PRN
Status: DISCONTINUED | OUTPATIENT
Start: 2023-05-15 | End: 2023-05-15 | Stop reason: HOSPADM

## 2023-05-15 RX ORDER — IODIXANOL 320 MG/ML
100 INJECTION, SOLUTION INTRAVASCULAR ONCE
Status: COMPLETED | OUTPATIENT
Start: 2023-05-15 | End: 2023-05-15

## 2023-05-15 RX ORDER — SODIUM CHLORIDE, SODIUM LACTATE, POTASSIUM CHLORIDE, CALCIUM CHLORIDE 600; 310; 30; 20 MG/100ML; MG/100ML; MG/100ML; MG/100ML
INJECTION, SOLUTION INTRAVENOUS CONTINUOUS PRN
Status: DISCONTINUED | OUTPATIENT
Start: 2023-05-15 | End: 2023-05-15

## 2023-05-15 RX ORDER — OXYCODONE HYDROCHLORIDE 10 MG/1
10 TABLET ORAL EVERY 4 HOURS PRN
Status: DISCONTINUED | OUTPATIENT
Start: 2023-05-15 | End: 2023-05-15 | Stop reason: HOSPADM

## 2023-05-15 RX ORDER — EPHEDRINE SULFATE 50 MG/ML
INJECTION, SOLUTION INTRAMUSCULAR; INTRAVENOUS; SUBCUTANEOUS PRN
Status: DISCONTINUED | OUTPATIENT
Start: 2023-05-15 | End: 2023-05-15

## 2023-05-15 RX ADMIN — PROPOFOL 30 MG: 10 INJECTION, EMULSION INTRAVENOUS at 15:12

## 2023-05-15 RX ADMIN — HYDROCORTISONE SODIUM SUCCINATE 100 MG: 100 INJECTION, POWDER, FOR SOLUTION INTRAMUSCULAR; INTRAVENOUS at 12:50

## 2023-05-15 RX ADMIN — PHENYLEPHRINE HYDROCHLORIDE 100 MCG: 10 INJECTION INTRAVENOUS at 14:06

## 2023-05-15 RX ADMIN — PHENYLEPHRINE HYDROCHLORIDE 200 MCG: 10 INJECTION INTRAVENOUS at 13:42

## 2023-05-15 RX ADMIN — SCOPALAMINE 1 PATCH: 1 PATCH, EXTENDED RELEASE TRANSDERMAL at 10:20

## 2023-05-15 RX ADMIN — PROPOFOL 140 MG: 10 INJECTION, EMULSION INTRAVENOUS at 13:22

## 2023-05-15 RX ADMIN — Medication 20 MG: at 15:14

## 2023-05-15 RX ADMIN — SODIUM CHLORIDE, POTASSIUM CHLORIDE, SODIUM LACTATE AND CALCIUM CHLORIDE: 600; 310; 30; 20 INJECTION, SOLUTION INTRAVENOUS at 14:10

## 2023-05-15 RX ADMIN — IODIXANOL 80 ML: 320 INJECTION, SOLUTION INTRAVASCULAR at 17:04

## 2023-05-15 RX ADMIN — PHENYLEPHRINE HYDROCHLORIDE 150 MCG: 10 INJECTION INTRAVENOUS at 14:28

## 2023-05-15 RX ADMIN — PHENYLEPHRINE HYDROCHLORIDE 100 MCG: 10 INJECTION INTRAVENOUS at 14:32

## 2023-05-15 RX ADMIN — EPHEDRINE SULFATE 10 MG: 5 INJECTION INTRAVENOUS at 13:48

## 2023-05-15 RX ADMIN — MITOMYCIN: 5 INJECTION, POWDER, LYOPHILIZED, FOR SOLUTION INTRAVENOUS at 14:59

## 2023-05-15 RX ADMIN — EPHEDRINE SULFATE 10 MG: 5 INJECTION INTRAVENOUS at 14:46

## 2023-05-15 RX ADMIN — FENTANYL CITRATE 50 MCG: 50 INJECTION, SOLUTION INTRAMUSCULAR; INTRAVENOUS at 15:19

## 2023-05-15 RX ADMIN — Medication 10 MG: at 14:22

## 2023-05-15 RX ADMIN — Medication 20 MG: at 14:42

## 2023-05-15 RX ADMIN — ONDANSETRON 4 MG: 2 INJECTION INTRAMUSCULAR; INTRAVENOUS at 16:56

## 2023-05-15 RX ADMIN — PHENYLEPHRINE HYDROCHLORIDE 100 MCG: 10 INJECTION INTRAVENOUS at 13:33

## 2023-05-15 RX ADMIN — SUGAMMADEX 200 MG: 100 INJECTION, SOLUTION INTRAVENOUS at 16:56

## 2023-05-15 RX ADMIN — Medication 20 MG: at 16:02

## 2023-05-15 RX ADMIN — MIDAZOLAM 2 MG: 1 INJECTION INTRAMUSCULAR; INTRAVENOUS at 13:18

## 2023-05-15 RX ADMIN — EPHEDRINE SULFATE 5 MG: 5 INJECTION INTRAVENOUS at 13:56

## 2023-05-15 RX ADMIN — LIDOCAINE HYDROCHLORIDE 100 MG: 20 INJECTION, SOLUTION INFILTRATION; PERINEURAL at 13:22

## 2023-05-15 RX ADMIN — HYDROMORPHONE HYDROCHLORIDE 0.5 MG: 1 INJECTION, SOLUTION INTRAMUSCULAR; INTRAVENOUS; SUBCUTANEOUS at 16:38

## 2023-05-15 RX ADMIN — HEPARIN SODIUM: 1000 INJECTION INTRAVENOUS; SUBCUTANEOUS at 15:33

## 2023-05-15 RX ADMIN — PROPOFOL 30 MG: 10 INJECTION, EMULSION INTRAVENOUS at 15:16

## 2023-05-15 RX ADMIN — PHENYLEPHRINE HYDROCHLORIDE 150 MCG: 10 INJECTION INTRAVENOUS at 14:16

## 2023-05-15 RX ADMIN — PHENYLEPHRINE HYDROCHLORIDE 100 MCG: 10 INJECTION INTRAVENOUS at 13:56

## 2023-05-15 RX ADMIN — SODIUM CHLORIDE, POTASSIUM CHLORIDE, SODIUM LACTATE AND CALCIUM CHLORIDE: 600; 310; 30; 20 INJECTION, SOLUTION INTRAVENOUS at 13:15

## 2023-05-15 RX ADMIN — Medication 20 MG: at 13:56

## 2023-05-15 RX ADMIN — FENTANYL CITRATE 100 MCG: 50 INJECTION, SOLUTION INTRAMUSCULAR; INTRAVENOUS at 13:18

## 2023-05-15 RX ADMIN — Medication 0.5 UNITS: at 14:31

## 2023-05-15 RX ADMIN — Medication 50 MG: at 13:22

## 2023-05-15 RX ADMIN — FENTANYL CITRATE 50 MCG: 50 INJECTION, SOLUTION INTRAMUSCULAR; INTRAVENOUS at 15:55

## 2023-05-15 ASSESSMENT — ACTIVITIES OF DAILY LIVING (ADL)
ADLS_ACUITY_SCORE: 37

## 2023-05-15 ASSESSMENT — COPD QUESTIONNAIRES: COPD: 1

## 2023-05-15 NOTE — DISCHARGE INSTRUCTIONS
*** Schedule your return appointment with Interventional Radiology at the next available opening.***  [facility] [place]  Same-Day Surgery   Adult Discharge Orders & Instructions     *** NO lifting greater than 10 lbs for 3 days***    For 24 hours after surgery    Get plenty of rest.  A responsible adult must stay with you for at least 24 hours after you leave the hospital.   Do not drive or use heavy equipment.  If you have weakness or tingling, don't drive or use heavy equipment until this feeling goes away.  Do not drink alcohol.  Avoid strenuous or risky activities.  Ask for help when climbing stairs.   You may feel lightheaded.  IF so, sit for a few minutes before standing.  Have someone help you get up.   If you have nausea (feel sick to your stomach): Drink only clear liquids such as apple juice, ginger ale, broth or 7-Up.  Rest may also help.  Be sure to drink enough fluids.  Move to a regular diet as you feel able.  You may have a slight fever. Call the doctor if your fever is over 100 F (37.7 C) (taken under the tongue) or lasts longer than 24 hours.  You may have a dry mouth, a sore throat, muscle aches or trouble sleeping.  These should go away after 24 hours.  Do not make important or legal decisions.   Call your doctor for any of the followin.  Signs of infection (fever, growing tenderness at the surgery site, a large amount of drainage or bleeding, severe pain, foul-smelling drainage, redness, swelling).    2. It has been over 8 to 10 hours since surgery and you are still not able to urinate (pass water).    3.  Headache for over 24 hours.    To contact a doctor, call Dr. Osborne with Interventional Radiology at [phone] or:    '   [phone] and ask for the resident on call for Interventional Radiology (answered 24 hours a day)  '   Emergency Department:    [facility]: [phone]       (TTY for hearing impaired: [phone])

## 2023-05-15 NOTE — ANESTHESIA PREPROCEDURE EVALUATION
Anesthesia Pre-Procedure Evaluation    Patient: Sohan Marcano   MRN: [id number] : 1960        Procedure : Procedure(s):  ANESTHESIA OUT OF OR COMPUTED TOMOGRAPHY LIVER ABLATION WITH BIOPSY AND POSSIBLE TRANSARTERIAL CHEMBOLIZATION(ctACE)@1130          Past Medical History:   Diagnosis Date     Actinic keratosis      Aneurysm of thoracic aorta (H)      Basal cell carcinoma      Cirrhosis of liver (H)      COPD (chronic obstructive pulmonary disease) (H)      HCC (hepatocellular carcinoma) (H)      Hepatic steatosis      History of hepatitis C      HLD (hyperlipidemia)      HTN (hypertension)      Neuropathy      Obesity      Osteoarthritis      Portal hypertension (H)      Squamous cell carcinoma      Substance abuse (H)     alcohol     Uncomplicated asthma       Past Surgical History:   Procedure Laterality Date     HERNIA REPAIR      as child     IN DENTAL SURGERY PROCEDURE       wisdom teeth removal        No Known Allergies   Social History     Tobacco Use     Smoking status: Former     Packs/day: 0.25     Types: Cigarettes     Smokeless tobacco: Never     Tobacco comments:     Hasn't smoked since end of Oct 2022 then had several relapses. Reports last smoke 3 cigarettes about 1-2 weeks ago (23)   Vaping Use     Vaping status: Former   Substance Use Topics     Alcohol use: Yes     Alcohol/week: 20.0 standard drinks of alcohol     Types: 20 Standard drinks or equivalent per week     Comment: 4-5/day - trying to slowly cut back by 1 ounce per day.      Wt Readings from Last 1 Encounters:   23 112.9 kg (249 lb)        Anesthesia Evaluation            ROS/MED HX  ENT/Pulmonary:     (+) asthma COPD,     Neurologic:       Cardiovascular: Comment: Thoracic aortic aneurysm    (+) Dyslipidemia hypertension-----Previous cardiac testing   Echo: Date: 23 Results:  Interpretation Summary  Global and regional left ventricular function is normal with an EF of 55-60%.  Right ventricular function,  chamber size, wall motion, and thickness are normal.  Sinuses of Valsalva 4.4 cm.  Ascending aorta 4.6 cm.  Mild dilation of aorta for BSA of 2.2m2.  Aortic root measured 4.3cms and ascending aorta 3.8cms in 2014.  Stress Test: Date: Results:    ECG Reviewed: Date: Results:    Cath: Date: Results:      METS/Exercise Tolerance:     Hematologic:       Musculoskeletal:       GI/Hepatic:     (+) hepatitis (s/p treatment) type C and Alcoholic, liver disease (HCC and cirrhosis),     Renal/Genitourinary:       Endo:       Psychiatric/Substance Use:     (+) alcohol abuse     Infectious Disease:       Malignancy:   (+) Malignancy, History of Skin.    Other:            Physical Exam    Airway        Mallampati: III   TM distance: < 3 FB   Neck ROM: full   Mouth opening: > 3 cm    Respiratory Devices and Support         Dental       (+) Multiple visibly decayed, broken teeth    B=Bridge, C=Chipped, L=Loose, M=Missing    Cardiovascular   cardiovascular exam normal          Pulmonary           (+) decreased breath sounds           OUTSIDE LABS:  CBC:   Lab Results   Component Value Date    WBC 5.0 05/09/2023    WBC 6.0 03/07/2023    HGB 15.7 05/09/2023    HGB 16.2 03/07/2023    HCT 43.7 05/09/2023    HCT 46.7 03/07/2023     05/09/2023     03/07/2023     BMP:   Lab Results   Component Value Date     05/09/2023     03/07/2023    POTASSIUM 3.8 05/09/2023    POTASSIUM 3.8 03/07/2023    CHLORIDE 103 05/09/2023    CHLORIDE 103 03/07/2023    CO2 27 05/09/2023    CO2 25 03/07/2023    BUN 11.9 05/09/2023    BUN 9.3 03/07/2023    CR 0.89 05/09/2023    CR 0.9 05/09/2023     (H) 05/09/2023     (H) 03/07/2023     COAGS:   Lab Results   Component Value Date    PTT 35 09/26/2020    INR 1.29 (H) 05/09/2023     POC: No results found for: BGM, HCG, HCGS  HEPATIC:   Lab Results   Component Value Date    ALBUMIN 4.1 05/09/2023    PROTTOTAL 7.4 05/09/2023    ALT 62 (H) 05/09/2023    AST 79 (H) 05/09/2023      (H) 09/22/2020    ALKPHOS 77 05/09/2023    BILITOTAL 1.3 (H) 05/09/2023    HERNAN 43 09/26/2020     OTHER:   Lab Results   Component Value Date    LACT 2.3 (H) 09/26/2020    A1C 5.2 04/06/2022    LEIGHA 9.3 05/09/2023    MAG 2.1 07/04/2019    LIPASE 360 09/24/2018    TSH 2.93 06/22/2022    T4 1.01 04/06/2022       Anesthesia Plan    ASA Status:  3   NPO Status:  NPO Appropriate    Anesthesia Type: General.     - Airway: ETT   Induction: Intravenous.   Maintenance: Balanced.   Techniques and Equipment:     - Lines/Monitors: 2nd IV     Consents    Anesthesia Plan(s) and associated risks, benefits, and realistic alternatives discussed. Questions answered and patient/representative(s) expressed understanding.    - Discussed:     - Discussed with:  Patient         Postoperative Care    Pain management: IV analgesics, Multi-modal analgesia.   PONV prophylaxis: Ondansetron (or other 5HT-3), Dexamethasone or Solumedrol     Comments:                Augie Ghosh MD

## 2023-05-15 NOTE — PROCEDURES
[facility]    Procedure: IR Procedure Note    Date/Time: 5/15/2023 5:04 PM    Performed by: Travis Francois DO  Authorized by: Travis Francois DO      UNIVERSAL PROTOCOL   Site Marked: NA  Prior Images Obtained and Reviewed:  Yes  Required items: Required blood products, implants, devices and special equipment available    Patient identity confirmed:  Verbally with patient, arm band, provided demographic data and hospital-assigned identification number  Patient was reevaluated immediately before administering moderate or deep sedation or anesthesia  Confirmation Checklist:  Patient's identity using two indicators, relevant allergies, procedure was appropriate and matched the consent or emergent situation and correct equipment/implants were available  Time out: Immediately prior to the procedure a time out was called    Universal Protocol: the Joint Commission Universal Protocol was followed    Preparation: Patient was prepped and draped in usual sterile fashion       ANESTHESIA    Anesthesia: Local infiltration  Local Anesthetic:  Lidocaine 1% without epinephrine      SEDATION  Patient Sedated: Yes (General Anesthesia)    Vital signs: Vital signs monitored during sedation    See dictated procedure note for full details.  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 Biopsy performed prior to treatment. cTACE performed with staining of largest mass and satellites. Dominant mass treated with two NuWave MWA probes for 10 minutes of full 65W burn. No immediate complication.    Specimens: none    Complications: None    Condition: Stable    Plan: Routine aftercare, symptomatic control and possible same day discharge. TR band protocol for wrist.      PROCEDURE    Patient Tolerance:  Patient tolerated the procedure well with no immediate complications  Length of time physician/provider present for 1:1 monitoring during sedation:  0

## 2023-05-15 NOTE — ANESTHESIA CARE TRANSFER NOTE
Patient: Sohan Marcano    Procedure: Procedure(s):  ANESTHESIA OUT OF OR COMPUTED TOMOGRAPHY LIVER ABLATION WITH BIOPSY AND POSSIBLE TRANSARTERIAL CHEMBOLIZATION(ctACE)@1130       Diagnosis: Liver lesion [K76.9]  Diagnosis Additional Information: No value filed.    Anesthesia Type:   General     Note:    Oropharynx: oropharynx clear of all foreign objects  Level of Consciousness: awake  Oxygen Supplementation: nasal cannula  Level of Supplemental Oxygen (L/min / FiO2): 3 LPM  Independent Airway: airway patency satisfactory and stable  Dentition: dentition unchanged  Vital Signs Stable: post-procedure vital signs reviewed and stable  Report to RN Given: handoff report given  Patient transferred to: PACU    Handoff Report: Identifed the Patient, Identified the Reponsible Provider, Reviewed the pertinent medical history, Discussed the surgical course, Reviewed Intra-OP anesthesia mangement and issues during anesthesia, Set expectations for post-procedure period and Allowed opportunity for questions and acknowledgement of understanding      Vitals:  Vitals Value Taken Time   /102 05/15/23 1717   Temp     Pulse 83 05/15/23 1721   Resp 18 05/15/23 1717   SpO2 90 % 05/15/23 1721   Vitals shown include unvalidated device data.    Electronically Signed By: ELIU Zurita CRNA  May 15, 2023  5:22 PM

## 2023-05-15 NOTE — IR NOTE
Patient Name: Sohan Marcano  Medical Record Number: [id number]  Today's Date: 5/15/2023    Start Time: 1411  End of procedure time: 1656  Procedure: Liver ablation with biopsy and possible ctACE  Proceduralist: Dr Christopher, Dr Francois    Sedation, general anesthesia       Report given to:  PACU RN  Time pt departs:  1720  :     TR band placed 3:35 PM with 12cc    Other Notes: Pt arrived to IR room 1 from PreOp. Pt denies any questions or concerns regarding procedure. Pt positioned supine and monitored per protocol. Pt tolerated procedure without any complication. Pt transferred back to PACU\.    Pavithra Campa. JENNIFRE     Simple: Patient demonstrates quick and easy understanding

## 2023-05-15 NOTE — ANESTHESIA PROCEDURE NOTES
Airway       Patient location during procedure: OR       Procedure Start/Stop Times: 5/15/2023 1:22 PM and 5/15/2023 1:26 PM  Staff -        CRNA: Florentin Gregorio APRN CRNA       Performed By: CRNA  Consent for Airway        Urgency: elective  Indications and Patient Condition       Indications for airway management: ana-procedural       Induction type:intravenous       Mask difficulty assessment: 2 - vent by mask + OA or adjuvant +/- NMBA    Final Airway Details       Final airway type: endotracheal airway       Successful airway: ETT - single  Endotracheal Airway Details        ETT size (mm): 7.5       Cuffed: yes       Successful intubation technique: direct laryngoscopy       DL Blade Type: Henriquez 2       Grade View of Cords: 1       Adjucts: stylet       Position: Right       Measured from: gums/teeth       Secured at (cm): 24       Bite block used: None    Post intubation assessment        Placement verified by: capnometry, equal breath sounds and chest rise        Number of attempts at approach: 1       Number of other approaches attempted: 0       Secured with: pink tape       Ease of procedure: easy       Dentition: Intact    Medication(s) Administered   Medication Administration Time: 5/15/2023 1:22 PM

## 2023-05-15 NOTE — ANESTHESIA POSTPROCEDURE EVALUATION
Patient: Sohan Marcano    Procedure: Procedure(s):  ANESTHESIA OUT OF OR COMPUTED TOMOGRAPHY LIVER ABLATION WITH BIOPSY AND POSSIBLE TRANSARTERIAL CHEMBOLIZATION(ctACE)@1130       Anesthesia Type:  General    Note:  Disposition: Outpatient   Postop Pain Control: Uneventful            Sign Out: Well controlled pain   PONV: No   Neuro/Psych: Uneventful            Sign Out: Acceptable/Baseline neuro status   Airway/Respiratory: Uneventful            Sign Out: Acceptable/Baseline resp. status   CV/Hemodynamics: Uneventful            Sign Out: Acceptable CV status; No obvious hypovolemia; No obvious fluid overload   Other NRE: NONE   DID A NON-ROUTINE EVENT OCCUR? No           Last vitals:  Vitals Value Taken Time   /106 05/15/23 1830   Temp 35.9  C (96.7  F) 05/15/23 1717   Pulse 84 05/15/23 1838   Resp 8 05/15/23 1838   SpO2 92 % 05/15/23 1838   Vitals shown include unvalidated device data.    Electronically Signed By: Berny Ferro MD  May 15, 2023  6:38 PM

## 2023-05-16 ENCOUNTER — PRE VISIT (OUTPATIENT)
Dept: SURGERY | Facility: CLINIC | Age: 63
End: 2023-05-16

## 2023-05-16 LAB
PATH REPORT.COMMENTS IMP SPEC: ABNORMAL
PATH REPORT.COMMENTS IMP SPEC: YES
PATH REPORT.FINAL DX SPEC: ABNORMAL
PATH REPORT.GROSS SPEC: ABNORMAL
PATH REPORT.MICROSCOPIC SPEC OTHER STN: ABNORMAL
PATH REPORT.RELEVANT HX SPEC: ABNORMAL
PHOTO IMAGE: ABNORMAL

## 2023-05-16 NOTE — OR NURSING
Paged Dr. Parish Christopher for discharge instructions/limitations. Clarified that the patient is okay to resume all of his home medications.    Per provider the patient can resume all home medications. And, his only restriction is to not lift greater than 10 pounds for 3 days.

## 2023-05-17 ENCOUNTER — APPOINTMENT (OUTPATIENT)
Dept: GENERAL RADIOLOGY | Facility: CLINIC | Age: 63
End: 2023-05-17
Attending: FAMILY MEDICINE
Payer: COMMERCIAL

## 2023-05-17 ENCOUNTER — HOSPITAL ENCOUNTER (INPATIENT)
Facility: CLINIC | Age: 63
LOS: 2 days | Discharge: HOME | End: 2023-05-19
Attending: FAMILY MEDICINE | Admitting: INTERNAL MEDICINE
Payer: COMMERCIAL

## 2023-05-17 ENCOUNTER — APPOINTMENT (OUTPATIENT)
Dept: CT IMAGING | Facility: CLINIC | Age: 63
End: 2023-05-17
Attending: FAMILY MEDICINE
Payer: COMMERCIAL

## 2023-05-17 DIAGNOSIS — R50.9 FEVER, UNSPECIFIED FEVER CAUSE: Primary | ICD-10-CM

## 2023-05-17 DIAGNOSIS — T81.718A POST-EMBOLIZATION SYNDROME FOLLOWING CHEMOEMBOLIZATION OF LIVER, INITIAL ENCOUNTER: ICD-10-CM

## 2023-05-17 DIAGNOSIS — R10.11 ABDOMINAL PAIN, RIGHT UPPER QUADRANT: ICD-10-CM

## 2023-05-17 DIAGNOSIS — C22.0 HEPATOCELLULAR CARCINOMA (H): ICD-10-CM

## 2023-05-17 LAB
ABO/RH(D): NORMAL
ALBUMIN SERPL BCG-MCNC: 3.9 G/DL (ref 3.5–5.2)
ALP SERPL-CCNC: 122 U/L (ref 40–129)
ALT SERPL W P-5'-P-CCNC: 646 U/L (ref 10–50)
ANION GAP SERPL CALCULATED.3IONS-SCNC: 11 MMOL/L (ref 7–15)
ANTIBODY SCREEN: NEGATIVE
APTT PPP: 33 SECONDS (ref 22–38)
AST SERPL W P-5'-P-CCNC: 1166 U/L (ref 10–50)
BASOPHILS # BLD AUTO: 0.1 10E3/UL (ref 0–0.2)
BASOPHILS NFR BLD AUTO: 0 %
BILIRUB SERPL-MCNC: 3.3 MG/DL
BUN SERPL-MCNC: 13.5 MG/DL (ref 8–23)
CALCIUM SERPL-MCNC: 9 MG/DL (ref 8.8–10.2)
CHLORIDE SERPL-SCNC: 95 MMOL/L (ref 98–107)
CREAT SERPL-MCNC: 0.94 MG/DL (ref 0.67–1.17)
CRP SERPL-MCNC: 89.25 MG/L
DEPRECATED HCO3 PLAS-SCNC: 23 MMOL/L (ref 22–29)
EOSINOPHIL # BLD AUTO: 0 10E3/UL (ref 0–0.7)
EOSINOPHIL NFR BLD AUTO: 0 %
ERYTHROCYTE [DISTWIDTH] IN BLOOD BY AUTOMATED COUNT: 11.9 % (ref 10–15)
GFR SERPL CREATININE-BSD FRML MDRD: >90 ML/MIN/1.73M2
GLUCOSE SERPL-MCNC: 116 MG/DL (ref 70–99)
HCT VFR BLD AUTO: 44.2 % (ref 40–53)
HGB BLD-MCNC: 15.4 G/DL (ref 13.3–17.7)
IMM GRANULOCYTES # BLD: 0.1 10E3/UL
IMM GRANULOCYTES NFR BLD: 1 %
INR PPP: 1.52 (ref 0.85–1.15)
LIPASE SERPL-CCNC: 32 U/L (ref 13–60)
LYMPHOCYTES # BLD AUTO: 1.2 10E3/UL (ref 0.8–5.3)
LYMPHOCYTES NFR BLD AUTO: 7 %
MAGNESIUM SERPL-MCNC: 1.6 MG/DL (ref 1.7–2.3)
MCH RBC QN AUTO: 35.8 PG (ref 26.5–33)
MCHC RBC AUTO-ENTMCNC: 34.8 G/DL (ref 31.5–36.5)
MCV RBC AUTO: 103 FL (ref 78–100)
MONOCYTES # BLD AUTO: 2.7 10E3/UL (ref 0–1.3)
MONOCYTES NFR BLD AUTO: 16 %
NEUTROPHILS # BLD AUTO: 13.2 10E3/UL (ref 1.6–8.3)
NEUTROPHILS NFR BLD AUTO: 76 %
NRBC # BLD AUTO: 0 10E3/UL
NRBC BLD AUTO-RTO: 0 /100
PHOSPHATE SERPL-MCNC: 2.6 MG/DL (ref 2.5–4.5)
PLATELET # BLD AUTO: 156 10E3/UL (ref 150–450)
POTASSIUM SERPL-SCNC: 4 MMOL/L (ref 3.4–5.3)
PROT SERPL-MCNC: 7 G/DL (ref 6.4–8.3)
RBC # BLD AUTO: 4.3 10E6/UL (ref 4.4–5.9)
SODIUM SERPL-SCNC: 127 MMOL/L (ref 136–145)
SODIUM SERPL-SCNC: 129 MMOL/L (ref 136–145)
SPECIMEN EXPIRATION DATE: NORMAL
WBC # BLD AUTO: 17.4 10E3/UL (ref 4–11)

## 2023-05-17 PROCEDURE — 250N000011 HC RX IP 250 OP 636: Performed by: PHYSICIAN ASSISTANT

## 2023-05-17 PROCEDURE — 83690 ASSAY OF LIPASE: CPT | Performed by: FAMILY MEDICINE

## 2023-05-17 PROCEDURE — 250N000011 HC RX IP 250 OP 636: Performed by: FAMILY MEDICINE

## 2023-05-17 PROCEDURE — 96361 HYDRATE IV INFUSION ADD-ON: CPT | Performed by: FAMILY MEDICINE

## 2023-05-17 PROCEDURE — 99285 EMERGENCY DEPT VISIT HI MDM: CPT | Performed by: FAMILY MEDICINE

## 2023-05-17 PROCEDURE — 86901 BLOOD TYPING SEROLOGIC RH(D): CPT | Performed by: FAMILY MEDICINE

## 2023-05-17 PROCEDURE — 74177 CT ABD & PELVIS W/CONTRAST: CPT

## 2023-05-17 PROCEDURE — 36415 COLL VENOUS BLD VENIPUNCTURE: CPT | Performed by: FAMILY MEDICINE

## 2023-05-17 PROCEDURE — 250N000009 HC RX 250: Performed by: FAMILY MEDICINE

## 2023-05-17 PROCEDURE — 94640 AIRWAY INHALATION TREATMENT: CPT | Mod: 59 | Performed by: FAMILY MEDICINE

## 2023-05-17 PROCEDURE — 83735 ASSAY OF MAGNESIUM: CPT | Performed by: PHYSICIAN ASSISTANT

## 2023-05-17 PROCEDURE — 71046 X-RAY EXAM CHEST 2 VIEWS: CPT

## 2023-05-17 PROCEDURE — 258N000003 HC RX IP 258 OP 636: Performed by: PHYSICIAN ASSISTANT

## 2023-05-17 PROCEDURE — 99223 1ST HOSP IP/OBS HIGH 75: CPT | Performed by: PHYSICIAN ASSISTANT

## 2023-05-17 PROCEDURE — 85610 PROTHROMBIN TIME: CPT | Performed by: FAMILY MEDICINE

## 2023-05-17 PROCEDURE — 85730 THROMBOPLASTIN TIME PARTIAL: CPT | Performed by: FAMILY MEDICINE

## 2023-05-17 PROCEDURE — 86140 C-REACTIVE PROTEIN: CPT | Performed by: PHYSICIAN ASSISTANT

## 2023-05-17 PROCEDURE — 86850 RBC ANTIBODY SCREEN: CPT | Performed by: FAMILY MEDICINE

## 2023-05-17 PROCEDURE — 85025 COMPLETE CBC W/AUTO DIFF WBC: CPT | Performed by: FAMILY MEDICINE

## 2023-05-17 PROCEDURE — 250N000009 HC RX 250: Performed by: PHYSICIAN ASSISTANT

## 2023-05-17 PROCEDURE — 258N000003 HC RX IP 258 OP 636: Performed by: FAMILY MEDICINE

## 2023-05-17 PROCEDURE — 36415 COLL VENOUS BLD VENIPUNCTURE: CPT | Performed by: PHYSICIAN ASSISTANT

## 2023-05-17 PROCEDURE — 120N000002 HC R&B MED SURG/OB UMMC

## 2023-05-17 PROCEDURE — 84295 ASSAY OF SERUM SODIUM: CPT | Performed by: PHYSICIAN ASSISTANT

## 2023-05-17 PROCEDURE — 96374 THER/PROPH/DIAG INJ IV PUSH: CPT | Performed by: FAMILY MEDICINE

## 2023-05-17 PROCEDURE — 84100 ASSAY OF PHOSPHORUS: CPT | Performed by: PHYSICIAN ASSISTANT

## 2023-05-17 PROCEDURE — 99285 EMERGENCY DEPT VISIT HI MDM: CPT | Mod: 25 | Performed by: FAMILY MEDICINE

## 2023-05-17 PROCEDURE — 80053 COMPREHEN METABOLIC PANEL: CPT | Performed by: FAMILY MEDICINE

## 2023-05-17 RX ORDER — ONDANSETRON 2 MG/ML
4 INJECTION INTRAMUSCULAR; INTRAVENOUS EVERY 30 MIN PRN
Status: DISCONTINUED | OUTPATIENT
Start: 2023-05-17 | End: 2023-05-17

## 2023-05-17 RX ORDER — IPRATROPIUM BROMIDE AND ALBUTEROL SULFATE 2.5; .5 MG/3ML; MG/3ML
3 SOLUTION RESPIRATORY (INHALATION)
Status: DISCONTINUED | OUTPATIENT
Start: 2023-05-17 | End: 2023-05-19 | Stop reason: HOSPADM

## 2023-05-17 RX ORDER — SODIUM CHLORIDE 9 MG/ML
INJECTION, SOLUTION INTRAVENOUS CONTINUOUS
Status: DISCONTINUED | OUTPATIENT
Start: 2023-05-17 | End: 2023-05-18

## 2023-05-17 RX ORDER — GABAPENTIN 300 MG/1
300 CAPSULE ORAL 2 TIMES DAILY
Status: CANCELLED | OUTPATIENT
Start: 2023-05-17

## 2023-05-17 RX ORDER — PROCHLORPERAZINE MALEATE 5 MG
10 TABLET ORAL EVERY 6 HOURS PRN
Status: DISCONTINUED | OUTPATIENT
Start: 2023-05-17 | End: 2023-05-19 | Stop reason: HOSPADM

## 2023-05-17 RX ORDER — FLUTICASONE FUROATE AND VILANTEROL 100; 25 UG/1; UG/1
1 POWDER RESPIRATORY (INHALATION) DAILY
Status: DISCONTINUED | OUTPATIENT
Start: 2023-05-18 | End: 2023-05-19 | Stop reason: HOSPADM

## 2023-05-17 RX ORDER — PIPERACILLIN SODIUM, TAZOBACTAM SODIUM 3; .375 G/15ML; G/15ML
3.38 INJECTION, POWDER, LYOPHILIZED, FOR SOLUTION INTRAVENOUS ONCE
Status: COMPLETED | OUTPATIENT
Start: 2023-05-17 | End: 2023-05-17

## 2023-05-17 RX ORDER — LOSARTAN POTASSIUM 25 MG/1
25 TABLET ORAL DAILY
Status: DISCONTINUED | OUTPATIENT
Start: 2023-05-18 | End: 2023-05-19 | Stop reason: HOSPADM

## 2023-05-17 RX ORDER — ONDANSETRON 2 MG/ML
4 INJECTION INTRAMUSCULAR; INTRAVENOUS EVERY 6 HOURS PRN
Status: DISCONTINUED | OUTPATIENT
Start: 2023-05-17 | End: 2023-05-19 | Stop reason: HOSPADM

## 2023-05-17 RX ORDER — AMOXICILLIN 250 MG
1 CAPSULE ORAL 2 TIMES DAILY PRN
Status: DISCONTINUED | OUTPATIENT
Start: 2023-05-17 | End: 2023-05-19 | Stop reason: HOSPADM

## 2023-05-17 RX ORDER — PIPERACILLIN SODIUM, TAZOBACTAM SODIUM 3; .375 G/15ML; G/15ML
3.38 INJECTION, POWDER, LYOPHILIZED, FOR SOLUTION INTRAVENOUS EVERY 6 HOURS
Status: DISCONTINUED | OUTPATIENT
Start: 2023-05-17 | End: 2023-05-19 | Stop reason: HOSPADM

## 2023-05-17 RX ORDER — LIDOCAINE 40 MG/G
CREAM TOPICAL
Status: DISCONTINUED | OUTPATIENT
Start: 2023-05-17 | End: 2023-05-19 | Stop reason: HOSPADM

## 2023-05-17 RX ORDER — MULTIPLE VITAMINS W/ MINERALS TAB 9MG-400MCG
1 TAB ORAL DAILY
Status: DISCONTINUED | OUTPATIENT
Start: 2023-05-18 | End: 2023-05-19 | Stop reason: HOSPADM

## 2023-05-17 RX ORDER — MULTIPLE VITAMINS W/ MINERALS TAB 9MG-400MCG
1 TAB ORAL
Status: DISCONTINUED | OUTPATIENT
Start: 2023-05-18 | End: 2023-05-17

## 2023-05-17 RX ORDER — IPRATROPIUM BROMIDE AND ALBUTEROL SULFATE 2.5; .5 MG/3ML; MG/3ML
3 SOLUTION RESPIRATORY (INHALATION) ONCE
Status: COMPLETED | OUTPATIENT
Start: 2023-05-17 | End: 2023-05-17

## 2023-05-17 RX ORDER — IOPAMIDOL 755 MG/ML
125 INJECTION, SOLUTION INTRAVASCULAR ONCE
Status: COMPLETED | OUTPATIENT
Start: 2023-05-17 | End: 2023-05-17

## 2023-05-17 RX ORDER — LORAZEPAM 1 MG/1
1-2 TABLET ORAL EVERY 30 MIN PRN
Status: DISCONTINUED | OUTPATIENT
Start: 2023-05-17 | End: 2023-05-19 | Stop reason: HOSPADM

## 2023-05-17 RX ORDER — BISACODYL 10 MG
10 SUPPOSITORY, RECTAL RECTAL DAILY PRN
Status: DISCONTINUED | OUTPATIENT
Start: 2023-05-17 | End: 2023-05-19 | Stop reason: HOSPADM

## 2023-05-17 RX ORDER — HYDROMORPHONE HYDROCHLORIDE 1 MG/ML
0.5 INJECTION, SOLUTION INTRAMUSCULAR; INTRAVENOUS; SUBCUTANEOUS ONCE
Status: COMPLETED | OUTPATIENT
Start: 2023-05-17 | End: 2023-05-17

## 2023-05-17 RX ORDER — IPRATROPIUM BROMIDE AND ALBUTEROL SULFATE 2.5; .5 MG/3ML; MG/3ML
3 SOLUTION RESPIRATORY (INHALATION) EVERY 4 HOURS PRN
Status: DISCONTINUED | OUTPATIENT
Start: 2023-05-17 | End: 2023-05-19 | Stop reason: HOSPADM

## 2023-05-17 RX ORDER — ONDANSETRON 4 MG/1
4 TABLET, ORALLY DISINTEGRATING ORAL EVERY 6 HOURS PRN
Status: DISCONTINUED | OUTPATIENT
Start: 2023-05-17 | End: 2023-05-19 | Stop reason: HOSPADM

## 2023-05-17 RX ORDER — HYDROMORPHONE HCL IN WATER/PF 6 MG/30 ML
.2-.4 PATIENT CONTROLLED ANALGESIA SYRINGE INTRAVENOUS
Status: DISCONTINUED | OUTPATIENT
Start: 2023-05-17 | End: 2023-05-19 | Stop reason: HOSPADM

## 2023-05-17 RX ORDER — PROCHLORPERAZINE 25 MG
25 SUPPOSITORY, RECTAL RECTAL EVERY 12 HOURS PRN
Status: DISCONTINUED | OUTPATIENT
Start: 2023-05-17 | End: 2023-05-19 | Stop reason: HOSPADM

## 2023-05-17 RX ORDER — AMOXICILLIN 250 MG
2 CAPSULE ORAL 2 TIMES DAILY PRN
Status: DISCONTINUED | OUTPATIENT
Start: 2023-05-17 | End: 2023-05-19 | Stop reason: HOSPADM

## 2023-05-17 RX ORDER — OXYCODONE HYDROCHLORIDE 5 MG/1
10 TABLET ORAL EVERY 4 HOURS PRN
Status: CANCELLED | OUTPATIENT
Start: 2023-05-17

## 2023-05-17 RX ORDER — ATORVASTATIN CALCIUM 40 MG/1
40 TABLET, FILM COATED ORAL DAILY
COMMUNITY
End: 2024-01-01

## 2023-05-17 RX ORDER — FOLIC ACID 1 MG/1
1 TABLET ORAL DAILY
Status: DISCONTINUED | OUTPATIENT
Start: 2023-05-18 | End: 2023-05-19 | Stop reason: HOSPADM

## 2023-05-17 RX ORDER — HYDROMORPHONE HCL IN WATER/PF 6 MG/30 ML
0.2 PATIENT CONTROLLED ANALGESIA SYRINGE INTRAVENOUS ONCE
Status: COMPLETED | OUTPATIENT
Start: 2023-05-17 | End: 2023-05-17

## 2023-05-17 RX ORDER — LORAZEPAM 2 MG/ML
1-2 INJECTION INTRAMUSCULAR EVERY 30 MIN PRN
Status: DISCONTINUED | OUTPATIENT
Start: 2023-05-17 | End: 2023-05-19 | Stop reason: HOSPADM

## 2023-05-17 RX ADMIN — IPRATROPIUM BROMIDE AND ALBUTEROL SULFATE 3 ML: 2.5; .5 SOLUTION RESPIRATORY (INHALATION) at 20:26

## 2023-05-17 RX ADMIN — SODIUM CHLORIDE: 9 INJECTION, SOLUTION INTRAVENOUS at 12:18

## 2023-05-17 RX ADMIN — FOLIC ACID: 5 INJECTION, SOLUTION INTRAMUSCULAR; INTRAVENOUS; SUBCUTANEOUS at 21:33

## 2023-05-17 RX ADMIN — PIPERACILLIN AND TAZOBACTAM 3.38 G: 3; .375 INJECTION, POWDER, FOR SOLUTION INTRAVENOUS at 14:03

## 2023-05-17 RX ADMIN — IPRATROPIUM BROMIDE AND ALBUTEROL SULFATE 3 ML: .5; 3 SOLUTION RESPIRATORY (INHALATION) at 15:32

## 2023-05-17 RX ADMIN — HYDROMORPHONE HYDROCHLORIDE 0.2 MG: 0.2 INJECTION, SOLUTION INTRAMUSCULAR; INTRAVENOUS; SUBCUTANEOUS at 15:32

## 2023-05-17 RX ADMIN — IOPAMIDOL 106 ML: 755 INJECTION, SOLUTION INTRAVENOUS at 14:44

## 2023-05-17 RX ADMIN — HYDROMORPHONE HYDROCHLORIDE 0.5 MG: 1 INJECTION, SOLUTION INTRAMUSCULAR; INTRAVENOUS; SUBCUTANEOUS at 12:16

## 2023-05-17 RX ADMIN — SODIUM CHLORIDE 52 ML: 9 INJECTION, SOLUTION INTRAVENOUS at 14:43

## 2023-05-17 RX ADMIN — IPRATROPIUM BROMIDE AND ALBUTEROL SULFATE 3 ML: .5; 3 SOLUTION RESPIRATORY (INHALATION) at 12:28

## 2023-05-17 RX ADMIN — PIPERACILLIN AND TAZOBACTAM 3.38 G: 3; .375 INJECTION, POWDER, LYOPHILIZED, FOR SOLUTION INTRAVENOUS at 20:20

## 2023-05-17 ASSESSMENT — ACTIVITIES OF DAILY LIVING (ADL)
ADLS_ACUITY_SCORE: 37

## 2023-05-17 NOTE — ED TRIAGE NOTES
Abdominal Pain RUQ pain increasing over the last 2 days. Pt reports that he has 5 tumors on his liver and had a liver ablation and a biopsy on 5/15. Endorses pain when taking a deep breath. Denies NVD. Has been taking Rx oxycodone with minimal relief.          Triage Assessment     Row Name 05/17/23 1119       Triage Assessment (Adult)    Airway WDL WDL       Respiratory WDL    Respiratory WDL X  SOB reported       Skin Circulation/Temperature WDL    Skin Circulation/Temperature WDL WDL       Cardiac WDL    Cardiac WDL WDL       Peripheral/Neurovascular WDL    Peripheral Neurovascular WDL WDL       Cognitive/Neuro/Behavioral WDL    Cognitive/Neuro/Behavioral WDL WDL

## 2023-05-17 NOTE — ED NOTES
MD updated that patient's O2 remains between 86-93% on 3L. MD requested patient's head of bed be elevated  but no further instructions given. Will continue to monitor.

## 2023-05-17 NOTE — ED PROVIDER NOTES
[facility] EMERGENCY DEPARTMENT ([facility])     May 17, 2023     History     Chief Complaint   Patient presents with     Abdominal Pain     RUQ pain increasing over the last 2 days. Pt reports that he has 5 tumors on his liver and had a liver ablation and a biopsy on 5/15. Endorses pain when taking a deep breath. Denies NVD. Has been taking Rx oxycodone with minimal relief.     HPI  Sohan Marcano is a 62 year old male with a past medical history which includes liver cirrhosis with portal hypertension, steatosis, hepatocellular carcinoma, history of hepatitis C status posttreatment, hypertension, hyperlipidemia, thoracic aortic aneurysm, chronic dyspnea on exertion, former smoker, COPD, neuropathy, obesity, skin cancer, osteoarthritis, history of substance abuse and alcohol abuse who presents to the Emergency Department for evaluation of abdominal pain s/p a liver ablation he had on 5/15.     The patient reports that he had an ablation of 2 of the 5 tumors in his liver on Monday (5/15) at [facility]; per op note from 5/15, the procedure was performed by Dr. Christopher and Dr. Francois with IR, and the patient tolerated the procedure well. However, since the procedure the patient reports that he has had worsening RUQ abdominal pain that worsens when he stands up or moves around, and only improves when he lies down. States that the procedure was done on his right side. He has been taking his prescribed oxycodone but this has not controlled the pain. The pain makes it difficult for him to sleep lying on his side. The patient also notes that he measured a fever of 100.1 yesterday after he moved around but this improved with rest. He has been drinking a lot of water and urinating normally. Denies vomiting. He dos note that he has not passed a BM today which is unusual for him as he takes x2 Miralax daily and x2 probiotics daily. Denies chronic pain or taking any painkillers.    Of note, in  addition the patient reports that he has COPD and emphysema.  He was sick here in the hospital last fall with a lung infection that required antibiotics, but since that time has slowly been getting his lung capacity back.  He is not on home O2 and states that he usually sats 95 to 96%.  However, he notes that he believes his sats are bad today (in triage SPO2 is 88%) because he is breathing shallowly due to pain with deep breaths.  He uses Symbicort and albuterol at home.  He was intubated during his procedure on 5/15.  Patient notes that he is coughing today and that he has been having to clear his lungs.  Denies any chest pain.      Past Medical History  Past Medical History:   Diagnosis Date     Actinic keratosis      Aneurysm of thoracic aorta (H)      Basal cell carcinoma      Cirrhosis of liver (H)      COPD (chronic obstructive pulmonary disease) (H)      HCC (hepatocellular carcinoma) (H)      Hepatic steatosis      History of hepatitis C      HLD (hyperlipidemia)      HTN (hypertension)      Neuropathy      Obesity      Osteoarthritis      Portal hypertension (H)      Squamous cell carcinoma      Substance abuse (H)     alcohol     Uncomplicated asthma      Past Surgical History:   Procedure Laterality Date     HERNIA REPAIR      as child     IR CHEMO EMBOLIZATION  5/15/2023     IR LIVER BIOPSY PERCUTANEOUS  5/15/2023     [facility] DENTAL SURGERY PROCEDURE       wisdom teeth removal       albuterol (PROAIR HFA/PROVENTIL HFA/VENTOLIN HFA) 108 (90 Base) MCG/ACT inhaler  budesonide-formoterol (SYMBICORT) 80-4.5 MCG/ACT Inhaler  gabapentin (NEURONTIN) 300 MG capsule  atorvastatin (LIPITOR) 20 MG tablet  Cholecalciferol (VITAMIN D) 125 MCG (5000 UT) capsule  diclofenac (VOLTAREN) 1 % topical gel  docusate sodium (COLACE) 100 MG tablet  ferrous sulfate (FEROSUL) 325 (65 Fe) MG tablet  ibuprofen (ADVIL/MOTRIN) 200 MG tablet  ipratropium (ATROVENT HFA) 17 MCG/ACT inhaler  lactobacillus rhamnosus, GG, (CULTURELL)  "capsule  losartan (COZAAR) 25 MG tablet  multivitamin w/minerals (THERA-VIT-M) tablet  ondansetron (ZOFRAN) 4 MG tablet  oxyCODONE (ROXICODONE) 5 MG tablet      No Known Allergies  Family History  Family History   Problem Relation Age of Onset     Other - See Comments Mother         Patient states his Mother ? had skin cancer.     Other - See Comments Father         Patient reports Father had \"all types of skin cancer\".     Coronary Artery Disease Father      CABG Father      Other - See Comments Sister         Basal cell carcinoma     Coronary Artery Disease Sister      Aortic aneurysm Sister      Coronary Artery Disease Brother      Anesthesia Reaction No family hx of      Venous thrombosis No family hx of      Social History   Social History     Tobacco Use     Smoking status: Former     Packs/day: 0.25     Types: Cigarettes     Smokeless tobacco: Never     Tobacco comments:     Hasn't smoked since end of Oct 2022 then had several relapses. Reports last smoke 3 cigarettes about 1-2 weeks ago (5/8/23)   Vaping Use     Vaping status: Former   Substance Use Topics     Alcohol use: Yes     Alcohol/week: 20.0 standard drinks of alcohol     Types: 20 Standard drinks or equivalent per week     Comment: 2-3 drinks daily     Drug use: Not Currently      Past medical history, past surgical history, medications, allergies, family history, and social history were reviewed with the patient. No additional pertinent items.      A medically appropriate review of systems was performed with pertinent positives and negatives noted in the HPI, and all other systems negative.    Physical Exam   BP: 110/64  Pulse: (!) 125  Temp: 100.3  F (37.9  C)  Resp: 20  Height: 195.6 cm (6' 5\")  Weight: 109.3 kg (241 lb)  SpO2: (!) 88 %  Physical Exam  Vitals and nursing note reviewed.   Constitutional:       General: He is not in acute distress.     Appearance: Normal appearance. He is not toxic-appearing.   HENT:      Head: Atraumatic.   Eyes: "      General: No scleral icterus.     Conjunctiva/sclera: Conjunctivae normal.   Cardiovascular:      Rate and Rhythm: Normal rate.      Heart sounds: Normal heart sounds.   Pulmonary:      Effort: Pulmonary effort is normal. No respiratory distress.      Breath sounds: Wheezing (Diminished breath sounds and slight wheezes at the bases bilaterally) present.      Comments: Patient is not in respiratory distress and talks in complete sentences.  He does occasionally cough.  Abdominal:      Palpations: Abdomen is soft.      Tenderness: There is no abdominal tenderness. There is guarding. There is no rebound.   Musculoskeletal:         General: No deformity.      Cervical back: Neck supple.   Skin:     General: Skin is warm.   Neurological:      Mental Status: He is alert.           ED Course, Procedures, & Data     11:29 AM  The patient was seen and examined by Melchor Epstein MD in [facility].    Procedures                     Results for orders placed or performed during the hospital encounter of 05/17/23   XR Chest 2 Views     Status: None    Narrative    XR CHEST 2 VIEWS   5/17/2023 1:14 PM     HISTORY: cough    COMPARISON: CT 5/9/2023      Impression    IMPRESSION: Stable cardiomediastinal silhouette. Possible pulmonary  vascular congestion. Primary linear opacities in the left lower lobe,  favor atelectasis, less likely developing airspace consolidation. No  definite pleural effusion or pneumothorax. No acute bony abnormality.    SONNY COLON MD         SYSTEM ID:  [id number]   CT Abdomen Pelvis w Contrast     Status: None    Narrative    CT ABDOMEN PELVIS W CONTRAST 5/17/2023 2:45 PM    CLINICAL HISTORY: Right upper quadrant pain and elevated LFTs after  liver ablation yesterday    TECHNIQUE: CT scan of the abdomen and pelvis was performed following  injection of IV contrast. Multiplanar reformats were obtained. Dose  reduction techniques were used.  CONTRAST: 106mL Isovue 370    COMPARISON: Ultrasound and  angiogram 5/15/2023, MRI 4/19/2023    FINDINGS:   LOWER CHEST: Linear bibasilar opacities, favor atelectasis. No  definite consolidation or pleural effusion.    HEPATOBILIARY: Morphologic changes of cirrhosis. Postprocedural  changes of ablation and TACE in segment VIII with expected small foci  of gas throughout the treated parenchyma. Known liver lesions better  seen on recent MRI. Trace perihepatic free fluid without drainable  fluid collection. Cholelithiasis without evidence of acute  cholecystitis or biliary obstruction.    PANCREAS: Normal.    SPLEEN: Multiple calcified granulomas.     ADRENAL GLANDS: Normal.    KIDNEYS/BLADDER: Nonobstructing bilateral renal calculi. No  ureterolithiasis or hydronephrosis. Urinary bladder is unremarkable.    BOWEL: No obstruction or inflammatory change. Normal appendix.    PELVIC ORGANS: Normal.    ADDITIONAL FINDINGS: Scattered calcified atherosclerosis. Multiple  venous collaterals in the upper abdomen, similar to prior MRI.    MUSCULOSKELETAL: No acute bony abnormality. Healed left rib fractures  partially visualized.      Impression    IMPRESSION:   1.  Expected postprocedural changes of TACE and ablation in segment  VIII without definite evidence of complication.  2.  Cirrhosis with evidence of portal hypertension. Trace perihepatic  and pelvic free fluid.  3.  Cholelithiasis without evidence of acute cholecystitis or biliary  obstruction.    SONNY COLON MD         SYSTEM ID:  [id number]   Comprehensive metabolic panel     Status: Abnormal   Result Value Ref Range    Sodium 129 (L) 136 - 145 mmol/L    Potassium 4.0 3.4 - 5.3 mmol/L    Chloride 95 (L) 98 - 107 mmol/L    Carbon Dioxide (CO2) 23 22 - 29 mmol/L    Anion Gap 11 7 - 15 mmol/L    Urea Nitrogen 13.5 8.0 - 23.0 mg/dL    Creatinine 0.94 0.67 - 1.17 mg/dL    Calcium 9.0 8.8 - 10.2 mg/dL    Glucose 116 (H) 70 - 99 mg/dL    Alkaline Phosphatase 122 40 - 129 U/L    AST 1,166 (HH) 10 - 50 U/L     (HH) 10 -  50 U/L    Protein Total 7.0 6.4 - 8.3 g/dL    Albumin 3.9 3.5 - 5.2 g/dL    Bilirubin Total 3.3 (H) <=1.2 mg/dL    GFR Estimate >90 >60 mL/min/1.73m2   Lipase     Status: Normal   Result Value Ref Range    Lipase 32 13 - 60 U/L   INR     Status: Abnormal   Result Value Ref Range    INR 1.52 (H) 0.85 - 1.15   Partial thromboplastin time     Status: Normal   Result Value Ref Range    aPTT 33 22 - 38 Seconds   CBC with platelets and differential     Status: Abnormal   Result Value Ref Range    WBC Count 17.4 (H) 4.0 - 11.0 10e3/uL    RBC Count 4.30 (L) 4.40 - 5.90 10e6/uL    Hemoglobin 15.4 13.3 - 17.7 g/dL    Hematocrit 44.2 40.0 - 53.0 %     (H) 78 - 100 fL    MCH 35.8 (H) 26.5 - 33.0 pg    MCHC 34.8 31.5 - 36.5 g/dL    RDW 11.9 10.0 - 15.0 %    Platelet Count 156 150 - 450 10e3/uL    % Neutrophils 76 %    % Lymphocytes 7 %    % Monocytes 16 %    % Eosinophils 0 %    % Basophils 0 %    % Immature Granulocytes 1 %    NRBCs per 100 WBC 0 <1 /100    Absolute Neutrophils 13.2 (H) 1.6 - 8.3 10e3/uL    Absolute Lymphocytes 1.2 0.8 - 5.3 10e3/uL    Absolute Monocytes 2.7 (H) 0.0 - 1.3 10e3/uL    Absolute Eosinophils 0.0 0.0 - 0.7 10e3/uL    Absolute Basophils 0.1 0.0 - 0.2 10e3/uL    Absolute Immature Granulocytes 0.1 <=0.4 10e3/uL    Absolute NRBCs 0.0 10e3/uL   Adult Type and Screen     Status: None   Result Value Ref Range    ABO/RH(D) A POS     Antibody Screen Negative Negative    SPECIMEN EXPIRATION DATE [id number]    CBC with platelets differential     Status: Abnormal    Narrative    The following orders were created for panel order CBC with platelets differential.  Procedure                               Abnormality         Status                     ---------                               -----------         ------                     CBC with platelets and d...[[id number]]  Abnormal            Final result                 Please view results for these tests on the individual orders.   ABO/Rh type and  screen     Status: None    Narrative    The following orders were created for panel order ABO/Rh type and screen.  Procedure                               Abnormality         Status                     ---------                               -----------         ------                     Adult Type and Screen[[id number]]                            Final result                 Please view results for these tests on the individual orders.     Medications   sodium chloride 0.9% infusion ( Intravenous $New Bag 5/17/23 1218)   ondansetron (ZOFRAN) injection 4 mg (has no administration in time range)   HYDROmorphone (PF) (DILAUDID) injection 0.5 mg (0.5 mg Intravenous $Given 5/17/23 1216)   ipratropium - albuterol 0.5 mg/2.5 mg/3 mL (DUONEB) neb solution 3 mL (3 mLs Nebulization $Given 5/17/23 1228)   piperacillin-tazobactam (ZOSYN) 3.375 g vial to attach to  mL bag (3.375 g Intravenous $New Bag 5/17/23 1403)   iopamidol (ISOVUE-370) solution 125 mL (106 mLs Intravenous $Given 5/17/23 1444)   sodium chloride 0.9 % bag 100mL (52 mLs Intravenous $Given 5/17/23 1443)   ipratropium - albuterol 0.5 mg/2.5 mg/3 mL (DUONEB) neb solution 3 mL (3 mLs Nebulization $Given 5/17/23 1532)   HYDROmorphone (DILAUDID) injection 0.2 mg (0.2 mg Intravenous $Given 5/17/23 1532)     Labs Ordered and Resulted from Time of ED Arrival to Time of ED Departure   COMPREHENSIVE METABOLIC PANEL - Abnormal       Result Value    Sodium 129 (*)     Potassium 4.0      Chloride 95 (*)     Carbon Dioxide (CO2) 23      Anion Gap 11      Urea Nitrogen 13.5      Creatinine 0.94      Calcium 9.0      Glucose 116 (*)     Alkaline Phosphatase 122      AST 1,166 (*)      (*)     Protein Total 7.0      Albumin 3.9      Bilirubin Total 3.3 (*)     GFR Estimate >90     INR - Abnormal    INR 1.52 (*)    CBC WITH PLATELETS AND DIFFERENTIAL - Abnormal    WBC Count 17.4 (*)     RBC Count 4.30 (*)     Hemoglobin 15.4      Hematocrit 44.2       (*)      MCH 35.8 (*)     MCHC 34.8      RDW 11.9      Platelet Count 156      % Neutrophils 76      % Lymphocytes 7      % Monocytes 16      % Eosinophils 0      % Basophils 0      % Immature Granulocytes 1      NRBCs per 100 WBC 0      Absolute Neutrophils 13.2 (*)     Absolute Lymphocytes 1.2      Absolute Monocytes 2.7 (*)     Absolute Eosinophils 0.0      Absolute Basophils 0.1      Absolute Immature Granulocytes 0.1      Absolute NRBCs 0.0     LIPASE - Normal    Lipase 32     PARTIAL THROMBOPLASTIN TIME - Normal    aPTT 33     ROUTINE UA WITH MICROSCOPIC REFLEX TO CULTURE   TYPE AND SCREEN, ADULT    ABO/RH(D) A POS      Antibody Screen Negative      SPECIMEN EXPIRATION DATE [id number]     ABO/RH TYPE AND SCREEN     CT Abdomen Pelvis w Contrast   Final Result   IMPRESSION:    1.  Expected postprocedural changes of TACE and ablation in segment   VIII without definite evidence of complication.   2.  Cirrhosis with evidence of portal hypertension. Trace perihepatic   and pelvic free fluid.   3.  Cholelithiasis without evidence of acute cholecystitis or biliary   obstruction.      SONNY COLON MD            SYSTEM ID:  [id number]      XR Chest 2 Views   Final Result   IMPRESSION: Stable cardiomediastinal silhouette. Possible pulmonary   vascular congestion. Primary linear opacities in the left lower lobe,   favor atelectasis, less likely developing airspace consolidation. No   definite pleural effusion or pneumothorax. No acute bony abnormality.      SONNY COLON MD            SYSTEM ID:  [id number]             Critical care was not performed.     Medical Decision Making  The patient's presentation was of high complexity (an acute health issue posing potential threat to life or bodily function).    The patient's evaluation involved:  review of external note(s) from 2 sources (Reviewed notes from yesterday's procedure, and prior outpatient clinic notes regarding his COPD and liver cancer)  ordering and/or review of  3+ test(s) in this encounter (see separate area of note for details)  review of 3+ test result(s) ordered prior to this encounter (Reviewed prior LFTs, WBC and chest x-ray from previous visits)  independent interpretation of testing performed by another health professional (Reviewed radiologist report of CT scan and chest x-ray)  discussion of management or test interpretation with another health professional (Case discussed with interventional radiology)    The patient's management necessitated moderate risk (prescription drug management including medications given in the ED), high risk (a parenteral controlled substance) and high risk (a decision regarding hospitalization).      Assessment & Plan    A 62-year-old man with a history of hepatocellular cancer and COPD who had a chemoembolization procedure on the liver on 5/15/2023 and is now presenting with persistent postprocedural right upper quadrant pain and fever.  On exam initially patient's temperature was 100.3, heart rate 125, oxygen saturation 88% on room air, respiratory rate 20s and no respiratory distress and can speak in complete sentences.  Patient does appear uncomfortable and has slight scleral icterus.  He has right upper quadrant tenderness with some guarding but no rebound tenderness.  He has slight wheezing and diminished air sounds at the bases of his lungs.  His physical exam is otherwise unremarkable.  We started IV fluids, IV analgesics, obtain diagnostic studies, these are notable for marked elevation of liver enzymes, INR 1.52, leukocytosis to 17.3 and total bili elevated slightly 3.3.  At this point discussed the case with interventional radiology, treated with IV Zosyn, obtained contrast CT which shows findings consistent with recent chemoembolization procedure only, no other obvious complications.  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  We will continue to treat with analgesics, antibiotics, admit to medicine.  I have  discussed with the hospitalist.  He was given 2 DuoNebs in the ED and continues to run oxygen saturation in the range of 88 to 90% without appearing to have respiratory difficulty.    I have reviewed the nursing notes. I have reviewed the findings, diagnosis, plan and need for follow up with the patient.    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   I, Melchor Epstein MD, was physically present and have reviewed and verified the accuracy of this note documented by Jackie Epstein.    Melchor Epstein MD  [facility] EMERGENCY DEPARTMENT  5/17/2023     Melchor Epstein MD  05/17/23 [id number]

## 2023-05-17 NOTE — H&P
[facility]    History and Physical - Hospitalist Service, GOLD TEAM        Date of Admission:  5/17/2023    Assessment & Plan      Sohan Marcano is a 62 year old male w/ recently dx hepatocellular carcinoma, liver cirrhosis, portal hypertension, history of hepatitis C status posttreatment, thoracic aortic aneurysm, COPD, MCKENZIE, history of substance abuse and alcohol abuse, neuropathy HTN, HLD, BCC skin admitted on 5/17/2023 following IR guided liver lesion bx and ablation procedures on 5/15/23.     Acute RUQ pain  HCC ablation  Acute hepatitis, likely traumatic  S/p HCC biopsy and ablation by IR (5/15/23)  Follows with Dr. Quezada of surg-onc. LFTs today are markedly elevated from previously with prior trace AST and ALT elevations which are now AST 1166,  on admission with T. bili 3.3.  WBC 17.4.  H&H stable.  CT abdomen and pelvis with contrast on admission in ED showing findings consistent with cirrhosis as well as changes consistent with ablation and TACE with trace perihepatic free fluid, no drainable fluid collection, no evidence of cholecystitis or biliary obstruction.  - FEN: ADAT, NaCl @ 75cc/h, wean IVF as able or per Na  - Pain mng:    - hold scheduled APAP given LFTs, can give limited APAP prn   - oxycodone 5-10mg q4h prn   - dilaudid 0.2-0.4mg q2h prn   - voltaren topical 1% QID   - continue PTA gabapentin 300mg BID  - anti-emetics prn zofran and reglan  - bowel regiment   -Surgical pathology 5/15 consistent with a moderately differentiated hepatocellular carcinoma  -Continue IV Zosyn at this time, trend CRP and WBC  -Repeat LFTs and INR in a.m. and if uptrending discuss with hepatology    Acute hypoxic respiratory failure  COPD  Dyspnea on exertion  Hx Tobacco use  Grade 1 diastolic dysfunction  Shortness of breath ongoing since procedure on Monday. No PTA supplemental O2 needs. SPO2 87% in emergency room.  Chest x-ray in ED with left lower lobe  "linear opacity without clear consolidation, PTX, effusion. Last TTE 3/2023 w/ grade 1 diastolic dysfunction, LVEF 55-60%.   - Duonebs QID and prn  - O2 to maintain sats at least 88%  - continue PTA symbicort--> substituted per formulary  - low threshold to obtain CTA chest if any acute worsening  - low threshold to obtain trop if chest pain or worsening   - EKG sinus tach () without ischemic change    History of IV and inhaled substance abuse   Hx alcohol abuse  Reports drinking \"5 cups\" of EtOH daily, unclear measurement and notes tapering himself down generally.  + hx of hallucinations, no hx of DT or seizures   - CIWA protocol with ativan as indicated    - if scoring, consider d/w psychiatry/detox   - MVI, folic acid, and thiamine supplements   - Notify medicine for SBP >180 or DBP >110    Liver cirrhosis C/B portal hypertension and ascites  History of hepatitis C status posttreatment  Follows outpatient with Dr. Leventhal and last seen 3/7/23. LFTs as above.  MELD score 22 here (normally is 10).  -Limit hepatotoxins as able  -Monitor for signs of GI bleeding    Hyponatremia, acute  Per any normal and on admission is 129 in the setting of above.  - NaCl 0.9% @75cc/h  - trend Na q6h overnight until stable, avoid overcorrection    HTN -BP is currently normal and stable -continue PTA losartan w/ hold parameters    ROMELIA - holding PTA iron at this time given abdominal pain, resume when improving  Neuropathy - continue PTA gabapentin   Cholelithiasis - noted on CT A/P 5/15/23 w/o e/o cholecystitis -monitor  Thoracic aortic aneurysm- aortic root 4.3 cm on TTE 3/23 - no pathology noted on recent CT  HLD -hold PTA statin given transaminitis  Skin cancer-monitor, continue to follow outpatient with dermatology  Nonobstructing nephrolithiasis-noted on CT-monitor  Coagulation defect- INR equals 1.52, stable vs prior - monitor  Overweight - BMI  28.58      Diet:  ADAT to regular  DVT Prophylaxis: Pneumatic Compression " "Devices  Marie Catheter: Not present  Lines: None     Cardiac Monitoring: None  Code Status:   full    Disposition Plan      Expected Discharge Date: 05/19/2023                The patient's care was discussed with the Attending Physician, Dr. Farley, Bedside Nurse, Patient and GI team.    Ly Noriega PA-C  Hospitalist Service, GOLD TEAM   [facility]  Securely message with [email or web address] (more info)  Text page via [email or web address] Paging/Directory   See signed in provider for up to date coverage information    ______________________________________________________________________    Chief Complaint   RUQ pain     History is obtained from the patient    History of Present Illness   Sohan Marcano is a 62 year old male w/ recently dx hepatocellular carcinoma, liver cirrhosis, portal hypertension, history of hepatitis C status posttreatment, thoracic aortic aneurysm, COPD, MCKENZIE, history of substance abuse and alcohol abuse, neuropathy HTN, HLD, BCC skin admitted on 5/17/2023 following IR guided liver lesion bx and ablation procedures on 5/15/23.     Patient presents after IR guided ablation of 2 of 5 liver tumors on Monday (5/15/2023) and [facility].  He notes interval increasing of right upper quadrant pain since the procedure.  He has been taking the prescribed oxycodone which has not been controlling his pain.  He notes having a low-grade fever of 100.1 yesterday.  He has been tolerating p.o. intake and hydration.    Currently he is notes that he is having unbearable pain after receiving Dilaudid and notes that his pain is 60 to 70% of what it was and was \"100%\" initially when he came in.  His pain is in a focal point of the right upper quadrant and is nonradiating.  He notes that oxycodone at home helped him to sleep and otherwise did not help with his pain.  He notes having a fever to 100.7 at home and endorses feeling warm.  He is also having dyspnea on exertion that is " "new since his procedure on Monday and notes that he is never needed oxygen prescribed at home.    He has been able to tolerate food and fluids as usual including eating a regular diet in the ED.  He denies having any nausea or vomiting.  He does not endorse any urinary issues.  He notes that his last bowel movement was 2 days ago and endorses constipation.     He notes some skin discoloration associated with the soap use asked to use prior to his ablation procedure and otherwise does not endorse having any yellow or orange discoloration to the skin.  He endorses having some new swelling in the bilateral hands that is indenting.    During our conversation he acknowledges frequently losing his train of thought and needs frequent reminders of where we left off in the discussion.    He notes drinking \"5 cups\" of alcohol per day and notes that he has been weaning himself off of more than 2 pints per day.  He has never had alcohol withdrawal seizures or delirium tremens and notes he has had an episode of alcohol associated hallucinations.    He denies chest pain, palpitations, lightheadedness, nausea, vomiting, current fever or chills.      Past Medical History    Past Medical History:   Diagnosis Date     Actinic keratosis      Aneurysm of thoracic aorta (H)      Basal cell carcinoma      Cirrhosis of liver (H)      COPD (chronic obstructive pulmonary disease) (H)      HCC (hepatocellular carcinoma) (H)      Hepatic steatosis      History of hepatitis C      HLD (hyperlipidemia)      HTN (hypertension)      Neuropathy      Obesity      Osteoarthritis      Portal hypertension (H)      Squamous cell carcinoma      Substance abuse (H)     alcohol     Uncomplicated asthma        Past Surgical History   Past Surgical History:   Procedure Laterality Date     HERNIA REPAIR      as child     IR CHEMO EMBOLIZATION  5/15/2023     IR LIVER BIOPSY PERCUTANEOUS  5/15/2023     WI DENTAL SURGERY PROCEDURE       wisdom teeth removal   "       Prior to Admission Medications   Prior to Admission Medications   Prescriptions Last Dose Informant Patient Reported? Taking?   Cholecalciferol (VITAMIN D) 125 MCG (5000 UT) capsule   No No   Sig: Take 1 capsule (5,000 Units) by mouth daily   albuterol (PROAIR HFA/PROVENTIL HFA/VENTOLIN HFA) 108 (90 Base) MCG/ACT inhaler   No Yes   Sig: Inhale 2 puffs into the lungs every 6 hours as needed for shortness of breath / dyspnea or wheezing   Patient taking differently: Inhale 1 puff into the lungs daily   atorvastatin (LIPITOR) 20 MG tablet   No No   Sig: Take 1 tablet (20 mg) by mouth daily   budesonide-formoterol (SYMBICORT) 80-4.5 MCG/ACT Inhaler   No Yes   Sig: Inhale 2 puffs into the lungs 2 times daily   diclofenac (VOLTAREN) 1 % topical gel   No No   Sig: Apply 2 g topically 4 times daily   docusate sodium (COLACE) 100 MG tablet   Yes No   Sig: Take 100 mg by mouth 2 times daily   ferrous sulfate (FEROSUL) 325 (65 Fe) MG tablet   No No   Sig: Take 1 tablet (325 mg) by mouth daily (with breakfast)   Patient not taking: Reported on 3/21/2023   gabapentin (NEURONTIN) 300 MG capsule   No Yes   Sig: Take 1 capsule (300 mg) by mouth 3 times daily as needed for neuropathic pain   Patient taking differently: Take 300 mg by mouth 2 times daily   ibuprofen (ADVIL/MOTRIN) 200 MG tablet   Yes No   Sig: Take 200 mg by mouth every 4 hours as needed for pain   ipratropium (ATROVENT HFA) 17 MCG/ACT inhaler   No No   Sig: Inhale 2 puffs into the lungs every 6 hours   lactobacillus rhamnosus, GG, (CULTURELL) capsule   Yes No   Sig: Take 1 capsule by mouth daily   losartan (COZAAR) 25 MG tablet   No No   Sig: Take 1 tablet (25 mg) by mouth daily   multivitamin w/minerals (THERA-VIT-M) tablet   No No   Sig: Take 1 tablet by mouth daily (with breakfast)   ondansetron (ZOFRAN) 4 MG tablet   No No   Sig: Take 1-2 tablets (4-8 mg) by mouth every 6 hours as needed for nausea (vomiting)   oxyCODONE (ROXICODONE) 5 MG tablet   No No  "  Sig: Take 2 tablets (10 mg) by mouth every 4 hours as needed for moderate to severe pain      Facility-Administered Medications Last Administration Doses Remaining   lidocaine 1% with EPINEPHrine 1:100,000 injection 3 mL None recorded 1           Review of Systems    The 10 point Review of Systems is negative other than noted in the HPI or here.     Social History   I have reviewed this patient's social history and updated it with pertinent information if needed.  Social History     Tobacco Use     Smoking status: Former     Packs/day: 0.25     Types: Cigarettes     Smokeless tobacco: Never     Tobacco comments:     Hasn't smoked since end of Oct 2022 then had several relapses. Reports last smoke 3 cigarettes about 1-2 weeks ago (5/8/23)   Vaping Use     Vaping status: Former   Substance Use Topics     Alcohol use: Yes     Alcohol/week: 20.0 standard drinks of alcohol     Types: 20 Standard drinks or equivalent per week     Comment: 2-3 drinks daily     Drug use: Not Currently       Family History   I have reviewed this patient's family history and updated it with pertinent information if needed.  Family History   Problem Relation Age of Onset     Other - See Comments Mother         Patient states his Mother ? had skin cancer.     Other - See Comments Father         Patient reports Father had \"all types of skin cancer\".     Coronary Artery Disease Father      CABG Father      Other - See Comments Sister         Basal cell carcinoma     Coronary Artery Disease Sister      Aortic aneurysm Sister      Coronary Artery Disease Brother      Anesthesia Reaction No family hx of      Venous thrombosis No family hx of        Allergies   No Known Allergies     Physical Exam   Vital Signs: Temp: 98  F (36.7  C) Temp src: Oral BP: 125/83 Pulse: 100   Resp: 20 SpO2: (!) 87 % O2 Device: Nasal cannula Oxygen Delivery: 3 LPM  Weight: 241 lbs 0 oz  GENERAL: Alert and oriented x 3. NAD.  Sitting at the edge of the bed and able to " reposition independently. Cooperative.   HEENT: Anicteric and noninjected sclera. Mucous membranes moist. NC. AT. EOMI. pupils equal and round  CV: RRR. S1, S2. No murmurs appreciated.   RESPIRATORY: Effort normal on 2 LNC. Lungs diminished bases, right lower lobe rales, no wheezing or rhonchi.    GI: + Firm and distended in the upper quadrants with soft bilateral lower quadrants, NABS, no guarding or rebound noted. No lesions.   NEUROLOGICAL: No focal deficits. Moves all extremities.  CN 2-12 grossly intact.  No asterixis EXTREMITIES: No peripheral edema. Intact bilateral pedal pulses.   SKIN: No jaundice. No rashes on exposed skin.  BACK: no CVA tenderness, no spinous tenderness, no lesions      Medical Decision Making       80 MINUTES SPENT BY ME on the date of service doing chart review, history, exam, documentation & further activities per the note.      Data     I have personally reviewed the following data over the past 24 hrs:    17.4 (H)  \   15.4   / 156     129 (L) 95 (L) 13.5 /  116 (H)   4.0 23 0.94 \       ALT: 646 (HH) AST: 1,166 (HH) AP: 122 TBILI: 3.3 (H)   ALB: 3.9 TOT PROTEIN: 7.0 LIPASE: 32       INR:  1.52 (H) PTT:  33   D-dimer:  N/A Fibrinogen:  N/A

## 2023-05-17 NOTE — ED NOTES
Bed: ED07  Expected date:   Expected time:   Means of arrival:   Comments:  Hold for melany terrazas

## 2023-05-17 NOTE — ED NOTES
Writer initially assessed patient. Patient rated pain at 7/10 but does rise to 10/10 with movement and deep breaths. Patient's O2 sats at 86-88% of 3L O2 via NC. MD made aware by previous RN. Writer administered Dilauded via IV and nebulizer. Patient's pain decreased to 4/10 while laying. O2 increased to 92% at 3L. Patient resting in bed.

## 2023-05-18 LAB
ALBUMIN SERPL BCG-MCNC: 3.4 G/DL (ref 3.5–5.2)
ALP SERPL-CCNC: 116 U/L (ref 40–129)
ALT SERPL W P-5'-P-CCNC: 492 U/L (ref 10–50)
ANION GAP SERPL CALCULATED.3IONS-SCNC: 12 MMOL/L (ref 7–15)
AST SERPL W P-5'-P-CCNC: 621 U/L (ref 10–50)
ATRIAL RATE - MUSE: 100 BPM
BILIRUB SERPL-MCNC: 3.7 MG/DL
BUN SERPL-MCNC: 16.7 MG/DL (ref 8–23)
CALCIUM SERPL-MCNC: 8.5 MG/DL (ref 8.8–10.2)
CHLORIDE SERPL-SCNC: 97 MMOL/L (ref 98–107)
CREAT SERPL-MCNC: 0.87 MG/DL (ref 0.67–1.17)
CRP SERPL-MCNC: 141.55 MG/L
DEPRECATED HCO3 PLAS-SCNC: 19 MMOL/L (ref 22–29)
DIASTOLIC BLOOD PRESSURE - MUSE: NORMAL MMHG
ERYTHROCYTE [DISTWIDTH] IN BLOOD BY AUTOMATED COUNT: 11.8 % (ref 10–15)
ETHANOL SERPL-MCNC: <0.01 G/DL
GFR SERPL CREATININE-BSD FRML MDRD: >90 ML/MIN/1.73M2
GLUCOSE SERPL-MCNC: 130 MG/DL (ref 70–99)
HCT VFR BLD AUTO: 39.6 % (ref 40–53)
HGB BLD-MCNC: 13.8 G/DL (ref 13.3–17.7)
INR PPP: 1.55 (ref 0.85–1.15)
INTERPRETATION ECG - MUSE: NORMAL
MAGNESIUM SERPL-MCNC: 2.4 MG/DL (ref 1.7–2.3)
MCH RBC QN AUTO: 36.1 PG (ref 26.5–33)
MCHC RBC AUTO-ENTMCNC: 34.8 G/DL (ref 31.5–36.5)
MCV RBC AUTO: 104 FL (ref 78–100)
P AXIS - MUSE: 45 DEGREES
PLATELET # BLD AUTO: 137 10E3/UL (ref 150–450)
POTASSIUM SERPL-SCNC: 4 MMOL/L (ref 3.4–5.3)
PR INTERVAL - MUSE: 130 MS
PROT SERPL-MCNC: 6.5 G/DL (ref 6.4–8.3)
QRS DURATION - MUSE: 108 MS
QT - MUSE: 356 MS
QTC - MUSE: 459 MS
R AXIS - MUSE: -36 DEGREES
RBC # BLD AUTO: 3.82 10E6/UL (ref 4.4–5.9)
SODIUM SERPL-SCNC: 128 MMOL/L (ref 136–145)
SODIUM SERPL-SCNC: 128 MMOL/L (ref 136–145)
SYSTOLIC BLOOD PRESSURE - MUSE: NORMAL MMHG
T AXIS - MUSE: 33 DEGREES
VENTRICULAR RATE- MUSE: 100 BPM
WBC # BLD AUTO: 13.6 10E3/UL (ref 4–11)

## 2023-05-18 PROCEDURE — 83735 ASSAY OF MAGNESIUM: CPT | Performed by: PHYSICIAN ASSISTANT

## 2023-05-18 PROCEDURE — 999N000157 HC STATISTIC RCP TIME EA 10 MIN

## 2023-05-18 PROCEDURE — 86140 C-REACTIVE PROTEIN: CPT | Performed by: PHYSICIAN ASSISTANT

## 2023-05-18 PROCEDURE — 94640 AIRWAY INHALATION TREATMENT: CPT | Mod: 76

## 2023-05-18 PROCEDURE — 120N000002 HC R&B MED SURG/OB UMMC

## 2023-05-18 PROCEDURE — 99232 SBSQ HOSP IP/OBS MODERATE 35: CPT | Performed by: INTERNAL MEDICINE

## 2023-05-18 PROCEDURE — 94640 AIRWAY INHALATION TREATMENT: CPT

## 2023-05-18 PROCEDURE — 36415 COLL VENOUS BLD VENIPUNCTURE: CPT | Performed by: PHYSICIAN ASSISTANT

## 2023-05-18 PROCEDURE — 82077 ASSAY SPEC XCP UR&BREATH IA: CPT | Performed by: PHYSICIAN ASSISTANT

## 2023-05-18 PROCEDURE — 250N000011 HC RX IP 250 OP 636: Performed by: PHYSICIAN ASSISTANT

## 2023-05-18 PROCEDURE — 99207 PR NO BILLABLE SERVICE THIS VISIT: CPT | Performed by: PEDIATRICS

## 2023-05-18 PROCEDURE — 85610 PROTHROMBIN TIME: CPT | Performed by: PHYSICIAN ASSISTANT

## 2023-05-18 PROCEDURE — 84295 ASSAY OF SERUM SODIUM: CPT | Performed by: PHYSICIAN ASSISTANT

## 2023-05-18 PROCEDURE — 250N000009 HC RX 250: Performed by: PHYSICIAN ASSISTANT

## 2023-05-18 PROCEDURE — 250N000013 HC RX MED GY IP 250 OP 250 PS 637: Performed by: PHYSICIAN ASSISTANT

## 2023-05-18 PROCEDURE — 85027 COMPLETE CBC AUTOMATED: CPT | Performed by: PHYSICIAN ASSISTANT

## 2023-05-18 PROCEDURE — 80053 COMPREHEN METABOLIC PANEL: CPT | Performed by: PHYSICIAN ASSISTANT

## 2023-05-18 RX ORDER — ACETAMINOPHEN 325 MG/1
975 TABLET ORAL EVERY 6 HOURS PRN
Status: DISCONTINUED | OUTPATIENT
Start: 2023-05-18 | End: 2023-05-19 | Stop reason: HOSPADM

## 2023-05-18 RX ADMIN — LOSARTAN POTASSIUM 25 MG: 25 TABLET, FILM COATED ORAL at 11:47

## 2023-05-18 RX ADMIN — HYDROMORPHONE HYDROCHLORIDE 0.4 MG: 0.2 INJECTION, SOLUTION INTRAMUSCULAR; INTRAVENOUS; SUBCUTANEOUS at 14:35

## 2023-05-18 RX ADMIN — IPRATROPIUM BROMIDE AND ALBUTEROL SULFATE 3 ML: 2.5; .5 SOLUTION RESPIRATORY (INHALATION) at 11:52

## 2023-05-18 RX ADMIN — FLUTICASONE FUROATE AND VILANTEROL TRIFENATATE 1 PUFF: 100; 25 POWDER RESPIRATORY (INHALATION) at 08:14

## 2023-05-18 RX ADMIN — HYDROMORPHONE HYDROCHLORIDE 0.4 MG: 0.2 INJECTION, SOLUTION INTRAMUSCULAR; INTRAVENOUS; SUBCUTANEOUS at 08:13

## 2023-05-18 RX ADMIN — PIPERACILLIN AND TAZOBACTAM 3.38 G: 3; .375 INJECTION, POWDER, LYOPHILIZED, FOR SOLUTION INTRAVENOUS at 13:30

## 2023-05-18 RX ADMIN — IPRATROPIUM BROMIDE AND ALBUTEROL SULFATE 3 ML: 2.5; .5 SOLUTION RESPIRATORY (INHALATION) at 08:20

## 2023-05-18 RX ADMIN — PIPERACILLIN AND TAZOBACTAM 3.38 G: 3; .375 INJECTION, POWDER, LYOPHILIZED, FOR SOLUTION INTRAVENOUS at 03:14

## 2023-05-18 RX ADMIN — IPRATROPIUM BROMIDE AND ALBUTEROL SULFATE 3 ML: 2.5; .5 SOLUTION RESPIRATORY (INHALATION) at 16:10

## 2023-05-18 RX ADMIN — IPRATROPIUM BROMIDE AND ALBUTEROL SULFATE 3 ML: 2.5; .5 SOLUTION RESPIRATORY (INHALATION) at 20:28

## 2023-05-18 RX ADMIN — HYDROMORPHONE HYDROCHLORIDE 0.4 MG: 0.2 INJECTION, SOLUTION INTRAMUSCULAR; INTRAVENOUS; SUBCUTANEOUS at 17:06

## 2023-05-18 RX ADMIN — THIAMINE HCL TAB 100 MG 100 MG: 100 TAB at 08:13

## 2023-05-18 RX ADMIN — PIPERACILLIN AND TAZOBACTAM 3.38 G: 3; .375 INJECTION, POWDER, LYOPHILIZED, FOR SOLUTION INTRAVENOUS at 08:32

## 2023-05-18 RX ADMIN — PIPERACILLIN AND TAZOBACTAM 3.38 G: 3; .375 INJECTION, POWDER, LYOPHILIZED, FOR SOLUTION INTRAVENOUS at 20:05

## 2023-05-18 RX ADMIN — HYDROMORPHONE HYDROCHLORIDE 0.4 MG: 0.2 INJECTION, SOLUTION INTRAMUSCULAR; INTRAVENOUS; SUBCUTANEOUS at 22:47

## 2023-05-18 RX ADMIN — HYDROMORPHONE HYDROCHLORIDE 0.4 MG: 0.2 INJECTION, SOLUTION INTRAMUSCULAR; INTRAVENOUS; SUBCUTANEOUS at 20:05

## 2023-05-18 RX ADMIN — MULTIPLE VITAMINS W/ MINERALS TAB 1 TABLET: TAB at 08:13

## 2023-05-18 RX ADMIN — FOLIC ACID 1 MG: 1 TABLET ORAL at 08:13

## 2023-05-18 ASSESSMENT — ACTIVITIES OF DAILY LIVING (ADL)
DEPENDENT_IADLS:: INDEPENDENT
ADLS_ACUITY_SCORE: 37

## 2023-05-18 NOTE — CONSULTS
Care Management Initial Consult    General Information  Assessment completed with: Patient,    Type of CM/SW Visit: Initial Assessment    Primary Care Provider verified and updated as needed: Yes   Readmission within the last 30 days: current reason for admission unrelated to previous admission      Reason for Consult: discharge planning, other (see comments) (elevated risk score)  Advance Care Planning: Advance Care Planning Reviewed: present on chart   (POLST)       Communication Assessment  Patient's communication style: spoken language (English or Bilingual)             Cognitive  Cognitive/Neuro/Behavioral: WDL                      Living Environment:   People in home: significant other     Current living Arrangements: house      Able to return to prior arrangements: yes  Living Arrangement Comments: Sig other is a retired RN    Family/Social Support:  Care provided by: self  Provides care for: no one  Marital Status: Lives with Significant Other  Significant Other          Description of Support System: Supportive, Involved    Support Assessment: Adequate family and caregiver support, Adequate social supports    Current Resources:   Patient receiving home care services: No     Community Resources: None  Equipment currently used at home:    Supplies currently used at home: None    Employment/Financial:  Employment Status: retired        Financial Concerns: No concerns identified           Does the patient's insurance plan have a 3 day qualifying hospital stay waiver?  NA    Lifestyle & Psychosocial Needs:  Social Determinants of Health     Tobacco Use: Medium Risk (5/17/2023)    Patient History      Smoking Tobacco Use: Former      Smokeless Tobacco Use: Never      Passive Exposure: Not on file   Alcohol Use: Not on file   Financial Resource Strain: Not on file   Food Insecurity: Not on file   Transportation Needs: Not on file   Physical Activity: Not on file   Stress: Not on file   Social Connections: Not on  "file   Intimate Partner Violence: Not on file   Depression: Not at risk (5/8/2023)    PHQ-2      PHQ-2 Score: 0   Housing Stability: Not on file       Functional Status:  Prior to admission patient needed assistance:   Dependent ADLs:: Independent  Dependent IADLs:: Independent  Assesssment of Functional Status: Not at  functional baseline    Mental Health Status:  Mental Health Status: No Current Concerns       Chemical Dependency Status:  Chemical Dependency Status: No Current Concerns             Values/Beliefs:  Spiritual, Cultural Beliefs, Jainism Practices, Values that affect care: no               Additional Information:    Per provider note:  \"Sohan Marcano is a 62 year old male w/ recently dx hepatocellular carcinoma, liver cirrhosis, portal hypertension, history of hepatitis C status posttreatment, thoracic aortic aneurysm, COPD, MCKENZIE, history of substance abuse and alcohol abuse, neuropathy HTN, HLD, BCC skin admitted on 5/17/2023 following IR guided liver lesion bx and ablation procedures on 5/15/23\"    Met with Wale in his room.  He is here for pain management following procedure.Discharge tomorrow if pain is improved.  O2 need while in hospital due to pain and inability to take deep breaths.  Should not need O2 at discharge, not baseline.    He anticipates returning home with significant other who is a retired RN.     Care management will be available should discharge needs arise.    Veronika Beal RN, BA  Care Coordinator  6 Med Surg  [phone], pager [phone]      For weekend social work needs, contact information below and available on [employer]:      Weekend [place] Pager: [phone]   Weekend 6MS, 8A, 10ICU- Pager: [phone]     For weekend RN care coordinator needs (home discharge with needs including home care, assisted living facility returns, durable medical equip, IV antibiotics) - page [phone]    "

## 2023-05-18 NOTE — PROGRESS NOTES
Flow sheet will not allow me to enter Critical Lab result so I will enter this note:    Received critical AST result of 621 from Lab. Dr. Levy notified. Not concerning as patient has Liver Cancer and is S/P Chemoembolization of liver.

## 2023-05-18 NOTE — PHARMACY-ADMISSION MEDICATION HISTORY
Admission Medication History    Admission medication history is complete. The information provided in this note is only as accurate as the sources available at the time of the update.    Information Source(s):    Patient    Dispense Report    Pertinent Information:    Patient-reported medications are consistent with dispense report.    Last fill for atorvastatin 40 mg was 1/18/2023 - patient reports that he wasn't adherent to this medication for a while but resumed taking it recently.    Changes made to PTA medication list:    Added:    Patient is taking, per patient:  o Milk Thistle PO    Deleted:     Patient is no longer taking, per patient and dispense report:  o Diclofenac 1% topical gel  o Diclofenac 100 mg tablet  o Ferrous sulfate 325 mg tablet  o Atrovent HFA 17 mcg/act inhaler  o Ondansetron 4 mg tablet  o Oxycodone 5 mg tablet    Changed: None    Allergies reviewed with patient and updates made in EHR.    Medication History Completed By: Blanche Hernandez, Student Pharmacist  5/17/2023 7:14 PM    PTA Med List   Medication Sig Last Dose     albuterol (PROAIR HFA/PROVENTIL HFA/VENTOLIN HFA) 108 (90 Base) MCG/ACT inhaler Inhale 2 puffs into the lungs every 6 hours as needed for shortness of breath / dyspnea or wheezing 5/17/2023 at AM     atorvastatin (LIPITOR) 40 MG tablet Take 20 mg by mouth daily 5/17/2023 at AM     budesonide-formoterol (SYMBICORT) 80-4.5 MCG/ACT Inhaler Inhale 2 puffs into the lungs 2 times daily 5/17/2023 at AM     Cholecalciferol (VITAMIN D) 125 MCG (5000 UT) capsule Take 1 capsule (5,000 Units) by mouth daily 5/17/2023     gabapentin (NEURONTIN) 300 MG capsule Take 1 capsule (300 mg) by mouth 3 times daily as needed for neuropathic pain 5/17/2023 at AM     losartan (COZAAR) 25 MG tablet Take 1 tablet (25 mg) by mouth daily 5/17/2023 at AM     MILK THISTLE PO Take 1 tablet by mouth daily 5/17/2023     multivitamin w/minerals (THERA-VIT-M) tablet Take 1 tablet by mouth daily (with  breakfast) 5/17/2023 at AM

## 2023-05-18 NOTE — ED NOTES
[facility]   ED Nurse to Floor Handoff     Sohan Marcano is a 62 year old male who speaks English and lives with family members,  in a home  They arrived in the ED by car from home    ED Chief Complaint: Abdominal Pain (RUQ pain increasing over the last 2 days. Pt reports that he has 5 tumors on his liver and had a liver ablation and a biopsy on 5/15. Endorses pain when taking a deep breath. Denies NVD. 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  Past Medical Hx:   Past Medical History:   Diagnosis Date    Actinic keratosis     Aneurysm of thoracic aorta (H)     Basal cell carcinoma     Cirrhosis of liver (H)     COPD (chronic obstructive pulmonary disease) (H)     HCC (hepatocellular carcinoma) (H)     Hepatic steatosis     History of hepatitis C     HLD (hyperlipidemia)     HTN (hypertension)     Neuropathy     Obesity     Osteoarthritis     Portal hypertension (H)     Squamous cell carcinoma     Substance abuse (H)     alcohol    Uncomplicated asthma       Baseline Mental status: WDL  Current Mental Status changes: at basesline    Infection present or suspected this encounter: yes other    Sepsis suspected: No  Isolation type: No active isolations  Patient tested for COVID 19 prior to admission: NO     Activity level - Baseline/Home:  Independent  Activity Level - Current:   Stand with Assist    Bariatric equipment needed?: No    In the ED these meds were given:   Medications   sodium chloride 0.9% infusion ( Intravenous Rate/Dose Verify 5/17/23 1946)   ipratropium - albuterol 0.5 mg/2.5 mg/3 mL (DUONEB) neb solution 3 mL (3 mLs Nebulization $Given 5/17/23 2026)     And   ipratropium - albuterol 0.5 mg/2.5 mg/3  mL (DUONEB) neb solution 3 mL (has no administration in time range)   fluticasone-vilanterol (BREO ELLIPTA) 100-25 MCG/ACT inhaler 1 puff (has no administration in time range)   losartan (COZAAR) tablet 25 mg (25 mg Oral Not Given 5/17/23 2010)   HYDROmorphone (DILAUDID) injection 0.2-0.4 mg (has no administration in time range)   lidocaine 1 % 0.1-1 mL (has no administration in time range)   lidocaine (LMX4) cream (has no administration in time range)   sodium chloride (PF) 0.9% PF flush 3 mL (has no administration in time range)   sodium chloride (PF) 0.9% PF flush 3 mL (has no administration in time range)   melatonin tablet 1 mg (has no administration in time range)   senna-docusate (SENOKOT-S/PERICOLACE) 8.6-50 MG per tablet 1 tablet (has no administration in time range)     Or   senna-docusate (SENOKOT-S/PERICOLACE) 8.6-50 MG per tablet 2 tablet (has no administration in time range)   ondansetron (ZOFRAN ODT) ODT tab 4 mg (has no administration in time range)     Or   ondansetron (ZOFRAN) injection 4 mg (has no administration in time range)   prochlorperazine (COMPAZINE) injection 10 mg (has no administration in time range)     Or   prochlorperazine (COMPAZINE) tablet 10 mg (has no administration in time range)     Or   prochlorperazine (COMPAZINE) suppository 25 mg (has no administration in time range)   bisacodyl (DULCOLAX) suppository 10 mg (has no administration in time range)   piperacillin-tazobactam (ZOSYN) 3.375 g vial to attach to  mL bag (3.375 g Intravenous $New Bag 5/17/23 2020)   sodium chloride 0.9 % 1,000 mL with Infuvite Adult 10 mL, thiamine 100 mg, folic acid 1 mg infusion (has no administration in time range)   thiamine (B-1) tablet 100 mg (has no administration in time range)   folic acid (FOLVITE) tablet 1 mg (has no administration in time range)   multivitamin w/minerals (THERA-VIT-M) tablet 1 tablet (has no administration in time range)   LORazepam (ATIVAN) tablet 1-2 mg (has no  administration in time range)     Or   LORazepam (ATIVAN) injection 1-2 mg (has no administration in time range)   HYDROmorphone (PF) (DILAUDID) injection 0.5 mg (0.5 mg Intravenous $Given 5/17/23 1216)   ipratropium - albuterol 0.5 mg/2.5 mg/3 mL (DUONEB) neb solution 3 mL (3 mLs Nebulization $Given 5/17/23 1228)   piperacillin-tazobactam (ZOSYN) 3.375 g vial to attach to  mL bag (0 g Intravenous Stopped 5/17/23 1500)   iopamidol (ISOVUE-370) solution 125 mL (106 mLs Intravenous $Given 5/17/23 1444)   sodium chloride 0.9 % bag 100mL (52 mLs Intravenous $Given 5/17/23 1443)   ipratropium - albuterol 0.5 mg/2.5 mg/3 mL (DUONEB) neb solution 3 mL (3 mLs Nebulization $Given 5/17/23 1532)   HYDROmorphone (DILAUDID) injection 0.2 mg (0.2 mg Intravenous $Given 5/17/23 1532)       Drips running?  No    Home pump  No    Current LDAs  Peripheral IV 05/17/23 Anterior;Right Upper forearm (Active)   Number of days: 0       Incision/Surgical Site 05/15/23 Right;Upper Flank (Active)   Number of days: 2       Labs results:   Labs Ordered and Resulted from Time of ED Arrival to Time of ED Departure   COMPREHENSIVE METABOLIC PANEL - Abnormal       Result Value    Sodium 129 (*)     Potassium 4.0      Chloride 95 (*)     Carbon Dioxide (CO2) 23      Anion Gap 11      Urea Nitrogen 13.5      Creatinine 0.94      Calcium 9.0      Glucose 116 (*)     Alkaline Phosphatase 122      AST 1,166 (*)      (*)     Protein Total 7.0      Albumin 3.9      Bilirubin Total 3.3 (*)     GFR Estimate >90     INR - Abnormal    INR 1.52 (*)    CBC WITH PLATELETS AND DIFFERENTIAL - Abnormal    WBC Count 17.4 (*)     RBC Count 4.30 (*)     Hemoglobin 15.4      Hematocrit 44.2       (*)     MCH 35.8 (*)     MCHC 34.8      RDW 11.9      Platelet Count 156      % Neutrophils 76      % Lymphocytes 7      % Monocytes 16      % Eosinophils 0      % Basophils 0      % Immature Granulocytes 1      NRBCs per 100 WBC 0      Absolute Neutrophils  13.2 (*)     Absolute Lymphocytes 1.2      Absolute Monocytes 2.7 (*)     Absolute Eosinophils 0.0      Absolute Basophils 0.1      Absolute Immature Granulocytes 0.1      Absolute NRBCs 0.0     CRP INFLAMMATION - Abnormal    CRP Inflammation 89.25 (*)    SODIUM - Abnormal    Sodium 127 (*)    LIPASE - Normal    Lipase 32     PARTIAL THROMBOPLASTIN TIME - Normal    aPTT 33     PHOSPHORUS - Normal    Phosphorus 2.6     ROUTINE UA WITH MICROSCOPIC REFLEX TO CULTURE   MAGNESIUM   ETHYL ALCOHOL LEVEL   TYPE AND SCREEN, ADULT    ABO/RH(D) A POS      Antibody Screen Negative      SPECIMEN EXPIRATION DATE [id number]     ABO/RH TYPE AND SCREEN       Imaging Studies:   Recent Results (from the past 24 hour(s))   XR Chest 2 Views    Narrative    XR CHEST 2 VIEWS   5/17/2023 1:14 PM     HISTORY: cough    COMPARISON: CT 5/9/2023      Impression    IMPRESSION: Stable cardiomediastinal silhouette. Possible pulmonary  vascular congestion. Primary linear opacities in the left lower lobe,  favor atelectasis, less likely developing airspace consolidation. No  definite pleural effusion or pneumothorax. No acute bony abnormality.    SONNY OCLON MD         SYSTEM ID:  [id number]   CT Abdomen Pelvis w Contrast    Narrative    CT ABDOMEN PELVIS W CONTRAST 5/17/2023 2:45 PM    CLINICAL HISTORY: Right upper quadrant pain and elevated LFTs after  liver ablation yesterday    TECHNIQUE: CT scan of the abdomen and pelvis was performed following  injection of IV contrast. Multiplanar reformats were obtained. Dose  reduction techniques were used.  CONTRAST: 106mL Isovue 370    COMPARISON: Ultrasound and angiogram 5/15/2023, MRI 4/19/2023    FINDINGS:   LOWER CHEST: Linear bibasilar opacities, favor atelectasis. No  definite consolidation or pleural effusion.    HEPATOBILIARY: Morphologic changes of cirrhosis. Postprocedural  changes of ablation and TACE in segment VIII with expected small foci  of gas throughout the treated parenchyma.  "Known liver lesions better  seen on recent MRI. Trace perihepatic free fluid without drainable  fluid collection. Cholelithiasis without evidence of acute  cholecystitis or biliary obstruction.    PANCREAS: Normal.    SPLEEN: Multiple calcified granulomas.     ADRENAL GLANDS: Normal.    KIDNEYS/BLADDER: Nonobstructing bilateral renal calculi. No  ureterolithiasis or hydronephrosis. Urinary bladder is unremarkable.    BOWEL: No obstruction or inflammatory change. Normal appendix.    PELVIC ORGANS: Normal.    ADDITIONAL FINDINGS: Scattered calcified atherosclerosis. Multiple  venous collaterals in the upper abdomen, similar to prior MRI.    MUSCULOSKELETAL: No acute bony abnormality. Healed left rib fractures  partially visualized.      Impression    IMPRESSION:   1.  Expected postprocedural changes of TACE and ablation in segment  VIII without definite evidence of complication.  2.  Cirrhosis with evidence of portal hypertension. Trace perihepatic  and pelvic free fluid.  3.  Cholelithiasis without evidence of acute cholecystitis or biliary  obstruction.    SONNY COLON MD         SYSTEM ID:  [id number]       Recent vital signs:   /73   Pulse 97   Temp 98.3  F (36.8  C) (Oral)   Resp 21   Ht 1.956 m (6' 5\")   Wt 109.3 kg (241 lb)   SpO2 (!) 89%   BMI 28.58 kg/m      [place] Coma Scale Score: 15 (05/17/23 2050)       Cardiac Rhythm: Normal Sinus  Pt needs tele? No  Skin/wound Issues: None    Code Status: Full Code    Pain control: fair    Nausea control: pt had none    Abnormal labs/tests/findings requiring intervention: see chart, discussed in report    Family present during ED course? No   Family Comments/Social Situation comments:     Tasks needing completion:  iv fluids sent via tube to continue    Carol Stevens RN  [facility] --   [phone] [place] ED  [phone] [facility] ED     "

## 2023-05-18 NOTE — PLAN OF CARE
VS: Temp: 98.9  F (37.2  C) Temp src: Oral BP: (!) 139/90 Pulse: 89   Resp: 18 SpO2: 94 % O2 Device: Nasal cannula     O2: 94 on 3 L of 02. Labored/ shallow breathing when walk. Continuous pulse ox   Output: Voids spontaneously via bathroom    Last BM: Bowel sound active x4   Activity: Independent in room    Up for meals? Yes.    Skin: Visible skin intact    Pain: Pain managed with prn dilaudid    Neuro / CMS: A&Ox4. Able to make needs known. Baseline neuropathy    Dressing: None    Diet: Regular diet    LDA: R PIV saline lock b/w antibiotics.    Plan: Continue plan of care.    Additional Info:

## 2023-05-18 NOTE — UTILIZATION REVIEW
Admission Status; Secondary Review Determination       Under the authority of the Utilization Management Committee, the utilization review process indicated a secondary review on the above patient. The review outcome is based on review of the medical records, discussions with staff, and applying clinical experience noted on the date of the review.     (x) Inpatient Status Appropriate - This patient's medical care is consistent with medical management for inpatient care and reasonable inpatient medical practice.     RATIONALE FOR DETERMINATION   62-year-old male with a recent diagnosis of hepatocellular carcinoma (HCC), liver cirrhosis, and portal hypertension. He has a history of hepatitis C, which has been treated, as well as a thoracic aortic aneurysm, chronic obstructive pulmonary disease (COPD), dyspnea on exertion, substance abuse, alcohol abuse, neuropathy, hypertension, hyperlipidemia, and basal cell carcinoma of the skin. He was admitted following IR-guided liver lesion biopsy and ablation procedures. On admission, he presented with acute right upper quadrant pain, likely related to the HCC ablation, and acute hepatitis, likely due to the procedures. Laboratory tests showed significantly elevated liver function tests (AST 1166, ) compared to previous measurements, along with elevated total bilirubin and elevated white blood cell count. CT scan findings were consistent with cirrhosis, changes from ablation and transarterial chemoembolization, and trace perihepatic free fluid. Pain management includes holding scheduled acetaminophen (APAP) and providing limited APAP as needed, as well as oxycodone, dilaudid, and topical voltaren for pain relief. The patient is also receiving anti-emetics, bowel regimen, and gabapentin for neuropathy. Intravenous antibiotics (Zosyn) are continued, and liver function tests and INR will be repeated in the morning. Additionally, the patient presented with acute hypoxic  respiratory failure due to COPD exacerbation. He has a history of tobacco use and grade 1 diastolic dysfunction. His oxygen saturation was 87% in the emergency room, and a chest x-ray showed a linear opacity in the left lower lobe without clear consolidation, pneumothorax, or effusion. Treatment includes duonebs, supplemental oxygen to maintain saturation at least 88%, and a substituted inhaler (symbicort). Further imaging and troponin testing are indicated if there is acute worsening or chest pain. The patient has a history of IV and inhaled substance abuse, as well as alcohol abuse. He reports tapering down his alcohol intake but has a history of hallucinations. A CIWA protocol with ativan is implemented for alcohol withdrawal management, and vitamin supplements are provided. The patient also has liver cirrhosis with portal hypertension and ascites and follows up with an outpatient provider. MELD score is elevated at 22 (normal is 10). Management involves limiting hepatotoxins and monitoring for signs of gastrointestinal bleeding. The patient also has acute hyponatremia, and normal saline is administered at 75cc/h while monitoring sodium levels.  Patient requires inpatient admission versus short stay observation or outpatient treatment for the following reasons:   the patient's acute presentations, including acute hepatitis, acute hypoxic respiratory failure, and acute hypoxic respiratory failure, necessitate close monitoring and treatment adjustments. Complications such as respiratory distress, infection, bleeding, and hepatic decompensation can arise and require intensive management. The risks of adverse outcomes in this patient include disease progression of HCC, liver decompensation, respiratory failure exacerbation, sepsis, and potential complications related to substance abuse and alcohol withdrawal.    The expected length of stay at the time of admission was more than 2 nights because of the severity of  illness, intensity of service provided, and risk for adverse outcome. Inpatient admission is appropriate.         This document was produced using voice recognition software       The information on this document is developed by the utilization review team in order for the business office to ensure compliance. This only denotes the appropriateness of proper admission status and does not reflect the quality of care rendered.   The definitions of Inpatient Status and Observation Status used in making the determination above are those provided in the CMS Coverage Manual, Chapter 1 and Chapter 6, section 70.4.   Sincerely,   NADEEM AGUILAR MD   System Medical Director   Utilization Management   [facility].

## 2023-05-18 NOTE — PROGRESS NOTES
[facility]    Medicine Progress Note - Hospitalist Service, GOLD TEAM 18    Date of Admission:  5/17/2023    Assessment & Plan   Sohan Marcano is a 62 year old male w/ recently dx hepatocellular carcinoma, liver cirrhosis, portal hypertension, history of hepatitis C infection  status post treatment, thoracic aortic aneurysm, COPD, history of substance abuse and alcohol abuse, neuropathy, HTN, HLD, BCC skin admitted on 5/17/2023 following IR guided liver lesion bx and ablation procedures on 5/15/23.  Admitted mainly for pain control and close monitoring of his transaminase.     S/p HCC biopsy and ablation by IR (5/15/23)  Acute RUQ pain  Acute hepatitis, likely due to ablation therapy  ---   Follows with Dr. Quezada of surg-onc.   ---   S/p HCC biopsy and ablation by IR (5/15/23)  ---   Now w/ severe RUQ abd pain and significantly elevated transaminase which has been trending down  ---   AST 1166 ---> 621,  ---> 492 except for TB 3.3 ---> 3.7.    ---   WBC 17.4 ---> 13.6, trending down  ---   Hgb 15.4 ---> 13.8 g, stable .  H&H stable.    ---   CT AP w/ IV contrast 5/17 revealed expected postprocedural changes of TACE and ablation in segment VIII without definite evidence of complication.  ---   Severe right upper quadrant abdominal pain and transaminitis are due to TACE and ablation procedure  ---   Continue regular diet  ---   Discontinue IVF              - hold scheduled APAP given LFTs, can give limited APAP prn              - oxycodone does not help.   - start dilaudid 2-4 mg po q4 hr prn              - dilaudid 0.2-0.4mg q2h prn              - voltaren topical 1% QID              - continue PTA gabapentin 300mg BID  ---   Anti-emetics prn zofran and reglan  ---   Bowel regiment   ---   Surgical pathology 5/15 consistent with a moderately differentiated hepatocellular carcinoma  ---   Continue IV Zosyn for one more day, trend CRP and WBC  ---   Repeat LFTs  "and INR in a.m. and if uptrending discuss with hepatology     Acute hypoxic respiratory failure  COPD  Dyspnea on exertion  Hx Tobacco use  Grade 1 diastolic dysfunction  ---   Shortness of breath ongoing since procedure on Monday.   ---   No PTA supplemental O2 needs.   ---   SPO2 87% in emergency room.    ---   Chest x-ray in ED with left lower lobe linear opacity without clear consolidation, PTX, effusion.  ---    Last TTE 3/2023 w/ grade 1 diastolic dysfunction, LVEF 55-60%.   ---    Duonebs QID and prn  ---   Continue PTA symbicort--> substituted per formulary  ---   Low threshold to obtain CTA chest if any acute worsening  ---   Low threshold to obtain trop if chest pain or worsening   ---   EKG sinus tach () without ischemic change  ---   Hypoxia resolved.  On RA     History of IV and inhaled substance abuse   Hx alcohol abuse  ---   Reports drinking \"5 cups\" of EtOH daily, unclear measurement and notes tapering himself down generally.    ---   + hx of hallucinations, no hx of DT or seizures  ---   CIWA protocol with ativan as indicated   ---   If scoring, consider d/w psychiatry/detox  ---   MVI, folic acid, and thiamine supplements  ---   Notify medicine for SBP >180 or DBP >110     Liver cirrhosis C/B portal hypertension and ascites  History of hepatitis C status posttreatment  ---   Follows outpatient with Dr. Leventhal and last seen 3/7/23.   ---   LFTs as above.   ---   MELD score 22 here (normally is 10).  ---   Limit hepatotoxins as able  ---   Monitor for signs of GI bleeding     Hyponatremia, acute  ---   Due to liver cirrhosis  ---   Admit Na level was 129 ---> 127 ---> 128  ---   Discontinue NaCl 0.9% @75cc/h  ---   MS is clear  ---   Check Na level in am     HTN   ---   BP is currently normal and stable   ---   Continue PTA losartan w/ hold parameters     ROMELIA - holding PTA iron at this time given abdominal pain, resume when improving  Neuropathy - continue PTA gabapentin   Cholelithiasis - " noted on CT A/P 5/15/23 w/o e/o cholecystitis -monitor  Thoracic aortic aneurysm- aortic root 4.3 cm on TTE 3/23 - no pathology noted on recent CT  HLD -hold PTA statin given transaminitis  Skin cancer-monitor, continue to follow outpatient with dermatology  Nonobstructing nephrolithiasis-noted on CT-monitor  Coagulation defect- INR equals 1.52, stable vs prior - monitor  Overweight - BMI  28.58         Diet:  ADAT to regular  DVT Prophylaxis: Pneumatic Compression Devices  Marie Catheter: Not present  Lines: None     Cardiac Monitoring: None  Code Status:   full  Disposition Plan:  Possible discharge tomorrow in abd pain is better and LFTs continue to trend down               Diet: Regular Diet Adult    DVT Prophylaxis: Pneumatic Compression Devices  Marie Catheter: Not present  Lines: None     Cardiac Monitoring: None  Code Status: No CPR- Do NOT Intubate              Kandis Levy MD  Hospitalist Service, GOLD TEAM 18  [facility]  Securely message with [employer] (more info)  Text page via [employer] Paging/Directory   See signed in provider for up to date coverage information  ______________________________________________________________________    Interval History   C/o severe RUQ abd pain  Oxycodone does not help but IV dilaudid does  No evidence of alcohol withdrawal  Uneventful night      Physical Exam   Vital Signs: Temp: 98.9  F (37.2  C) Temp src: Oral BP: (!) 139/90 Pulse: 89   Resp: 18 SpO2: 94 % O2 Device: Nasal cannula Oxygen Delivery: 4 LPM  Weight: 241 lbs 0 oz  General: aao x 3, NAD.  HEENT:  NC/AT, neck supple  CVS:  NL s 1 and s2, no m/r/g.  Lungs:  CTA B/L.   Abd:  Soft, + bs, NT, no rebound or gaurding  Ext:  No c/c.  Lymph:  No edema.  Neuro:  Nonfocal.  Musculoskeletal: No calf tenderness to palpation.    Skin:  No rash.  Psychiatry:  Mood and affect appropriate.        Data     I have personally reviewed the following data over the past 24 hrs:    13.6  (H)  \   13.8   / 137 (L)     128 (L) 97 (L) 16.7 /  130 (H)   4.0 19 (L) 0.87 \       ALT: 492 (H) AST: 621 (HH) AP: 116 TBILI: 3.7 (H)   ALB: 3.4 (L) TOT PROTEIN: 6.5 LIPASE: N/A       Procal: N/A CRP: 141.55 (H) Lactic Acid: N/A       INR:  1.55 (H) PTT:  N/A   D-dimer:  N/A Fibrinogen:  N/A       Imaging results reviewed over the past 24 hrs:   Recent Results (from the past 24 hour(s))   XR Chest 2 Views    Narrative    XR CHEST 2 VIEWS   5/17/2023 1:14 PM     HISTORY: cough    COMPARISON: CT 5/9/2023      Impression    IMPRESSION: Stable cardiomediastinal silhouette. Possible pulmonary  vascular congestion. Primary linear opacities in the left lower lobe,  favor atelectasis, less likely developing airspace consolidation. No  definite pleural effusion or pneumothorax. No acute bony abnormality.    SONNY COLON MD         SYSTEM ID:  [id number]   CT Abdomen Pelvis w Contrast    Narrative    CT ABDOMEN PELVIS W CONTRAST 5/17/2023 2:45 PM    CLINICAL HISTORY: Right upper quadrant pain and elevated LFTs after  liver ablation yesterday    TECHNIQUE: CT scan of the abdomen and pelvis was performed following  injection of IV contrast. Multiplanar reformats were obtained. Dose  reduction techniques were used.  CONTRAST: 106mL Isovue 370    COMPARISON: Ultrasound and angiogram 5/15/2023, MRI 4/19/2023    FINDINGS:   LOWER CHEST: Linear bibasilar opacities, favor atelectasis. No  definite consolidation or pleural effusion.    HEPATOBILIARY: Morphologic changes of cirrhosis. Postprocedural  changes of ablation and TACE in segment VIII with expected small foci  of gas throughout the treated parenchyma. Known liver lesions better  seen on recent MRI. Trace perihepatic free fluid without drainable  fluid collection. Cholelithiasis without evidence of acute  cholecystitis or biliary obstruction.    PANCREAS: Normal.    SPLEEN: Multiple calcified granulomas.     ADRENAL GLANDS: Normal.    KIDNEYS/BLADDER: Nonobstructing  bilateral renal calculi. No  ureterolithiasis or hydronephrosis. Urinary bladder is unremarkable.    BOWEL: No obstruction or inflammatory change. Normal appendix.    PELVIC ORGANS: Normal.    ADDITIONAL FINDINGS: Scattered calcified atherosclerosis. Multiple  venous collaterals in the upper abdomen, similar to prior MRI.    MUSCULOSKELETAL: No acute bony abnormality. Healed left rib fractures  partially visualized.      Impression    IMPRESSION:   1.  Expected postprocedural changes of TACE and ablation in segment  VIII without definite evidence of complication.  2.  Cirrhosis with evidence of portal hypertension. Trace perihepatic  and pelvic free fluid.  3.  Cholelithiasis without evidence of acute cholecystitis or biliary  obstruction.    SONNY COLON MD         SYSTEM ID:  [id number]

## 2023-05-18 NOTE — PROGRESS NOTES
6MS ADMISSION 2108    D: Patient admitted/transferred from ED via patient transport for liver ablation.     I: Upon arrival to the unit patient was oriented to room, unit, and call light. Patient s height, weight, and vital signs were obtained. Allergies reviewed and allergy band applied. Provider notified of patient s arrival on the unit. Adult AVS completed. Head to toe assessment completed. Education assessment completed. Care plan initiated.    A: Vital signs stable upon admission. Patient rates pain at 0. Two RN skin assessment completed: No. No significant skin findings. [facility] Nurse Consult Ordered? No. Bed Algorithm can be found in PCS flow sheets (Support Surface Algorithm) and on IP [facility] NURSE RESOURCE TAB, was this used during this assessment? No. Was a pulsate mattress ordered? No.    P: Continue to monitor patient s oxygen and intervene as needed. Continue with plan of care. Notify provider with any concerns or changes in patient status.

## 2023-05-18 NOTE — PLAN OF CARE
"Goal Outcome Evaluation:    VS: Blood pressure 113/69, pulse 85, temperature 98.9  F (37.2  C), temperature source Oral, resp. rate 20, height 1.956 m (6' 5\"), weight 109.3 kg (241 lb), SpO2 (!) 88 %.    O2: On 5L, O2 sats between 88-90%   Output: Adequate    Last BM: Continent- LBM- 5/15   Activity: SBA   Skin: Visible skin intact   Pain: Denied   CMS: Intact, able to make needs known   Dressing: None   Diet: Reg, thin, whole with water   LDA: R. PIV- SL   Equipment: IV Pole, Patient belonging   Plan: TBD, Safety measures in place, call light within reach, continue with POC.   Additional Info: On IV ABX, CIWA-0           Plan of Care Reviewed With: patient    Overall Patient Progress: no changeOverall Patient Progress: no change           "

## 2023-05-19 VITALS
WEIGHT: 241 LBS | TEMPERATURE: 98.4 F | HEART RATE: 92 BPM | OXYGEN SATURATION: 92 % | RESPIRATION RATE: 18 BRPM | SYSTOLIC BLOOD PRESSURE: 130 MMHG | BODY MASS INDEX: 28.46 KG/M2 | DIASTOLIC BLOOD PRESSURE: 85 MMHG | HEIGHT: 77 IN

## 2023-05-19 LAB
ALBUMIN SERPL BCG-MCNC: 3.3 G/DL (ref 3.5–5.2)
ALBUMIN UR-MCNC: NEGATIVE MG/DL
ALP SERPL-CCNC: 109 U/L (ref 40–129)
ALT SERPL W P-5'-P-CCNC: 382 U/L (ref 10–50)
ANION GAP SERPL CALCULATED.3IONS-SCNC: 10 MMOL/L (ref 7–15)
APPEARANCE UR: CLEAR
AST SERPL W P-5'-P-CCNC: 387 U/L (ref 10–50)
BILIRUB SERPL-MCNC: 3.4 MG/DL
BILIRUB UR QL STRIP: NEGATIVE
BUN SERPL-MCNC: 13.2 MG/DL (ref 8–23)
CALCIUM SERPL-MCNC: 8.5 MG/DL (ref 8.8–10.2)
CHLORIDE SERPL-SCNC: 100 MMOL/L (ref 98–107)
COLOR UR AUTO: YELLOW
CREAT SERPL-MCNC: 0.79 MG/DL (ref 0.67–1.17)
DEPRECATED HCO3 PLAS-SCNC: 22 MMOL/L (ref 22–29)
ERYTHROCYTE [DISTWIDTH] IN BLOOD BY AUTOMATED COUNT: 11.4 % (ref 10–15)
GFR SERPL CREATININE-BSD FRML MDRD: >90 ML/MIN/1.73M2
GLUCOSE SERPL-MCNC: 123 MG/DL (ref 70–99)
GLUCOSE UR STRIP-MCNC: NEGATIVE MG/DL
HCT VFR BLD AUTO: 37.1 % (ref 40–53)
HGB BLD-MCNC: 13.4 G/DL (ref 13.3–17.7)
HGB UR QL STRIP: NEGATIVE
INR PPP: 1.69 (ref 0.85–1.15)
KETONES UR STRIP-MCNC: NEGATIVE MG/DL
LACTATE SERPL-SCNC: 1.5 MMOL/L (ref 0.7–2)
LEUKOCYTE ESTERASE UR QL STRIP: NEGATIVE
MAGNESIUM SERPL-MCNC: 2.7 MG/DL (ref 1.7–2.3)
MCH RBC QN AUTO: 36.2 PG (ref 26.5–33)
MCHC RBC AUTO-ENTMCNC: 36.1 G/DL (ref 31.5–36.5)
MCV RBC AUTO: 100 FL (ref 78–100)
NITRATE UR QL: NEGATIVE
PH UR STRIP: 7 [PH] (ref 5–7)
PLATELET # BLD AUTO: 137 10E3/UL (ref 150–450)
POTASSIUM SERPL-SCNC: 3.6 MMOL/L (ref 3.4–5.3)
PROT SERPL-MCNC: 6 G/DL (ref 6.4–8.3)
RBC # BLD AUTO: 3.7 10E6/UL (ref 4.4–5.9)
RBC URINE: <1 /HPF
SODIUM SERPL-SCNC: 132 MMOL/L (ref 136–145)
SP GR UR STRIP: 1.02 (ref 1–1.03)
UROBILINOGEN UR STRIP-MCNC: 2 MG/DL
WBC # BLD AUTO: 9.9 10E3/UL (ref 4–11)
WBC URINE: <1 /HPF

## 2023-05-19 PROCEDURE — 99239 HOSP IP/OBS DSCHRG MGMT >30: CPT | Performed by: INTERNAL MEDICINE

## 2023-05-19 PROCEDURE — 94640 AIRWAY INHALATION TREATMENT: CPT

## 2023-05-19 PROCEDURE — 250N000009 HC RX 250: Performed by: PHYSICIAN ASSISTANT

## 2023-05-19 PROCEDURE — 83735 ASSAY OF MAGNESIUM: CPT | Performed by: INTERNAL MEDICINE

## 2023-05-19 PROCEDURE — 85014 HEMATOCRIT: CPT | Performed by: INTERNAL MEDICINE

## 2023-05-19 PROCEDURE — 81001 URINALYSIS AUTO W/SCOPE: CPT | Performed by: PHYSICIAN ASSISTANT

## 2023-05-19 PROCEDURE — 36415 COLL VENOUS BLD VENIPUNCTURE: CPT | Performed by: INTERNAL MEDICINE

## 2023-05-19 PROCEDURE — 36415 COLL VENOUS BLD VENIPUNCTURE: CPT

## 2023-05-19 PROCEDURE — 80053 COMPREHEN METABOLIC PANEL: CPT | Performed by: INTERNAL MEDICINE

## 2023-05-19 PROCEDURE — 250N000013 HC RX MED GY IP 250 OP 250 PS 637: Performed by: PHYSICIAN ASSISTANT

## 2023-05-19 PROCEDURE — 250N000013 HC RX MED GY IP 250 OP 250 PS 637: Performed by: PEDIATRICS

## 2023-05-19 PROCEDURE — 250N000011 HC RX IP 250 OP 636: Performed by: PHYSICIAN ASSISTANT

## 2023-05-19 PROCEDURE — 83605 ASSAY OF LACTIC ACID: CPT

## 2023-05-19 PROCEDURE — 87040 BLOOD CULTURE FOR BACTERIA: CPT | Performed by: INTERNAL MEDICINE

## 2023-05-19 PROCEDURE — 999N000157 HC STATISTIC RCP TIME EA 10 MIN

## 2023-05-19 PROCEDURE — 85610 PROTHROMBIN TIME: CPT | Performed by: INTERNAL MEDICINE

## 2023-05-19 RX ORDER — METRONIDAZOLE 500 MG/1
500 TABLET ORAL 3 TIMES DAILY
Qty: 15 TABLET | Refills: 0 | Status: SHIPPED | OUTPATIENT
Start: 2023-05-19 | End: 2023-05-24

## 2023-05-19 RX ORDER — HYDROMORPHONE HYDROCHLORIDE 2 MG/1
2-4 TABLET ORAL EVERY 6 HOURS PRN
Qty: 24 TABLET | Refills: 0 | Status: SHIPPED | OUTPATIENT
Start: 2023-05-19 | End: 2023-05-22

## 2023-05-19 RX ORDER — CIPROFLOXACIN 500 MG/1
500 TABLET, FILM COATED ORAL 2 TIMES DAILY
Qty: 10 TABLET | Refills: 0 | Status: SHIPPED | OUTPATIENT
Start: 2023-05-19 | End: 2023-05-24

## 2023-05-19 RX ADMIN — ACETAMINOPHEN 975 MG: 325 TABLET ORAL at 08:07

## 2023-05-19 RX ADMIN — HYDROMORPHONE HYDROCHLORIDE 0.4 MG: 0.2 INJECTION, SOLUTION INTRAMUSCULAR; INTRAVENOUS; SUBCUTANEOUS at 00:51

## 2023-05-19 RX ADMIN — FOLIC ACID 1 MG: 1 TABLET ORAL at 08:06

## 2023-05-19 RX ADMIN — HYDROMORPHONE HYDROCHLORIDE 0.4 MG: 0.2 INJECTION, SOLUTION INTRAMUSCULAR; INTRAVENOUS; SUBCUTANEOUS at 05:02

## 2023-05-19 RX ADMIN — ACETAMINOPHEN 975 MG: 325 TABLET ORAL at 00:06

## 2023-05-19 RX ADMIN — PIPERACILLIN AND TAZOBACTAM 3.38 G: 3; .375 INJECTION, POWDER, LYOPHILIZED, FOR SOLUTION INTRAVENOUS at 08:08

## 2023-05-19 RX ADMIN — PIPERACILLIN AND TAZOBACTAM 3.38 G: 3; .375 INJECTION, POWDER, LYOPHILIZED, FOR SOLUTION INTRAVENOUS at 01:43

## 2023-05-19 RX ADMIN — HYDROMORPHONE HYDROCHLORIDE 0.4 MG: 0.2 INJECTION, SOLUTION INTRAMUSCULAR; INTRAVENOUS; SUBCUTANEOUS at 02:56

## 2023-05-19 RX ADMIN — THIAMINE HCL TAB 100 MG 100 MG: 100 TAB at 08:06

## 2023-05-19 RX ADMIN — IPRATROPIUM BROMIDE AND ALBUTEROL SULFATE 3 ML: 2.5; .5 SOLUTION RESPIRATORY (INHALATION) at 08:40

## 2023-05-19 RX ADMIN — HYDROMORPHONE HYDROCHLORIDE 0.4 MG: 0.2 INJECTION, SOLUTION INTRAMUSCULAR; INTRAVENOUS; SUBCUTANEOUS at 08:06

## 2023-05-19 RX ADMIN — MULTIPLE VITAMINS W/ MINERALS TAB 1 TABLET: TAB at 08:06

## 2023-05-19 ASSESSMENT — ACTIVITIES OF DAILY LIVING (ADL)
ADLS_ACUITY_SCORE: 37

## 2023-05-19 NOTE — PROGRESS NOTES
6MS DISCHARGE 1148    D: Patient discharged to home at 1148. Patient accompanied by self.    I: Discharge prescriptions given to patient. All discharge medications and instructions reviewed with patient. Patient instructed to seek care if experiencing worsening symptoms, such as abdominal pain. Other phone numbers to call with questions or concerns after discharge reviewed. PIV removed. Education completed.    A: Patient verbalized understanding of discharge medications and instructions. Prescribed home medications given to patient. Belongings returned to patient.    P: Patient to follow-up with provider and scheduled appointments.

## 2023-05-19 NOTE — PROGRESS NOTES
Pt A&Ox4. Able to make needs known. No IV access. Independent in room. Continent bowel and bladder -- pt unsure of last BM. Denies nausea/vomiting and chest pain. Pt c/o SOB at baseline; pt refused cont. Pulse ox. Pt c/o pain r/t liver ablation; administered prn Dilaudid. Pt had elevated temperature of 100.4; administered prn Tylenol. No acute events. Call light in reach. Discharged at 1148.

## 2023-05-19 NOTE — PROGRESS NOTES
Fatuma page for fever 101 and 's; wrote for APAP 975mg q6 prn pain or fever for x3 doses. Favor no medication treatment of asymptomatic / nonfocal HTN in the acutely inflamed patient.     Rafael Olivier MD

## 2023-05-19 NOTE — DISCHARGE SUMMARY
[facility]  Hospitalist Discharge Summary      Date of Admission:  5/17/2023  Date of Discharge:  5/19/2023  Discharging Provider: Kandis Lvey MD  Discharge Service: Hospitalist Service, GOLD TEAM 18    Discharge Diagnoses   Acute RUQ abd pain due to HCC biopsy and ablation by IR 5/15/23  Acute transaminitis due to ablation therapy    Follow-ups Needed After Discharge      Follow-up Appointments    Follow Up and recommended labs and tests     Hepatology and IR clinics follow up as previously scheduled          Unresulted Labs Ordered in the Past 30 Days of this Admission     Date and Time Order Name Status Description    5/19/2023  8:47 AM Blood Culture Hand, Left In process     5/19/2023  8:47 AM Blood Culture Hand, Right In process       These results will be followed up by hospitalist    Discharge Disposition   Discharged to home  Condition at discharge: Stable    Hospital Course    Sohan Marcano is a 62 year old male w/ recently dx hepatocellular carcinoma, liver cirrhosis, portal hypertension, history of hepatitis C infection s/p treatment, thoracic aortic aneurysm, COPD, history of substance abuse and alcohol abuse, neuropathy, HTN, HLD, BCC skin admitted on 5/17/2023 following IR guided liver lesion bx and ablation procedures on 5/15/23.  Admitted mainly for pain control and close monitoring of his transaminase.      S/p HCC biopsy and ablation by IR on 5/15/23   Acute RUQ pain due to above  Acute transaminitis due to ablation therapy  ---   Follows with Dr. Quezada of surg-onc.   ---   S/p HCC biopsy and ablation by IR (5/15/23)  ---   Now w/ severe RUQ abd pain and significantly elevated transaminase which has been trending down  ---   AST 1166 ---> 621 ---> 387  ---    ---> 492 ---> 382   ---   TB 3.3 ---> 3.7 ---> 3.4.    ---   WBC 17.4 ---> 13.6, trending down  ---   Hgb 15.4 ---> 13.8 ---> 13.4 g, stable   ---   CT AP w/ IV contrast 5/17  "revealed expected post procedural changes of TACE and ablation in segment VIII without definite evidence of complication.  ---   Severe RUQ abd pain and transaminitis are due to TACE and ablation procedure  ---   Surgical pathology 5/15 consistent with a moderately differentiated hepatocellular carcinoma  ---   Treated w/ IVF, oral dilaudid, IV dilaudid, tylenol and gabapentin  ---   He also received empiric IV Zosyn  ---   Pain better and lefts are trending down  ---   Pt demanded to be d/c'd to home today  ---   I d/c'd him to home with oral dilaudid    Fever, temp of 101.1  F earlier this a.m.  ---   Rest of vital signs within normal range  ---   No leukocytosis  ---   Patient feels great  ---   Blood culture x2 just collected  ---   Patient left the hospital AGAINST MEDICAL ADVICE  ---   S/p empiric Zosyn 5/17 - 5/19/23  ---   I will discharge him to home w/ Cipro and flagyl x 5 more days.     Acute hypoxic respiratory failure  COPD  Dyspnea on exertion  Hx Tobacco use  Chronic Grade 1 diastolic dysfunction  ---   Shortness of breath ongoing since procedure on 5/15/23  ---   No PTA supplemental O2 needs.   ---   SPO2 87% in emergency room.    ---   Chest x-ray in ED with left lower lobe linear opacity without clear consolidation, PTX, effusion.  ---   Ruled out for ACS w/ EKG and HS troponin  ---   Last TTE 3/2023 w/ grade 1 diastolic dysfunction, LVEF 55-60%.   ---   He received Duonebs QID and prn  ---   Continue PTA symbicort  ---   Hypoxia resolved  ---   On RA past 36 hrs prior to d/c     History of IV and inhaled substance abuse   Hx alcohol abuse  ---   Reports drinking \"5 cups\" of EtOH daily, unclear measurement and notes tapering himself down generally.    ---   + hx of hallucinations, no hx of DT or seizures  ---   No evidence of alcohol withdrawal symptoms  ---   He did not require benzodiazepine but received MVI, folate and thiamine supplements     Liver cirrhosis C/B portal hypertension and " ascites  History of hepatitis C status posttreatment  ---   Follows outpatient with Dr. Leventhal and last seen 3/7/23.   ---   LFTs as above.   ---   MELD score 22 here (normally 10).  ---   F/u in GI clinic as previously scheduled     Hyponatremia, acute  ---   Due to liver cirrhosis  ---   Admit Na level was 129 ---> 127 ---> 128 ---> 132  ---   He received isotonic IVF for a day  ---   MS is clear  ---   F/u at PCP clinic     HTN   ---   BP is currently normal and stable   ---   Continue PTA losartan     ROMELIA - holding PTA iron at this time given abdominal pain, resume when improving  Neuropathy - continue PTA gabapentin   Cholelithiasis - noted on CT A/P 5/15/23 w/o e/o cholecystitis -monitor  Thoracic aortic aneurysm- aortic root 4.3 cm on TTE 3/23 - no pathology noted on recent CT  HLD -hold PTA statin given transaminitis  Skin cancer-monitor, continue to follow outpatient with dermatology  Nonobstructing nephrolithiasis-noted on CT-monitor  Coagulation defect- INR equals 1.52, stable vs prior - monitor  Overweight - BMI  28.58          Consultations This Hospital Stay   GI LUMINAL ADULT IP CONSULT  CARE MANAGEMENT / SOCIAL WORK IP CONSULT    Code Status   No CPR- Do NOT Intubate    Time Spent on this Encounter   I, Kandis Levy MD, personally saw the patient today and spent greater than 30 minutes discharging this patient.       Kandis Levy MD  [facility] MED SURG  Critical access hospital[facility] [address]  Phone: [phone]  Fax: [phone]  ______________________________________________________________________    Physical Exam   Vital Signs: Temp: 98.4  F (36.9  C) Temp src: Oral BP: 130/85 Pulse: 92   Resp: 18 SpO2: 92 % O2 Device: None (Room air) Oxygen Delivery: 4 LPM  Weight: 241 lbs 0 oz  General: aao x 3, NAD.  HEENT:  NC/AT, neck supple  CVS:  NL s 1 and s2, no m/r/g.  Lungs:  CTA B/L.          Abd:  Soft, + bs, NT, no rebound or gaurding  Ext:  No c/c.  Lymph:  No  edema.  Neuro:  Nonfocal.  Musculoskeletal: No calf tenderness to palpation.    Skin:  No rash.  Psychiatry:  Mood and affect appropriate.         Primary Care Physician   Yannick Womack    Discharge Orders      Reason for your hospital stay    Abd pain HCC biopsy and ablation by IR on 5/15/23     Activity    Your activity upon discharge: activity as tolerated     Follow Up and recommended labs and tests    Hepatology and IR clinics follow up as previously scheduled     Diet    Follow this diet upon discharge: Regular Diet Adult       Discharge Medications   Current Discharge Medication List      START taking these medications    Details   ciprofloxacin (CIPRO) 500 MG tablet Take 1 tablet (500 mg) by mouth 2 times daily for 5 days  Qty: 10 tablet, Refills: 0    Associated Diagnoses: Fever, unspecified fever cause      HYDROmorphone (DILAUDID) 2 MG tablet Take 1-2 tablets (2-4 mg) by mouth every 6 hours as needed for pain  Qty: 24 tablet, Refills: 0    Associated Diagnoses: Abdominal pain, right upper quadrant      metroNIDAZOLE (FLAGYL) 500 MG tablet Take 1 tablet (500 mg) by mouth 3 times daily for 5 days  Qty: 15 tablet, Refills: 0    Associated Diagnoses: Fever, unspecified fever cause         CONTINUE these medications which have NOT CHANGED    Details   albuterol (PROAIR HFA/PROVENTIL HFA/VENTOLIN HFA) 108 (90 Base) MCG/ACT inhaler Inhale 2 puffs into the lungs every 6 hours as needed for shortness of breath / dyspnea or wheezing  Qty: 8 g, Refills: 3    Comments: Pharmacy may dispense brand covered by insurance (Proair, or proventil or ventolin or generic albuterol inhaler)  Associated Diagnoses: Chronic obstructive pulmonary disease, unspecified COPD type (H)      atorvastatin (LIPITOR) 40 MG tablet Take 20 mg by mouth daily      budesonide-formoterol (SYMBICORT) 80-4.5 MCG/ACT Inhaler Inhale 2 puffs into the lungs 2 times daily  Qty: 10 g, Refills: 4    Associated Diagnoses: Chronic obstructive pulmonary  disease, unspecified COPD type (H)      Cholecalciferol (VITAMIN D) 125 MCG (5000 UT) capsule Take 1 capsule (5,000 Units) by mouth daily  Qty: 90 capsule, Refills: 3    Associated Diagnoses: Vitamin D deficiency      gabapentin (NEURONTIN) 300 MG capsule Take 1 capsule (300 mg) by mouth 3 times daily as needed for neuropathic pain  Qty: 90 capsule, Refills: 3    Associated Diagnoses: Alcoholic polyneuropathy (H)      losartan (COZAAR) 25 MG tablet Take 1 tablet (25 mg) by mouth daily  Qty: 90 tablet, Refills: 3    Associated Diagnoses: Aneurysm of ascending aorta without rupture (H); Essential hypertension      MILK THISTLE PO Take 1 tablet by mouth daily      multivitamin w/minerals (THERA-VIT-M) tablet Take 1 tablet by mouth daily (with breakfast)  Qty: 30 tablet, Refills: 0    Associated Diagnoses: Alcohol dependence with uncomplicated intoxication (H)           Allergies   No Known Allergies

## 2023-05-19 NOTE — PLAN OF CARE
A/Ox 4. Able to make needs known. Denies SOB, N/V, and CP. Continent of B/B. LBM is unknown. Pt think it was a week ago. Independent, up ad jamari to the bathroom and voids without difficulty. R PIV patent and saline locked with abx in between per MAR. C/o pain, managed with PRN Dilaudid 0.4 mg IV Q2H that provided some relief. UA sample collected, see results.     Sepsis protocol triggered around 0000, VSS every 30 minutes x 1 hour, completed. Lactic acid level is 1.5.    Appears to be sleeping during rounds. Call light within reach. Continue with POC.

## 2023-05-19 NOTE — PROGRESS NOTES
Paged on call Hospitalist about pt's temperature of 101.1 and BP of 160/98. Asymptomatic. PRN Tylenol was placed and given to pt. No new orders for BP.

## 2023-05-19 NOTE — PROGRESS NOTES
"BP (!) 133/90 (BP Location: Left arm)   Pulse 111   Temp 99.9  F (37.7  C) (Oral)   Resp 18   Ht 1.956 m (6' 5\")   Wt 109.3 kg (241 lb)   SpO2 91%   BMI 28.58 kg/m       VSS, calm and cooperative, A&Ox4. Denies chest pain, SOB, n/v, numbness/tingling, and dizziness  LS diminished, on 4L O2 via NC  BS active, voids spontaneously w/o difficulty  SBA/Ax1   Skin intact ex abd incision   R PIV SL  Pt c/o abd pain, rated 8/10. Managed with PRN Dilaudid with relief  Regular diet, able to make needs known  Call light in reach, continue POC      "

## 2023-05-21 ENCOUNTER — PATIENT OUTREACH (OUTPATIENT)
Dept: CARE COORDINATION | Facility: CLINIC | Age: 63
End: 2023-05-21

## 2023-05-21 NOTE — PROGRESS NOTES
Clinic Care Coordination Contact  Community Health Worker Initial Outreach    Patient accepts CC: No, Pt declined needs for extra support.     Clinic Care Coordination Contact  [facility]  SITUATION                                                      Admission:    Admission Date: 05/17/23   Reason for Admission: Acute RUQ abd pain due to HCC biopsy and ablation by IR 5/15/23  Acute transaminitis due to ablation therapy  Discharge:   Discharge Date: 05/19/23  Discharge Diagnosis: Acute RUQ abd pain due to HCC biopsy and ablation by IR 5/15/23  Acute transaminitis due to ablation therapy    BACKGROUND                                                      Per hospital discharge summary and inpatient provider notes:    Sohan Marcano is a 62 year old male w/ recently dx hepatocellular carcinoma, liver cirrhosis, portal hypertension, history of hepatitis C infection s/p treatment, thoracic aortic aneurysm, COPD, history of substance abuse and alcohol abuse, neuropathy, HTN, HLD, BCC skin admitted on 5/17/2023 following IR guided liver lesion bx and ablation procedures on 5/15/23.     ASSESSMENT           Discharge Assessment  How are you doing now that you are home?: doing well  How are your symptoms? (Red Flag symptoms escalate to triage hotline per guidelines): Unchanged  Do you feel your condition is stable enough to be safe at home until your provider visit?: Yes  Does the patient have their discharge instructions? : Yes  Does the patient have questions regarding their discharge instructions? : No  Were you started on any new medications or were there changes to any of your previous medications? : Yes  Does the patient have all of their medications?: Yes  Do you have questions regarding any of your medications? : No  Do you have all of your needed medical supplies or equipment (DME)?  (i.e. oxygen tank, CPAP, cane, etc.): Yes  Discharge follow-up appointment scheduled within 14 calendar  days? : No  Is patient agreeable to assistance with scheduling? : No (Pt declined to assist him)    Post-op ([facility] CTA Only)  If the patient had a surgery or procedure, do they have any questions for a nurse?: No         PLAN                                                      Outpatient Plan:     Follow-up Appointments    Follow Up and recommended labs and tests     Hepatology and IR clinics follow up as previously scheduled       Future Appointments   Date Time Provider Department Center   6/29/2023  8:40 AM [id number] [place] [address]   7/3/2023  9:00 AM [facility]   7/3/2023 10:00 AM Leventhal, Thomas Michael, MD [facility]   7/7/2023 11:00 AM [facility]   7/7/2023 11:30 AM Augustin See MD [facility]   7/17/2023  1:30 PM Taryn Booker MD [facility]   9/18/2023  9:30 AM Marlena Sosa PA-C [facility]     For any urgent concerns, please contact our 24 hour nurse triage line: [phone] ([phone])         RACHEL Borrego  [phone]  [facility]

## 2023-05-22 ENCOUNTER — TELEPHONE (OUTPATIENT)
Dept: RADIOLOGY | Facility: CLINIC | Age: 63
End: 2023-05-22

## 2023-05-22 DIAGNOSIS — C22.0 HCC (HEPATOCELLULAR CARCINOMA) (H): Primary | ICD-10-CM

## 2023-05-22 NOTE — TELEPHONE ENCOUNTER
Called and spoke to patient regarding his ablation procedure. Patient developed abdominal pain post procedure and presented to the emergency department. He was discharged with pain medication and is now recovering. Discussed plan for follow up including an MRI, labs and a follow up visit with Dr. Christopher.     Olivia LOVING RN, BSN   Interventional Radiology [facility]   [phone]

## 2023-05-24 LAB
BACTERIA BLD CULT: NO GROWTH
BACTERIA BLD CULT: NO GROWTH

## 2023-06-26 DIAGNOSIS — C22.0 HEPATOCELLULAR CARCINOMA (H): ICD-10-CM

## 2023-06-26 DIAGNOSIS — F10.20 ALCOHOL USE DISORDER, SEVERE, DEPENDENCE (H): ICD-10-CM

## 2023-06-26 DIAGNOSIS — K70.30 ALCOHOLIC CIRRHOSIS OF LIVER WITHOUT ASCITES (H): Primary | ICD-10-CM

## 2023-06-28 ENCOUNTER — LAB (OUTPATIENT)
Dept: LAB | Facility: CLINIC | Age: 63
End: 2023-06-28
Payer: COMMERCIAL

## 2023-06-28 ENCOUNTER — OFFICE VISIT (OUTPATIENT)
Dept: VASCULAR SURGERY | Facility: CLINIC | Age: 63
End: 2023-06-28
Payer: COMMERCIAL

## 2023-06-28 ENCOUNTER — ANCILLARY PROCEDURE (OUTPATIENT)
Dept: MRI IMAGING | Facility: CLINIC | Age: 63
End: 2023-06-28
Attending: RADIOLOGY
Payer: COMMERCIAL

## 2023-06-28 VITALS — DIASTOLIC BLOOD PRESSURE: 83 MMHG | OXYGEN SATURATION: 93 % | HEART RATE: 96 BPM | SYSTOLIC BLOOD PRESSURE: 134 MMHG

## 2023-06-28 DIAGNOSIS — K76.9 LESION OF LIVER: Primary | ICD-10-CM

## 2023-06-28 DIAGNOSIS — C22.0 HCC (HEPATOCELLULAR CARCINOMA) (H): ICD-10-CM

## 2023-06-28 DIAGNOSIS — C22.0 HEPATOCELLULAR CARCINOMA (H): ICD-10-CM

## 2023-06-28 DIAGNOSIS — K70.30 ALCOHOLIC CIRRHOSIS OF LIVER WITHOUT ASCITES (H): ICD-10-CM

## 2023-06-28 DIAGNOSIS — F10.20 ALCOHOL USE DISORDER, SEVERE, DEPENDENCE (H): ICD-10-CM

## 2023-06-28 LAB
AFP SERPL-MCNC: 6.3 NG/ML
ALBUMIN SERPL BCG-MCNC: 4 G/DL (ref 3.5–5.2)
ALP SERPL-CCNC: 73 U/L (ref 40–129)
ALT SERPL W P-5'-P-CCNC: 21 U/L (ref 0–70)
ANION GAP SERPL CALCULATED.3IONS-SCNC: 11 MMOL/L (ref 7–15)
AST SERPL W P-5'-P-CCNC: 36 U/L (ref 0–45)
BILIRUB DIRECT SERPL-MCNC: 0.41 MG/DL (ref 0–0.3)
BILIRUB SERPL-MCNC: 1.1 MG/DL
BUN SERPL-MCNC: 6.9 MG/DL (ref 8–23)
CALCIUM SERPL-MCNC: 9.2 MG/DL (ref 8.8–10.2)
CHLORIDE SERPL-SCNC: 107 MMOL/L (ref 98–107)
CREAT SERPL-MCNC: 0.69 MG/DL (ref 0.67–1.17)
DEPRECATED HCO3 PLAS-SCNC: 23 MMOL/L (ref 22–29)
ERYTHROCYTE [DISTWIDTH] IN BLOOD BY AUTOMATED COUNT: 13.4 % (ref 10–15)
GFR SERPL CREATININE-BSD FRML MDRD: >90 ML/MIN/1.73M2
GLUCOSE SERPL-MCNC: 137 MG/DL (ref 70–99)
HCT VFR BLD AUTO: 42.8 % (ref 40–53)
HGB BLD-MCNC: 14.8 G/DL (ref 13.3–17.7)
INR PPP: 1.42 (ref 0.85–1.15)
MCH RBC QN AUTO: 35.7 PG (ref 26.5–33)
MCHC RBC AUTO-ENTMCNC: 34.6 G/DL (ref 31.5–36.5)
MCV RBC AUTO: 103 FL (ref 78–100)
PLATELET # BLD AUTO: 167 10E3/UL (ref 150–450)
POTASSIUM SERPL-SCNC: 3.4 MMOL/L (ref 3.4–5.3)
PROT SERPL-MCNC: 7.5 G/DL (ref 6.4–8.3)
RBC # BLD AUTO: 4.14 10E6/UL (ref 4.4–5.9)
SODIUM SERPL-SCNC: 141 MMOL/L (ref 136–145)
WBC # BLD AUTO: 5.5 10E3/UL (ref 4–11)

## 2023-06-28 PROCEDURE — 36415 COLL VENOUS BLD VENIPUNCTURE: CPT | Performed by: PATHOLOGY

## 2023-06-28 PROCEDURE — 82248 BILIRUBIN DIRECT: CPT | Performed by: PATHOLOGY

## 2023-06-28 PROCEDURE — 82105 ALPHA-FETOPROTEIN SERUM: CPT | Performed by: RADIOLOGY

## 2023-06-28 PROCEDURE — 85027 COMPLETE CBC AUTOMATED: CPT | Performed by: PATHOLOGY

## 2023-06-28 PROCEDURE — A9585 GADOBUTROL INJECTION: HCPCS | Mod: JZ | Performed by: STUDENT IN AN ORGANIZED HEALTH CARE EDUCATION/TRAINING PROGRAM

## 2023-06-28 PROCEDURE — 80053 COMPREHEN METABOLIC PANEL: CPT | Performed by: PATHOLOGY

## 2023-06-28 PROCEDURE — 99213 OFFICE O/P EST LOW 20 MIN: CPT | Performed by: RADIOLOGY

## 2023-06-28 PROCEDURE — 74183 MRI ABD W/O CNTR FLWD CNTR: CPT | Performed by: STUDENT IN AN ORGANIZED HEALTH CARE EDUCATION/TRAINING PROGRAM

## 2023-06-28 PROCEDURE — 85610 PROTHROMBIN TIME: CPT | Performed by: PATHOLOGY

## 2023-06-28 PROCEDURE — 99000 SPECIMEN HANDLING OFFICE-LAB: CPT | Performed by: PATHOLOGY

## 2023-06-28 RX ORDER — GADOBUTROL 604.72 MG/ML
15 INJECTION INTRAVENOUS ONCE
Status: COMPLETED | OUTPATIENT
Start: 2023-06-28 | End: 2023-06-28

## 2023-06-28 RX ORDER — GADOBUTROL 604.72 MG/ML
10 INJECTION INTRAVENOUS ONCE
Status: DISCONTINUED | OUTPATIENT
Start: 2023-06-28 | End: 2023-06-28

## 2023-06-28 RX ADMIN — GADOBUTROL 11 ML: 604.72 INJECTION INTRAVENOUS at 16:05

## 2023-06-28 NOTE — LETTER
6/28/2023       RE: Sohan Marcano  [address] [facility] [address]       Dear Colleague,    Thank you for referring your patient, Sohan Marcano, to the [facility] at [facility]. Please see a copy of my visit note below.    Mr. Marcano is a 61 yo male with cirrhosis complicated by likely BCLC B, intermediate stage HCC, due to at least two likely HCC, one between 3-4 cm, and several small enhancing satellite lesions concerning for additional cancer.     He underwent TACE/Ablation single session of the largest lesions in segment 8, along with biopsy which confirmed diagnosis of moderately differentiated HCC.    His post operative course was challenging due to significant post embolization and treatment pain, which really began on post op day 1. His pain persisted for a few weeks but is not recovered to his pre-op state. He was managing his pain primarily with ibuprofen and tylenol.     He likely had referred pain to his shoulder due to inflammation at the liver capsule and adjacent diaphragm.     /83 (BP Location: Left arm, Patient Position: Sitting, Cuff Size: Adult Large)   Pulse 96   SpO2 93%   Alert, nad    Labs:  Last Comprehensive Metabolic Panel:  Sodium   Date Value Ref Range Status   06/28/2023 141 136 - 145 mmol/L Final   09/27/2020 139 133 - 144 mmol/L Final     Potassium   Date Value Ref Range Status   06/28/2023 3.4 3.4 - 5.3 mmol/L Final   06/22/2022 3.9 3.4 - 5.3 mmol/L Final   09/27/2020 3.3 (L) 3.4 - 5.3 mmol/L Final     Chloride   Date Value Ref Range Status   06/28/2023 107 98 - 107 mmol/L Final   06/22/2022 109 94 - 109 mmol/L Final   09/27/2020 108 94 - 109 mmol/L Final     Carbon Dioxide   Date Value Ref Range Status   09/27/2020 25 20 - 32 mmol/L Final     Carbon Dioxide (CO2)   Date Value Ref Range Status   06/28/2023 23 22 - 29 mmol/L Final   06/22/2022 22 20 - 32 mmol/L Final     Anion Gap    Date Value Ref Range Status   06/28/2023 11 7 - 15 mmol/L Final   06/22/2022 9 3 - 14 mmol/L Final   09/27/2020 6 3 - 14 mmol/L Final     Glucose   Date Value Ref Range Status   06/28/2023 137 (H) 70 - 99 mg/dL Final   06/22/2022 110 (H) 70 - 99 mg/dL Final   09/27/2020 103 (H) 70 - 99 mg/dL Final     Urea Nitrogen   Date Value Ref Range Status   06/28/2023 6.9 (L) 8.0 - 23.0 mg/dL Final   06/22/2022 8 7 - 30 mg/dL Final   09/27/2020 16 7 - 30 mg/dL Final     Creatinine   Date Value Ref Range Status   06/28/2023 0.69 0.67 - 1.17 mg/dL Final   09/27/2020 0.82 0.66 - 1.25 mg/dL Final     GFR Estimate   Date Value Ref Range Status   06/28/2023 >90 >60 mL/min/1.73m2 Final   09/27/2020 >90 >60 mL/min/[1.73_m2] Final     Comment:     Non  GFR Calc  Starting 12/18/2018, serum creatinine based estimated GFR (eGFR) will be   calculated using the Chronic Kidney Disease Epidemiology Collaboration   (CKD-EPI) equation.       GFR, ESTIMATED POCT   Date Value Ref Range Status   05/09/2023 >60 >60 mL/min/1.73m2 Final     Calcium   Date Value Ref Range Status   06/28/2023 9.2 8.8 - 10.2 mg/dL Final   09/27/2020 7.8 (L) 8.5 - 10.1 mg/dL Final     Bilirubin Total   Date Value Ref Range Status   06/28/2023 1.1 <=1.2 mg/dL Final   09/27/2020 3.3 (H) 0.2 - 1.3 mg/dL Final     Alkaline Phosphatase   Date Value Ref Range Status   06/28/2023 73 40 - 129 U/L Final   09/27/2020 84 40 - 150 U/L Final     ALT   Date Value Ref Range Status   06/28/2023 21 0 - 70 U/L Final     Comment:     Reference intervals for this test were updated on 6/12/2023 to more accurately reflect our healthy population. There may be differences in the flagging of prior results with similar values performed with this method. Interpretation of those prior results can be made in the context of the updated reference intervals.     09/27/2020 36 0 - 70 U/L Final     AST   Date Value Ref Range Status   06/28/2023 36 0 - 45 U/L Final     Comment:      Reference intervals for this test were updated on 2023 to more accurately reflect our healthy population. There may be differences in the flagging of prior results with similar values performed with this method. Interpretation of those prior results can be made in the context of the updated reference intervals.   2020 48 (H) 0 - 45 U/L Final     Lab Results   Component Value Date    WBC 5.5 2023    WBC 5.6 2020     Lab Results   Component Value Date    RBC 4.14 2023    RBC 3.59 2020     Lab Results   Component Value Date    HGB 14.8 2023    HGB 13.2 2020     Lab Results   Component Value Date    HCT 42.8 2023    HCT 38.7 2020     No components found for: MCT  Lab Results   Component Value Date     2023     2020     Lab Results   Component Value Date    MCH 35.7 2023    MCH 36.8 2020     Lab Results   Component Value Date    MCHC 34.6 2023    MCHC 34.1 2020     Lab Results   Component Value Date    RDW 13.4 2023    RDW 13.2 2020     Lab Results   Component Value Date     2023     2020     INR   Date Value Ref Range Status   2023 1.42 (H) 0.85 - 1.15 Final   2020 1.64 (H) 0.86 - 1.14 Final      AFP 6.3    Imagin.  Posttreatment changes of LI-RADS 3 and 4 observations in hepatic  segment 7 and 8; LR-TR, nonviable.  2.  No new suspicious hepatic observation.  3.  Cirrhosis and sequelae of portal hypertension.  4.  Based on this exam, the patient is within [place] criteria.  5.  Stable pancreatic cysts measuring up to 0.7 cm consistent with  side branch IPMN's. Recommend continued attention on follow-up.    A/P:    61 yo, BCLC B, multifocal HCC biopsy proven, now about 1 month s/p TACE w/MWA ablation of segment 8 lesions.    No definite viable tumor in either lesion on imaging. We will follow-up with MRI in 2-3 months.     His liver function tests have  normalized to pre-treatment levels.                 Again, thank you for allowing me to participate in the care of your patient.      Sincerely,    Parish Christopher MD

## 2023-06-29 ENCOUNTER — HOSPITAL ENCOUNTER (OUTPATIENT)
Dept: CT IMAGING | Facility: CLINIC | Age: 63
Discharge: HOME | End: 2023-06-29
Attending: INTERNAL MEDICINE | Admitting: INTERNAL MEDICINE
Payer: COMMERCIAL

## 2023-06-29 DIAGNOSIS — I71.21 ANEURYSM OF ASCENDING AORTA WITHOUT RUPTURE (H): ICD-10-CM

## 2023-06-29 PROCEDURE — 71275 CT ANGIOGRAPHY CHEST: CPT | Mod: 26 | Performed by: INTERNAL MEDICINE

## 2023-06-29 PROCEDURE — 71275 CT ANGIOGRAPHY CHEST: CPT

## 2023-06-29 PROCEDURE — 250N000011 HC RX IP 250 OP 636: Performed by: INTERNAL MEDICINE

## 2023-06-29 RX ORDER — IOPAMIDOL 755 MG/ML
100 INJECTION, SOLUTION INTRAVASCULAR ONCE
Status: COMPLETED | OUTPATIENT
Start: 2023-06-29 | End: 2023-06-29

## 2023-06-29 RX ADMIN — IOPAMIDOL 100 ML: 755 INJECTION, SOLUTION INTRAVENOUS at 08:54

## 2023-06-30 ENCOUNTER — TELEPHONE (OUTPATIENT)
Dept: CARDIOLOGY | Facility: CLINIC | Age: 63
End: 2023-06-30

## 2023-06-30 DIAGNOSIS — I71.21 ANEURYSM OF ASCENDING AORTA WITHOUT RUPTURE (H): Primary | ICD-10-CM

## 2023-06-30 NOTE — TELEPHONE ENCOUNTER
Health Call Center    Phone Message    May a detailed message be left on voicemail: yes     Reason for Call: Other: Patient returning call for his care team. Please call patient back to further discuss, thank you.     Action Taken: Message routed to:  Other: Cardiology    Travel Screening: Not Applicable     Thank you!  [facility]

## 2023-07-03 ENCOUNTER — TELEPHONE (OUTPATIENT)
Dept: CARDIOLOGY | Facility: CLINIC | Age: 63
End: 2023-07-03

## 2023-07-03 ENCOUNTER — OFFICE VISIT (OUTPATIENT)
Dept: GASTROENTEROLOGY | Facility: CLINIC | Age: 63
End: 2023-07-03
Attending: INTERNAL MEDICINE
Payer: COMMERCIAL

## 2023-07-03 VITALS
WEIGHT: 241.6 LBS | SYSTOLIC BLOOD PRESSURE: 125 MMHG | HEART RATE: 52 BPM | DIASTOLIC BLOOD PRESSURE: 67 MMHG | BODY MASS INDEX: 28.53 KG/M2 | HEIGHT: 77 IN

## 2023-07-03 DIAGNOSIS — C22.0 HEPATOCELLULAR CARCINOMA (H): Primary | ICD-10-CM

## 2023-07-03 DIAGNOSIS — E44.1 MILD PROTEIN-CALORIE MALNUTRITION (H): ICD-10-CM

## 2023-07-03 DIAGNOSIS — I85.10 SECONDARY ESOPHAGEAL VARICES WITHOUT BLEEDING (H): ICD-10-CM

## 2023-07-03 DIAGNOSIS — K70.30 ALCOHOLIC CIRRHOSIS OF LIVER WITHOUT ASCITES (H): ICD-10-CM

## 2023-07-03 DIAGNOSIS — F10.20 ALCOHOL USE DISORDER, SEVERE, DEPENDENCE (H): ICD-10-CM

## 2023-07-03 PROCEDURE — 99214 OFFICE O/P EST MOD 30 MIN: CPT | Performed by: INTERNAL MEDICINE

## 2023-07-03 PROCEDURE — G0463 HOSPITAL OUTPT CLINIC VISIT: HCPCS | Performed by: INTERNAL MEDICINE

## 2023-07-03 ASSESSMENT — PAIN SCALES - GENERAL: PAINLEVEL: NO PAIN (0)

## 2023-07-03 NOTE — PATIENT INSTRUCTIONS
- We will get an endoscopy to look for dilated/varicose veins    - You will have an MRI the end of September    Website: [email or web address]    Cirrhosis Education    We recommend you go to the website: [email or web address] to review valuable information about your liver disease, good dietary choices, exercise options, and symptom management strategies    Nutrition  - For patients with cirrhosis, it is very important to eat the right types and amounts of foods.  We recommend a diet low in carbohydrates/sugars and high in fresh fruits/vegetables, with the right amount of protein.  We typically recommend 1.5gm/kg/day of protein, or  grams of protein every day.  - In regard to protein intake, you can focus on: All meats, cooked fish and seafood, vegetable-based protein meat products, tofu, eggs, legumes, dairy products (including milk and yogurt and cheese), unsalted nuts, etc...  - You should eat at least three meals a day and three to four snacks between meals  - Bedtime snacks are especially important (preferrably something with some protein)    - Patients with malnutrition and/or loss of muscular mass can improve their nutrition and muscular mass by drinking two cans of dietary supplements daily, particularly at bedtime.  These would include: Ensure, Boost, [place] Instant Milk, Glucerna, or powdered whey protein (or similar supplements)  - Please avoid eating raw seafood, especially shellfish, because of risk of serious illness  - We recommend all patients with cirrhosis limit their daily sodium intake to less than 2,000mg      General Liver Health  - Avoid the use of all non-steroid anti-inflammatory medicines (ibuprofen, Aleve, naproxen, aspirin, etc.) as this can cause serious injury to your kidneys in the setting of liver disease  - If you see a doctor and they prescribe you a new medication, please contact our clinic to let us know what changes are being made  - If you become acutely ill and present  to an ER or are admitted to a hospital, please let us know as soon as you are able.  - Patients with chronic liver disease be vaccinated against hepatitis A and B.  Please discuss with your primary provider the need for these vaccines  - As patients with liver disease are at an increased risk of developing osteoporosis, we recommend that you have a DEXA scan with appropriate follow up and treatment.   - We recommend screening for Vitamin D deficiency (at least yearly) and aggressive replacement/supplementation as warranted.    Sleep Troubles:  - Sleep issues are very common in patients with chronic liver disease  - Recommend strict avoidance of medications such as narcotics, sedatives and sleep aids.    - Low dose benadryl or melatonin can be used for insomnia, if needed.

## 2023-07-03 NOTE — NURSING NOTE
"Chief Complaint   Patient presents with     RECHECK     Follow up with Hep C     /67   Pulse 52   Ht 1.956 m (6' 5\")   Wt 109.6 kg (241 lb 9.6 oz)   BMI 28.65 kg/m    Enid Borges CMA on 7/3/2023 at 10:04 AM    "

## 2023-07-03 NOTE — LETTER
7/3/2023         RE: Sohan Marcano  1945 [facility] [id number]        Dear Colleague,    Thank you for referring your patient, Sohan Marcano, to the [facility] [place]. Please see a copy of my visit note below.    Date of Service: July 3, 2023     Subjective:            Sohan Marcano is a 62 year old male presenting for evaluation of liver disease    History of Present Illness   Sohan Marcano is a 62 year old male with past medical history of IV and inhaled substance use with chronic hepatitis C, long-standing history of alcohol use disorder with cirrhosis presenting complicated by biopsy-proven HCC s/p TACE/MWA who presents in follow up    After review of his imaging and tumor board he was admitted to the hospital after IR based angiogram on March 15 where he underwent TACE to segment 8 given concerns for satellite lesions around the predominant 3 cm lesion and then ultimately underwent microwave ablation of that large lesion.  Biopsy prior to did demonstrate a moderately differentiated hepatocellular carcinoma.  While he felt fine immediately after the procedure shortly after getting home he had the onset of severe right upper quadrant pain with radiation to his right shoulder that was uncontrolled at home and ultimately led to a hospitalization for pain control.  Notes that he did have ongoing issues with pain and discomfort for several weeks after this procedure, but is now pain-free.  Follow-up MRI performed on June 28 suggested that all lesions were treated and nonviable    Denies any overt issues with impaired memory or concentration.  Denies any overt issues with lower extremity edema     He continues to drink alcohol: One-pint hard alcohol per day.  He is not ready to quit.      He does note ongoing issues with shortness of breath, pain with ambulation due to neuropathy      Past Medical History:  Past Medical History:   Diagnosis Date     "Actinic keratosis     Aneurysm of thoracic aorta (H)     Basal cell carcinoma     Cirrhosis of liver (H)     COPD (chronic obstructive pulmonary disease) (H)     HCC (hepatocellular carcinoma) (H)     Hepatic steatosis     History of hepatitis C     HLD (hyperlipidemia)     HTN (hypertension)     Neuropathy     Obesity     Osteoarthritis     Portal hypertension (H)     Squamous cell carcinoma     Substance abuse (H)     alcohol    Uncomplicated asthma        Surgical History:  Past Surgical History:   Procedure Laterality Date    HERNIA REPAIR      as child    IR CHEMO EMBOLIZATION  5/15/2023    IR LIVER BIOPSY PERCUTANEOUS  5/15/2023    [place] DENTAL SURGERY PROCEDURE      wisdom teeth removal         Social History:  Social History     Tobacco Use    Smoking status: Former     Packs/day: 0.25     Types: Cigarettes    Smokeless tobacco: Never    Tobacco comments:     Hasn't smoked since end of Oct 2022 then had several relapses. Reports last smoke 3 cigarettes about 1-2 weeks ago (5/8/23)   Vaping Use    Vaping Use: Former   Substance Use Topics    Alcohol use: Yes     Alcohol/week: 20.0 standard drinks of alcohol     Types: 20 Standard drinks or equivalent per week     Comment: 2-3 drinks daily    Drug use: Not Currently       Family History:  Family History   Problem Relation Age of Onset    Other - See Comments Mother         Patient states his Mother ? had skin cancer.    Other - See Comments Father         Patient reports Father had \"all types of skin cancer\".    Coronary Artery Disease Father     CABG Father     Other - See Comments Sister         Basal cell carcinoma    Coronary Artery Disease Sister     Aortic aneurysm Sister     Coronary Artery Disease Brother     Anesthesia Reaction No family hx of     Venous thrombosis No family hx of        Medications:  Current Outpatient Medications   Medication    albuterol (PROAIR HFA/PROVENTIL HFA/VENTOLIN HFA) 108 (90 Base) MCG/ACT inhaler    atorvastatin (LIPITOR) 40 " "MG tablet    budesonide-formoterol (SYMBICORT) 80-4.5 MCG/ACT Inhaler    Cholecalciferol (VITAMIN D) 125 MCG (5000 UT) capsule    gabapentin (NEURONTIN) 300 MG capsule    losartan (COZAAR) 25 MG tablet    MILK THISTLE PO    multivitamin w/minerals (THERA-VIT-M) tablet     No current facility-administered medications for this visit.       Review of Systems  A complete 10 point review of systems was asked and answered in the negative unless specifically commented upon in the HPI    Objective:         Vitals:    07/03/23 1003   BP: 125/67   Pulse: 52   Weight: 109.6 kg (241 lb 9.6 oz)   Height: 1.956 m (6' 5\")     Body mass index is 28.65 kg/m .     Physical Exam  Constitutional: Well-developed, well-nourished, in no apparent distress.    HEENT: Normocephalic. no scleral icterus.   Neck/Lymph: Normal ROM  Cardiac:  Regular rate  Respiratory: scattered wheezes  GI:  Abdomen soft, obese  Skin:   + spider nevi noted.  + palmar erythema  Peripheral Vascular: no lower extremity edema. 2+ pulses in all extremities  Musculoskeletal:  ROM intact, normal muscle bulk    Psychiatric: Normal mood and affect. Behavior is normal.  Neuro:  no asterixis, no tremor    Labs and Diagnostic tests:  MELD-Na: 11 at 6/28/2023  4:10 PM  MELD: 11 at 6/28/2023  4:10 PM  Calculated from:  Serum Creatinine: 0.69 mg/dL (Using min of 1 mg/dL) at 6/28/2023  4:10 PM  Serum Sodium: 141 mmol/L (Using max of 137 mmol/L) at 6/28/2023  4:10 PM  Total Bilirubin: 1.1 mg/dL at 6/28/2023  4:10 PM  INR(ratio): 1.42 at 6/28/2023  4:10 PM        Imaging:  MRI Abd w/wo contrast 6/28/2023  FINDINGS:     Liver: Cirrhotic morphology     Post chemoembolization and microwave ablation changes of hepatic  segment 7, 8 and 5 and to a lesser extent involving hepatic segments  4A and 6. The previously described LI-RADS 4 as well as LI-RADS 3  observations in these areas are within the treatment zone.No residual  masslike enhancement or diffusion restriction.      Unchanged " area of arterial hyperenhancement of the recanalized  umbilical vein hepatic segment IVb without discrete hepatic  abnormality.     Scattered hepatic cysts.     Gallbladder/Bile Ducts:  Gallstones  . Normal bile ducts.     Spleen: Normal     Pancreas: Stable pancreatic tail cysts measuring up to 0.7 x 0.4 cm  (axial T2 series 12, image 22 and 21).     Adrenal glands: Normal     Kidneys: Normal     Bowel: Nondilated  T2 hyperintense observation along the left colon is confirmed as a  diverticulum containing air on remaining sequences (T2 fat saturated  series 12, image 26 and series 20, image 22).     Lymph nodes: No adenopathy     Blood vessels: Unchanged nonocclusive thrombus in the right right  portal vein.  Conventional hepatic arterial anatomy. Recannulization  of the umbilical vein.     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 hepatitis C and alcohol use:    - Well compensated at this time  - MELD-Na: 11 (7/2023)  - Discussed role of alcohol cessation in promoting liver health  - HCV with SVR    Alcohol use disorder:  - Ongoing use  - Offered resources for cessation; deferred at this time    Chronic hepatitis C:  - S/p SVR 2019    Hepatocellular Cancer:   - Biopsy-proven disease 5/2023  - Underwent TACE to segment 8 and MWA of 3cm lesion on 5/15/2023   - Surveillance MRI 6/2023 without viable tumor   - Exam ordered late Sept 2023    Ascites:  - No overt issues at this time  - Continue to follow a sodium restricted (<2g sodium diet)     Hepatic Encephalopathy:  -No acute issues    Esophageal Varices:   - Will order screening for now    Nutrition:  As with most patients with chronic liver disease, there is a significant degree of protein malnutrition.  Dicussed need to change dietary habits to improve  overall protein balance.  Discussed the importance of eating between 1.2-1.5g/kg/day lean protein like eggs, fish, chicken, nuts, and legumes, in addition to a diet rich in fresh fruits and vegetables.  Continue to follow a sodium restricted (<2g sodium diet) and discussed the need to minimize the intake of carbohydrates and sugars, to avoid obesity.   - Strongly encourage protein supplements 2-3 times daily (Boost, Ensure, [place] Instant Milk, etc.) to meet protein and caloric intake.  - Recommend a bedtime snack with protein and complex carbohydrate to minimize risk of muscle catabolism overnight    Routine Health Care in Patient with Chronic Liver Disease:  - We recommend screening for hepatitis A and B, please vaccinate if not immune  - All patients with liver disease, particularly those with cholestatic liver disease, are at an increased risk for osteoporosis.  We strongly recommend screening for Vitamin D deficiency at least twice yearly with aggressive supplementation/replacement as indicated.    - We also recommend a screening DEXA scan to evaluate for osteoporosis.  If present, should treat with calcium, Vitamin D supplementation, and recommend consideration of bisphosphonate therapy.  Also recommend follow up DEXA scans to evaluate for improvement of bone density on therapy.  - All patients with liver disease should avoid the use of Non-steroidal Anti-Inflammatory (NSAID) medications as they can cause significant injury to the kidneys in this population    Follow Up:  6 months     Thank you very much for the opportunity to participate in the care of this patient.  If you have any further questions, please don't hesitate to contact our office.    Thomas M. Leventhal, M.D.   of Medicine  Advanced & Transplant Hepatology  [facility]       Again, thank you for allowing me to participate in the care of your patient.        Sincerely,        Zach LOVING  Leventhal, MD

## 2023-07-03 NOTE — PROGRESS NOTES
Date of Service: July 3, 2023     Subjective:            Sohan Marcano is a 62 year old male presenting for evaluation of liver disease    History of Present Illness   Sohan Marcano is a 62 year old male with past medical history of IV and inhaled substance use with chronic hepatitis C, long-standing history of alcohol use disorder with cirrhosis presenting complicated by biopsy-proven HCC s/p TACE/MWA who presents in follow up    After review of his imaging and tumor board he was admitted to the hospital after IR based angiogram on March 15 where he underwent TACE to segment 8 given concerns for satellite lesions around the predominant 3 cm lesion and then ultimately underwent microwave ablation of that large lesion.  Biopsy prior to did demonstrate a moderately differentiated hepatocellular carcinoma.  While he felt fine immediately after the procedure shortly after getting home he had the onset of severe right upper quadrant pain with radiation to his right shoulder that was uncontrolled at home and ultimately led to a hospitalization for pain control.  Notes that he did have ongoing issues with pain and discomfort for several weeks after this procedure, but is now pain-free.  Follow-up MRI performed on June 28 suggested that all lesions were treated and nonviable    Denies any overt issues with impaired memory or concentration.  Denies any overt issues with lower extremity edema     He continues to drink alcohol: One-pint hard alcohol per day.  He is not ready to quit.      He does note ongoing issues with shortness of breath, pain with ambulation due to neuropathy      Past Medical History:  Past Medical History:   Diagnosis Date     Actinic keratosis      Aneurysm of thoracic aorta (H)      Basal cell carcinoma      Cirrhosis of liver (H)      COPD (chronic obstructive pulmonary disease) (H)      HCC (hepatocellular carcinoma) (H)      Hepatic steatosis      History of hepatitis C      HLD  "(hyperlipidemia)      HTN (hypertension)      Neuropathy      Obesity      Osteoarthritis      Portal hypertension (H)      Squamous cell carcinoma      Substance abuse (H)     alcohol     Uncomplicated asthma        Surgical History:  Past Surgical History:   Procedure Laterality Date     HERNIA REPAIR      as child     IR CHEMO EMBOLIZATION  5/15/2023     IR LIVER BIOPSY PERCUTANEOUS  5/15/2023     [place] DENTAL SURGERY PROCEDURE       wisdom teeth removal         Social History:  Social History     Tobacco Use     Smoking status: Former     Packs/day: 0.25     Types: Cigarettes     Smokeless tobacco: Never     Tobacco comments:     Hasn't smoked since end of Oct 2022 then had several relapses. Reports last smoke 3 cigarettes about 1-2 weeks ago (5/8/23)   Vaping Use     Vaping Use: Former   Substance Use Topics     Alcohol use: Yes     Alcohol/week: 20.0 standard drinks of alcohol     Types: 20 Standard drinks or equivalent per week     Comment: 2-3 drinks daily     Drug use: Not Currently       Family History:  Family History   Problem Relation Age of Onset     Other - See Comments Mother         Patient states his Mother ? had skin cancer.     Other - See Comments Father         Patient reports Father had \"all types of skin cancer\".     Coronary Artery Disease Father      CABG Father      Other - See Comments Sister         Basal cell carcinoma     Coronary Artery Disease Sister      Aortic aneurysm Sister      Coronary Artery Disease Brother      Anesthesia Reaction No family hx of      Venous thrombosis No family hx of        Medications:  Current Outpatient Medications   Medication     albuterol (PROAIR HFA/PROVENTIL HFA/VENTOLIN HFA) 108 (90 Base) MCG/ACT inhaler     atorvastatin (LIPITOR) 40 MG tablet     budesonide-formoterol (SYMBICORT) 80-4.5 MCG/ACT Inhaler     Cholecalciferol (VITAMIN D) 125 MCG (5000 UT) capsule     gabapentin (NEURONTIN) 300 MG capsule     losartan (COZAAR) 25 MG tablet     MILK THISTLE " "PO     multivitamin w/minerals (THERA-VIT-M) tablet     No current facility-administered medications for this visit.       Review of Systems  A complete 10 point review of systems was asked and answered in the negative unless specifically commented upon in the HPI    Objective:         Vitals:    07/03/23 1003   BP: 125/67   Pulse: 52   Weight: 109.6 kg (241 lb 9.6 oz)   Height: 1.956 m (6' 5\")     Body mass index is 28.65 kg/m .     Physical Exam  Constitutional: Well-developed, well-nourished, in no apparent distress.    HEENT: Normocephalic. no scleral icterus.   Neck/Lymph: Normal ROM  Cardiac:  Regular rate  Respiratory: scattered wheezes  GI:  Abdomen soft, obese  Skin:   + spider nevi noted.  + palmar erythema  Peripheral Vascular: no lower extremity edema. 2+ pulses in all extremities  Musculoskeletal:  ROM intact, normal muscle bulk    Psychiatric: Normal mood and affect. Behavior is normal.  Neuro:  no asterixis, no tremor    Labs and Diagnostic tests:  MELD-Na: 11 at 6/28/2023  4:10 PM  MELD: 11 at 6/28/2023  4:10 PM  Calculated from:  Serum Creatinine: 0.69 mg/dL (Using min of 1 mg/dL) at 6/28/2023  4:10 PM  Serum Sodium: 141 mmol/L (Using max of 137 mmol/L) at 6/28/2023  4:10 PM  Total Bilirubin: 1.1 mg/dL at 6/28/2023  4:10 PM  INR(ratio): 1.42 at 6/28/2023  4:10 PM        Imaging:  MRI Abd w/wo contrast 6/28/2023  FINDINGS:     Liver: Cirrhotic morphology     Post chemoembolization and microwave ablation changes of hepatic  segment 7, 8 and 5 and to a lesser extent involving hepatic segments  4A and 6. The previously described LI-RADS 4 as well as LI-RADS 3  observations in these areas are within the treatment zone.No residual  masslike enhancement or diffusion restriction.      Unchanged area of arterial hyperenhancement of the recanalized  umbilical vein hepatic segment IVb without discrete hepatic  abnormality.     Scattered hepatic cysts.     Gallbladder/Bile Ducts:  Gallstones  . Normal bile " ducts.     Spleen: Normal     Pancreas: Stable pancreatic tail cysts measuring up to 0.7 x 0.4 cm  (axial T2 series 12, image 22 and 21).     Adrenal glands: Normal     Kidneys: Normal     Bowel: Nondilated  T2 hyperintense observation along the left colon is confirmed as a  diverticulum containing air on remaining sequences (T2 fat saturated  series 12, image 26 and series 20, image 22).     Lymph nodes: No adenopathy     Blood vessels: Unchanged nonocclusive thrombus in the right right  portal vein.  Conventional hepatic arterial anatomy. Recannulization  of the umbilical vein.     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 hepatitis C and alcohol use:    - Well compensated at this time  - MELD-Na: 11 (7/2023)  - Discussed role of alcohol cessation in promoting liver health  - HCV with SVR    Alcohol use disorder:  - Ongoing use  - Offered resources for cessation; deferred at this time    Chronic hepatitis C:  - S/p SVR 2019    Hepatocellular Cancer:   - Biopsy-proven disease 5/2023  - Underwent TACE to segment 8 and MWA of 3cm lesion on 5/15/2023   - Surveillance MRI 6/2023 without viable tumor   - Exam ordered late Sept 2023    Ascites:  - No overt issues at this time  - Continue to follow a sodium restricted (<2g sodium diet)     Hepatic Encephalopathy:  -No acute issues    Esophageal Varices:   - Will order screening for now    Nutrition:  As with most patients with chronic liver disease, there is a significant degree of protein malnutrition.  Dicussed need to change dietary habits to improve overall protein balance.  Discussed the importance of eating between 1.2-1.5g/kg/day lean protein like eggs, fish, chicken, nuts, and legumes, in addition to a diet rich in fresh fruits and vegetables.  Continue to  follow a sodium restricted (<2g sodium diet) and discussed the need to minimize the intake of carbohydrates and sugars, to avoid obesity.   - Strongly encourage protein supplements 2-3 times daily (Boost, Ensure, [place] Instant Milk, etc.) to meet protein and caloric intake.  - Recommend a bedtime snack with protein and complex carbohydrate to minimize risk of muscle catabolism overnight    Routine Health Care in Patient with Chronic Liver Disease:  - We recommend screening for hepatitis A and B, please vaccinate if not immune  - All patients with liver disease, particularly those with cholestatic liver disease, are at an increased risk for osteoporosis.  We strongly recommend screening for Vitamin D deficiency at least twice yearly with aggressive supplementation/replacement as indicated.    - We also recommend a screening DEXA scan to evaluate for osteoporosis.  If present, should treat with calcium, Vitamin D supplementation, and recommend consideration of bisphosphonate therapy.  Also recommend follow up DEXA scans to evaluate for improvement of bone density on therapy.  - All patients with liver disease should avoid the use of Non-steroidal Anti-Inflammatory (NSAID) medications as they can cause significant injury to the kidneys in this population    Follow Up:  6 months     Thank you very much for the opportunity to participate in the care of this patient.  If you have any further questions, please don't hesitate to contact our office.    Thomas M. Leventhal, M.D.   of Medicine  Advanced & Transplant Hepatology  [facility]

## 2023-07-12 NOTE — PROGRESS NOTES
Mr. 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     He underwent TACE/Ablation single session of the largest lesions in segment 8, along with biopsy which confirmed diagnosis of moderately differentiated HCC.    His post operative course was challenging due to significant post embolization and treatment pain, which really began on post op day 1. His pain persisted for a few weeks but is not recovered to his pre-op state. He was managing his pain primarily with ibuprofen and tylenol.     He likely had referred pain to his shoulder due to inflammation at the liver capsule and adjacent diaphragm.     /83 (BP Location: Left arm, Patient Position: Sitting, Cuff Size: Adult Large)   Pulse 96   SpO2 93%   Alert, nad    Labs:  Last Comprehensive Metabolic Panel:  Sodium   Date Value Ref Range Status   06/28/2023 141 136 - 145 mmol/L Final   09/27/2020 139 133 - 144 mmol/L Final     Potassium   Date Value Ref Range Status   06/28/2023 3.4 3.4 - 5.3 mmol/L Final   06/22/2022 3.9 3.4 - 5.3 mmol/L Final   09/27/2020 3.3 (L) 3.4 - 5.3 mmol/L Final     Chloride   Date Value Ref Range Status   06/28/2023 107 98 - 107 mmol/L Final   06/22/2022 109 94 - 109 mmol/L Final   09/27/2020 108 94 - 109 mmol/L Final     Carbon Dioxide   Date Value Ref Range Status   09/27/2020 25 20 - 32 mmol/L Final     Carbon Dioxide (CO2)   Date Value Ref Range Status   06/28/2023 23 22 - 29 mmol/L Final   06/22/2022 22 20 - 32 mmol/L Final     Anion Gap   Date Value Ref Range Status   06/28/2023 11 7 - 15 mmol/L Final   06/22/2022 9 3 - 14 mmol/L Final   09/27/2020 6 3 - 14 mmol/L Final     Glucose   Date Value Ref Range Status   06/28/2023 137 (H) 70 - 99 mg/dL Final   06/22/2022 110 (H) 70 - 99 mg/dL Final   09/27/2020 103 (H) 70 - 99 mg/dL Final     Urea Nitrogen    Date Value Ref Range Status   06/28/2023 6.9 (L) 8.0 - 23.0 mg/dL Final   06/22/2022 8 7 - 30 mg/dL Final   09/27/2020 16 7 - 30 mg/dL Final     Creatinine   Date Value Ref Range Status   06/28/2023 0.69 0.67 - 1.17 mg/dL Final   09/27/2020 0.82 0.66 - 1.25 mg/dL Final     GFR Estimate   Date Value Ref Range Status   06/28/2023 >90 >60 mL/min/1.73m2 Final   09/27/2020 >90 >60 mL/min/[1.73_m2] Final     Comment:     Non  GFR Calc  Starting 12/18/2018, serum creatinine based estimated GFR (eGFR) will be   calculated using the Chronic Kidney Disease Epidemiology Collaboration   (CKD-EPI) equation.       GFR, ESTIMATED POCT   Date Value Ref Range Status   05/09/2023 >60 >60 mL/min/1.73m2 Final     Calcium   Date Value Ref Range Status   06/28/2023 9.2 8.8 - 10.2 mg/dL Final   09/27/2020 7.8 (L) 8.5 - 10.1 mg/dL Final     Bilirubin Total   Date Value Ref Range Status   06/28/2023 1.1 <=1.2 mg/dL Final   09/27/2020 3.3 (H) 0.2 - 1.3 mg/dL Final     Alkaline Phosphatase   Date Value Ref Range Status   06/28/2023 73 40 - 129 U/L Final   09/27/2020 84 40 - 150 U/L Final     ALT   Date Value Ref Range Status   06/28/2023 21 0 - 70 U/L Final     Comment:     Reference intervals for this test were updated on 6/12/2023 to more accurately reflect our healthy population. There may be differences in the flagging of prior results with similar values performed with this method. Interpretation of those prior results can be made in the context of the updated reference intervals.     09/27/2020 36 0 - 70 U/L Final     AST   Date Value Ref Range Status   06/28/2023 36 0 - 45 U/L Final     Comment:     Reference intervals for this test were updated on 6/12/2023 to more accurately reflect our healthy population. There may be differences in the flagging of prior results with similar values performed with this method. Interpretation of those prior results can be made in the context of the updated reference intervals.    2020 48 (H) 0 - 45 U/L Final     Lab Results   Component Value Date    WBC 5.5 2023    WBC 5.6 2020     Lab Results   Component Value Date    RBC 4.14 2023    RBC 3.59 2020     Lab Results   Component Value Date    HGB 14.8 2023    HGB 13.2 2020     Lab Results   Component Value Date    HCT 42.8 2023    HCT 38.7 2020     No components found for: MCT  Lab Results   Component Value Date     2023     2020     Lab Results   Component Value Date    MCH 35.7 2023    MCH 36.8 2020     Lab Results   Component Value Date    MCHC 34.6 2023    MCHC 34.1 2020     Lab Results   Component Value Date    RDW 13.4 2023    RDW 13.2 2020     Lab Results   Component Value Date     2023     2020     INR   Date Value Ref Range Status   2023 1.42 (H) 0.85 - 1.15 Final   2020 1.64 (H) 0.86 - 1.14 Final      AFP 6.3    Imagin.  Posttreatment changes of LI-RADS 3 and 4 observations in hepatic  segment 7 and 8; LR-TR, nonviable.  2.  No new suspicious hepatic observation.  3.  Cirrhosis and sequelae of portal hypertension.  4.  Based on this exam, the patient is within Antony criteria.  5.  Stable pancreatic cysts measuring up to 0.7 cm consistent with  side branch IPMN's. Recommend continued attention on follow-up.    A/P:    63 yo, BCLC B, multifocal HCC biopsy proven, now about 1 month s/p TACE w/MWA ablation of segment 8 lesions.    No definite viable tumor in either lesion on imaging. We will follow-up with MRI in 2-3 months.     His liver function tests have normalized to pre-treatment levels.

## 2023-07-17 ENCOUNTER — OFFICE VISIT (OUTPATIENT)
Dept: PULMONOLOGY | Facility: CLINIC | Age: 63
End: 2023-07-17
Attending: STUDENT IN AN ORGANIZED HEALTH CARE EDUCATION/TRAINING PROGRAM
Payer: COMMERCIAL

## 2023-07-17 VITALS
SYSTOLIC BLOOD PRESSURE: 133 MMHG | HEART RATE: 101 BPM | OXYGEN SATURATION: 92 % | BODY MASS INDEX: 28.65 KG/M2 | DIASTOLIC BLOOD PRESSURE: 83 MMHG | HEIGHT: 77 IN

## 2023-07-17 DIAGNOSIS — J44.9 CHRONIC OBSTRUCTIVE PULMONARY DISEASE, UNSPECIFIED COPD TYPE (H): Primary | ICD-10-CM

## 2023-07-17 DIAGNOSIS — Z87.891 PERSONAL HISTORY OF TOBACCO USE: ICD-10-CM

## 2023-07-17 PROCEDURE — 99215 OFFICE O/P EST HI 40 MIN: CPT | Mod: 25 | Performed by: STUDENT IN AN ORGANIZED HEALTH CARE EDUCATION/TRAINING PROGRAM

## 2023-07-17 PROCEDURE — G0296 VISIT TO DETERM LDCT ELIG: HCPCS | Performed by: STUDENT IN AN ORGANIZED HEALTH CARE EDUCATION/TRAINING PROGRAM

## 2023-07-17 PROCEDURE — G0463 HOSPITAL OUTPT CLINIC VISIT: HCPCS | Mod: 25 | Performed by: STUDENT IN AN ORGANIZED HEALTH CARE EDUCATION/TRAINING PROGRAM

## 2023-07-17 RX ORDER — BUDESONIDE AND FORMOTEROL FUMARATE DIHYDRATE 160; 4.5 UG/1; UG/1
2 AEROSOL RESPIRATORY (INHALATION) 2 TIMES DAILY
Qty: 10.2 G | Refills: 11 | Status: SHIPPED | OUTPATIENT
Start: 2023-07-17 | End: 2024-01-01

## 2023-07-17 NOTE — PATIENT INSTRUCTIONS
Will increase to higher dose of Symbicort.  new inhaler at the pharmacy. Continue to use twice daily.    Use albuterol as needed for cough and shortness of breath.    Avoid being outside on days when the air quality is bad. If you have to go outside, wear a mask.    Keep working on minimizing your smoking. Try to limit yourself to one cigarette when visiting with friends.     Get your flu shot in the fall. Look into COVID booster.     Follow up in one year with pulmonary function tests and a six minute walk.     Call if breathing gets worse, start to have increased productive cough, have increased reliance on albuterol.

## 2023-07-17 NOTE — NURSING NOTE
Chief Complaint   Patient presents with     RECHECK     Return visit     Medications reviewed and vital signs taken.   Sandra Tyson CMA

## 2023-07-17 NOTE — LETTER
7/17/2023         RE: Sohan Marcano  1945 [facility] [address]        Dear Colleague,    Thank you for referring your patient, Sohan Marcano, to the [facility] [place]. Please see a copy of my visit note below.    [facility]    Pulmonary, Allergy, Critical Care and Sleep Medicine    Clinic Progress Note  7/17/23    Sohan Marcano MRN# [id number]   Age: 62 year old YOB: 1960     Primary care provider: Yannick Womack     Assessment and Recommendations:    Sohan Marcano is a 63 year old male with a history of COPD, prior tobacco use, cirrhosis 2/2 chronic hepatitis C and alcohol use c/b biopsy proven HCC s/p TACE/MWA, and HTN who presents for follow up of COPD/asthma.     COPD/Asthma  Current Tobacco Use  Emphysema in setting of long smoking history with moderate obstruction and bronchodilator response on PFTs. Significant decline in fall 2022, ultimately requiring multiple courses of antibiotics for presumed pneumonia. During this time stopped smoking but also stopped inhalers and increased drinking. Since last visit relative stability but still with significant symptoms depending on activity and air quality. Using Symbicort but missing doses with some regularity. Will increase dose for more robust treatment, if still missing a lot of doses in future can see if insurance will cover a once daily inhaler. Unfortunately is smoking again several times per month, often as part of social situations. Long discussion on smoking today, is using NRT and staying off cigarettes majority of days per month. Is due for annual lung cancer screening.   - Symbicort BID, will increase to higher dose inhaler  - Continue PRN albuterol  - Discussion today on air quality measures, avoidance of outdoors and mask use as appropriate  - Encouraged continued efforts on smoking cessation and at least minimizing number  of cigarettes when smokes  - Will do annual lung cancer screening CT  - Recommended influenza and COVID vaccinations  - Will get PFTs and 6MW at next visit    Follow up in one year with PFTs and 6MW    Taryn Booker MD PhD  Pulmonary and Critical Care   Pager [phone]    40 minutes spent on the date of the encounter doing chart review, history and exam, documentation and further activities per the note not including time spent in discussion and coordination of lung cancer screening.    Lung Cancer Screening Shared Decision Making Visit     Sohan Marcano is eligible for lung cancer screening on the basis of:   has not not experienced symptoms suggestive of lung cancer.   born on 1960, 63 year old years old.   smoked 1 packs of cigarettes for 45-50 years for a total of 50+ pack-years   has not quit smoking and is currently smoking      I have discussed with patient the risks and benefits of screening for lung cancer with low-dose CT.     The risks include:   radiation exposure    false positives     over-diagnosis    The benefit of early detection of lung cancer is contingent upon adherence to annual screening or more frequent follow up if indicated.     Furthermore, reaping the benefits of screening requires Sohan Marcano to be willing and able to undergo diagnostic procedures, if indicated.     We did discuss that the only way to prevent lung cancer is to not smoke. Smoking cessation assistance was offered.      Addendum: Lung cancer screening CT performed 7/18/23. Lung_RADS Category 2, will continue annual screening. Significant incidental findings of changes from TACE procedure in liver and borderline aneurysmal criteria of mid ascending thoracic aorta. Aortic aneurysm is known, recently evaluated by Cardiology with scheduled visit in a couple months.     Subjective:    HPI:   Initially seen in Pulmonary clinic by Dr. Kevin in July 2022 for COPD with acute on chronic progression of  "dyspnea following steady decline with more significant symptoms after wildfire smoke exposure. On albuterol and Symbicort, smoking about 1/4 pack per month. PFTs with moderate obstruction with bronchodilator response, hyperinflation and air trapping. Treated for pneumonia in Dec but at some point stopped Symbicort, CXR with basilar reticular opacities. AST and ALT up, reportedly due to increased drinking while sick. CT Chest on 12/29/22 without acute lung findings. At Jan clinic visit respiratory symptoms present for much of the fall, took two rounds of antibiotics to improve. Was feeling better but some limited stamina. Limited oximeter use, thought when active 92-93% and 95% at rest. Cough a little in the morning with clear gray sputum. Symbicort daily and albuterol a couple times in the evening. Quit smoking in October.     Since last visit diagnosed with hepatocellular carcinoma. Imaging with liver and spleen lesions that suspicious, underwent IR biopsy and ablation 5/15/23. Brief admission 5/17-19/23 for abdominal pain and monitoring of LFTs following procedure. MRI in June without viable tumor. Last saw Hepatology 7/3/23, plan for screening EGD.    Today reports that after about a week was able to start moving around again after his ablation. Overall breathing relatively stable. O2 level does run low, 90-92%. Breathing made worse by the smoke in the air recently and will also cough with gray sputum. Hard for him to try to do physical work and wears him out. Breathing ok when at rest. No chest pain, wheezing, or chest tightness. When air is good and trying to be active, breathing is \"pretty good\". Able to do a couple flights of stairs. Symbicort most days twice daily but can forgot. Rescue inhaler mostly just when the air is bad. 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 Last two weekends smoked cigarettes when was around family and friends and felt \"like hell\" " "the next day. 3-4 days per month will smoke multiple cigarettes. Is using nicotine gum. Drinking and smoking went up when diagnosed with HCC earlier this year.    Additional data from chart review  Primary Care, Hepatology, Hospital notes reviewed as above.     Procedures  Echo 1/2023: Normal RV and LV, aneurysm of ascending aorta     PFTs 7/18/2022: Moderate obstruction with air trapping, hyperinflation, significant bronchodilator response, and normal diffusing capacity    Imaging  Chest CT 12/29/2022: Granulomatous disease with calcified nodule right base, enlarged thoracic aorta, cirrhotic liver     CTA Chest 6/2023: Personally reviewed. Ordered by Cardiology for monitoring of thoracic aortic aneurysm. Emphysema.    Review of Systems:  Complete 12 point ROS negative unless mentioned in HPI    Medications, Allergies:    Medications:  Current Outpatient Medications   Medication     albuterol (PROAIR HFA/PROVENTIL HFA/VENTOLIN HFA) 108 (90 Base) MCG/ACT inhaler     atorvastatin (LIPITOR) 40 MG tablet     budesonide-formoterol (SYMBICORT) 80-4.5 MCG/ACT Inhaler     Cholecalciferol (VITAMIN D) 125 MCG (5000 UT) capsule     gabapentin (NEURONTIN) 300 MG capsule     losartan (COZAAR) 25 MG tablet     MILK THISTLE PO     multivitamin w/minerals (THERA-VIT-M) tablet     No current facility-administered medications for this visit.     Allergies:   No Known Allergies  Physical Exam:      /83   Pulse 101   Ht 1.956 m (6' 5\")   SpO2 92%   BMI 28.65 kg/m      General: Sitting up in NAD  HEENT: anicteric, moist mucosa  Neck: no palpable lymphadenopathy  Chest: CTAB, no wheezing or crackles  Cardiac: RRR no murmurs, radial pulses intact  Abdomen: Soft, non tender, active BS  Extremities: No LE Edema  Neuro: A&Ox3, no focal deficits   Skin: no rash noted on limited exam  Psych: Appropriate  Laboratory, imaging, and microbiologic data:    All laboratory and imaging data reviewed, pertinent results discussed " above.    .soc      Again, thank you for allowing me to participate in the care of your patient.        Sincerely,        Taryn Booker MD

## 2023-07-17 NOTE — PROGRESS NOTES
[facility]    Pulmonary, Allergy, Critical Care and Sleep Medicine    Clinic Progress Note  7/17/23    Sohan Marcano MRN# [id number]   Age: 62 year old YOB: 1960     Primary care provider: Yannick Womack     Assessment and Recommendations:    Sohan Marcano is a 63 year old male with a history of COPD, prior tobacco use, cirrhosis 2/2 chronic hepatitis C and alcohol use c/b biopsy proven HCC s/p TACE/MWA, and HTN who presents for follow up of COPD/asthma.     COPD/Asthma  Current Tobacco Use  Emphysema in setting of long smoking history with moderate obstruction and bronchodilator response on PFTs. Significant decline in fall 2022, ultimately requiring multiple courses of antibiotics for presumed pneumonia. During this time stopped smoking but also stopped inhalers and increased drinking. Since last visit relative stability but still with significant symptoms depending on activity and air quality. Using Symbicort but missing doses with some regularity. Will increase dose for more robust treatment, if still missing a lot of doses in future can see if insurance will cover a once daily inhaler. Unfortunately is smoking again several times per month, often as part of social situations. Long discussion on smoking today, is using NRT and staying off cigarettes majority of days per month. Is due for annual lung cancer screening.   - Symbicort BID, will increase to higher dose inhaler  - Continue PRN albuterol  - Discussion today on air quality measures, avoidance of outdoors and mask use as appropriate  - Encouraged continued efforts on smoking cessation and at least minimizing number of cigarettes when smokes  - Will do annual lung cancer screening CT  - Recommended influenza and COVID vaccinations  - Will get PFTs and 6MW at next visit    Follow up in one year with PFTs and 6MW    Taryn Booker MD PhD  Pulmonary and Critical Care   Pager [phone]    40 minutes spent  on the date of the encounter doing chart review, history and exam, documentation and further activities per the note not including time spent in discussion and coordination of lung cancer screening.    Lung Cancer Screening Shared Decision Making Visit     Sohan Marcano is eligible for lung cancer screening on the basis of:   has not not experienced symptoms suggestive of lung cancer.   born on 1960, 63 year old years old.   smoked 1 packs of cigarettes for 45-50 years for a total of 50+ pack-years   has not quit smoking and is currently smoking      I have discussed with patient the risks and benefits of screening for lung cancer with low-dose CT.     The risks include:   radiation exposure    false positives     over-diagnosis    The benefit of early detection of lung cancer is contingent upon adherence to annual screening or more frequent follow up if indicated.     Furthermore, reaping the benefits of screening requires Sohan Marcano to be willing and able to undergo diagnostic procedures, if indicated.     We did discuss that the only way to prevent lung cancer is to not smoke. Smoking cessation assistance was offered.      Addendum: Lung cancer screening CT performed 7/18/23. Lung_RADS Category 2, will continue annual screening. Significant incidental findings of changes from TACE procedure in liver and borderline aneurysmal criteria of mid ascending thoracic aorta. Aortic aneurysm is known, recently evaluated by Cardiology with scheduled visit in a couple months.     Subjective:    HPI:   Initially seen in Pulmonary clinic by Dr. Kevin in July 2022 for COPD with acute on chronic progression of dyspnea following steady decline with more significant symptoms after wildfire smoke exposure. On albuterol and Symbicort, smoking about 1/4 pack per month. PFTs with moderate obstruction with bronchodilator response, hyperinflation and air trapping. Treated for pneumonia in Dec but at some point  "stopped Symbicort, CXR with basilar reticular opacities. AST and ALT up, reportedly due to increased drinking while sick. CT Chest on 12/29/22 without acute lung findings. At Jan clinic visit respiratory symptoms present for much of the fall, took two rounds of antibiotics to improve. Was feeling better but some limited stamina. Limited oximeter use, thought when active 92-93% and 95% at rest. Cough a little in the morning with clear gray sputum. Symbicort daily and albuterol a couple times in the evening. Quit smoking in October.     Since last visit diagnosed with hepatocellular carcinoma. Imaging with liver and spleen lesions that suspicious, underwent IR biopsy and ablation 5/15/23. Brief admission 5/17-19/23 for abdominal pain and monitoring of LFTs following procedure. MRI in June without viable tumor. Last saw Hepatology 7/3/23, plan for screening EGD.    Today reports that after about a week was able to start moving around again after his ablation. Overall breathing relatively stable. O2 level does run low, 90-92%. Breathing made worse by the smoke in the air recently and will also cough with gray sputum. Hard for him to try to do physical work and wears him out. Breathing ok when at rest. No chest pain, wheezing, or chest tightness. When air is good and trying to be active, breathing is \"pretty good\". Able to do a couple flights of stairs. Symbicort most days twice daily but can forgot. Rescue inhaler mostly just when the air is bad. 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 Last two weekends smoked cigarettes when was around family and friends and felt \"like hell\" the next day. 3-4 days per month will smoke multiple cigarettes. Is using nicotine gum. Drinking and smoking went up when diagnosed with HCC earlier this year.    Additional data from chart review  Primary Care, Hepatology, Hospital notes reviewed as above.     Procedures  Echo 1/2023: Normal RV " "and LV, aneurysm of ascending aorta     PFTs 7/18/2022: Moderate obstruction with air trapping, hyperinflation, significant bronchodilator response, and normal diffusing capacity    Imaging  Chest CT 12/29/2022: Granulomatous disease with calcified nodule right base, enlarged thoracic aorta, cirrhotic liver     CTA Chest 6/2023: Personally reviewed. Ordered by Cardiology for monitoring of thoracic aortic aneurysm. Emphysema.    Review of Systems:  Complete 12 point ROS negative unless mentioned in HPI    Medications, Allergies:    Medications:  Current Outpatient Medications   Medication    albuterol (PROAIR HFA/PROVENTIL HFA/VENTOLIN HFA) 108 (90 Base) MCG/ACT inhaler    atorvastatin (LIPITOR) 40 MG tablet    budesonide-formoterol (SYMBICORT) 80-4.5 MCG/ACT Inhaler    Cholecalciferol (VITAMIN D) 125 MCG (5000 UT) capsule    gabapentin (NEURONTIN) 300 MG capsule    losartan (COZAAR) 25 MG tablet    MILK THISTLE PO    multivitamin w/minerals (THERA-VIT-M) tablet     No current facility-administered medications for this visit.     Allergies:   No Known Allergies  Physical Exam:      /83   Pulse 101   Ht 1.956 m (6' 5\")   SpO2 92%   BMI 28.65 kg/m      General: Sitting up in NAD  HEENT: anicteric, moist mucosa  Neck: no palpable lymphadenopathy  Chest: CTAB, no wheezing or crackles  Cardiac: RRR no murmurs, radial pulses intact  Abdomen: Soft, non tender, active BS  Extremities: No LE Edema  Neuro: A&Ox3, no focal deficits   Skin: no rash noted on limited exam  Psych: Appropriate  Laboratory, imaging, and microbiologic data:    All laboratory and imaging data reviewed, pertinent results discussed above.    .soc  "

## 2023-07-18 ENCOUNTER — TELEPHONE (OUTPATIENT)
Dept: FAMILY MEDICINE | Facility: CLINIC | Age: 63
End: 2023-07-18

## 2023-07-18 ENCOUNTER — ANCILLARY PROCEDURE (OUTPATIENT)
Dept: CT IMAGING | Facility: CLINIC | Age: 63
End: 2023-07-18
Attending: STUDENT IN AN ORGANIZED HEALTH CARE EDUCATION/TRAINING PROGRAM
Payer: COMMERCIAL

## 2023-07-18 DIAGNOSIS — Z87.891 PERSONAL HISTORY OF TOBACCO USE: ICD-10-CM

## 2023-07-18 PROCEDURE — 71271 CT THORAX LUNG CANCER SCR C-: CPT | Performed by: RADIOLOGY

## 2023-07-18 NOTE — TELEPHONE ENCOUNTER
Patient Quality Outreach    Patient is due for the following:   Colon Cancer Screening  Physical Annual Wellness Visit      Topic Date Due     Hepatitis B Vaccine (1 of 3 - Risk 3-dose series) Never done     COVID-19 Vaccine (5 - Pfizer series) 08/17/2022       Next Steps:   Schedule a Annual Wellness Visit    Type of outreach:    Sent letter.    Next Steps:  Reach out within 90 days via Phone.    Max number of attempts reached: No. Will try again in 90 days if patient still on fail list.    Questions for provider review:    None           Bibi Valadez RN  Chart routed to Care Team.

## 2023-07-18 NOTE — LETTER
July 18, 2023      Sohan Marcano  1945 [facility] [address]        Dear Sohan,     We care about your health and have reviewed your health plan and are making recommendations based on this review to optimize your health.    You are in particular need of attention regarding:   Colon Cancer Screening, if you have had a colonoscopy let us know when and where you had it done  Physical Annual Wellness Visit      Topic Date Due    Hepatitis B Vaccine (1 of 3 - Risk 3-dose series) Never done    COVID-19 Vaccine (5 - Pfizer series) 08/17/2022     Next Steps:   Schedule a Annual Wellness Visit    Please call the clinic to schedule at [phone] and let us know if you have questions/concerns.      Sincerely,  Yannick Womack NP  And your care team at [facility]

## 2023-07-19 ENCOUNTER — TELEPHONE (OUTPATIENT)
Dept: PULMONOLOGY | Facility: CLINIC | Age: 63
End: 2023-07-19

## 2023-07-19 NOTE — TELEPHONE ENCOUNTER
Prior Authorization Retail Medication Request    Medication/Dose: Budesonide-Formoterol Fumarate 160-4.5MCG/ ACT  ICD code (if different than what is on RX):    Previously Tried and Failed:    Rationale:      Insurance Name:    Insurance ID:        Pharmacy Information (if different than what is on RX)  Name:    Phone:

## 2023-07-19 NOTE — TELEPHONE ENCOUNTER
[facility] Call Center    Phone Message    May a detailed message be left on voicemail: yes     Reason for Call: Other: Please call imaging asap to dicuss findings  from CT and chest Xray     Action Taken: PULM     Travel Screening: Not Applicable

## 2023-07-19 NOTE — TELEPHONE ENCOUNTER
Spoke with Devika from imaging. She provided an update that there are incidental findings on patient's CT Chest Lung CA completed on 7/18/23    Incidental findings below      Routing to Dr Booker to review and advise as needed.    Mulugeta Galdamez RN

## 2023-07-22 NOTE — TELEPHONE ENCOUNTER
No insurance info in [employer].  Will call pharmacy when they open to get processing info.

## 2023-07-24 NOTE — TELEPHONE ENCOUNTER
Prior Authorization Not Needed per Insurance    Medication: BUDESONIDE-FORMOTEROL FUMARATE 160-4.5 MCG/ACT IN AERO  Insurance Company: [employer] - Phone [phone] Fax [phone]  Expected CoPay:      Pharmacy Filling the Rx: [id number] - [place], [place] - [address]  Pharmacy Notified: Yes  Patient Notified: Yes **Instructed pharmacy to notify patient when script is ready to /ship.**    Plan prefers brand name.

## 2023-08-14 DIAGNOSIS — G62.1 ALCOHOLIC POLYNEUROPATHY (H): ICD-10-CM

## 2023-08-14 RX ORDER — GABAPENTIN 300 MG/1
300 CAPSULE ORAL 3 TIMES DAILY PRN
Qty: 90 CAPSULE | Refills: 3 | Status: SHIPPED | OUTPATIENT
Start: 2023-08-14

## 2023-08-14 NOTE — TELEPHONE ENCOUNTER
Requested Prescriptions   Pending Prescriptions Disp Refills    gabapentin (NEURONTIN) 300 MG capsule 90 capsule 3     Sig: Take 1 capsule (300 mg) by mouth 3 times daily as needed for neuropathic pain       There is no refill protocol information for this order        Routing refill request to provider for review/approval because:  Drug not on the [facility] refill protocol     Annual wellness OV made for 9/7/23   Thanks!  Edson Francois RN  [facility]

## 2023-08-14 NOTE — TELEPHONE ENCOUNTER
Pt requested med and apt made for annual wellness.   Refill pended.     Edson Francois RN  [facility]

## 2023-09-06 ENCOUNTER — EXTERNAL ORDER RESULTS (OUTPATIENT)
Dept: CARDIOLOGY | Facility: CLINIC | Age: 63
End: 2023-09-06
Payer: COMMERCIAL

## 2023-09-06 DIAGNOSIS — E78.5 HYPERLIPIDEMIA LDL GOAL <70: Primary | ICD-10-CM

## 2023-09-07 ENCOUNTER — OFFICE VISIT (OUTPATIENT)
Dept: FAMILY MEDICINE | Facility: CLINIC | Age: 63
End: 2023-09-07
Payer: COMMERCIAL

## 2023-09-07 VITALS
OXYGEN SATURATION: 92 % | SYSTOLIC BLOOD PRESSURE: 144 MMHG | HEART RATE: 66 BPM | WEIGHT: 245 LBS | BODY MASS INDEX: 29.83 KG/M2 | RESPIRATION RATE: 18 BRPM | HEIGHT: 76 IN | DIASTOLIC BLOOD PRESSURE: 90 MMHG

## 2023-09-07 DIAGNOSIS — J44.9 CHRONIC OBSTRUCTIVE PULMONARY DISEASE, UNSPECIFIED COPD TYPE (H): ICD-10-CM

## 2023-09-07 DIAGNOSIS — I71.21 ANEURYSM OF ASCENDING AORTA WITHOUT RUPTURE (H): ICD-10-CM

## 2023-09-07 DIAGNOSIS — I10 ESSENTIAL HYPERTENSION: ICD-10-CM

## 2023-09-07 DIAGNOSIS — Z23 ENCOUNTER FOR IMMUNIZATION: ICD-10-CM

## 2023-09-07 DIAGNOSIS — Z00.00 ENCOUNTER FOR ANNUAL PHYSICAL EXAM: Primary | ICD-10-CM

## 2023-09-07 DIAGNOSIS — Z12.11 SCREEN FOR COLON CANCER: ICD-10-CM

## 2023-09-07 PROCEDURE — 99214 OFFICE O/P EST MOD 30 MIN: CPT | Mod: 25

## 2023-09-07 PROCEDURE — 90662 IIV NO PRSV INCREASED AG IM: CPT

## 2023-09-07 PROCEDURE — G0008 ADMIN INFLUENZA VIRUS VAC: HCPCS

## 2023-09-07 PROCEDURE — G0438 PPPS, INITIAL VISIT: HCPCS

## 2023-09-07 PROCEDURE — 0124A COVID-19 BIVALENT 12+ (PFIZER): CPT

## 2023-09-07 PROCEDURE — 91312 COVID-19 BIVALENT 12+ (PFIZER): CPT

## 2023-09-07 RX ORDER — LOSARTAN POTASSIUM 25 MG/1
50 TABLET ORAL DAILY
Qty: 90 TABLET | Refills: 3 | Status: SHIPPED | OUTPATIENT
Start: 2023-09-07

## 2023-09-07 ASSESSMENT — ENCOUNTER SYMPTOMS
ARTHRALGIAS: 1
NERVOUS/ANXIOUS: 0
JOINT SWELLING: 1
MYALGIAS: 0
DIARRHEA: 0
COUGH: 1
HEADACHES: 0
DYSURIA: 0
HEARTBURN: 0
PARESTHESIAS: 0
HEMATOCHEZIA: 0
NAUSEA: 0
ABDOMINAL PAIN: 0
HEMATURIA: 0
SORE THROAT: 0
FEVER: 0
WEAKNESS: 1
CHILLS: 0
CONSTIPATION: 0
FREQUENCY: 0
EYE PAIN: 0
PALPITATIONS: 0
DIZZINESS: 0
SHORTNESS OF BREATH: 1

## 2023-09-07 ASSESSMENT — PAIN SCALES - GENERAL: PAINLEVEL: NO PAIN (0)

## 2023-09-07 ASSESSMENT — ACTIVITIES OF DAILY LIVING (ADL): CURRENT_FUNCTION: NO ASSISTANCE NEEDED

## 2023-09-07 NOTE — PROGRESS NOTES
"SUBJECTIVE:   Sohan is a 63 year old who presents for Preventive Visit.      9/7/2023     1:57 PM   Additional Questions   Roomed by Andrzej RODRIGUEZ     COPD: prescribed higher dose inhalers, but stopped using his high dose inhaler due to dizziness and headaches. Bought an electronic bike and has been going back and forth. It's rare that he has been using the emergency inhaler.  Uses emergency inhaler if air quality is bad or taking steps.  He has not had any recent breathing issues.    Liver: Had an HCC biopsy and ablation. He had a lot of pain from this procedure. Admitted to [facility] for pain and acute transaminitis in May and had left ama due to hallucinations.  Been managing pain with ibuprofen/tylenol/nerve pain medication.  Found to be clear of [facility]    Cardiology: following realted to aneurysm. Appointment tomorrow.    Alcohol: reports he was drinking more than he should have previously. He is currently at 1.5 pints daily which is better than in May.    Smoking: buys gum, which he chews for a few weeks.  Alternating cigarettes and gum. Trigger is being with older brother. Tried the patch years ago, which messed with his dreams.1-3 pieces of gum per day.      Are you in the first 12 months of your Medicare coverage?  No    Healthy Habits:     In general, how would you rate your overall health?  Fair    Frequency of exercise:  4-5 days/week    Duration of exercise:  Greater than 60 minutes    Do you usually eat at least 4 servings of fruit and vegetables a day, include whole grains    & fiber and avoid regularly eating high fat or \"junk\" foods?  No    Taking medications regularly:  Yes    Medication side effects:  Other    Ability to successfully perform activities of daily living:  No assistance needed    Home Safety:  No safety concerns identified    Hearing Impairment:  No hearing concerns    In the past 6 months, have you been bothered by leaking of urine?  No    In general, how would you rate your overall mental or " emotional health?  Good    Additional concerns today:  No      Have you ever done Advance Care Planning? (For example, a Health Directive, POLST, or a discussion with a medical provider or your loved ones about your wishes): Yes, advance care planning is on file.      Fall risk  Fallen 2 or more times in the past year?: No  Any fall with injury in the past year?: No    Cognitive Screening   1) Repeat 3 items (Leader, Season, Table)    2) Clock draw: NORMAL  3) 3 item recall: Recalls 3 objects  Results: 3 items recalled: COGNITIVE IMPAIRMENT LESS LIKELY    Mini-CogTM Copyright S Adrienne. Licensed by the author for use in [facility]; reprinted with permission (kaity@[facility]). All rights reserved.      Do you have sleep apnea, excessive snoring or daytime drowsiness? : no    Reviewed and updated as needed this visit by clinical staff   Tobacco  Allergies  Meds              Reviewed and updated as needed this visit by Provider                 Social History     Tobacco Use    Smoking status: Former     Packs/day: 0.25     Types: Cigarettes    Smokeless tobacco: Never    Tobacco comments:     Hasn't smoked since end of Oct 2022 then had several relapses. Reports last smoke 3 cigarettes about 1-2 weeks ago (5/8/23)   Substance Use Topics    Alcohol use: Yes     Alcohol/week: 20.0 standard drinks of alcohol     Types: 20 Standard drinks or equivalent per week     Comment: 2-3 drinks daily             9/7/2023     1:57 PM   Alcohol Use   Prescreen: >3 drinks/day or >7 drinks/week? Yes   AUDIT SCORE  10         9/7/2023     1:57 PM   AUDIT - Alcohol Use Disorders Identification Test - Reproduced from the World Health Organization Audit 2001 (Second Edition)   1.  How often do you have a drink containing alcohol? 4 or more times a week   2.  How many drinks containing alcohol do you have on a typical day when you are drinking? 3 or 4   3.  How often do you have five or more drinks on one occasion? Weekly   4.   How often during the last year have you found that you were not able to stop drinking once you had started? Less than monthly   5.  How often during the last year have you failed to do what was normally expected of you because of drinking? Never   6.  How often during the last year have you needed a first drink in the morning to get yourself going after a heavy drinking session? Less than monthly   7.  How often during the last year have you had a feeling of guilt or remorse after drinking? Never   8.  How often during the last year have you been unable to remember what happened the night before because of your drinking? Never   9.  Have you or someone else been injured because of your drinking? No   10. Has a relative, friend, doctor or other health care worker been concerned about your drinking or suggested you cut down? No   TOTAL SCORE 10     Do you have a current opioid prescription? No  Do you use any other controlled substances or medications that are not prescribed by a provider? Alcohol      Current providers sharing in care for this patient include:   Patient Care Team:  Yannick Womack NP as PCP - General (Nurse Practitioner Primary Care)  April Russo PA-C as Physician Assistant (Dermatology)  Joey Kevin MD as MD (Critical Care)  Marlena Sosa PA-C as Assigned Surgical Provider  Augustin See MD as Assigned Heart and Vascular Provider  Yannick Womack NP as Assigned PCP  Leventhal, Thomas Michael, MD as Assigned Gastroenterology Provider  Anna Marie Jaime RN as Specialty Care Coordinator (Hematology & Oncology)  Taryn Lance, RN as Specialty Care Coordinator (Cardiology)    The following health maintenance items are reviewed in Epic and correct as of today:  Health Maintenance   Topic Date Due    COPD ACTION PLAN  Never done    COLORECTAL CANCER SCREENING  Never done    HEPATITIS A IMMUNIZATION (1 of 2 - Risk 2-dose series) Never done    HEPATITIS B  "IMMUNIZATION (1 of 3 - Risk 3-dose series) Never done    COVID-19 Vaccine (5 - Pfizer series) 08/17/2022    ANNUAL REVIEW OF HM ORDERS  06/22/2023    MEDICARE ANNUAL WELLNESS VISIT  07/17/2023    INFLUENZA VACCINE (1) 09/01/2023    LUNG CANCER SCREENING  07/18/2024    FALL RISK ASSESSMENT  09/07/2024    ADVANCE CARE PLANNING  06/27/2027    LIPID  01/18/2028    DTAP/TDAP/TD IMMUNIZATION (2 - Td or Tdap) 04/06/2032    SPIROMETRY  Completed    HIV SCREENING  Completed    PHQ-2 (once per calendar year)  Completed    Pneumococcal Vaccine: 65+ Years  Completed    ZOSTER IMMUNIZATION  Completed    AORTIC ANEURYSM SCREENING (SYSTEM ASSIGNED)  Completed    IPV IMMUNIZATION  Aged Out    HPV IMMUNIZATION  Aged Out    MENINGITIS IMMUNIZATION  Aged Out             Review of Systems   Constitutional:  Negative for chills and fever.   HENT:  Negative for congestion, ear pain, hearing loss and sore throat.    Eyes:  Positive for visual disturbance. Negative for pain.   Respiratory:  Positive for cough and shortness of breath.    Cardiovascular:  Negative for chest pain, palpitations and peripheral edema.   Gastrointestinal:  Negative for abdominal pain, constipation, diarrhea, heartburn, hematochezia and nausea.   Genitourinary:  Positive for impotence and urgency. Negative for dysuria, frequency, genital sores, hematuria and penile discharge.   Musculoskeletal:  Positive for arthralgias and joint swelling. Negative for myalgias.   Skin:  Negative for rash.   Neurological:  Positive for weakness. Negative for dizziness, headaches and paresthesias.   Psychiatric/Behavioral:  Negative for mood changes. The patient is not nervous/anxious.        OBJECTIVE:   BP (!) 144/90 (BP Location: Right arm, Patient Position: Sitting, Cuff Size: Adult Large)   Pulse 66   Resp 18   Ht 1.93 m (6' 4\")   Wt 111.1 kg (245 lb)   SpO2 92%   BMI 29.82 kg/m   Estimated body mass index is 29.82 kg/m  as calculated from the following:    Height as of " "this encounter: 1.93 m (6' 4\").    Weight as of this encounter: 111.1 kg (245 lb).  Physical Exam  GENERAL: healthy, alert and no distress  EYES: Eyes grossly normal to inspection, PERRL and conjunctivae and sclerae normal  HENT: ear canals and TM's normal, nose and mouth without ulcers or lesions  NECK: no adenopathy, no asymmetry, masses, or scars and thyroid normal to palpation  RESP: lungs clear to auscultation - no rales, rhonchi or wheezes  CV: regular rate and rhythm, normal S1 S2, no S3 or S4, no murmur, click or rub, no peripheral edema and peripheral pulses strong  ABDOMEN: soft, nontender, no hepatosplenomegaly, no masses and bowel sounds normal  MS: no gross musculoskeletal defects noted, no edema  SKIN: no suspicious lesions or rashes  NEURO: Normal strength and tone, mentation intact and speech normal  PSYCH: mentation appears normal, affect normal/bright    ASSESSMENT / PLAN:   (Z00.00) Encounter for annual physical exam  (primary encounter diagnosis)  We discussed alcohol and tobacco use.  Patient is not currently in a place to change his habits but does recognize the risk of alcohol use to liver and tobacco to the lungs.    (Z12.11) Screen for colon cancer  Patient reports an intention to use the Cologuard.      (Z23) Encounter for immunization  Plan: INFLUENZA VACCINE 65+ (FLUZONE HD), COVID-19         BIVALENT 12+ (PFIZER)    (J44.9) COPD  Condition is currently stable.  Advised patient follow-up with me in the wintertime if breathing is flaring up.    (I10) Essential Hypertension  Chronic, uncontrolled  Blood pressure increased this visit.  Recommend taking 50 mg losartan daily instead of 25 mg losartan daily.  Patient verbalized understanding.    Patient has been advised of split billing requirements and indicates understanding: Yes      COUNSELING:  Reviewed preventive health counseling, as reflected in patient instructions       Regular exercise       Healthy diet/nutrition      BMI: " "  Estimated body mass index is 29.82 kg/m  as calculated from the following:    Height as of this encounter: 1.93 m (6' 4\").    Weight as of this encounter: 111.1 kg (245 lb).   Weight management plan: Discussed healthy diet and exercise guidelines      He reports that he has quit smoking. His smoking use included cigarettes. He smoked an average of .25 packs per day. He has never used smokeless tobacco.      Appropriate preventive services were discussed with this patient, including applicable screening as appropriate for cardiovascular disease, diabetes, osteopenia/osteoporosis, and glaucoma.  As appropriate for age/gender, discussed screening for colorectal cancer, prostate cancer, breast cancer, and cervical cancer. Checklist reviewing preventive services available has been given to the patient.    Reviewed patients plan of care and provided an AVS. The Intermediate Care Plan ( asthma action plan, low back pain action plan, and migraine action plan) for Sohan meets the Care Plan requirement. This Care Plan has been established and reviewed with the Patient.          Yannick Womack NP  [facility]    Identified Health Risks:  I have reviewed Opioid Use Disorder and Substance Use Disorder risk factors and made any needed referrals.   "

## 2023-09-08 ENCOUNTER — OFFICE VISIT (OUTPATIENT)
Dept: CARDIOLOGY | Facility: CLINIC | Age: 63
End: 2023-09-08
Attending: INTERNAL MEDICINE
Payer: COMMERCIAL

## 2023-09-08 ENCOUNTER — TELEPHONE (OUTPATIENT)
Dept: CARDIOLOGY | Facility: CLINIC | Age: 63
End: 2023-09-08

## 2023-09-08 ENCOUNTER — LAB (OUTPATIENT)
Dept: LAB | Facility: CLINIC | Age: 63
End: 2023-09-08
Payer: COMMERCIAL

## 2023-09-08 VITALS
BODY MASS INDEX: 30.07 KG/M2 | OXYGEN SATURATION: 92 % | SYSTOLIC BLOOD PRESSURE: 116 MMHG | DIASTOLIC BLOOD PRESSURE: 85 MMHG | HEART RATE: 119 BPM | WEIGHT: 247 LBS

## 2023-09-08 DIAGNOSIS — I25.10 CORONARY ARTERY DISEASE DUE TO CALCIFIED CORONARY LESION: ICD-10-CM

## 2023-09-08 DIAGNOSIS — I10 ESSENTIAL HYPERTENSION: ICD-10-CM

## 2023-09-08 DIAGNOSIS — E78.5 HYPERLIPIDEMIA LDL GOAL <70: ICD-10-CM

## 2023-09-08 DIAGNOSIS — I25.84 CORONARY ARTERY DISEASE DUE TO CALCIFIED CORONARY LESION: ICD-10-CM

## 2023-09-08 DIAGNOSIS — I71.21 ANEURYSM OF ASCENDING AORTA WITHOUT RUPTURE (H): Primary | ICD-10-CM

## 2023-09-08 LAB
CHOLEST SERPL-MCNC: 132 MG/DL
HDLC SERPL-MCNC: 41 MG/DL
LDLC SERPL CALC-MCNC: 64 MG/DL
NONHDLC SERPL-MCNC: 91 MG/DL
TRIGL SERPL-MCNC: 135 MG/DL

## 2023-09-08 PROCEDURE — 80061 LIPID PANEL: CPT | Performed by: PATHOLOGY

## 2023-09-08 PROCEDURE — 36415 COLL VENOUS BLD VENIPUNCTURE: CPT | Performed by: PATHOLOGY

## 2023-09-08 PROCEDURE — 99214 OFFICE O/P EST MOD 30 MIN: CPT | Performed by: INTERNAL MEDICINE

## 2023-09-08 PROCEDURE — G0463 HOSPITAL OUTPT CLINIC VISIT: HCPCS | Performed by: INTERNAL MEDICINE

## 2023-09-08 ASSESSMENT — PAIN SCALES - GENERAL: PAINLEVEL: NO PAIN (0)

## 2023-09-08 NOTE — PROGRESS NOTES
CARDIOLOGY CLINIC FOLLOW UP  Sohan Marcano is a 63 year old male who receives primary care from Mr. Yannick Womack. He was referred to Cardiology clinic for management of thoracic aortic aneurysm. He returns for follow up.     23  Sohan is a pleasant man with no history of an atherosclerotic CVD event. He was found to have a moderately dilated ascending thoracic aorta measuring 4.5 cm on CT chest of done for evaluation of shortness of breath.  On his chest CT he was also noted to have significant coronary artery calcifications.  CVD risk factors include HTN, HLD and an extensive history of smoking, around 50 pack years, but he quit in October.      Sohan has a very significant and substantial family history of CVD.  His sister had a thoracic aortic aneurysm diagnosed at age 55 when it ruptured while she was at the hospital visiting her brother.  Fortunately she survived the event likely because she was already in the hospital when it happened.  He also had a long history of smoking. Most of the men on his father side  of ischemic heart disease.  His brother has CAD and has had multiple PCI's.  His sister has CAD.  His father had CAD and had a bypass surgery.    Despite his COPD, Sohan stays quite active working in [place] during the summer.  He also eats relatively healthy meal.      Interval History 23  Sohan sold his car and started riding his e-bike everywhere after our last visit. He says he feels better than he has in a long time with more energy and fitness. Still has some dyspnea related to COPD.   He adjusted his diet to increase chicken and fish and decrease red meat.   He still intermittently smokes, but tries to use nicorette gum to hold off cravings. He usually goes about 2 weeks without cigarettes then smokes for a couple days and then stops again.     Aorta caliber is stable from  to .   Echo shows trileaflet aortic valve with normal biventricular function. Aortic  measures on echo match the CT.   LDL is pretty well controlled, 64mg/dL  Blood pressure is well controlled.    The 10-year ASCVD risk score (New MESA, et al., 2019) is: 10.5%    Values used to calculate the score:      Age: 63 years      Sex: Male      Is Non- : No      Diabetic: No      Tobacco smoker: No      Systolic Blood Pressure: 116 mmHg      Is BP treated: Yes      HDL Cholesterol: 41 mg/dL      Total Cholesterol: 132 mg/dL     ASSESSMENT AND PLAN  Sohan Marcano is a 63 year old male with thoracic aortic aneurysm of 4.5 cm.  He has CAD as noted on the CT chest with CV risk factors of HTN, HLD, 50-pack-year smoking history (quit in October 2022).  He also has COPD.    #.  Thoracic aortic aneurysm: root/prox ascending 4.6/4.5cm  # Trileaflet aortic valve  #.  HTN: well controlled  #.  HLD: LDL 64 mg/dL  #.  CAD: severe coronary artery calcifications on CT; No angina  - given intermittent smoking and severe coronary artery calcifications will adjust LDL goal to <55  - increase atorvastatin to 40mg daily  - continue losartan  - consider starting aspirin  - echo in 6 months    #.  Intermittent smoker:   - encouraged complete cessation    Follow up with NP in 6 months and with me in 1 year.    Thank you for the opportunity to participate in the care of Mr. Sohan Marcano. Please feel free to contact me with any additional questions or concerns.    Augustin Tyler MD    [facility]  Pager: [phone]    This note was dictated with voice recognition software and then edited. Please excuse any unintentional errors.    PAST MEDICAL HISTORY:  Patient Active Problem List   Diagnosis    Chemical dependency (H)    Basal cell carcinoma of anterior chest s/p excision 3-10-15    Squamous cell carcinoma mid upper chest s/p excision 3-10-15    Abdominal pain    Arthritis of both hands    Hepatitis C, chronic (H)    Alcohol use disorder, severe, dependence (H)     Elevated blood pressure reading without diagnosis of hypertension    Advanced directives, counseling/discussion    Alcoholic polyneuropathy (H)    Abdominal pain, right upper quadrant    Hepatocellular carcinoma (H)     Past Medical History:   Diagnosis Date    Actinic keratosis     Aneurysm of thoracic aorta (H)     Basal cell carcinoma     Cirrhosis of liver (H)     COPD (chronic obstructive pulmonary disease) (H)     HCC (hepatocellular carcinoma) (H)     Hepatic steatosis     History of hepatitis C     HLD (hyperlipidemia)     HTN (hypertension)     Neuropathy     Obesity     Osteoarthritis     Portal hypertension (H)     Squamous cell carcinoma     Substance abuse (H)     alcohol    Uncomplicated asthma        CURRENT MEDICATIONS:  Current Outpatient Medications   Medication Sig Dispense Refill    albuterol (PROAIR HFA/PROVENTIL HFA/VENTOLIN HFA) 108 (90 Base) MCG/ACT inhaler Inhale 2 puffs into the lungs every 6 hours as needed for shortness of breath / dyspnea or wheezing 8 g 3    gabapentin (NEURONTIN) 300 MG capsule Take 1 capsule (300 mg) by mouth 3 times daily as needed for neuropathic pain 90 capsule 3    losartan (COZAAR) 25 MG tablet Take 2 tablets (50 mg) by mouth daily 90 tablet 3    atorvastatin (LIPITOR) 40 MG tablet Take 40 mg by mouth daily      budesonide-formoterol (SYMBICORT) 160-4.5 MCG/ACT Inhaler Inhale 2 puffs into the lungs 2 times daily (Patient not taking: Reported on 9/7/2023) 10.2 g 11    Cholecalciferol (VITAMIN D) 125 MCG (5000 UT) capsule Take 1 capsule (5,000 Units) by mouth daily (Patient not taking: Reported on 9/7/2023) 90 capsule 3    MILK THISTLE PO Take 1 tablet by mouth daily (Patient not taking: Reported on 9/7/2023)      multivitamin w/minerals (THERA-VIT-M) tablet Take 1 tablet by mouth daily (with breakfast) (Patient not taking: Reported on 9/7/2023) 30 tablet 0       PAST SURGICAL HISTORY:  Past Surgical History:   Procedure Laterality Date    HERNIA REPAIR      as  "child    IR CHEMO EMBOLIZATION  5/15/2023    IR LIVER BIOPSY PERCUTANEOUS  5/15/2023    VA DENTAL SURGERY PROCEDURE      wisdom teeth removal         ALLERGIES  Patient has no known allergies.    FAMILY HX:  Family History   Problem Relation Age of Onset    Other - See Comments Mother         Patient states his Mother ? had skin cancer.    Other - See Comments Father         Patient reports Father had \"all types of skin cancer\".    Coronary Artery Disease Father     CABG Father     Other - See Comments Sister         Basal cell carcinoma    Coronary Artery Disease Sister     Aortic aneurysm Sister     Coronary Artery Disease Brother     Anesthesia Reaction No family hx of     Venous thrombosis No family hx of        SOCIAL HX:  Social History     Tobacco Use    Smoking status: Former     Packs/day: 0.25     Types: Cigarettes    Smokeless tobacco: Never    Tobacco comments:     Hasn't smoked since end of Oct 2022 then had several relapses. Reports last smoke 3 cigarettes about 1-2 weeks ago (5/8/23)   Vaping Use    Vaping Use: Former   Substance Use Topics    Alcohol use: Yes     Alcohol/week: 20.0 standard drinks of alcohol     Types: 20 Standard drinks or equivalent per week     Comment: 2-3 drinks daily    Drug use: Not Currently        VITAL SIGNS:  /85 (BP Location: Right arm, Patient Position: Chair, Cuff Size: Adult Large)   Pulse 119   Wt 112 kg (247 lb)   SpO2 92%   BMI 30.07 kg/m    Body mass index is 30.07 kg/m .  Wt Readings from Last 2 Encounters:   09/08/23 112 kg (247 lb)   09/07/23 111.1 kg (245 lb)       PHYSICAL EXAM  Gen: pleasant man sitting comfortably in NAD  CV: nml s1/s2, no murmur or gallop; no JVD  Chest: clear lungs, no wheezing  Ext: warm, no LE edema  Skin: no rash on limited exam  Neuro: awake, alert, oriented, nml speech and gait  Vasc: 2+ bilateral carotid, radial, pulses; no bruits noted    LABS: personally reviewed  CMP  Recent Labs   Lab Test 06/28/23  1610 05/19/23  0512 " 05/18/23  1053 05/18/23  0101 05/17/23 2006 05/17/23  1206 04/06/22  1040 09/27/20  0645 09/26/20  1738 09/22/20  1020 10/11/19  1114 07/06/19  1136 07/04/19  2148    132* 128* 128*   < > 129*   < > 139 136 140 138   < > 142   POTASSIUM 3.4 3.6 4.0  --   --  4.0   < > 3.3* 3.4 3.0* 3.7   < > 3.6   CHLORIDE 107 100 97*  --   --  95*   < > 108 102 105 105   < > 107   CO2 23 22 19*  --   --  23   < > 25 25 26 25   < > 26   ANIONGAP 11 10 12  --   --  11   < > 6 9 9 8   < > 9   * 123* 130*  --   --  116*   < > 103* 93 138* 107*   < > 107*   BUN 6.9* 13.2 16.7  --   --  13.5   < > 16 21 7 10   < > 7   CR 0.69 0.79 0.87  --   --  0.94   < > 0.82 0.93 0.84 0.74   < > 0.73   GFRESTIMATED >90 >90 >90  --   --  >90   < > >90 88 >90 >90   < > >90   GFRESTBLACK  --   --   --   --   --   --   --  >90 >90 >90 >90   < > >90   LEIGHA 9.2 8.5* 8.5*  --   --  9.0   < > 7.8* 8.9 8.7 8.9   < > 8.3*   MAG  --  2.7* 2.4*  --   --  1.6*  --   --   --   --   --   --  2.1   PHOS  --   --   --   --   --  2.6  --   --   --   --   --   --   --    PROTTOTAL 7.5 6.0* 6.5  --   --  7.0   < > 6.8 8.3 8.1 7.6   < > 7.7   ALBUMIN 4.0 3.3* 3.4*  --   --  3.9   < > 2.9* 3.7 3.7 3.3*   < > 3.0*   BILITOTAL 1.1 3.4* 3.7*  --   --  3.3*   < > 3.3* 4.6* 3.4* 2.6*   < > 3.7*   ALKPHOS 73 109 116  --   --  122   < > 84 98 86 86   < > 103   AST 36 387* 621*  --   --  1,166*   < > 48* 58* 131* 43   < > 106*   ALT 21 382* 492*  --   --  646*   < > 36 47 67 38   < > 52    < > = values in this interval not displayed.     CBC  Recent Labs   Lab Test 06/28/23 1610 05/19/23  0512 05/18/23  1053 05/17/23  1206   WBC 5.5 9.9 13.6* 17.4*   RBC 4.14* 3.70* 3.82* 4.30*   HGB 14.8 13.4 13.8 15.4   HCT 42.8 37.1* 39.6* 44.2   * 100 104* 103*   MCH 35.7* 36.2* 36.1* 35.8*   MCHC 34.6 36.1 34.8 34.8   RDW 13.4 11.4 11.8 11.9    137* 137* 156     INR  Recent Labs   Lab Test 06/28/23  1610 05/19/23  0512 05/18/23  1053 05/17/23  1206   INR 1.42*  1.69* 1.55* 1.52*     Recent Labs   Lab Test 23  1413 23  1500   CHOL 132 173   HDL 41 49   LDL 64 112*   TRIG 135 58     Recent Labs   Lab Test 22  1040   A1C 5.2       [id number] NSR, left anterior fascicular block    Echo: reviewed and discussed with patient  23 TTE  Global and regional left ventricular function is normal with an EF of 55-60%.  Right ventricular function, chamber size, wall motion, and thickness are  normal.  Sinuses of Valsalva 4.4 cm.  Ascending aorta 4.6 cm.  Mild dilation of aorta for BSA of 2.2m2.  Aortic root measured 4.3cms and ascending aorta 3.8cms in .    Other Imaging: reviewed and discussed with the patient  23 CTA chest  1.  Moderate ascending aortic dilation measuring 4.6cm at the sinuses  of valsalva and 4.5cm at the proximal ascending aorta.     2.  No interval change when compared to prior non-contrast CT dated  2022.     1.  There is moderate dilation of the aortic root and ascending aorta.  Normal caliber of aortic arch and descending thoracic aorta.  2.  The aortic arch is left sided. There is normal branching of the  arch vessels. There is no coarctation seen.  3.  The main and proximal branch pulmonary arteries are normal in  size.   4.  The systemic venous connections are normal.   5.  The cardiac chambers demonstrate normal atrioventricular and  ventriculoarterial concordance.  6.  Coronary arteries originate from their respective cusps.   7.  There are severe diffuse coronary artery calcifications.  8.  The pericardium is unremarkable.  There is no pericardial  effusion.   9.  There is no intracardiac thrombus.    22 CT chest  Ascending aorta 4.5cm  Extensive coronary calcification

## 2023-09-08 NOTE — NURSING NOTE
Chief Complaint   Patient presents with    Follow Up     [facility] F/U     Vitals were taken and medications reconciled.    River Lipscomb, EMT  2:51 PM

## 2023-09-08 NOTE — PATIENT INSTRUCTIONS
"You were seen today in the Cardiovascular Clinic at the [facility].     Cardiology Providers you saw during your visit: Dr. Dennis Tyler     Diagnosis:   Encounter Diagnoses   Name Primary?    Aneurysm of ascending aorta without rupture (H)     Coronary artery disease due to calcified coronary lesion     Hyperlipidemia LDL goal <70 Yes    Essential hypertension         Recommendations:   1. Schedule an echo for 6 months.  2. Increase atorvastatin to 40mg (1 tablet) every day.   3. Check cholesterol panel in 2-3 months.  4. Follow up with Stephanie French, nurse practitioner in 6 months.  5. Follow up with Dr. Dennis Tyler in 1 year.       Please feel free to call me with any questions or concerns.       Taryn Lance RN     Questions: [phone].   First press #1 for the [employer] and then press #4 for \"Medical Questions\" to reach us Cardiology Nurses.     [phone].   First press #1 for the [employer] and then press #1     On Call Cardiologist for after hours or on weekends: [phone]   option #4 and ask to speak to the on-call Cardiologist.          If you need a medication refill please contact your pharmacy.  Please allow 3 business days for your refill to be completed.  ________________________________________________________________________________________________________________________________    "

## 2023-09-08 NOTE — LETTER
2023      RE: Sohan Marcano   [facility] [id number]       Dear Colleague,    Thank you for the opportunity to participate in the care of your patient, Sohan Marcano, at the [facility] [place] at [facility]. Please see a copy of my visit note below.    CARDIOLOGY CLINIC FOLLOW UP  Sohan Marcano is a 63 year old male who receives primary care from Mr. Yannick Womack. He was referred to Cardiology clinic for management of thoracic aortic aneurysm. He returns for follow up.     23  Sohan is a pleasant man with no history of an atherosclerotic CVD event. He was found to have a moderately dilated ascending thoracic aorta measuring 4.5 cm on CT chest of done for evaluation of shortness of breath.  On his chest CT he was also noted to have significant coronary artery calcifications.  CVD risk factors include HTN, HLD and an extensive history of smoking, around 50 pack years, but he quit in October.      Sohan has a very significant and substantial family history of CVD.  His sister had a thoracic aortic aneurysm diagnosed at age 55 when it ruptured while she was at the hospital visiting her brother.  Fortunately she survived the event likely because she was already in the hospital when it happened.  He also had a long history of smoking. Most of the men on his father side  of ischemic heart disease.  His brother has CAD and has had multiple PCI's.  His sister has CAD.  His father had CAD and had a bypass surgery.    Despite his COPD, Sohan stays quite active working in [employer] during the summer.  He also eats relatively healthy meal.      Interval History 23  Sohan sold his car and started riding his e-bike everywhere after our last visit. He says he feels better than he has in a long time with more energy and fitness. Still has some dyspnea related to COPD.   He adjusted his diet to increase chicken  and fish and decrease red meat.   He still intermittently smokes, but tries to use nicorette gum to hold off cravings. He usually goes about 2 weeks without cigarettes then smokes for a couple days and then stops again.     Aorta caliber is stable from 2022 to 2023.   Echo shows trileaflet aortic valve with normal biventricular function. Aortic measures on echo match the CT.   LDL is pretty well controlled, 64mg/dL  Blood pressure is well controlled.    The 10-year ASCVD risk score (New MESA, et al., 2019) is: 10.5%    Values used to calculate the score:      Age: 63 years      Sex: Male      Is Non- : No      Diabetic: No      Tobacco smoker: No      Systolic Blood Pressure: 116 mmHg      Is BP treated: Yes      HDL Cholesterol: 41 mg/dL      Total Cholesterol: 132 mg/dL     ASSESSMENT AND PLAN  Sohan Marcano is a 63 year old male with thoracic aortic aneurysm of 4.5 cm.  He has CAD as noted on the CT chest with CV risk factors of HTN, HLD, 50-pack-year smoking history (quit in October 2022).  He also has COPD.    #.  Thoracic aortic aneurysm: root/prox ascending 4.6/4.5cm  # Trileaflet aortic valve  #.  HTN: well controlled  #.  HLD: LDL 64 mg/dL  #.  CAD: severe coronary artery calcifications on CT; No angina  - given intermittent smoking and severe coronary artery calcifications will adjust LDL goal to <55  - increase atorvastatin to 40mg daily  - continue losartan  - consider starting aspirin  - echo in 6 months    #.  Intermittent smoker:   - encouraged complete cessation    Follow up with NP in 6 months and with me in 1 year.    Thank you for the opportunity to participate in the care of Mr. Sohan Marcano. Please feel free to contact me with any additional questions or concerns.    Augustin Tyler MD    Preventive Cardiology  Pager: [phone]    This note was dictated with voice recognition software and then edited. Please excuse any unintentional  errors.    PAST MEDICAL HISTORY:  Patient Active Problem List   Diagnosis     Chemical dependency (H)     Basal cell carcinoma of anterior chest s/p excision 3-10-15     Squamous cell carcinoma mid upper chest s/p excision 3-10-15     Abdominal pain     Arthritis of both hands     Hepatitis C, chronic (H)     Alcohol use disorder, severe, dependence (H)     Elevated blood pressure reading without diagnosis of hypertension     Advanced directives, counseling/discussion     Alcoholic polyneuropathy (H)     Abdominal pain, right upper quadrant     Hepatocellular carcinoma (H)     Past Medical History:   Diagnosis Date     Actinic keratosis      Aneurysm of thoracic aorta (H)      Basal cell carcinoma      Cirrhosis of liver (H)      COPD (chronic obstructive pulmonary disease) (H)      HCC (hepatocellular carcinoma) (H)      Hepatic steatosis      History of hepatitis C      HLD (hyperlipidemia)      HTN (hypertension)      Neuropathy      Obesity      Osteoarthritis      Portal hypertension (H)      Squamous cell carcinoma      Substance abuse (H)     alcohol     Uncomplicated asthma        CURRENT MEDICATIONS:  Current Outpatient Medications   Medication Sig Dispense Refill     albuterol (PROAIR HFA/PROVENTIL HFA/VENTOLIN HFA) 108 (90 Base) MCG/ACT inhaler Inhale 2 puffs into the lungs every 6 hours as needed for shortness of breath / dyspnea or wheezing 8 g 3     gabapentin (NEURONTIN) 300 MG capsule Take 1 capsule (300 mg) by mouth 3 times daily as needed for neuropathic pain 90 capsule 3     losartan (COZAAR) 25 MG tablet Take 2 tablets (50 mg) by mouth daily 90 tablet 3     atorvastatin (LIPITOR) 40 MG tablet Take 40 mg by mouth daily       budesonide-formoterol (SYMBICORT) 160-4.5 MCG/ACT Inhaler Inhale 2 puffs into the lungs 2 times daily (Patient not taking: Reported on 9/7/2023) 10.2 g 11     Cholecalciferol (VITAMIN D) 125 MCG (5000 UT) capsule Take 1 capsule (5,000 Units) by mouth daily (Patient not taking:  "Reported on 9/7/2023) 90 capsule 3     MILK THISTLE PO Take 1 tablet by mouth daily (Patient not taking: Reported on 9/7/2023)       multivitamin w/minerals (THERA-VIT-M) tablet Take 1 tablet by mouth daily (with breakfast) (Patient not taking: Reported on 9/7/2023) 30 tablet 0       PAST SURGICAL HISTORY:  Past Surgical History:   Procedure Laterality Date     HERNIA REPAIR      as child     IR CHEMO EMBOLIZATION  5/15/2023     IR LIVER BIOPSY PERCUTANEOUS  5/15/2023     RI DENTAL SURGERY PROCEDURE       wisdom teeth removal         ALLERGIES  Patient has no known allergies.    FAMILY HX:  Family History   Problem Relation Age of Onset     Other - See Comments Mother         Patient states his Mother ? had skin cancer.     Other - See Comments Father         Patient reports Father had \"all types of skin cancer\".     Coronary Artery Disease Father      CABG Father      Other - See Comments Sister         Basal cell carcinoma     Coronary Artery Disease Sister      Aortic aneurysm Sister      Coronary Artery Disease Brother      Anesthesia Reaction No family hx of      Venous thrombosis No family hx of        SOCIAL HX:  Social History     Tobacco Use     Smoking status: Former     Packs/day: 0.25     Types: Cigarettes     Smokeless tobacco: Never     Tobacco comments:     Hasn't smoked since end of Oct 2022 then had several relapses. Reports last smoke 3 cigarettes about 1-2 weeks ago (5/8/23)   Vaping Use     Vaping Use: Former   Substance Use Topics     Alcohol use: Yes     Alcohol/week: 20.0 standard drinks of alcohol     Types: 20 Standard drinks or equivalent per week     Comment: 2-3 drinks daily     Drug use: Not Currently        VITAL SIGNS:  /85 (BP Location: Right arm, Patient Position: Chair, Cuff Size: Adult Large)   Pulse 119   Wt 112 kg (247 lb)   SpO2 92%   BMI 30.07 kg/m    Body mass index is 30.07 kg/m .  Wt Readings from Last 2 Encounters:   09/08/23 112 kg (247 lb)   09/07/23 111.1 kg " (245 lb)       PHYSICAL EXAM  Gen: pleasant man sitting comfortably in NAD  CV: nml s1/s2, no murmur or gallop; no JVD  Chest: clear lungs, no wheezing  Ext: warm, no LE edema  Skin: no rash on limited exam  Neuro: awake, alert, oriented, nml speech and gait  Vasc: 2+ bilateral carotid, radial, pulses; no bruits noted    LABS: personally reviewed  CMP  Recent Labs   Lab Test 06/28/23  1610 05/19/23  0512 05/18/23  1053 05/18/23  0101 05/17/23 2006 05/17/23  1206 04/06/22  1040 09/27/20  0645 09/26/20  1738 09/22/20  1020 10/11/19  1114 07/06/19  1136 07/04/19  2148    132* 128* 128*   < > 129*   < > 139 136 140 138   < > 142   POTASSIUM 3.4 3.6 4.0  --   --  4.0   < > 3.3* 3.4 3.0* 3.7   < > 3.6   CHLORIDE 107 100 97*  --   --  95*   < > 108 102 105 105   < > 107   CO2 23 22 19*  --   --  23   < > 25 25 26 25   < > 26   ANIONGAP 11 10 12  --   --  11   < > 6 9 9 8   < > 9   * 123* 130*  --   --  116*   < > 103* 93 138* 107*   < > 107*   BUN 6.9* 13.2 16.7  --   --  13.5   < > 16 21 7 10   < > 7   CR 0.69 0.79 0.87  --   --  0.94   < > 0.82 0.93 0.84 0.74   < > 0.73   GFRESTIMATED >90 >90 >90  --   --  >90   < > >90 88 >90 >90   < > >90   GFRESTBLACK  --   --   --   --   --   --   --  >90 >90 >90 >90   < > >90   LEIGHA 9.2 8.5* 8.5*  --   --  9.0   < > 7.8* 8.9 8.7 8.9   < > 8.3*   MAG  --  2.7* 2.4*  --   --  1.6*  --   --   --   --   --   --  2.1   PHOS  --   --   --   --   --  2.6  --   --   --   --   --   --   --    PROTTOTAL 7.5 6.0* 6.5  --   --  7.0   < > 6.8 8.3 8.1 7.6   < > 7.7   ALBUMIN 4.0 3.3* 3.4*  --   --  3.9   < > 2.9* 3.7 3.7 3.3*   < > 3.0*   BILITOTAL 1.1 3.4* 3.7*  --   --  3.3*   < > 3.3* 4.6* 3.4* 2.6*   < > 3.7*   ALKPHOS 73 109 116  --   --  122   < > 84 98 86 86   < > 103   AST 36 387* 621*  --   --  1,166*   < > 48* 58* 131* 43   < > 106*   ALT 21 382* 492*  --   --  646*   < > 36 47 67 38   < > 52    < > = values in this interval not displayed.     CBC  Recent Labs   Lab Test  23  1610 23  0512 23  1053 23  1206   WBC 5.5 9.9 13.6* 17.4*   RBC 4.14* 3.70* 3.82* 4.30*   HGB 14.8 13.4 13.8 15.4   HCT 42.8 37.1* 39.6* 44.2   * 100 104* 103*   MCH 35.7* 36.2* 36.1* 35.8*   MCHC 34.6 36.1 34.8 34.8   RDW 13.4 11.4 11.8 11.9    137* 137* 156     INR  Recent Labs   Lab Test 23  1610 23  0512 23  1053 23  1206   INR 1.42* 1.69* 1.55* 1.52*     Recent Labs   Lab Test 23  1413 23  1500   CHOL 132 173   HDL 41 49   LDL 64 112*   TRIG 135 58     Recent Labs   Lab Test 22  1040   A1C 5.2       [id number] NSR, left anterior fascicular block    Echo: reviewed and discussed with patient  23 TTE  Global and regional left ventricular function is normal with an EF of 55-60%.  Right ventricular function, chamber size, wall motion, and thickness are  normal.  Sinuses of Valsalva 4.4 cm.  Ascending aorta 4.6 cm.  Mild dilation of aorta for BSA of 2.2m2.  Aortic root measured 4.3cms and ascending aorta 3.8cms in .    Other Imaging: reviewed and discussed with the patient  23 CTA chest  1.  Moderate ascending aortic dilation measuring 4.6cm at the sinuses  of valsalva and 4.5cm at the proximal ascending aorta.     2.  No interval change when compared to prior non-contrast CT dated  2022.     1.  There is moderate dilation of the aortic root and ascending aorta.  Normal caliber of aortic arch and descending thoracic aorta.  2.  The aortic arch is left sided. There is normal branching of the  arch vessels. There is no coarctation seen.  3.  The main and proximal branch pulmonary arteries are normal in  size.   4.  The systemic venous connections are normal.   5.  The cardiac chambers demonstrate normal atrioventricular and  ventriculoarterial concordance.  6.  Coronary arteries originate from their respective cusps.   7.  There are severe diffuse coronary artery calcifications.  8.  The pericardium is  unremarkable.  There is no pericardial  effusion.   9.  There is no intracardiac thrombus.    12/29/22 CT chest  Ascending aorta 4.5cm  Extensive coronary calcification      Please do not hesitate to contact me if you have any questions/concerns.     Sincerely,     Augustin Puente'mary Tyler MD

## 2023-09-15 ENCOUNTER — DOCUMENTATION ONLY (OUTPATIENT)
Dept: OTHER | Facility: CLINIC | Age: 63
End: 2023-09-15
Payer: COMMERCIAL

## 2023-10-21 NOTE — DISCHARGE INSTRUCTIONS
Your head CT showed no concussion.    Keep your wound covered with bacitracin.  Watch for any signs of infection    If you develop any new or worsening symptoms please return to the emergency department.    Please follow concussion management as discussed and listed above.  Okay to take Tylenol or ibuprofen for your headaches.
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
## 2023-11-07 NOTE — DISCHARGE INSTRUCTIONS
You need to stop drinking alcohol.     You need to follow up with your hepatolgoist/liver specialist for further care.     You have a concussion that is probably contributing to your blurry vision and sleepiness.     Please follow up with the concussion specialist team for further care.
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
## 2023-12-22 NOTE — TELEPHONE ENCOUNTER
Entered MRI order. Called patient to go over new order from Dr. Leventhal based on imaging done at [facility]. Patient voiced understanding of plan of care. Number given to patient to schedule own appointment. Patient is currently scheduled to see Dr. Leventhal on 1/4/24.    Nahomi STERN LPN  Hepatology Clinic     --------------------   Leventhal, Thomas Michael, MD->Nahomi Love LPN  Could you please order an MRI Abdomen Liver Protocol w/wo contrast. He is overdue    TL   ---------------------  Returned Dr. Barclay's call.  Patient was presented to [facility] for alcohol intoxication. Recent traumatic brain injury, alcohol use disorder, AAA, cirrhosis.     Patient had abdominal ultrasound 12/20 that revealed:  The liver is enlarged measuring 21.1 cm, demonstrating heterogeneous echotexture and nodularity consistent with cirrhosis. Masslike lesion in the right hepatic lobe measuring 5.5 x 7.0 x 6.0 cm.  The main portal vein is not visualized.    Is this an old or new mass?  Would like Dr. Leventhal to review.     Tracey STERN LPN  Hepatology Clinic     ---------  [facility] Call Center    Phone Message    May a detailed message be left on voicemail: yes     Reason for Call: Other: Juan Gilbert called from [facility] as patient has been admitted.  They found a mass on his liver.  Is this an old or new mass?  Please have Dr. Leventhal follow up with Juan at [phone]     Action Taken: Message routed to:  [facility] ([facility]    Travel Screening: Not Applicable

## 2024-01-01 ENCOUNTER — OFFICE VISIT (OUTPATIENT)
Dept: FAMILY MEDICINE | Facility: CLINIC | Age: 64
End: 2024-01-01
Payer: COMMERCIAL

## 2024-01-01 ENCOUNTER — OFFICE VISIT (OUTPATIENT)
Dept: DERMATOLOGY | Facility: CLINIC | Age: 64
End: 2024-01-01
Payer: COMMERCIAL

## 2024-01-01 ENCOUNTER — VIRTUAL VISIT (OUTPATIENT)
Dept: FAMILY MEDICINE | Facility: CLINIC | Age: 64
End: 2024-01-01
Payer: COMMERCIAL

## 2024-01-01 ENCOUNTER — ANCILLARY PROCEDURE (OUTPATIENT)
Dept: CARDIOLOGY | Facility: CLINIC | Age: 64
End: 2024-01-01
Attending: INTERNAL MEDICINE
Payer: COMMERCIAL

## 2024-01-01 ENCOUNTER — ANCILLARY PROCEDURE (OUTPATIENT)
Dept: MRI IMAGING | Facility: CLINIC | Age: 64
End: 2024-01-01
Attending: INTERNAL MEDICINE
Payer: COMMERCIAL

## 2024-01-01 ENCOUNTER — PATIENT OUTREACH (OUTPATIENT)
Dept: ONCOLOGY | Facility: CLINIC | Age: 64
End: 2024-01-01
Payer: COMMERCIAL

## 2024-01-01 ENCOUNTER — TELEPHONE (OUTPATIENT)
Dept: DERMATOLOGY | Facility: CLINIC | Age: 64
End: 2024-01-01
Payer: COMMERCIAL

## 2024-01-01 ENCOUNTER — ONCOLOGY VISIT (OUTPATIENT)
Dept: ONCOLOGY | Facility: CLINIC | Age: 64
End: 2024-01-01
Attending: INTERNAL MEDICINE
Payer: COMMERCIAL

## 2024-01-01 ENCOUNTER — LAB (OUTPATIENT)
Dept: LAB | Facility: CLINIC | Age: 64
End: 2024-01-01
Attending: INTERNAL MEDICINE
Payer: COMMERCIAL

## 2024-01-01 ENCOUNTER — THERAPY VISIT (OUTPATIENT)
Dept: OCCUPATIONAL THERAPY | Facility: CLINIC | Age: 64
End: 2024-01-01
Payer: COMMERCIAL

## 2024-01-01 ENCOUNTER — TELEPHONE (OUTPATIENT)
Dept: FAMILY MEDICINE | Facility: CLINIC | Age: 64
End: 2024-01-01

## 2024-01-01 ENCOUNTER — HOSPITAL ENCOUNTER (OUTPATIENT)
Facility: CLINIC | Age: 64
End: 2024-01-01
Attending: INTERNAL MEDICINE | Admitting: INTERNAL MEDICINE
Payer: COMMERCIAL

## 2024-01-01 ENCOUNTER — OFFICE VISIT (OUTPATIENT)
Dept: GASTROENTEROLOGY | Facility: CLINIC | Age: 64
End: 2024-01-01
Attending: INTERNAL MEDICINE
Payer: COMMERCIAL

## 2024-01-01 ENCOUNTER — TELEPHONE (OUTPATIENT)
Dept: GASTROENTEROLOGY | Facility: CLINIC | Age: 64
End: 2024-01-01
Payer: COMMERCIAL

## 2024-01-01 ENCOUNTER — TELEPHONE (OUTPATIENT)
Dept: CARDIOLOGY | Facility: CLINIC | Age: 64
End: 2024-01-01
Payer: COMMERCIAL

## 2024-01-01 ENCOUNTER — TELEPHONE (OUTPATIENT)
Dept: GASTROENTEROLOGY | Facility: CLINIC | Age: 64
End: 2024-01-01

## 2024-01-01 ENCOUNTER — TELEPHONE (OUTPATIENT)
Dept: FAMILY MEDICINE | Facility: CLINIC | Age: 64
End: 2024-01-01
Payer: COMMERCIAL

## 2024-01-01 ENCOUNTER — PATIENT OUTREACH (OUTPATIENT)
Dept: CARE COORDINATION | Facility: CLINIC | Age: 64
End: 2024-01-01
Payer: COMMERCIAL

## 2024-01-01 ENCOUNTER — TRANSCRIBE ORDERS (OUTPATIENT)
Dept: OTHER | Age: 64
End: 2024-01-01

## 2024-01-01 ENCOUNTER — OFFICE VISIT (OUTPATIENT)
Dept: PALLIATIVE CARE | Facility: CLINIC | Age: 64
End: 2024-01-01
Attending: STUDENT IN AN ORGANIZED HEALTH CARE EDUCATION/TRAINING PROGRAM
Payer: COMMERCIAL

## 2024-01-01 ENCOUNTER — PRE VISIT (OUTPATIENT)
Dept: ONCOLOGY | Facility: CLINIC | Age: 64
End: 2024-01-01
Payer: COMMERCIAL

## 2024-01-01 ENCOUNTER — ONCOLOGY VISIT (OUTPATIENT)
Dept: ONCOLOGY | Facility: CLINIC | Age: 64
End: 2024-01-01
Attending: STUDENT IN AN ORGANIZED HEALTH CARE EDUCATION/TRAINING PROGRAM
Payer: COMMERCIAL

## 2024-01-01 VITALS
RESPIRATION RATE: 16 BRPM | TEMPERATURE: 98 F | HEART RATE: 96 BPM | OXYGEN SATURATION: 95 % | SYSTOLIC BLOOD PRESSURE: 125 MMHG | WEIGHT: 212.5 LBS | BODY MASS INDEX: 26.42 KG/M2 | HEIGHT: 75 IN | DIASTOLIC BLOOD PRESSURE: 82 MMHG

## 2024-01-01 VITALS
OXYGEN SATURATION: 95 % | BODY MASS INDEX: 28.54 KG/M2 | SYSTOLIC BLOOD PRESSURE: 136 MMHG | TEMPERATURE: 97.8 F | RESPIRATION RATE: 16 BRPM | WEIGHT: 228.6 LBS | DIASTOLIC BLOOD PRESSURE: 72 MMHG | HEART RATE: 68 BPM

## 2024-01-01 VITALS
RESPIRATION RATE: 22 BRPM | WEIGHT: 229 LBS | SYSTOLIC BLOOD PRESSURE: 125 MMHG | TEMPERATURE: 98.5 F | OXYGEN SATURATION: 92 % | HEART RATE: 91 BPM | DIASTOLIC BLOOD PRESSURE: 80 MMHG | BODY MASS INDEX: 27.89 KG/M2 | HEIGHT: 76 IN

## 2024-01-01 VITALS
SYSTOLIC BLOOD PRESSURE: 135 MMHG | DIASTOLIC BLOOD PRESSURE: 74 MMHG | OXYGEN SATURATION: 97 % | TEMPERATURE: 98.4 F | WEIGHT: 208.7 LBS | BODY MASS INDEX: 25.95 KG/M2 | RESPIRATION RATE: 16 BRPM | HEART RATE: 55 BPM

## 2024-01-01 VITALS
DIASTOLIC BLOOD PRESSURE: 88 MMHG | OXYGEN SATURATION: 94 % | BODY MASS INDEX: 27.6 KG/M2 | TEMPERATURE: 99.1 F | HEIGHT: 75 IN | SYSTOLIC BLOOD PRESSURE: 124 MMHG | WEIGHT: 222 LBS | HEART RATE: 98 BPM | RESPIRATION RATE: 18 BRPM

## 2024-01-01 DIAGNOSIS — H93.13 TINNITUS, BILATERAL: ICD-10-CM

## 2024-01-01 DIAGNOSIS — K70.31 ALCOHOLIC CIRRHOSIS OF LIVER WITH ASCITES (H): Primary | ICD-10-CM

## 2024-01-01 DIAGNOSIS — Z51.81 ENCOUNTER FOR MONITORING LOW DOSE ASPIRIN THERAPY: ICD-10-CM

## 2024-01-01 DIAGNOSIS — I71.21 ANEURYSM OF ASCENDING AORTA WITHOUT RUPTURE (H): ICD-10-CM

## 2024-01-01 DIAGNOSIS — J44.9 CHRONIC OBSTRUCTIVE PULMONARY DISEASE, UNSPECIFIED COPD TYPE (H): ICD-10-CM

## 2024-01-01 DIAGNOSIS — G62.1 ALCOHOLIC POLYNEUROPATHY (H): ICD-10-CM

## 2024-01-01 DIAGNOSIS — E44.1 MILD PROTEIN-CALORIE MALNUTRITION (H): ICD-10-CM

## 2024-01-01 DIAGNOSIS — C22.0 HEPATOCELLULAR CARCINOMA (H): Primary | ICD-10-CM

## 2024-01-01 DIAGNOSIS — C22.0 HEPATOCELLULAR CARCINOMA (H): ICD-10-CM

## 2024-01-01 DIAGNOSIS — F10.20 ALCOHOL USE DISORDER, SEVERE, DEPENDENCE (H): ICD-10-CM

## 2024-01-01 DIAGNOSIS — Z71.89 ADVANCED CARE PLANNING/COUNSELING DISCUSSION: ICD-10-CM

## 2024-01-01 DIAGNOSIS — Z78.9 ACTIVITY OF DAILY LIVING ALTERATION: ICD-10-CM

## 2024-01-01 DIAGNOSIS — F07.81 POST CONCUSSIVE SYNDROME: Primary | ICD-10-CM

## 2024-01-01 DIAGNOSIS — K70.30 ALCOHOLIC CIRRHOSIS OF LIVER WITHOUT ASCITES (H): ICD-10-CM

## 2024-01-01 DIAGNOSIS — R17 JAUNDICE: ICD-10-CM

## 2024-01-01 DIAGNOSIS — C22.0 HCC (HEPATOCELLULAR CARCINOMA) (H): Primary | ICD-10-CM

## 2024-01-01 DIAGNOSIS — I71.10 RUPTURED ANEURYSM OF THORACIC AORTA, UNSPECIFIED PART (H): ICD-10-CM

## 2024-01-01 DIAGNOSIS — L81.4 LENTIGINES: ICD-10-CM

## 2024-01-01 DIAGNOSIS — C22.0 HCC (HEPATOCELLULAR CARCINOMA) (H): ICD-10-CM

## 2024-01-01 DIAGNOSIS — R53.81 PHYSICAL DECONDITIONING: Primary | ICD-10-CM

## 2024-01-01 DIAGNOSIS — L57.0 ACTINIC KERATOSIS: Primary | ICD-10-CM

## 2024-01-01 DIAGNOSIS — R26.81 UNSTEADY GAIT: ICD-10-CM

## 2024-01-01 DIAGNOSIS — Z79.82 ENCOUNTER FOR MONITORING LOW DOSE ASPIRIN THERAPY: ICD-10-CM

## 2024-01-01 DIAGNOSIS — L57.8 ACTINIC SKIN DAMAGE: ICD-10-CM

## 2024-01-01 DIAGNOSIS — I85.10 SECONDARY ESOPHAGEAL VARICES WITHOUT BLEEDING (H): ICD-10-CM

## 2024-01-01 DIAGNOSIS — D22.9 MULTIPLE BENIGN NEVI: ICD-10-CM

## 2024-01-01 DIAGNOSIS — Z12.11 SCREEN FOR COLON CANCER: ICD-10-CM

## 2024-01-01 DIAGNOSIS — F10.90 ALCOHOL USE DISORDER: ICD-10-CM

## 2024-01-01 DIAGNOSIS — F19.20 CHEMICAL DEPENDENCY (H): ICD-10-CM

## 2024-01-01 LAB
AFP SERPL-MCNC: 4.7 NG/ML
ALBUMIN SERPL BCG-MCNC: 3.6 G/DL (ref 3.5–5.2)
ALP SERPL-CCNC: 74 U/L (ref 40–150)
ALT SERPL W P-5'-P-CCNC: 35 U/L (ref 0–70)
ANION GAP SERPL CALCULATED.3IONS-SCNC: 9 MMOL/L (ref 7–15)
AST SERPL W P-5'-P-CCNC: 55 U/L (ref 0–45)
BILIRUB DIRECT SERPL-MCNC: 1.31 MG/DL (ref 0–0.3)
BILIRUB SERPL-MCNC: 2.3 MG/DL
BUN SERPL-MCNC: 6.8 MG/DL (ref 8–23)
CALCIUM SERPL-MCNC: 9.4 MG/DL (ref 8.8–10.2)
CHLORIDE SERPL-SCNC: 102 MMOL/L (ref 98–107)
CREAT SERPL-MCNC: 0.95 MG/DL (ref 0.67–1.17)
DEPRECATED HCO3 PLAS-SCNC: 29 MMOL/L (ref 22–29)
EGFRCR SERPLBLD CKD-EPI 2021: 89 ML/MIN/1.73M2
ERYTHROCYTE [DISTWIDTH] IN BLOOD BY AUTOMATED COUNT: 12.7 % (ref 10–15)
GLUCOSE SERPL-MCNC: 107 MG/DL (ref 70–99)
HCT VFR BLD AUTO: 43.5 % (ref 40–53)
HGB BLD-MCNC: 15 G/DL (ref 13.3–17.7)
INR PPP: 1.47 (ref 0.85–1.15)
LVEF ECHO: NORMAL
MCH RBC QN AUTO: 36.7 PG (ref 26.5–33)
MCHC RBC AUTO-ENTMCNC: 34.5 G/DL (ref 31.5–36.5)
MCV RBC AUTO: 106 FL (ref 78–100)
PLATELET # BLD AUTO: 248 10E3/UL (ref 150–450)
POTASSIUM SERPL-SCNC: 3.4 MMOL/L (ref 3.4–5.3)
PROT SERPL-MCNC: 7.5 G/DL (ref 6.4–8.3)
RBC # BLD AUTO: 4.09 10E6/UL (ref 4.4–5.9)
SODIUM SERPL-SCNC: 140 MMOL/L (ref 135–145)
WBC # BLD AUTO: 6.2 10E3/UL (ref 4–11)

## 2024-01-01 PROCEDURE — 80053 COMPREHEN METABOLIC PANEL: CPT | Performed by: PATHOLOGY

## 2024-01-01 PROCEDURE — 82248 BILIRUBIN DIRECT: CPT | Performed by: PATHOLOGY

## 2024-01-01 PROCEDURE — 97129 THER IVNTJ 1ST 15 MIN: CPT | Mod: GO | Performed by: OCCUPATIONAL THERAPIST

## 2024-01-01 PROCEDURE — 99214 OFFICE O/P EST MOD 30 MIN: CPT | Performed by: INTERNAL MEDICINE

## 2024-01-01 PROCEDURE — G0463 HOSPITAL OUTPT CLINIC VISIT: HCPCS | Performed by: STUDENT IN AN ORGANIZED HEALTH CARE EDUCATION/TRAINING PROGRAM

## 2024-01-01 PROCEDURE — 99215 OFFICE O/P EST HI 40 MIN: CPT | Mod: GC | Performed by: STUDENT IN AN ORGANIZED HEALTH CARE EDUCATION/TRAINING PROGRAM

## 2024-01-01 PROCEDURE — A9585 GADOBUTROL INJECTION: HCPCS | Performed by: RADIOLOGY

## 2024-01-01 PROCEDURE — 99417 PROLNG OP E/M EACH 15 MIN: CPT | Mod: GC | Performed by: STUDENT IN AN ORGANIZED HEALTH CARE EDUCATION/TRAINING PROGRAM

## 2024-01-01 PROCEDURE — 82105 ALPHA-FETOPROTEIN SERUM: CPT | Performed by: INTERNAL MEDICINE

## 2024-01-01 PROCEDURE — 97535 SELF CARE MNGMENT TRAINING: CPT | Mod: GO | Performed by: OCCUPATIONAL THERAPIST

## 2024-01-01 PROCEDURE — 99213 OFFICE O/P EST LOW 20 MIN: CPT | Mod: 25 | Performed by: STUDENT IN AN ORGANIZED HEALTH CARE EDUCATION/TRAINING PROGRAM

## 2024-01-01 PROCEDURE — 99214 OFFICE O/P EST MOD 30 MIN: CPT

## 2024-01-01 PROCEDURE — 36415 COLL VENOUS BLD VENIPUNCTURE: CPT | Performed by: PATHOLOGY

## 2024-01-01 PROCEDURE — 74183 MRI ABD W/O CNTR FLWD CNTR: CPT | Performed by: RADIOLOGY

## 2024-01-01 PROCEDURE — G0463 HOSPITAL OUTPT CLINIC VISIT: HCPCS | Performed by: INTERNAL MEDICINE

## 2024-01-01 PROCEDURE — 17003 DESTRUCT PREMALG LES 2-14: CPT | Performed by: STUDENT IN AN ORGANIZED HEALTH CARE EDUCATION/TRAINING PROGRAM

## 2024-01-01 PROCEDURE — 99000 SPECIMEN HANDLING OFFICE-LAB: CPT | Performed by: PATHOLOGY

## 2024-01-01 PROCEDURE — G0463 HOSPITAL OUTPT CLINIC VISIT: HCPCS | Performed by: EMERGENCY MEDICINE

## 2024-01-01 PROCEDURE — 17000 DESTRUCT PREMALG LESION: CPT | Performed by: STUDENT IN AN ORGANIZED HEALTH CARE EDUCATION/TRAINING PROGRAM

## 2024-01-01 PROCEDURE — 99443 PR PHYSICIAN TELEPHONE EVALUATION 21-30 MIN: CPT | Mod: 93

## 2024-01-01 PROCEDURE — 85610 PROTHROMBIN TIME: CPT | Performed by: PATHOLOGY

## 2024-01-01 PROCEDURE — 93306 TTE W/DOPPLER COMPLETE: CPT | Performed by: STUDENT IN AN ORGANIZED HEALTH CARE EDUCATION/TRAINING PROGRAM

## 2024-01-01 PROCEDURE — 99205 OFFICE O/P NEW HI 60 MIN: CPT | Mod: GC | Performed by: EMERGENCY MEDICINE

## 2024-01-01 PROCEDURE — 99205 OFFICE O/P NEW HI 60 MIN: CPT | Mod: GC | Performed by: STUDENT IN AN ORGANIZED HEALTH CARE EDUCATION/TRAINING PROGRAM

## 2024-01-01 PROCEDURE — 85027 COMPLETE CBC AUTOMATED: CPT | Performed by: PATHOLOGY

## 2024-01-01 PROCEDURE — 97165 OT EVAL LOW COMPLEX 30 MIN: CPT | Mod: GO | Performed by: OCCUPATIONAL THERAPIST

## 2024-01-01 RX ORDER — POLYETHYLENE GLYCOL 3350 17 G/17G
1 POWDER, FOR SOLUTION ORAL DAILY PRN
COMMUNITY
End: 2024-01-01

## 2024-01-01 RX ORDER — ASPIRIN 81 MG/1
81 TABLET ORAL DAILY
Qty: 90 TABLET | Refills: 3 | Status: SHIPPED | OUTPATIENT
Start: 2024-01-01

## 2024-01-01 RX ORDER — LORAZEPAM 2 MG/ML
0.5 INJECTION INTRAMUSCULAR EVERY 4 HOURS PRN
Status: CANCELLED | OUTPATIENT
Start: 2024-01-01

## 2024-01-01 RX ORDER — METHYLPREDNISOLONE SODIUM SUCCINATE 125 MG/2ML
125 INJECTION, POWDER, LYOPHILIZED, FOR SOLUTION INTRAMUSCULAR; INTRAVENOUS
Status: CANCELLED
Start: 2024-01-01

## 2024-01-01 RX ORDER — ALBUTEROL SULFATE 90 UG/1
1-2 AEROSOL, METERED RESPIRATORY (INHALATION)
Status: CANCELLED
Start: 2024-01-01

## 2024-01-01 RX ORDER — LANOLIN ALCOHOL/MO/W.PET/CERES
3 CREAM (GRAM) TOPICAL AT BEDTIME
COMMUNITY
End: 2024-01-01

## 2024-01-01 RX ORDER — ALBUTEROL SULFATE 90 UG/1
2 AEROSOL, METERED RESPIRATORY (INHALATION) EVERY 6 HOURS PRN
Qty: 18 G | Refills: 2 | Status: SHIPPED | OUTPATIENT
Start: 2024-01-01

## 2024-01-01 RX ORDER — EPINEPHRINE 1 MG/ML
0.3 INJECTION, SOLUTION INTRAMUSCULAR; SUBCUTANEOUS EVERY 5 MIN PRN
Status: CANCELLED | OUTPATIENT
Start: 2024-01-01

## 2024-01-01 RX ORDER — GADOBUTROL 604.72 MG/ML
10 INJECTION INTRAVENOUS ONCE
Status: COMPLETED | OUTPATIENT
Start: 2024-01-01 | End: 2024-01-01

## 2024-01-01 RX ORDER — DIPHENHYDRAMINE HYDROCHLORIDE 50 MG/ML
50 INJECTION INTRAMUSCULAR; INTRAVENOUS
Status: CANCELLED
Start: 2024-01-01

## 2024-01-01 RX ORDER — SENNOSIDES 8.6 MG
650 CAPSULE ORAL EVERY 6 HOURS PRN
COMMUNITY

## 2024-01-01 RX ORDER — NALTREXONE HYDROCHLORIDE 50 MG/1
50 TABLET, FILM COATED ORAL DAILY
Qty: 90 TABLET | Refills: 1 | Status: SHIPPED | OUTPATIENT
Start: 2024-01-01

## 2024-01-01 RX ORDER — ALBUTEROL SULFATE 0.83 MG/ML
2.5 SOLUTION RESPIRATORY (INHALATION)
Status: CANCELLED | OUTPATIENT
Start: 2024-01-01

## 2024-01-01 RX ORDER — CARBOXYMETHYLCELLULOSE SODIUM 5 MG/ML
1 SOLUTION/ DROPS OPHTHALMIC 3 TIMES DAILY PRN
COMMUNITY
End: 2024-01-01

## 2024-01-01 RX ORDER — MEPERIDINE HYDROCHLORIDE 25 MG/ML
25 INJECTION INTRAMUSCULAR; INTRAVENOUS; SUBCUTANEOUS EVERY 30 MIN PRN
Status: CANCELLED | OUTPATIENT
Start: 2024-01-01

## 2024-01-01 RX ADMIN — GADOBUTROL 10 ML: 604.72 INJECTION INTRAVENOUS at 17:31

## 2024-01-01 ASSESSMENT — PAIN SCALES - GENERAL
PAINLEVEL: NO PAIN (0)

## 2024-01-04 PROBLEM — E44.1 MILD PROTEIN-CALORIE MALNUTRITION (H): Status: ACTIVE | Noted: 2024-01-01

## 2024-01-04 PROBLEM — I85.10 SECONDARY ESOPHAGEAL VARICES WITHOUT BLEEDING (H): Status: ACTIVE | Noted: 2024-01-01
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
## 2024-01-04 NOTE — NURSING NOTE
"Chief Complaint   Patient presents with    RECHECK     Cirrhosis     Vital signs:  Temp: 98.5  F (36.9  C) Temp src: Oral BP: 125/80 Pulse: 91   Resp: 22 SpO2: 92 %     Height: 193 cm (6' 3.98\") Weight: 103.9 kg (229 lb)  Estimated body mass index is 27.89 kg/m  as calculated from the following:    Height as of this encounter: 1.93 m (6' 3.98\").    Weight as of this encounter: 103.9 kg (229 lb).      Meli Fields, [facility]  1/4/2024 11:26 AM    "

## 2024-01-04 NOTE — TELEPHONE ENCOUNTER
"Endoscopy Scheduling Screen    Have you had a positive Covid test in the last 14 days?  No    Are you active on MyChart?   No    What insurance is in the chart?  Other:  [facility] Medicare    Ordering/Referring Provider: Leventhal   (If ordering provider performs procedure, schedule with ordering provider unless otherwise instructed. )    BMI: Estimated body mass index is 27.89 kg/m  as calculated from the following:    Height as of an earlier encounter on 1/4/24: 1.93 m (6' 3.98\").    Weight as of an earlier encounter on 1/4/24: 103.9 kg (229 lb).     Sedation Ordered  MAC/deep sedation.   BMI<= 45 45 < BMI <= 48 48 < BMI < = 50  BMI > 50   No Restrictions No [facility]  No [facility]  [facility] with exceptions Hospital Only OR Only       Are you taking any prescription medications for pain 3 or more times per week?   NO - No RN review required.    Do you have a history of malignant hyperthermia or adverse reaction to anesthesia?  No    (Females) Are you currently pregnant?   No     Have you been diagnosed or told you have pulmonary hypertension?   No    Do you have an LVAD?  No    Have you been told you have moderate to severe sleep apnea?  No    Have you been told you have COPD, asthma, or any other lung disease?  Yes     What breathing problems do you have?  COPD     Do you use home oxygen?  No    Have your breathing problems required an ED visit or hospitalization in the last year?  No    Do you have any heart conditions?  Yes     In the past 6 months, have you had any hospitalizations for heart related issues including cardiomyopathy, heart attack, or stent placement?  No    Do you have any implantable devices in your body (pacemaker, ICD)?  No    Do you take nitroglycerine?  No    Have you ever had an organ transplant?   No    Have you ever had or are you awaiting a heart or lung transplant?   No    Have you had a stroke or transient ischemic attack (TIA aka \"mini stroke\" in the last 6 months?   No    Have you been " "diagnosed with or been told you have cirrhosis of the liver?   Yes (RN Review required for scheduling unless scheduling in Hospital.)    Are you currently on dialysis?   No    Do you need assistance transferring?   No    BMI: Estimated body mass index is 27.89 kg/m  as calculated from the following:    Height as of an earlier encounter on 1/4/24: 1.93 m (6' 3.98\").    Weight as of an earlier encounter on 1/4/24: 103.9 kg (229 lb).     Is patients BMI > 40 and scheduling location [facility]?  No    Do you take an injectable medication for weight loss or diabetes (excluding insulin)?  No    Do you take the medication Naltrexone?  No    Do you take blood thinners?  No       Prep   Are you currently on dialysis or do you have chronic kidney disease?  No    Do you have a diagnosis of diabetes?  No    Do you have a diagnosis of cystic fibrosis (CF)?  No    On a regular basis do you go 3 -5 days between bowel movements?  Yes (Extended Prep)    BMI > 40?  No    Preferred Pharmacy:        [id number] - [place], [place] - [address]  [address] [address]  Phone: [phone] Fax: [phone]      Final Scheduling Details   Colonoscopy prep sent?  N/A    Procedure scheduled  Upper endoscopy (EGD)    Surgeon:  REBECCA     Date of procedure:  05/09/2024     Pre-OP / PAC:   No - Not required for this site.    Location  [facility] - Per exclusion criteria.    Sedation   MAC/Deep Sedation - Per order.      Patient Reminders:   You will receive a call from a Nurse to review instructions and health history.  This assessment must be completed prior to your procedure.  Failure to complete the Nurse assessment may result in the procedure being cancelled.      On the day of your procedure, please designate an adult(s) who can drive you home stay with you for the next 24 hours. The medicines used in the exam will make you sleepy. You will not be able to drive.      You cannot take public transportation, ride share " services, or non-medical taxi service without a responsible caregiver.  Medical transport services are allowed with the requirement that a responsible caregiver will receive you at your destination.  We require that drivers and caregivers are confirmed prior to your procedure.

## 2024-01-04 NOTE — PATIENT INSTRUCTIONS
MRI - January 23, 2025 at 5 PM    We have ordered an upper endoscopy to look for large/dilated veins in the esophagus and stomach.  They will call you to schedule    I will see you again in clinic in 4-6 months    Cirrhosis Education    We recommend you go to the website: [email or web address].ca to review valuable information about your liver disease, good dietary choices, exercise options, and symptom management strategies    Nutrition  - For patients with cirrhosis, it is very important to eat the right types and amounts of foods.  We recommend a diet low in carbohydrates/sugars and high in fresh fruits/vegetables, with the right amount of protein.  We typically recommend 1.5gm/kg/day of protein, or  grams of protein every day.  - In regard to protein intake, you can focus on: All meats, cooked fish and seafood, vegetable-based protein meat products, tofu, eggs, legumes, dairy products (including milk and yogurt and cheese), unsalted nuts, etc...  - You should eat at least three meals a day and three to four snacks between meals  - Bedtime snacks are especially important (preferrably something with some protein)    - Patients with malnutrition and/or loss of muscular mass can improve their nutrition and muscular mass by drinking two cans of dietary supplements daily, particularly at bedtime.  These would include: Ensure, Boost, [place] Instant Milk, Glucerna, or powdered whey protein (or similar supplements)  - Please avoid eating raw seafood, especially shellfish, because of risk of serious illness  - We recommend all patients with cirrhosis limit their daily sodium intake to less than 2,000mg      General Liver Health  - Avoid the use of all non-steroid anti-inflammatory medicines (ibuprofen, Aleve, naproxen, aspirin, etc.) as this can cause serious injury to your kidneys in the setting of liver disease  - If you see a doctor and they prescribe you a new medication, please contact our clinic to let us  know what changes are being made  - If you become acutely ill and present to an ER or are admitted to a hospital, please let us know as soon as you are able.  - Patients with chronic liver disease be vaccinated against hepatitis A and B.  Please discuss with your primary provider the need for these vaccines  - As patients with liver disease are at an increased risk of developing osteoporosis, we recommend that you have a DEXA scan with appropriate follow up and treatment.   - We recommend screening for Vitamin D deficiency (at least yearly) and aggressive replacement/supplementation as warranted.    Sleep Troubles:  - Sleep issues are very common in patients with chronic liver disease  - Recommend strict avoidance of medications such as narcotics, sedatives and sleep aids.    - Low dose benadryl or melatonin can be used for insomnia, if needed.

## 2024-01-15 PROBLEM — I71.10: Status: ACTIVE | Noted: 2024-01-01

## 2024-01-15 PROBLEM — J44.9 CHRONIC OBSTRUCTIVE PULMONARY DISEASE, UNSPECIFIED COPD TYPE (H): Status: ACTIVE | Noted: 2024-01-01
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
## 2024-01-29 NOTE — PROGRESS NOTES
Called patient to review tumor board discussion from last week    Discussed that there is residual tumor around previous ablation and 3+ areas of LR 4 lesions very concerning for recurrent cancer    Not felt to be a candidate for re-treatment via locoregional therapy at this time.    Per recs of Dr. Kennedy: referral to oncology for systemic therapy    Thomas M. Leventhal, M.D.  Associate Professor of Medicine  Advanced/Transplant Hepatology & Critical Care Medicine  [facility]  January 29, 2024 2:16 PM

## 2024-01-31 NOTE — TELEPHONE ENCOUNTER
RECORDS STATUS - ALL OTHER DIAGNOSIS      Imaging Received  January 31, 2024 11:30 AM    Action: Images from [facility] received and resolved to PACS.     RECORDS RECEIVED FROM: Epic   DATE RECEIVED:    NOTES STATUS DETAILS   OFFICE NOTE from referring provider Epic 1/4/24: Dr. Thomas Leventhal   OFFICE NOTE from other specialist Epic 3/21/23: Dr. Rafael Beauchamp   OPERATIVE REPORT Epic 5/15/23: Liver Bx   MEDICATION LIST [facility]    LABS     PATHOLOGY REPORTS Report in Epic 5/15/23: [id number]    ANYTHING RELATED TO DIAGNOSIS Epic Most recent 1/4/24   IMAGING (NEED IMAGES & REPORT)     CT SCANS PACS/(img req from [facility]) 5/17/23, 1/10/23: CT Abd Pel  5/15/23: CT Liver Ablation    [facility]:  12/15/23: CT Chest Abd Pel   MRI PACS 1/23/24-4/19/23: MR Abd  2/7/23: MR Liver   ULTRASOUND PACS/(img req from [facility]) [facility]:  12/20/23: US Abd  12/15/23: US Fast-Trauma    PACS:  4/19/23, 10/16/19. 9/23/18: US Abd
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
## 2024-04-17 NOTE — PROGRESS NOTES
"Palliative Care Outpatient Clinic      Patient ID:  Medical -   - advanced HCC dx'd 2023 s/p TACE and MWA with multifocal recurrence, not candidate for repeat locoregional therapies, is candidate for systemic therapies with palliative intent (median survival 13-19mo with treatment)  - cirrhosis (child-redmond A 6), hep C and AUD (rx'd naltrexone 50mg daily in 2024), esophageal varices  - Onc visit 2024 pt had resumed daily EtOH use and felt that his death is inevitable and doesn't want to prolong things with CA directed treatment at that time.  Plans follow up with onc and GI.  - Admitted 3/2024 to [facility] for etOH WD with likely seizure, seen by me (Dr Schwartz) on palliative service at [facility], we completed a POLST reflecting DNR and DNI (consistent with POLST in [facility] from ), pt stated he was going to set up hospice informational sessions when he got home, but did not want to talk about it theoretically while he was in the hospital  - COPD    Social -   - He currently lives in a house with a friend of 40 years, who used to have a prior relationship with. He notes he helped raise her son who is a psychiatrist. His friend's brother (with aspergers) lives in the house as well as her parents who are in their 90s. He notes most people in the house keep to themselves. He has 6 siblings, 2 sisters who live in town, 2 who he talks to on the phone, and 1 brother in town. His other brother, who was the  he had worked with,  of AIDS.   - Wale grew up in [place] and had \"a rough start to life\". 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 He notes he got a lot of enjoyment out of his work with [employer] throughout his life.   - He notes he still gets vinny out of spending time with and/or interacting with his family. His friend's son's children have \"always " "thought of me as their grandpa\". He has never been  and does not have any biological children of his own.     Care Planning -   - POLST with DNR and DNI (selective treatment)  - 4/2023 referral to [facility] hospice placed  - will need a facility as his functional status declines    History:  History gathered today from: patient    Oncology note 2/2024:   He stated clearly that he is not ready for any further treatment for his cancer as he feels his death is inevitable and he that treatment would only prolong his course. He did not have any suicidal ideation or thoughts of death. He is open to talking to our Cancer Psychologist to better help with coping strategies. I suspect he may have situational/non-situational depression. He is open to working with our Palliative Care Service. He is willing to continue ongoing follow-up to discuss HCC-directed care in future. We will maintain care continuum and setup follow up in 2 months to revisit cancer-directed therapy. He stated he would continue to follow up with his other outpatient providers including GI and primary care.     My interview:  Pain - on gabapentin 300 TID  Sleep - has been poor long-term, taking melatonin / magnesium    PO: can't sit down and eat a large meal anymore, doing fruit / cottage cheese / fish / half sandwiches - does this many times per day  Bms good (taking probiotics and stool softeners OTC)    Not much energy, watching TV lots  Balance not great, no falls since got home, knows when to sit down    Misses drinking \"but that's what ended me in the hospital\" - naltrexone is helping now and doesn't want to drink again    \"I've been processing my belongings and putting stuff out on the curb so I don't leave a bunch of stuff for other people to deal with after I'm gone\"     Doesn't want to go to heart / lung doctor anymore, stopped those medications, and explains that with his current prognosis, these probably aren't very important " "anymore  - still takes albuterol    Has accepted diagnosis / prognosis... but no one will really give me a prognosis (time-based) - we discussed the uncertainty about timing, discussed likely less than a year based on the onc prognosis of 13-19 months with treatment, but that we can't predict with certainty    He explains \"Kamini doesn't want me to die in the house (and can't help when he has a significant functional decline), she is already helping to take care of her mom - he interested in facilities in the future when he needs additional care    Lives in duplex on 2nd floor with Kamini and \"her weird brother Grant\" (who has Asperger), her 90 year old parents live on first floor (dad still drives)    Working on cremation policy and getting everything worked out - says he gets his cremation policy tomorrow    \"I've been around enough death... it's just an inevidtability in nature and I'm 63 and have lived a life. I just want to plan as much as I can so the people around me don't have the stress of having to worry about that.\"    Pertinent Medications:  Albuterol prn  gabapentin 300 TID  Naltrexone 50mg daily  - he has stopped his other medications he was on previously      PE: There were no vitals taken for this visit.   Wt Readings from Last 3 Encounters:   02/05/24 103.7 kg (228 lb 9.6 oz)   01/15/24 100.7 kg (222 lb)   01/04/24 103.9 kg (229 lb)     Gen: alert, awake, NAD  Eyes: + scleral icterus  Pulm: no resp distress  Ext: no gross deformities      Data reviewed:  I reviewed recent labs and imaging, my comments:  Bili 24.8     database reviewed: no      Impression & Recommendations:  This is a 63 year old male with advanced HCC and cirrhosis here for advanced care planning.    He is death accepting and is trying to plan as best he can for the steps ahead (functional decline and death) to minimize the stress on his friends / family when this occurs.  I do think this is him accepting his diagnosis and " prognosis as opposed to depression.    Discussed how he will need additional care / support as time goes on and his cancer progresses.  Recommended talking with hospice and considering enrollment, as they can also help him find a facility as his health declines and he needs more help on a daily basis.  He is open to this, we have placed a referral to [facility] hospice.    I considered [facility] home hospice but I think there is certainly a possibility that he could need a LTC for longer than [facility] hospice home can accommodate.    SW referral also placed to discuss LTC facility planning if he elects not to enroll in hospice at this time.    Follow up in 1 month is planned - he can cancel this if he enrolls in hospice prior to that.    This has been discussed with Dr. Lazaro Schwartz MD  Fellow, Palliative Care    Attending Note:  Patient seen and evaluated with Dr Schwartz and I agree with/confirm their findings/recs in this note. I personally spent over 60 minutes on the date of service in various clinical activities associated with this patient's care.      Thank you for involving us in the patient's care.   Medhat Willis MD / Palliative Medicine / Text me via [facility].

## 2024-04-18 NOTE — LETTER
"2024       RE: Sohan Marcano   [place] [facility] [address]       Dear Colleague,    Thank you for referring your patient, Sohan Marcano, to the [facility] at [facility]. Please see a copy of my visit note below.    Palliative Care Outpatient Clinic      Patient ID:  Medical -   - advanced HCC dx'd 2023 s/p TACE and MWA with multifocal recurrence, not candidate for repeat locoregional therapies, is candidate for systemic therapies with palliative intent (median survival 13-19mo with treatment)  - cirrhosis (child-redmond A 6), hep C and AUD (rx'd naltrexone 50mg daily in 2024), esophageal varices  - Onc visit 2024 pt had resumed daily EtOH use and felt that his death is inevitable and doesn't want to prolong things with CA directed treatment at that time.  Plans follow up with onc and GI.  - Admitted 3/2024 to [facility] for etOH WD with likely seizure, seen by me (Dr Schwartz) on palliative service at [facility], we completed a POLST reflecting DNR and DNI (consistent with POLST in [facility]  system from ), pt stated he was going to set up hospice informational sessions when he got home, but did not want to talk about it theoretically while he was in the hospital  - COPD    Social -   - He currently lives in a house with a friend of 40 years, who used to have a prior relationship with. He notes he helped raise her son who is a psychiatrist. His friend's brother (with aspergers) lives in the house as well as her parents who are in their 90s. He notes most people in the house keep to themselves. He has 6 siblings, 2 sisters who live in town, 2 who he talks to on the phone, and 1 brother in town. His other brother, who was the  he had worked with,  of AIDS.   - Wale grew up in [place] and had \"a rough start to life\". 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 He notes he got a lot of enjoyment out of his work with [employer] throughout his life.   - He notes he still gets vinny out of spending time with and/or interacting with his family. His friend's son's children have \"always thought of me as their grandpa\". He has never been  and does not have any biological children of his own.     Care Planning -   - POLST with DNR and DNI (selective treatment)  - 4/2023 referral to [facility] placed  - will need a facility as his functional status declines    History:  History gathered today from: patient    Oncology note 2/2024:   He stated clearly that he is not ready for any further treatment for his cancer as he feels his death is inevitable and he that treatment would only prolong his course. He did not have any suicidal ideation or thoughts of death. He is open to talking to our Cancer Psychologist to better help with coping strategies. I suspect he may have situational/non-situational depression. He is open to working with our Palliative Care Service. He is willing to continue ongoing follow-up to discuss HCC-directed care in future. We will maintain care continuum and setup follow up in 2 months to revisit cancer-directed therapy. He stated he would continue to follow up with his other outpatient providers including GI and primary care.     My interview:  Pain - on gabapentin 300 TID  Sleep - has been poor long-term, taking melatonin / magnesium    PO: can't sit down and eat a large meal anymore, doing fruit / cottage cheese / fish / half sandwiches - does this many times per day  Bms good (taking probiotics and stool softeners OTC)    Not much energy, watching TV lots  Balance not great, no falls since got home, knows when to sit down    Misses drinking \"but that's what ended me in the hospital\" - naltrexone is helping now and doesn't want to drink again    \"I've " "been processing my belongings and putting stuff out on the curb so I don't leave a bunch of stuff for other people to deal with after I'm gone\"     Doesn't want to go to heart / lung doctor anymore, stopped those medications, and explains that with his current prognosis, these probably aren't very important anymore  - still takes albuterol    Has accepted diagnosis / prognosis... but no one will really give me a prognosis (time-based) - we discussed the uncertainty about timing, discussed likely less than a year based on the onc prognosis of 13-19 months with treatment, but that we can't predict with certainty    He explains \"Kamini doesn't want me to die in the house (and can't help when he has a significant functional decline), she is already helping to take care of her mom - he interested in facilities in the future when he needs additional care    Lives in duplex on 2nd floor with Kamini and \"her weird brother Grant\" (who has Asperger), her 90 year old parents live on first floor (dad still drives)    Working on cremation policy and getting everything worked out - says he gets his cremation policy tomorrow    \"I've been around enough death... it's just an inevidtability in nature and I'm 63 and have lived a life. I just want to plan as much as I can so the people around me don't have the stress of having to worry about that.\"    Pertinent Medications:  Albuterol prn  gabapentin 300 TID  Naltrexone 50mg daily  - he has stopped his other medications he was on previously      PE: There were no vitals taken for this visit.   Wt Readings from Last 3 Encounters:   02/05/24 103.7 kg (228 lb 9.6 oz)   01/15/24 100.7 kg (222 lb)   01/04/24 103.9 kg (229 lb)     Gen: alert, awake, NAD  Eyes: + scleral icterus  Pulm: no resp distress  Ext: no gross deformities      Data reviewed:  I reviewed recent labs and imaging, my comments:  Nathan 24.8     database reviewed: no      Impression & Recommendations:  This is a 63 year " old male with advanced HCC and cirrhosis here for advanced care planning.    He is death accepting and is trying to plan as best he can for the steps ahead (functional decline and death) to minimize the stress on his friends / family when this occurs.  I do think this is him accepting his diagnosis and prognosis as opposed to depression.    Discussed how he will need additional care / support as time goes on and his cancer progresses.  Recommended talking with hospice and considering enrollment, as they can also help him find a facility as his health declines and he needs more help on a daily basis.  He is open to this, we have placed a referral to [facility] hospice.    I considered [facility] home hospice but I think there is certainly a possibility that he could need a LTC for longer than [facility] hospice home can accommodate.    SW referral also placed to discuss LTC facility planning if he elects not to enroll in hospice at this time.    Follow up in 1 month is planned - he can cancel this if he enrolls in hospice prior to that.    This has been discussed with Dr. Willis    Attending Note:  Patient seen and evaluated with Dr Schwartz and I agree with/confirm their findings/recs in this note. I personally spent over 60 minutes on the date of service in various clinical activities associated with this patient's care.      Thank you for involving us in the patient's care.   Medhat Willis MD / Palliative Medicine / Text me via [facility].        Again, thank you for allowing me to participate in the care of your patient.      Sincerely,    Ute Schwartz MD

## 2024-04-18 NOTE — NURSING NOTE
"Oncology Rooming Note    April 18, 2024 3:13 PM   Sohan Marcano is a 63 year old male who presents for:    Chief Complaint   Patient presents with    Oncology Clinic Visit     [facility]     Initial Vitals: /82 (BP Location: Right arm, Patient Position: Chair, Cuff Size: Adult Large)   Pulse 96   Temp 98  F (36.7  C)   Resp 16   Ht 1.91 m (6' 3.2\")   Wt 96.4 kg (212 lb 8 oz)   SpO2 95%   BMI 26.42 kg/m   Estimated body mass index is 26.42 kg/m  as calculated from the following:    Height as of this encounter: 1.91 m (6' 3.2\").    Weight as of this encounter: 96.4 kg (212 lb 8 oz). Body surface area is 2.26 meters squared.  No Pain (0) Comment: Data Unavailable   No LMP for male patient.  Allergies reviewed: Yes  Medications reviewed: Yes    Medications: Medication refills not needed today.  Pharmacy name entered into [employer]:    [id number] - [place], [place] - [address] AT [address] & [facility] - [place] - [address] [facility] [place], [place] - [address] [place] [address] [id number] - [place], [place] - [address]    Frailty Screening:   Is the patient here for a new oncology consult visit in cancer care? 1. Yes. Over the past month, have you experienced difficulty or required a caregiver to assist with:   1. Balance, walking or general mobility (including any falls)? YES  2. Completion of self-care tasks such as bathing, dressing, toileting, grooming/hygiene?  NO  3. Concentration or memory that affects your daily life?  YES     Nito Bright LPN              "
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
## 2024-04-22 NOTE — NURSING NOTE
"Oncology Rooming Note    April 22, 2024 12:47 PM   Sohan Marcano is a 63 year old male who presents for:    Chief Complaint   Patient presents with    Oncology Clinic Visit     Hepatocellular carcinoma      Initial Vitals: /74 (BP Location: Right arm, Patient Position: Sitting, Cuff Size: Adult Regular)   Pulse 55   Temp 98.4  F (36.9  C) (Oral)   Resp 16   Wt 94.7 kg (208 lb 11.2 oz)   SpO2 97%   BMI 25.95 kg/m   Estimated body mass index is 25.95 kg/m  as calculated from the following:    Height as of 4/18/24: 1.91 m (6' 3.2\").    Weight as of this encounter: 94.7 kg (208 lb 11.2 oz). Body surface area is 2.24 meters squared.  No Pain (0) Comment: Data Unavailable   No LMP for male patient.  Allergies reviewed: Yes  Medications reviewed: Yes    Medications: Medication refills not needed today.  Pharmacy name entered into [facility]:    [id number] - [place], [place] - [address] AT [address]  [facility] - [place], [place] - [address] [facility] [place], [place] - [address] [place] [address] [id number] - [place] - [address] [facility]    Frailty Screening:   Is the patient here for a new oncology consult visit in cancer care? 2. No      Clinical concerns:  none      Kemi Millan              "

## 2024-04-23 NOTE — PROGRESS NOTES
[facility]: Cancer Care Short Note                                                                                          Did not enroll.  Patient is not interested in cancer-directed therapy and is meeting with hospice      Guerline Beal RN, BSN  RN Care Coordinator   [facility]

## 2024-04-24 NOTE — PROGRESS NOTES
Social Work - Intervention  [facility]  Data/Intervention:    Patient Name: Sohan Marcano Goes By: Sohan CHAUDHARI/Age: 1960 (63 year old)     Visit Type: telephone  Referral Source: care team  Reason for Referral: end of life planning    Collaborated With:    -patient  -[facility] care     Psychosocial Information/Concerns:  Patient is not interested in pursuing treatment or palliative care.    Patient would like to explore long term care facilities, hospice services and discuss end of life planning.     Patient expressed the above to their oncology care team and hospice referral was placed to [facility].       Intervention/Education/Resources Provided:  SW followed up with [facility] regarding hospice referral.     [facility] stated the referral had not come into their referral que though they could see the hospice orders placed in patient's chart.     SW informed [facility] of patient interest in hospice information session and desire for end of life planning.    Assessment/Plan:  [facility] agreed to follow up with patient regarding interest in hospice services.     Should patient choose to enroll in hospice services, [facility] will assist patient with end of life planning.     [facility] will follow back up with  with regards to patient's decision to enroll.       24  SW followed back up with [facility] who reported patient had enrolled in hospice on 24.     [facility] will follow patient for ongoing supportive needs. No further follow up form SW needed at this time.       Bailey AMBRIZ, [facility]  - Oncology  Phone : [phone]  Pager: [phone]

## 2024-04-25 NOTE — LETTER
4/25/2024       RE: Sohan Marcano  [address] [facility] [address]     Dear Colleague,    Thank you for referring your patient, Sohan Marcano, to the [facility] [place] [facility]. Please see a copy of my visit note below.    [facility] Dermatology Note    Encounter Date: Apr 25, 2024    Dermatology Problem List:  1. 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 Family history of Melanoma (Father). Brother, both passed   3.  Actinic keratosis  - s/p cryo 12/13/2014, 10/17/2019, 1/25/21, 9/13/2021, 3/13/2023  - Right elbow, s/p biopsy 2/23/2021  4. Seborrheic keratosis  5. Telangiectasia    Major PMHx  -   ______________________________________    Impression/Plan:  Sohan was seen today for derm problem.    Diagnoses and all orders for this visit:    Actinic keratosis  -     WA DESTRUCT PREMALIGNANT LESION, FIRST [[id number]]  -     WA DESTRUCT PREMALIGNANT LESION, 2-14 [[id number]]  - neck and scalp     Lentigines  Multiple benign nevi  Actinic skin damage  - Reviewed the compounding benefits of incremental changes to sun protective clothing behaviors including increased frequency of sunscreen and sun protective clothing like broad brimmed hats and longsleeved UPF containing clothing    Jaundice  - HCC meeting w/ hospice     Other orders  -     Adult Dermatology Clinic Follow-Up Order (Blank); Future        Cryotherapy procedure note: After verbal consent and discussion of risks and benefits including but no limited to dyspigmentation/scar, blister, and pain, 2 aks on neck and scalp was(were) treated with 1-2mm freeze border for 2 cycles with liquid nitrogen. Post cryotherapy instructions were provided.      Follow-up in 6 mo .       Staff Involved:  Staff Only    Luis Antonio Miller MD   of Dermatology  Department of Dermatology  [facility]      CC:   Chief Complaint   Patient presents with    Derm Problem     FBSE- spot of concern behind R ear       History of Present Illness:  Mr. Sohan Marcano is a 63 year old male who presents as a  patient.    Pt presents today for concerns about spots on trunk and extremities. Spot behind ear present for months. He picks at it and it bothers him and is sore.     Unfortunately has been diagnosed with unresectable hepatocellular carcinoma in the setting of cirrhosis. Is meeting w/ hospice tomorrow       Labs:      Physical exam:  Vitals: There were no vitals taken for this visit.  GEN: well developed, well-nourished, in no acute distress, in a pleasant mood.     SKIN: Slater phototype 1  - Full skin, which includes the head/face, both arms, chest, back, abdomen,both legs, genitalia and/or groin buttocks, digits and/or nails, was examined.  - Flat brown macules and patches in a sun exposed areas on face and extremities  - scattered brown papules on trunk and extremities   - yellowing of sclera and skin   - No other lesions of concern on areas examined.     Past Medical History:   Past Medical History:   Diagnosis Date    Actinic keratosis     Aneurysm of thoracic aorta (H24)     Basal cell carcinoma     Cirrhosis of liver (H)     COPD (chronic obstructive pulmonary disease) (H)     HCC (hepatocellular carcinoma) (H)     Hepatic steatosis     History of hepatitis C     HLD (hyperlipidemia)     HTN (hypertension)     Neuropathy     Obesity     Osteoarthritis     Portal hypertension (H)     Squamous cell carcinoma     Substance abuse (H)     alcohol    Uncomplicated asthma      Past Surgical History:   Procedure Laterality Date    HERNIA REPAIR      as child    IR CHEMO EMBOLIZATION  5/15/2023    IR LIVER BIOPSY PERCUTANEOUS   "5/15/2023    IA DENTAL SURGERY PROCEDURE      wisdom teeth removal         Social History:   reports that he has been smoking cigarettes. He has never used smokeless tobacco. He reports that he does not currently use alcohol after a past usage of about 20.0 standard drinks of alcohol per week. He reports that he does not currently use drugs.    Family History:  Family History   Problem Relation Age of Onset    Other - See Comments Mother         Patient states his Mother ? had skin cancer.    Melanoma Father     Other - See Comments Father         Patient reports Father had \"all types of skin cancer\".    Coronary Artery Disease Father     CABG Father     Skin Cancer Brother     Coronary Artery Disease Brother     Skin Cancer Sister     Other - See Comments Sister         Basal cell carcinoma    Skin Cancer Sister     Coronary Artery Disease Sister     Aortic aneurysm Sister     Anesthesia Reaction No family hx of     Venous thrombosis No family hx of        Medications:  Current Outpatient Medications   Medication Sig Dispense Refill    albuterol (PROAIR HFA/PROVENTIL HFA/VENTOLIN HFA) 108 (90 Base) MCG/ACT inhaler Inhale 2 puffs into the lungs every 6 hours as needed for shortness of breath or wheezing 18 g 2    Cholecalciferol (VITAMIN D) 125 MCG (5000 UT) capsule Take 1 capsule (5,000 Units) by mouth daily 90 capsule 3    gabapentin (NEURONTIN) 300 MG capsule Take 1 capsule (300 mg) by mouth 3 times daily as needed for neuropathic pain 90 capsule 3    MILK THISTLE PO Take 1 tablet by mouth daily      multivitamin w/minerals (THERA-VIT-M) tablet Take 1 tablet by mouth daily (with breakfast) 30 tablet 0    naltrexone (DEPADE/REVIA) 50 MG tablet Take 1 tablet (50 mg) by mouth daily 90 tablet 1    acetaminophen (TYLENOL) 650 MG CR tablet Take 650 mg by mouth every 6 hours as needed for mild pain or fever (Patient not taking: Reported on 4/18/2024)      aspirin (ASPIRIN LOW DOSE) 81 MG EC tablet Take 1 tablet (81 mg) " by mouth daily (Patient not taking: Reported on 4/18/2024) 90 tablet 3    losartan (COZAAR) 25 MG tablet Take 2 tablets (50 mg) by mouth daily (Patient not taking: Reported on 4/18/2024) 90 tablet 3    naltrexone (VIVITROL) 380 MG SUSR Inject 380 mg into the muscle every 7 days (Patient not taking: Reported on 4/18/2024)       No Known Allergies

## 2024-04-25 NOTE — NURSING NOTE
Dermatology Rooming Note    Sohan Marcano's goals for this visit include:   Chief Complaint   Patient presents with    Derm Problem     FBSE- spot of concern behind R ear     Helen Carranza, EMT

## 2024-04-25 NOTE — PROGRESS NOTES
[facility] Dermatology Note    Encounter Date: Apr 25, 2024    Dermatology Problem List:  1. 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 Family history of Melanoma (Father). Brother, both passed   3.  Actinic keratosis  - s/p cryo 12/13/2014, 10/17/2019, 1/25/21, 9/13/2021, 3/13/2023  - Right elbow, s/p biopsy 2/23/2021  4. Seborrheic keratosis  5. Telangiectasia    Major PMHx  -   ______________________________________    Impression/Plan:  Sohan was seen today for derm problem.    Diagnoses and all orders for this visit:    Actinic keratosis  -     PA DESTRUCT PREMALIGNANT LESION, FIRST [[id number]]  -     PA DESTRUCT PREMALIGNANT LESION, 2-14 [[id number]]  - neck and scalp     Lentigines  Multiple benign nevi  Actinic skin damage  - Reviewed the compounding benefits of incremental changes to sun protective clothing behaviors including increased frequency of sunscreen and sun protective clothing like broad brimmed hats and longsleeved UPF containing clothing    Jaundice  - HCC meeting w/ hospice     Other orders  -     Adult Dermatology Clinic Follow-Up Order (Blank); Future        Cryotherapy procedure note: After verbal consent and discussion of risks and benefits including but no limited to dyspigmentation/scar, blister, and pain, 2 aks on neck and scalp was(were) treated with 1-2mm freeze border for 2 cycles with liquid nitrogen. Post cryotherapy instructions were provided.     Follow-up in 6 mo .       Staff Involved:  Staff Only    Luis Antonio Miller MD   of Dermatology  Department of Dermatology  [facility]      CC:   Chief Complaint   Patient presents with    Derm Problem     FBSE- spot of concern behind R ear       History of Present Illness:    Sohan Marcano is a 63 year old male who presents as a  patient.    Pt presents today for concerns about spots on trunk and extremities. Spot behind ear present for months. He picks at it and it bothers him and is sore.     Unfortunately has been diagnosed with unresectable hepatocellular carcinoma in the setting of cirrhosis. Is meeting w/ hospice tomorrow       Labs:      Physical exam:  Vitals: There were no vitals taken for this visit.  GEN: well developed, well-nourished, in no acute distress, in a pleasant mood.     SKIN: Slater phototype 1  - Full skin, which includes the head/face, both arms, chest, back, abdomen,both legs, genitalia and/or groin buttocks, digits and/or nails, was examined.  - Flat brown macules and patches in a sun exposed areas on face and extremities  - scattered brown papules on trunk and extremities   - yellowing of sclera and skin   - No other lesions of concern on areas examined.     Past Medical History:   Past Medical History:   Diagnosis Date    Actinic keratosis     Aneurysm of thoracic aorta (H24)     Basal cell carcinoma     Cirrhosis of liver (H)     COPD (chronic obstructive pulmonary disease) (H)     HCC (hepatocellular carcinoma) (H)     Hepatic steatosis     History of hepatitis C     HLD (hyperlipidemia)     HTN (hypertension)     Neuropathy     Obesity     Osteoarthritis     Portal hypertension (H)     Squamous cell carcinoma     Substance abuse (H)     alcohol    Uncomplicated asthma      Past Surgical History:   Procedure Laterality Date    HERNIA REPAIR      as child    IR CHEMO EMBOLIZATION  5/15/2023    IR LIVER BIOPSY PERCUTANEOUS  5/15/2023    OR DENTAL SURGERY PROCEDURE      wisdom teeth removal         Social History:   reports that he has been smoking cigarettes. He has never used smokeless tobacco. He reports that he does not currently use alcohol after a past usage of about 20.0 standard drinks of alcohol per week. He reports that he does not currently  "use drugs.    Family History:  Family History   Problem Relation Age of Onset    Other - See Comments Mother         Patient states his Mother ? had skin cancer.    Melanoma Father     Other - See Comments Father         Patient reports Father had \"all types of skin cancer\".    Coronary Artery Disease Father     CABG Father     Skin Cancer Brother     Coronary Artery Disease Brother     Skin Cancer Sister     Other - See Comments Sister         Basal cell carcinoma    Skin Cancer Sister     Coronary Artery Disease Sister     Aortic aneurysm Sister     Anesthesia Reaction No family hx of     Venous thrombosis No family hx of        Medications:  Current Outpatient Medications   Medication Sig Dispense Refill    albuterol (PROAIR HFA/PROVENTIL HFA/VENTOLIN HFA) 108 (90 Base) MCG/ACT inhaler Inhale 2 puffs into the lungs every 6 hours as needed for shortness of breath or wheezing 18 g 2    Cholecalciferol (VITAMIN D) 125 MCG (5000 UT) capsule Take 1 capsule (5,000 Units) by mouth daily 90 capsule 3    gabapentin (NEURONTIN) 300 MG capsule Take 1 capsule (300 mg) by mouth 3 times daily as needed for neuropathic pain 90 capsule 3    MILK THISTLE PO Take 1 tablet by mouth daily      multivitamin w/minerals (THERA-VIT-M) tablet Take 1 tablet by mouth daily (with breakfast) 30 tablet 0    naltrexone (DEPADE/REVIA) 50 MG tablet Take 1 tablet (50 mg) by mouth daily 90 tablet 1    acetaminophen (TYLENOL) 650 MG CR tablet Take 650 mg by mouth every 6 hours as needed for mild pain or fever (Patient not taking: Reported on 4/18/2024)      aspirin (ASPIRIN LOW DOSE) 81 MG EC tablet Take 1 tablet (81 mg) by mouth daily (Patient not taking: Reported on 4/18/2024) 90 tablet 3    losartan (COZAAR) 25 MG tablet Take 2 tablets (50 mg) by mouth daily (Patient not taking: Reported on 4/18/2024) 90 tablet 3    naltrexone (VIVITROL) 380 MG SUSR Inject 380 mg into the muscle every 7 days (Patient not taking: Reported on 4/18/2024)       No " Known Allergies

## 2024-04-26 NOTE — TELEPHONE ENCOUNTER
"----- Message from Yi Hernandez RN sent at 4/26/2024 12:14 PM CDT -----  Regarding: Cancel  Patient called and wants to cancel his EGD for 05.09.2024.  When I asked patient if he would like to reschedule he said \"no I am not interested in that\".      Thank you,   Laila Hernandez  "

## 2024-04-26 NOTE — TELEPHONE ENCOUNTER
Left Voicemail (1st Attempt) and Sent Mychart (1st Attempt) for the patient to call back and schedule the following:    Appointment type: follow up  Provider: Paul  Return date: 10/2024  Specialty phone number: [phone]  Additional appointment(s) needed: na  Additonal Notes: na

## 2024-04-26 NOTE — TELEPHONE ENCOUNTER
Caller: No call    Reason for Reschedule/Cancellation   (please be detailed, any staff messages or encounters to note?): Does not want procedure.       Prior to reschedule please review:  Ordering Provider: LEVENTHAL  Sedation Determined: MAC  Does patient have any ASC Exclusions, please identify?: Y - cirrhosis      Notes on Cancelled Procedure:  Procedure: Upper Endoscopy [EGD]   Date: 5/9/2024  Location: [facility]; [address] [place] [address], [address] Floor, [place] [address]   Surgeon: Yoni      Rescheduled: No, patient declined.        Did you cancel or rescheduled an EUS procedure? No.

## 2024-05-29 NOTE — TELEPHONE ENCOUNTER
Retail Pharmacy Prior Authorization Team   Phone: [phone]    PA Initiation    Medication: GABAPENTIN 300 MG PO CAPS  Insurance Company: [employer] Non-Specialty PA's - Phone [phone] Fax [phone]  Pharmacy Filling the Rx: [id number] - [place], [place] - [address]  Filling Pharmacy Phone: [phone]  Filling Pharmacy Fax:    Start Date: 5/29/2024      Note: Due to record-high volumes, our turn-around time is taking longer than usual . We are currently 2 weeks behind in the pools.   We are working diligently to submit all requests in a timely manner and in the order they are received. Please only flag TRUE URGENT requests as high priority to the pool at this time.   If you have questions on status of PA's,  please send a note/message in the active PA encounter and send back to the [facility] PA pool [[phone]].    If you have questions about the turn-around time or about our process, please reach out to our supervisor Leilani Harper.   Thank you!   RPPA (Retail Pharmacy Prior Authorization) team

## 2024-05-30 NOTE — TELEPHONE ENCOUNTER
Retail Pharmacy Prior Authorization Team   Phone: [phone]    Hello,  Is this being used for a hospice related illness? If yes, Medicare Part A covers all drugs used in hospice treatment. If No, please let me know and I will send the clarification to Medicare Part D for coverage determination.   Thank you,  Maida Lombardo CPhT  [facility] PA Team

## 2024-05-30 NOTE — TELEPHONE ENCOUNTER
Called pt and LMOM to callback clinic to check if gabapentin helps with his chronic pain as if it does it likely would be covered per message below.    Pipe Fam RN   [facility]

## 2024-05-31 NOTE — TELEPHONE ENCOUNTER
Retail Pharmacy Prior Authorization Team   Phone: [phone]    Per notes below Hospice related medications will be covered under Medicare Part A. PA has been denied from Medicare Part D plan. I spoke with the pharmacy and they are aware.

## 2024-05-31 NOTE — TELEPHONE ENCOUNTER
Called pt and left a detailed voicemail message to call the clinic back regarding the message below. Thanks    Guerline Cardona RN  [facility]    decreased damaris/decreased step length/decreased stride length

## 2024-05-31 NOTE — TELEPHONE ENCOUNTER
Pt called back and stated the Gabapentin helps with his chronic pain. States that without it both of his feel are in pain.

## 2024-06-10 NOTE — TELEPHONE ENCOUNTER
Left Voicemail (2nd Attempt) for the patient to call back and schedule the following:    Appointment type: rtn cardio  Provider: vargas mcdonnell   Return date: 09/08/24  Specialty phone number: [phone] opt 1   Additional appointment(s) needed: n/a   Additonal Notes: n/a

## 2024-06-12 NOTE — TELEPHONE ENCOUNTER
Left Voicemail (2nd Attempt) for the patient to call back and schedule the following:    Appointment type: rtn cardio  Provider: vargas mcdonnell   Return date: 09/08/24  Specialty phone number: [phone] opt 1   Additional appointment(s) needed: n/a   Additonal Notes: n/a

## 2024-06-17 PROBLEM — Z71.89 ADVANCED DIRECTIVES, COUNSELING/DISCUSSION: Status: RESOLVED | Noted: 2022-06-22 | Resolved: 2024-01-01

## 2025-04-23 NOTE — PROGRESS NOTES
07/05/19 0235   Patient Belongings   Did you bring any home meds/supplements to the hospital?  No   Patient Belongings locker   Patient Belongings Put in Hospital Secure Location (Security or Locker, etc.) cash/credit card;cell phone/electronics;clothing;glasses;keys;money (see comment);shoes   Belongings Search Yes   Clothing Search Yes   Second Staff Emeka PRITCHETT   Comment All patient belongings are in a bin and his discahrge bin. His important cards and cash of $6.00 were sent to security.     BIN:  Pants, shoes and socks, belt, a cap. Sunglasses, shirt and a lighter.    DISCHARGE BIN: Phone, keys, some coins and a couple of gas cards.    SECURITY ENV # [id number] - [place] Drivers License, VISA, and cash of $6.00    A               Admission:  I am responsible for any personal items that are not sent to the safe or pharmacy.  [facility] is not responsible for loss, theft or damage of any property in my possession.    Signature:  _________________________________ Date: _______  Time: _____                                              Staff Signature:  ____________________________ Date: ________  Time: _____      2nd Staff person, if patient is unable/unwilling to sign:    Signature: ________________________________ Date: ________  Time: _____     Discharge:  [facility] has returned all of my personal belongings:    Signature: _________________________________ Date: ________  Time: _____                                          Staff Signature:  ____________________________ Date: ________  Time: _____       
Brief Medicine Note    Chart reviewed. Vitals improved. No significant tachycardia.  Bilirubin and AST improved (see below).     Will repeat LFTs in AM and monitor vitals.     Please contact on call provider if any acute concerns.       Vahid Benítez PA-C       ROUTINE IP LABS (Last four results)  Recent Labs   Lab 07/07/19  0644 07/06/19  1136 07/04/19  2148    136 142   POTASSIUM 3.8 3.5 3.6   CHLORIDE 103 101 107   CO2 27 27 26   ANIONGAP 7 8 9   * 123* 107*   BUN 11 10 7   CR 0.86 0.73 0.73   LEIGHA 8.9 8.6 8.3*   MAG  --   --  2.1   PROTTOTAL 7.9 7.4 7.7   ALBUMIN 3.1* 2.9* 3.0*   BILITOTAL 4.5* 6.7* 3.7*   ALKPHOS 119 91 103   AST 69* 78* 106*   ALT 43 44 52            
Business office stated they met with pt on Friday to complete his MA application, and it is currently active. Pt informed and follow up primary care appointment was made on Monday July 15th at 10:40 am at the [facility] [place] [facility] to help pt manage his hepatitis C.     Provider informed, and discharge order placed.    Pt was discharged into the care of self. Discharge teaching, including a review of the f/u care set-up by [facility], as well as medication teaching, complete. Pt completed a Coping Plan and denies SI/SIB/HI.  All belongings were returned to pt upon discharge.      
Case Management Note  7/8/2019    Writer met with pt to initiate discharge planning. Pt reports he is here for detox only. Pt declined case management, assessment and/or referral. Pt reports he knows some older people in recovery and he will talk with them for sober support. Pt advised to seek assistance as needed. Case management complete.    Lamont Chaudhary MA, LADC  
Participated in Music Therapy group with focus on mood elevation, validation and decreasing anxiety and improved group cohesiveness. Engaged and cooperative in music listening interventions.   Showed progress in session goals.     
Pt came in seeking help with withdrawal from alcohol. Has been trying to cut down on his own but still drinking almost 1 L per day. Pt has history of DT's when he tried to detox on his own. Pt has had treatment as a teenager, been in our detox once 5 years ago after being on a medical unit. Drug of choice is ETOH, has a remote history of using heroin and amphetamines as a teen. Pt was cooperative and pleasant, starting to show signs of ETOH withdrawal. Denies SI.  
Pt has been 24 hours without valium, MSSA score 3, pt officially out of detox.  Casie Tapia RN     
Pt stumbled in hallway and went down on one knee.  He caught himself and regained balance independently.  Pt report that he feels a bit unsteady when ambulating.  Escobar placed at bedside with instructions to use it when wanting to get up.  Falls precautions initiated.  R knee is without redness or swelling, no stiffness, patient has full range of motion without difficulty or discomfort.  
The patient had one episode of urinary incontinence.  He also had one emesis.  The doctor was called and Zofran was given.  He refused his Vitamin D due to his nausea.  
The patient's VS were elevated and IM was notified.  IM will be seeing the patient for the elevated VS and as he feels unsteady.  A wheelchair was obtained for the patient.  
no

## (undated) RX ORDER — VERAPAMIL HYDROCHLORIDE 2.5 MG/ML
INJECTION, SOLUTION INTRAVENOUS
Status: DISPENSED
Start: 2023-05-15

## (undated) RX ORDER — DEXAMETHASONE SODIUM PHOSPHATE 4 MG/ML
INJECTION, SOLUTION INTRA-ARTICULAR; INTRALESIONAL; INTRAMUSCULAR; INTRAVENOUS; SOFT TISSUE
Status: DISPENSED
Start: 2023-05-15

## (undated) RX ORDER — NITROGLYCERIN 5 MG/ML
VIAL (ML) INTRAVENOUS
Status: DISPENSED
Start: 2023-05-15

## (undated) RX ORDER — LIDOCAINE HYDROCHLORIDE 10 MG/ML
INJECTION, SOLUTION EPIDURAL; INFILTRATION; INTRACAUDAL; PERINEURAL
Status: DISPENSED
Start: 2023-05-15

## (undated) RX ORDER — EPHEDRINE SULFATE 50 MG/ML
INJECTION, SOLUTION INTRAMUSCULAR; INTRAVENOUS; SUBCUTANEOUS
Status: DISPENSED
Start: 2023-05-15

## (undated) RX ORDER — FENTANYL CITRATE 50 UG/ML
INJECTION, SOLUTION INTRAMUSCULAR; INTRAVENOUS
Status: DISPENSED
Start: 2023-05-15

## (undated) RX ORDER — ALBUTEROL SULFATE 0.83 MG/ML
SOLUTION RESPIRATORY (INHALATION)
Status: DISPENSED
Start: 2022-07-18

## (undated) RX ORDER — HEPARIN SODIUM 1000 [USP'U]/ML
INJECTION, SOLUTION INTRAVENOUS; SUBCUTANEOUS
Status: DISPENSED
Start: 2023-05-15

## (undated) RX ORDER — PROPOFOL 10 MG/ML
INJECTION, EMULSION INTRAVENOUS
Status: DISPENSED
Start: 2023-05-15

## (undated) RX ORDER — FENTANYL CITRATE-0.9 % NACL/PF 10 MCG/ML
PLASTIC BAG, INJECTION (ML) INTRAVENOUS
Status: DISPENSED
Start: 2023-05-15

## (undated) RX ORDER — ONDANSETRON 2 MG/ML
INJECTION INTRAMUSCULAR; INTRAVENOUS
Status: DISPENSED
Start: 2023-05-15

## (undated) RX ORDER — SCOLOPAMINE TRANSDERMAL SYSTEM 1 MG/1
PATCH, EXTENDED RELEASE TRANSDERMAL
Status: DISPENSED
Start: 2023-05-15

## (undated) RX ORDER — HYDROMORPHONE HYDROCHLORIDE 1 MG/ML
INJECTION, SOLUTION INTRAMUSCULAR; INTRAVENOUS; SUBCUTANEOUS
Status: DISPENSED
Start: 2023-05-15